# Patient Record
Sex: FEMALE | Race: WHITE | NOT HISPANIC OR LATINO | Employment: UNEMPLOYED | URBAN - METROPOLITAN AREA
[De-identification: names, ages, dates, MRNs, and addresses within clinical notes are randomized per-mention and may not be internally consistent; named-entity substitution may affect disease eponyms.]

---

## 2017-03-12 ENCOUNTER — HOSPITAL ENCOUNTER (EMERGENCY)
Facility: HOSPITAL | Age: 42
Discharge: HOME | End: 2017-03-12
Attending: EMERGENCY MEDICINE | Admitting: EMERGENCY MEDICINE
Payer: COMMERCIAL

## 2017-03-12 VITALS
TEMPERATURE: 98.2 F | DIASTOLIC BLOOD PRESSURE: 82 MMHG | OXYGEN SATURATION: 98 % | HEART RATE: 82 BPM | RESPIRATION RATE: 18 BRPM | WEIGHT: 229.28 LBS | SYSTOLIC BLOOD PRESSURE: 142 MMHG

## 2017-03-12 DIAGNOSIS — R51.9 FACIAL PAIN, ACUTE: Primary | ICD-10-CM

## 2017-03-12 DIAGNOSIS — K08.89 PAIN, DENTAL: ICD-10-CM

## 2017-03-12 PROCEDURE — 99283 EMERGENCY DEPT VISIT LOW MDM: CPT

## 2017-03-12 RX ORDER — HYDROCODONE BITARTRATE AND ACETAMINOPHEN 5; 325 MG/1; MG/1
1 TABLET ORAL EVERY 6 HOURS PRN
Qty: 10 TABLET | Refills: 0 | Status: SHIPPED | OUTPATIENT
Start: 2017-03-12 | End: 2018-06-11

## 2017-03-12 RX ORDER — AMOXICILLIN AND CLAVULANATE POTASSIUM 875; 125 MG/1; MG/1
1 TABLET, FILM COATED ORAL EVERY 12 HOURS
Qty: 20 TABLET | Refills: 0 | Status: SHIPPED | OUTPATIENT
Start: 2017-03-12 | End: 2017-03-22

## 2018-01-18 NOTE — MISCELLANEOUS
Message  Return to work or school:   Jason Montejo is under my professional care   She was seen in my office on 2/29/16   She is able to return to work on  3/2/16            Future Appointments    Signatures   Electronically signed by : Cheli Alba [facility]; Feb 29 2016 11:11AM EST                       (Author)

## 2018-03-29 ENCOUNTER — OFFICE VISIT (OUTPATIENT)
Dept: URGENT CARE | Facility: MEDICAL CENTER | Age: 43
End: 2018-03-29
Payer: COMMERCIAL

## 2018-03-29 VITALS
HEART RATE: 100 BPM | TEMPERATURE: 98.3 F | OXYGEN SATURATION: 100 % | BODY MASS INDEX: 39.91 KG/M2 | HEIGHT: 64 IN | SYSTOLIC BLOOD PRESSURE: 114 MMHG | RESPIRATION RATE: 20 BRPM | WEIGHT: 233.8 LBS | DIASTOLIC BLOOD PRESSURE: 82 MMHG

## 2018-03-29 DIAGNOSIS — B00.9 HERPES SIMPLEX VIRAL INFECTION: Primary | ICD-10-CM

## 2018-03-29 PROCEDURE — 99213 OFFICE O/P EST LOW 20 MIN: CPT | Performed by: NURSE PRACTITIONER

## 2018-03-29 RX ORDER — ACYCLOVIR 400 MG/1
400 TABLET ORAL
Qty: 25 TABLET | Refills: 0 | Status: SHIPPED | OUTPATIENT
Start: 2018-03-29 | End: 2021-01-13 | Stop reason: ALTCHOICE

## 2018-03-29 NOTE — LETTER
March 29, 2018     Patient: Gisela Brown   YOB: 1975   Date of Visit: 3/29/2018       To Whom it May Concern:    Gisela Brown was seen in my clinic on 3/29/2018  She may return to school on 4/2/2018  If you have any questions or concerns, please don't hesitate to call           Sincerely,          SANGEETA Pool        CC: No Recipients

## 2018-03-29 NOTE — PROGRESS NOTES
[facility]    NAME: Jose Daniel Clifton is a 44 y o  female  : 1975    MRN: [id number]  DATE: 2018  TIME: 2:35 PM    Assessment and Plan   Herpes simplex viral infection [B00 9]  1  Herpes simplex viral infection  acyclovir (ZOVIRAX) 400 MG tablet         Patient Instructions   1  Acyclovir 1 tab 5 times daily  For 5 day  2  Sustain from physical contact until lesions are gone  3  Follow up wit PCP/OB GYN as needed   4  Ibuprofen as needed for pain     Chief Complaint     Chief Complaint   Patient presents with    Rash     around lips x 4 days painful         History of Present Illness       45 y/o female presents with rash to lips  She reports that last week they felt dry and burning, so applied a new type of Vaseline until 2 days ago  She states that the rash appeared 4 days ago  She describes it as burning and painful  This morning her lips were swollen which prompted her visit today  She admits that she has been with the same sexual partner for 1 year  She denies history of genital herpes  She admits to oral sex over the weekend  She has not applied anything else to her lips and denies the use of any other new product  Rash   Pertinent negatives include no diarrhea, fatigue, fever or vomiting  Review of Systems   Review of Systems   Constitutional: Negative for chills, fatigue and fever  HENT: Negative for mouth sores  Gastrointestinal: Negative for abdominal pain, diarrhea, nausea and vomiting  Genitourinary: Negative for dysuria, genital sores, pelvic pain and vaginal pain  Skin: Positive for rash           Current Medications       Current Outpatient Prescriptions:     acyclovir (ZOVIRAX) 400 MG tablet, Take 1 tablet (400 mg total) by mouth 5 (five) times a day for 5 days, Disp: 25 tablet, Rfl: 0    HYDROcodone-acetaminophen (NORCO) 5-325 mg per tablet, Take 1 tablet by mouth every 6 (six) hours as needed for pain for up to 10 doses Max Daily Amount: 4 tablets, Disp: 10 tablet, Rfl: 0    Current Allergies     Allergies as of 03/29/2018    (No Known Allergies)            The following portions of the patient's history were reviewed and updated as appropriate: allergies, current medications, past family history, past medical history, past social history, past surgical history and problem list      History reviewed  No pertinent past medical history  History reviewed  No pertinent surgical history  History reviewed  No pertinent family history  Medications have been verified  Objective   /82   Pulse 100   Temp 98 3 °F (36 8 °C) (Temporal)   Resp 20   Ht 5' 4" (1 626 m)   Wt 106 kg (233 lb 12 8 oz)   SpO2 100%   BMI 40 13 kg/m²        Physical Exam     Physical Exam   Constitutional: She is oriented to person, place, and time  She appears well-developed and well-nourished  She is active  HENT:   Head: Normocephalic  Mouth/Throat:       Neck: Normal range of motion  Cardiovascular: Normal rate, regular rhythm, S1 normal, S2 normal and normal heart sounds  Exam reveals no gallop and no friction rub  No murmur heard  Pulmonary/Chest: Effort normal and breath sounds normal  No respiratory distress  Neurological: She is alert and oriented to person, place, and time  GCS eye subscore is 4  GCS verbal subscore is 5  GCS motor subscore is 6  Skin: Skin is warm and dry  Rash noted

## 2018-03-29 NOTE — PATIENT INSTRUCTIONS
1  Acyclovir 1 tab 5 times daily  For 5 day  2  Sustain from physical contact until lesions are gone  3  Follow up wit PCP/OB GYN as needed   4  Ibuprofen as needed for pain   Oral Herpes Simplex Virus Infections   WHAT YOU NEED TO KNOW:   Oral herpes simplex virus (HSV) infections cause sores to form on the mouth, lips, or gums  HSV has 2 types  Oral HSV infections are most often caused by HSV type 1  HSV type 2 normally affects the genital area, but may also occur in the mouth  After you are infected, the virus hides in your nerves and may return  An HSV infection that comes back is also known as a cold sore  DISCHARGE INSTRUCTIONS:   Medicines:   · Antiviral medicine: This decreases symptoms and shortens the amount of time blisters are present  You may also need to take it daily to prevent blisters  The medicine may be given as a liquid, pill, or ointment  Use as directed  · Numbing medicine: This decreases mouth pain  It is usually given as a mouth rinse  Use it before you eat or drink, or as directed  Follow up with your healthcare provider as directed:  Write down your questions so you remember to ask them during your visits  Self-care:   · Eat soft, bland foods:  Avoid salty, acidic, spicy, sharp-edged, and hard foods  Eat healthy foods to help healing  · Drink liquids:  Cool liquids may help soothe your mouth and numb the pain  Avoid citrus or carbonated drinks, such as orange or grapefruit juice, lemonade, or soda  These liquids may cause your mouth to hurt more  A straw may help if you have blisters on the lips or tongue  · Use ice:  Ice helps decrease swelling and pain  Drink cold water or suck on ice to help decrease pain on your tongue or inside your mouth  Use an ice pack, or put crushed ice in a plastic bag on your lip  Cover it with a towel and place it on your lip for 15 to 20 minutes every hour or as directed    Prevent the spread of the herpes simplex virus:   · Do not have close contact with people until the blisters heal  This includes touching, kissing, and oral sex  · Do not get close to babies or to people who are sick while you have cold sores  · Do not share eating utensils, towels, lip balm, or makeup with another person  · Do not touch the blisters or pick at the scabs  Do not touch other body parts, especially your eyes or genitals without washing your hands first  Wash your hands often  Contact your healthcare provider if:   · You have a fever  · Your symptoms become worse or do not improve a week after you start treatment  · You have difficulty eating or drinking because of the pain in your mouth  · You get a headache, are nauseated, or vomit  · Your eyes feel irritated, or you feel like you have something in your eye  · Your skin becomes itchy, swollen, or develops a rash after you take your medicine  · You have questions or concerns about your condition or care  Return to the emergency department if:   · You get a fever, feel achy, or see pus instead of clear fluid in the sores  · You get sores on your eyes  · You have abdominal pain, a severe headache, or confusion  · You get new symptoms, or old symptoms return after you have been treated  © 2017 [employer]  Information is for End User's use only and may not be sold, redistributed or otherwise used for commercial purposes  All illustrations and images included in CareNotes® are the copyrighted property of [employer]  or [address]  The above information is an  only  It is not intended as medical advice for individual conditions or treatments  Talk to your doctor, nurse or pharmacist before following any medical regimen to see if it is safe and effective for you  Gingivostomatitis   WHAT YOU NEED TO KNOW:   Gingivostomatitis (GS) is a condition that causes painful sores on the lips, tongue, gums, and inside the mouth   GS is caused by the herpes simplex virus  The virus spreads easily from person to person through saliva or shared objects  The sores usually heal within 2 weeks with treatment  DISCHARGE INSTRUCTIONS:   Return to the emergency department if:   · You have severe pain  Contact your healthcare provider if:   · Your fever or other symptoms return after treatment  · You are urinating less than usual     · Your mouth sores are draining pus or blood  · You have questions or concerns about your condition or care  Medicines: You may need any of the following:  · Acetaminophen  decreases pain and fever  It is available without a doctor's order  Ask how much to take and how often to take it  Follow directions  Acetaminophen can cause liver damage if not taken correctly  · NSAIDs , such as ibuprofen, help decrease swelling, pain, and fever  This medicine is available with or without a doctor's order  NSAIDs can cause stomach bleeding or kidney problems in certain people  If you take blood thinner medicine, always ask your healthcare provider if NSAIDs are safe for you  Always read the medicine label and follow directions  · Numbing medicine  helps decrease pain from your mouth sores  This medicine is usually a liquid that you swish in your mouth and then spit out  · Antiviral medicine  helps treat a viral infection  · Take your medicine as directed  Contact your healthcare provider if you think your medicine is not helping or if you have side effects  Tell him of her if you are allergic to any medicine  Keep a list of the medicines, vitamins, and herbs you take  Include the amounts, and when and why you take them  Bring the list or the pill bottles to follow-up visits  Carry your medicine list with you in case of an emergency  Manage your symptoms:   · Brush your teeth at least 2 times each day  Floss at least 1 time each day  If you wear dentures, make sure they fit properly       · Drink liquids as directed to prevent dehydration  It is important to drink liquids even though your mouth is sore  Ask how much liquid to drink each day and which liquids are best for you  · Eat a variety of healthy foods  You may need to eat bland foods until your pain gets better  Healthy foods include fruits, vegetables, whole-grain breads, low-fat dairy products, beans, lean meats, and fish  Do not eat spicy, dry, hard, or acidic foods, such as oranges  · Do not smoke  Nicotine and other chemicals in cigarettes and cigars can cause mouth and lung damage  Ask your healthcare provider for information if you currently smoke and need help to quit  E-cigarettes or smokeless tobacco still contain nicotine  Talk to your healthcare provider before you use these products  Follow up with your healthcare provider as directed:  Write down your questions so you remember to ask them during your visits  © 2017 [address] [employer] Information is for End User's use only and may not be sold, redistributed or otherwise used for commercial purposes  All illustrations and images included in CareNotes® are the copyrighted property of [employer]  or [employer]  The above information is an  only  It is not intended as medical advice for individual conditions or treatments  Talk to your doctor, nurse or pharmacist before following any medical regimen to see if it is safe and effective for you

## 2018-06-11 ENCOUNTER — OFFICE VISIT (OUTPATIENT)
Dept: URGENT CARE | Facility: MEDICAL CENTER | Age: 43
End: 2018-06-11
Payer: COMMERCIAL

## 2018-06-11 VITALS
HEART RATE: 115 BPM | WEIGHT: 239.5 LBS | TEMPERATURE: 97.8 F | HEIGHT: 64 IN | OXYGEN SATURATION: 100 % | SYSTOLIC BLOOD PRESSURE: 134 MMHG | RESPIRATION RATE: 20 BRPM | BODY MASS INDEX: 40.89 KG/M2 | DIASTOLIC BLOOD PRESSURE: 80 MMHG

## 2018-06-11 DIAGNOSIS — K08.89 PAIN, DENTAL: Primary | ICD-10-CM

## 2018-06-11 PROCEDURE — 99213 OFFICE O/P EST LOW 20 MIN: CPT | Performed by: FAMILY MEDICINE

## 2018-06-11 RX ORDER — TRAMADOL HYDROCHLORIDE 50 MG/1
50 TABLET ORAL EVERY 6 HOURS PRN
Qty: 8 TABLET | Refills: 0 | Status: SHIPPED | OUTPATIENT
Start: 2018-06-11 | End: 2020-12-08

## 2018-06-11 RX ORDER — AMOXICILLIN 500 MG/1
500 CAPSULE ORAL EVERY 8 HOURS SCHEDULED
Qty: 30 CAPSULE | Refills: 0 | Status: SHIPPED | OUTPATIENT
Start: 2018-06-11 | End: 2018-06-21

## 2018-06-11 NOTE — PROGRESS NOTES
Started yesterday with congestion on right side  Pt stated that it settled in jaw on right side  Having drainage from wisdom tooth on bottom   Having chills off and on

## 2018-06-11 NOTE — PATIENT INSTRUCTIONS
Take amoxicillin 3 times daily for 10 days  Take with food and eat yogurt or a probiotic to decrease GI upset  Tylenol and motrin for pain  Try tramadol as needed for pain  Follow up with your dentist   Ena Arevalo to the ER for any distress

## 2018-06-11 NOTE — PROGRESS NOTES
[facility]        NAME: Mary Caldwell is a 44 y o  female  : 1975    MRN: [id number]  DATE: 2018  TIME: 8:37 AM    Assessment and Plan   Pain, dental [K08 89]  1  Pain, dental  amoxicillin (AMOXIL) 500 mg capsule    traMADol (ULTRAM) 50 mg tablet         Patient Instructions     Take amoxicillin 3 times daily for 10 days  Take with food and eat yogurt or a probiotic to decrease GI upset  Tylenol and motrin for pain  Try tramadol as needed for pain  Follow up with your dentist   Follow up with PCP in 3-5 days  Proceed to  ER if symptoms worsen  Chief Complaint     Chief Complaint   Patient presents with    Dental Pain     x2 days          History of Present Illness       This is a 56-year-old female presenting for dental pain times 3 days  She states that she has infected wisdom tooth on the bottom right, was in a severe amount of pain last night and unable to sleep  She states that this morning the tooth opened up in his began to drain, is feeling a little bit better now but still painful  She has an appointment with a dentist at the end of the week  She denies any fevers but states that she does feel chilled  Review of Systems   Review of Systems   Constitutional: Positive for chills and fatigue  Negative for fever  HENT: Positive for congestion, dental problem and ear pain  Negative for mouth sores and sore throat  Respiratory: Negative for cough  Gastrointestinal: Negative for nausea and vomiting  Musculoskeletal: Negative for neck pain  Skin: Negative for rash  Neurological: Negative for dizziness, light-headedness and headaches           Current Medications       Current Outpatient Prescriptions:     acyclovir (ZOVIRAX) 400 MG tablet, Take 1 tablet (400 mg total) by mouth 5 (five) times a day for 5 days, Disp: 25 tablet, Rfl: 0    amoxicillin (AMOXIL) 500 mg capsule, Take 1 capsule (500 mg total) by mouth every 8 (eight) hours for 10 days, Disp: 30 capsule, Rfl: 0    traMADol (ULTRAM) 50 mg tablet, Take 1 tablet (50 mg total) by mouth every 6 (six) hours as needed for moderate pain, Disp: 8 tablet, Rfl: 0    Current Allergies     Allergies as of 06/11/2018    (No Known Allergies)            The following portions of the patient's history were reviewed and updated as appropriate: allergies, current medications, past family history, past medical history, past social history, past surgical history and problem list      History reviewed  No pertinent past medical history  History reviewed  No pertinent surgical history  No family history on file  Medications have been verified  Objective   /80   Pulse (!) 115   Temp 97 8 °F (36 6 °C) (Temporal)   Resp 20   Ht 5' 4" (1 626 m)   Wt 109 kg (239 lb 8 oz)   SpO2 100%   BMI 41 11 kg/m²        Physical Exam     Physical Exam   Constitutional: She appears well-developed and well-nourished  No distress  HENT:   Head: Normocephalic and atraumatic  Right Ear: Tympanic membrane, external ear and ear canal normal    Left Ear: Tympanic membrane, external ear and ear canal normal    Nose: Nose normal    Mouth/Throat: Uvula is midline, oropharynx is clear and moist and mucous membranes are normal  Abnormal dentition (  There is purulent drainage noted from the far back bottom right tooth  There is some mild edema to the jawline  Tenderness to palpation over the jaw  )  Eyes: Conjunctivae and EOM are normal  Pupils are equal, round, and reactive to light  Cardiovascular: Normal rate, regular rhythm and normal heart sounds  Pulmonary/Chest: Effort normal and breath sounds normal  No respiratory distress  She has no wheezes  She has no rales  Neurological: She is alert  Skin: Skin is warm and dry  She is not diaphoretic  Nursing note and vitals reviewed

## 2018-06-11 NOTE — LETTER
June 11, 2018     Patient: Pema Cooley   YOB: 1975   Date of Visit: 6/11/2018       To Whom it May Concern:    Pema Cooley was seen in my clinic on 6/11/2018  She may return to work on 6/13/2018  If you have any questions or concerns, please don't hesitate to call           Sincerely,          Schuyler Cuba PA-C        CC: No Recipients

## 2019-03-12 ENCOUNTER — OFFICE VISIT (OUTPATIENT)
Dept: URGENT CARE | Facility: CLINIC | Age: 44
End: 2019-03-12
Payer: COMMERCIAL

## 2019-03-12 VITALS
RESPIRATION RATE: 16 BRPM | HEART RATE: 88 BPM | DIASTOLIC BLOOD PRESSURE: 80 MMHG | WEIGHT: 242 LBS | BODY MASS INDEX: 41.32 KG/M2 | SYSTOLIC BLOOD PRESSURE: 142 MMHG | TEMPERATURE: 98.3 F | HEIGHT: 64 IN

## 2019-03-12 DIAGNOSIS — K08.89 PAIN, DENTAL: Primary | ICD-10-CM

## 2019-03-12 PROCEDURE — 99213 OFFICE O/P EST LOW 20 MIN: CPT | Performed by: PHYSICIAN ASSISTANT

## 2019-03-12 RX ORDER — AMOXICILLIN 500 MG/1
500 CAPSULE ORAL EVERY 8 HOURS SCHEDULED
Qty: 30 CAPSULE | Refills: 0 | Status: SHIPPED | OUTPATIENT
Start: 2019-03-12 | End: 2019-03-22

## 2019-03-12 RX ORDER — IBUPROFEN 800 MG/1
800 TABLET ORAL EVERY 8 HOURS PRN
Qty: 30 TABLET | Refills: 0 | Status: SHIPPED | OUTPATIENT
Start: 2019-03-12 | End: 2019-04-16 | Stop reason: ALTCHOICE

## 2019-03-12 NOTE — PROGRESS NOTES
[facility]        NAME: Salo Torres is a 38 y o  female  : 1975    MRN: [id number]  DATE: 2019  TIME: 5:07 PM    Assessment and Plan   Pain, dental [K08 89]  1  Pain, dental  amoxicillin (AMOXIL) 500 mg capsule    ibuprofen (MOTRIN) 800 mg tablet         Patient Instructions     Amoxicillin as directed  Ibuprofen as directed  Salt water swishes  Follow up with Dentist   Proceed to  ER if symptoms worsen  Chief Complaint     Chief Complaint   Patient presents with    Facial Pain     pain L upper jaw, started last night, some swelling inside mouth,          History of Present Illness       Pt is a 38 yr old female presenting for left upper tooth pain since yesterday  She believes it is from her molar but she also has a tooth with a crown next to it  She first noticed sensitivity and discomfort while eating dinner yesterday  She is unable to bite down on the upper left teeth today due to the pain  The pain is radiating up the left side of her sinuses now and she has noticed some swelling to the gums  No f/c  She has taken Motrin with little relief of pain  Review of Systems   Review of Systems   Constitutional: Negative for chills and fever  HENT: Positive for dental problem  Negative for facial swelling and trouble swallowing  Respiratory: Negative for shortness of breath  Gastrointestinal: Negative for vomiting           Current Medications       Current Outpatient Medications:     acyclovir (ZOVIRAX) 400 MG tablet, Take 1 tablet (400 mg total) by mouth 5 (five) times a day for 5 days, Disp: 25 tablet, Rfl: 0    amoxicillin (AMOXIL) 500 mg capsule, Take 1 capsule (500 mg total) by mouth every 8 (eight) hours for 10 days, Disp: 30 capsule, Rfl: 0    ibuprofen (MOTRIN) 800 mg tablet, Take 1 tablet (800 mg total) by mouth every 8 (eight) hours as needed for mild pain, Disp: 30 tablet, Rfl: 0    traMADol (ULTRAM) 50 mg tablet, Take 1 tablet (50 mg total) by mouth every 6 (six) hours as needed for moderate pain (Patient not taking: Reported on 3/12/2019), Disp: 8 tablet, Rfl: 0    Current Allergies     Allergies as of 03/12/2019    (No Known Allergies)            The following portions of the patient's history were reviewed and updated as appropriate: allergies, current medications, past family history, past medical history, past social history, past surgical history and problem list      Past Medical History:   Diagnosis Date    No known health problems        Past Surgical History:   Procedure Laterality Date    NO PAST SURGERIES         No family history on file  Medications have been verified  Objective   /80 (Patient Position: Sitting)   Pulse 88   Temp 98 3 °F (36 8 °C) (Temporal)   Resp 16   Ht 5' 4" (1 626 m)   Wt 110 kg (242 lb)   BMI 41 54 kg/m²        Physical Exam     Physical Exam   Constitutional: She is oriented to person, place, and time  She appears well-developed and well-nourished  No distress  HENT:   Head: Normocephalic and atraumatic  Left upper molars very tender to touch  Molar with crown has dental cavity  Scant swelling noted around gum  No erythema  Pulmonary/Chest: Effort normal    Neurological: She is alert and oriented to person, place, and time  Skin: Skin is warm and dry  No rash noted  She is not diaphoretic  Psychiatric: She has a normal mood and affect   Her behavior is normal  Judgment and thought content normal

## 2019-03-12 NOTE — LETTER
March 12, 2019     Patient: Kishore Starkey   YOB: 1975   Date of Visit: 3/12/2019       To Whom it May Concern:    Kishore Starkey was seen in my clinic on 3/12/2019  If you have any questions or concerns, please don't hesitate to call           Sincerely,          Jessenia Angeles PA-C        CC: Kishore Ramírezchaparro

## 2019-03-12 NOTE — PATIENT INSTRUCTIONS
Amoxicillin as directed  Ibuprofen as directed  Salt water swishes  Follow up with Dentist   Proceed to  ER if symptoms worsen

## 2019-04-03 ENCOUNTER — OFFICE VISIT (OUTPATIENT)
Dept: URGENT CARE | Facility: CLINIC | Age: 44
End: 2019-04-03
Payer: COMMERCIAL

## 2019-04-03 VITALS
DIASTOLIC BLOOD PRESSURE: 76 MMHG | HEART RATE: 88 BPM | SYSTOLIC BLOOD PRESSURE: 159 MMHG | WEIGHT: 245 LBS | RESPIRATION RATE: 18 BRPM | TEMPERATURE: 97.7 F | BODY MASS INDEX: 42.05 KG/M2 | OXYGEN SATURATION: 98 %

## 2019-04-03 DIAGNOSIS — J06.9 ACUTE URI: Primary | ICD-10-CM

## 2019-04-03 PROCEDURE — 99213 OFFICE O/P EST LOW 20 MIN: CPT | Performed by: NURSE PRACTITIONER

## 2019-04-03 RX ORDER — ALBUTEROL SULFATE 90 UG/1
2 AEROSOL, METERED RESPIRATORY (INHALATION) EVERY 4 HOURS PRN
Qty: 18 G | Refills: 0 | Status: SHIPPED | OUTPATIENT
Start: 2019-04-03 | End: 2021-01-13 | Stop reason: ALTCHOICE

## 2019-04-03 RX ORDER — BENZONATATE 200 MG/1
200 CAPSULE ORAL 3 TIMES DAILY PRN
Qty: 20 CAPSULE | Refills: 0 | Status: SHIPPED | OUTPATIENT
Start: 2019-04-03 | End: 2020-12-08

## 2019-04-03 RX ORDER — GUAIFENESIN 600 MG
1200 TABLET, EXTENDED RELEASE 12 HR ORAL EVERY 12 HOURS SCHEDULED
Qty: 20 TABLET | Refills: 0 | Status: SHIPPED | OUTPATIENT
Start: 2019-04-03 | End: 2020-12-08

## 2019-04-04 ENCOUNTER — OFFICE VISIT (OUTPATIENT)
Dept: URGENT CARE | Facility: MEDICAL CENTER | Age: 44
End: 2019-04-04
Payer: COMMERCIAL

## 2019-04-04 VITALS
TEMPERATURE: 97.8 F | DIASTOLIC BLOOD PRESSURE: 88 MMHG | HEART RATE: 98 BPM | SYSTOLIC BLOOD PRESSURE: 135 MMHG | HEIGHT: 64 IN | OXYGEN SATURATION: 98 % | WEIGHT: 240 LBS | BODY MASS INDEX: 40.97 KG/M2 | RESPIRATION RATE: 20 BRPM

## 2019-04-04 DIAGNOSIS — J06.9 UPPER RESPIRATORY TRACT INFECTION, UNSPECIFIED TYPE: Primary | ICD-10-CM

## 2019-04-04 PROCEDURE — 99213 OFFICE O/P EST LOW 20 MIN: CPT | Performed by: PHYSICIAN ASSISTANT

## 2019-04-16 ENCOUNTER — OFFICE VISIT (OUTPATIENT)
Dept: URGENT CARE | Facility: CLINIC | Age: 44
End: 2019-04-16
Payer: COMMERCIAL

## 2019-04-16 VITALS
DIASTOLIC BLOOD PRESSURE: 77 MMHG | BODY MASS INDEX: 42.03 KG/M2 | TEMPERATURE: 99.5 F | HEIGHT: 64 IN | WEIGHT: 246.2 LBS | HEART RATE: 85 BPM | SYSTOLIC BLOOD PRESSURE: 144 MMHG | RESPIRATION RATE: 18 BRPM | OXYGEN SATURATION: 98 %

## 2019-04-16 DIAGNOSIS — K02.9 PAIN DUE TO DENTAL CARIES: Primary | ICD-10-CM

## 2019-04-16 PROCEDURE — 99213 OFFICE O/P EST LOW 20 MIN: CPT | Performed by: NURSE PRACTITIONER

## 2019-04-16 RX ORDER — IBUPROFEN 800 MG/1
800 TABLET ORAL EVERY 8 HOURS PRN
Qty: 20 TABLET | Refills: 0 | Status: SHIPPED | OUTPATIENT
Start: 2019-04-16 | End: 2021-01-13 | Stop reason: ALTCHOICE

## 2019-04-16 RX ORDER — CLINDAMYCIN HYDROCHLORIDE 300 MG/1
300 CAPSULE ORAL 4 TIMES DAILY
Qty: 40 CAPSULE | Refills: 0 | Status: SHIPPED | OUTPATIENT
Start: 2019-04-16 | End: 2019-04-26

## 2019-10-13 ENCOUNTER — OFFICE VISIT (OUTPATIENT)
Dept: URGENT CARE | Facility: MEDICAL CENTER | Age: 44
End: 2019-10-13
Payer: COMMERCIAL

## 2019-10-13 ENCOUNTER — HOSPITAL ENCOUNTER (EMERGENCY)
Facility: HOSPITAL | Age: 44
Discharge: HOME | End: 2019-10-13
Attending: EMERGENCY MEDICINE
Payer: COMMERCIAL

## 2019-10-13 VITALS
TEMPERATURE: 98.5 F | SYSTOLIC BLOOD PRESSURE: 181 MMHG | DIASTOLIC BLOOD PRESSURE: 82 MMHG | OXYGEN SATURATION: 97 % | BODY MASS INDEX: 44.11 KG/M2 | WEIGHT: 257 LBS | HEART RATE: 70 BPM | RESPIRATION RATE: 16 BRPM

## 2019-10-13 VITALS
RESPIRATION RATE: 18 BRPM | HEIGHT: 64 IN | SYSTOLIC BLOOD PRESSURE: 148 MMHG | BODY MASS INDEX: 43.81 KG/M2 | WEIGHT: 256.6 LBS | TEMPERATURE: 98.2 F | DIASTOLIC BLOOD PRESSURE: 94 MMHG | HEART RATE: 96 BPM | OXYGEN SATURATION: 98 %

## 2019-10-13 DIAGNOSIS — K04.7 DENTAL ABSCESS: Primary | ICD-10-CM

## 2019-10-13 DIAGNOSIS — K08.89 PAIN, DENTAL: Primary | ICD-10-CM

## 2019-10-13 DIAGNOSIS — K04.7 DENTAL INFECTION: ICD-10-CM

## 2019-10-13 PROCEDURE — 99213 OFFICE O/P EST LOW 20 MIN: CPT | Performed by: PHYSICIAN ASSISTANT

## 2019-10-13 PROCEDURE — 99284 EMERGENCY DEPT VISIT MOD MDM: CPT | Performed by: PHYSICIAN ASSISTANT

## 2019-10-13 PROCEDURE — 99282 EMERGENCY DEPT VISIT SF MDM: CPT

## 2019-10-13 RX ORDER — IBUPROFEN 600 MG/1
600 TABLET ORAL EVERY 8 HOURS PRN
Qty: 30 TABLET | Refills: 0 | Status: SHIPPED | OUTPATIENT
Start: 2019-10-13 | End: 2019-10-18

## 2019-10-13 RX ORDER — LIDOCAINE HYDROCHLORIDE 20 MG/ML
15 SOLUTION OROPHARYNGEAL ONCE
Status: COMPLETED | OUTPATIENT
Start: 2019-10-13 | End: 2019-10-13

## 2019-10-13 RX ORDER — LIDOCAINE HYDROCHLORIDE 20 MG/ML
15 SOLUTION OROPHARYNGEAL 4 TIMES DAILY PRN
Qty: 100 ML | Refills: 0 | Status: SHIPPED | OUTPATIENT
Start: 2019-10-13 | End: 2021-01-13 | Stop reason: ALTCHOICE

## 2019-10-13 RX ORDER — AMOXICILLIN 500 MG/1
500 TABLET, FILM COATED ORAL 3 TIMES DAILY
Qty: 30 TABLET | Refills: 0 | Status: SHIPPED | OUTPATIENT
Start: 2019-10-13 | End: 2019-10-23

## 2019-10-13 RX ADMIN — LIDOCAINE HYDROCHLORIDE 15 ML: 20 SOLUTION ORAL; TOPICAL at 15:38

## 2019-10-13 NOTE — PATIENT INSTRUCTIONS
1  Take Amox; 2 as 1st dose, then 1 tablet 3x daily x 10 days  2  Use Motrin 600mg  Every 8 hours as needed for pain  3  Dental consult ASAP

## 2019-10-13 NOTE — ED PROVIDER NOTES
History  Chief Complaint   Patient presents with    Dental Pain     Pt presents to the ED with dental pain over the right side  Pt reports radiating pain into ear and jaw  Was seen at urgent care for same  Patient is a 37 y/o female with no significant past medical history who presents with right dental pain for 2 days  Patient states she was seen at urgent care today with the same symptoms and was diagnosed with a dental abscess, though she says a provider said there is no drainable abscess at that time  She was given amoxicillin and ibuprofen and has taken 1 doses of each, the pain is still intense, so she came to the ED to see if there is anything else to do for her pain  She states her last dose of ibuprofen was approximately 2 hours prior to arrival   She states it provided some relief, but the pain is aching to sharp and radiates into her right ear with some mild facial swelling  She denies any drainage from the ear, swelling, stridor, difficulty breathing, throat swelling, drainage from the area, history of abscess  Patient states she has not been the dentist in a while, but plans to schedule an appointment this week  Patient states she is otherwise in her usual state of health and denies any fevers, chills, diaphoresis, headaches, vision changes, neck pain or stiffness, congestion, cough, shortness of breath, chest pain, abdominal pain, nausea, vomiting, diarrhea, urinary changes, or rash          Prior to Admission Medications   Prescriptions Last Dose Informant Patient Reported?  Taking?   acyclovir (ZOVIRAX) 400 MG tablet   No No   Sig: Take 1 tablet (400 mg total) by mouth 5 (five) times a day for 5 days   albuterol (VENTOLIN HFA) 90 mcg/act inhaler Not Taking at Unknown time  No No   Sig: Inhale 2 puffs every 4 (four) hours as needed for wheezing   Patient not taking: Reported on 10/13/2019   amoxicillin (AMOXIL) 500 MG tablet   No Yes   Sig: Take 1 tablet (500 mg total) by mouth 3 (three) times a day for 10 days   benzonatate (TESSALON) 200 MG capsule Not Taking at Unknown time  No No   Sig: Take 1 capsule (200 mg total) by mouth 3 (three) times a day as needed for cough   Patient not taking: Reported on 10/13/2019   guaiFENesin (MUCINEX) 600 mg 12 hr tablet Not Taking at Unknown time  No No   Sig: Take 2 tablets (1,200 mg total) by mouth every 12 (twelve) hours   Patient not taking: Reported on 4/16/2019   ibuprofen (MOTRIN) 600 mg tablet   No Yes   Sig: Take 1 tablet (600 mg total) by mouth every 8 (eight) hours as needed for mild pain or moderate pain for up to 5 days   ibuprofen (MOTRIN) 800 mg tablet Not Taking at Unknown time  No No   Sig: Take 1 tablet (800 mg total) by mouth every 8 (eight) hours as needed for mild pain   Patient not taking: Reported on 10/13/2019   traMADol (ULTRAM) 50 mg tablet Not Taking at Unknown time Self No No   Sig: Take 1 tablet (50 mg total) by mouth every 6 (six) hours as needed for moderate pain   Patient not taking: Reported on 3/12/2019      Facility-Administered Medications: None       Past Medical History:   Diagnosis Date    No known health problems        Past Surgical History:   Procedure Laterality Date    NO PAST SURGERIES         Family History   Problem Relation Age of Onset    No Known Problems Mother     No Known Problems Father      I have reviewed and agree with the history as documented  Social History     Tobacco Use    Smoking status: Current Every Day Smoker     Packs/day: 0 25     Types: Cigarettes    Smokeless tobacco: Never Used   Substance Use Topics    Alcohol use: No    Drug use: No        Review of Systems   Constitutional: Negative for chills, diaphoresis and fever  HENT: Positive for dental problem, ear pain and facial swelling  Negative for congestion, drooling, ear discharge, mouth sores, rhinorrhea and sore throat  Eyes: Negative for visual disturbance     Respiratory: Negative for cough, shortness of breath, wheezing and stridor  Cardiovascular: Negative for chest pain, palpitations and leg swelling  Gastrointestinal: Negative for abdominal pain, diarrhea, nausea and vomiting  Genitourinary: Negative for difficulty urinating  Musculoskeletal: Negative for neck pain and neck stiffness  Skin: Negative for color change, pallor and rash  Neurological: Negative for light-headedness and headaches  All other systems reviewed and are negative  Physical Exam  Physical Exam   Constitutional: She is oriented to person, place, and time  She appears well-developed and well-nourished  She is active and cooperative  Non-toxic appearance  She does not have a sickly appearance  She does not appear ill  No distress  HENT:   Head: Normocephalic and atraumatic  Right Ear: Hearing, tympanic membrane, external ear and ear canal normal    Left Ear: Hearing, tympanic membrane, external ear and ear canal normal    Nose: Nose normal    Mouth/Throat: Uvula is midline, oropharynx is clear and moist and mucous membranes are normal  No oral lesions  Dental caries present  No dental abscesses or uvula swelling  No oropharyngeal exudate, posterior oropharyngeal edema or posterior oropharyngeal erythema  Eyes: Pupils are equal, round, and reactive to light  Conjunctivae and EOM are normal    Neck: Normal range of motion  Neck supple  Cardiovascular: Normal rate, regular rhythm, normal heart sounds and intact distal pulses  Pulmonary/Chest: Effort normal and breath sounds normal  No stridor  No respiratory distress  She has no wheezes  Abdominal: Soft  Bowel sounds are normal  She exhibits no distension  There is no tenderness  Musculoskeletal: Normal range of motion  Lymphadenopathy:     She has no cervical adenopathy  Neurological: She is alert and oriented to person, place, and time  Skin: Skin is warm and dry  Capillary refill takes less than 2 seconds  She is not diaphoretic     Nursing note and vitals reviewed  Vital Signs  ED Triage Vitals [10/13/19 1435]   Temperature Pulse Respirations Blood Pressure SpO2   98 5 °F (36 9 °C) 70 16 (!) 181/82 97 %      Temp Source Heart Rate Source Patient Position - Orthostatic VS BP Location FiO2 (%)   Oral Monitor -- -- --      Pain Score       Worst Possible Pain           Vitals:    10/13/19 1435   BP: (!) 181/82   Pulse: 70         Visual Acuity      ED Medications  Medications   Lidocaine Viscous HCl (XYLOCAINE) 2 % mucosal solution 15 mL (15 mL Swish & Spit Given 10/13/19 1538)       Diagnostic Studies  Results Reviewed     None                 No orders to display              Procedures  Procedures       ED Course                               MDM  Number of Diagnoses or Management Options  Dental infection:   Pain, dental:   Diagnosis management comments: Discussed exam today is consistent with dental infection and patient should continue previously prescribed medications including antibiotics  Discussed most long-term and ultimate treatment of patient's dental pain is follow-up with a dentist and instructed to follow up with a dentist as soon as possible  Patient noted improvement of symptoms after viscous lidocaine in ED  Provided with prescription for management of pain at home  Reviewed red flags symptoms and return to ED instructions  Patient notes understanding and agrees to plan  Disposition  Final diagnoses:   Pain, dental   Dental infection     Time reflects when diagnosis was documented in both MDM as applicable and the Disposition within this note     Time User Action Codes Description Comment    10/13/2019  3:39 PM Samuel Cottrell [K08 [id number]] Pain, dental     10/13/2019  3:39 PM Samuel Cottrell [K04 [id number]] Dental infection       ED Disposition     ED Disposition Condition Date/Time Comment    Discharge Stable Sun Oct 13, 2019  3:39 PM Andres Gearing discharge to home/self care              Follow-up Information     Follow up With Specialties Details Why Contact Info Additional Information    Victor Manuel Yee   As needed [address]  [place]  [address] [facility]  [phone]       [address] Emergency Department Emergency Medicine  If symptoms worsen [address]  [phone] [facility] ED, [address], [place], [place], [address] Adult and [address] [employer] [address]  Schedule an appointment as soon as possible for a visit   [address]  [phone]           Discharge Medication List as of 10/13/2019  3:45 PM      START taking these medications    Details   Lidocaine Viscous HCl (XYLOCAINE) 2 % mucosal solution Swish and spit 15 mL 4 (four) times a day as needed for mild pain, Starting Sun 10/13/2019, Print         CONTINUE these medications which have NOT CHANGED    Details   amoxicillin (AMOXIL) 500 MG tablet Take 1 tablet (500 mg total) by mouth 3 (three) times a day for 10 days, Starting Sun 10/13/2019, Until Wed 10/23/2019, Normal      !! ibuprofen (MOTRIN) 600 mg tablet Take 1 tablet (600 mg total) by mouth every 8 (eight) hours as needed for mild pain or moderate pain for up to 5 days, Starting Sun 10/13/2019, Until Fri 10/18/2019, Normal      acyclovir (ZOVIRAX) 400 MG tablet Take 1 tablet (400 mg total) by mouth 5 (five) times a day for 5 days, Starting Thu 3/29/2018, Until Tue 4/3/2018, Normal      albuterol (VENTOLIN HFA) 90 mcg/act inhaler Inhale 2 puffs every 4 (four) hours as needed for wheezing, Starting Wed 4/3/2019, Normal      benzonatate (TESSALON) 200 MG capsule Take 1 capsule (200 mg total) by mouth 3 (three) times a day as needed for cough, Starting Wed 4/3/2019, Normal      guaiFENesin (MUCINEX) 600 mg 12 hr tablet Take 2 tablets (1,200 mg total) by mouth every 12 (twelve) hours, Starting Wed 4/3/2019, Normal      !! ibuprofen (MOTRIN) 800 mg tablet Take 1 tablet (800 mg total) by mouth every 8 (eight) hours as needed for mild pain, Starting Tue 4/16/2019, Normal      traMADol (ULTRAM) 50 mg tablet Take 1 tablet (50 mg total) by mouth every 6 (six) hours as needed for moderate pain, Starting Mon 6/11/2018, Print       !! - Potential duplicate medications found  Please discuss with provider  No discharge procedures on file      ED Provider  Electronically Signed by           Estee Clay PA-C  10/17/19 3041

## 2019-10-13 NOTE — DISCHARGE INSTRUCTIONS
Continue oxacillin as previously prescribed for dental infection  Continue ibuprofen as previously prescribed for pain  Start using lidocaine as prescribed as needed for pain  Follow-up with dentist as soon as possible for further evaluation  Return to ED if symptoms worsen including difficulty breathing, stridor, increased swelling

## 2020-03-25 ENCOUNTER — NURSE TRIAGE (OUTPATIENT)
Dept: OTHER | Facility: OTHER | Age: 45
End: 2020-03-25

## 2020-03-25 NOTE — TELEPHONE ENCOUNTER
Regarding: Corona - 2 of 2  ----- Message from [facility] ([facility]) sent at 3/25/2020  3:37 PM EDT -----  Fever: No  SOB: No  Cough: Yes  Symptoms since last week, also has chest tightness    PMH: Alopecia    Recent travel outside the country: No  Exposed to anyone positive for coronavirus: No

## 2020-03-25 NOTE — TELEPHONE ENCOUNTER
Reason for Disposition   [1] Caller concerned that exposure to COVID-19 occurred BUT [2] does not meet COVID-19 EXPOSURE criteria from [facility]    Answer Assessment - Initial Assessment Questions  1  CONFIRMED CASE: "Who is the person with the confirmed COVID-19 infection that you were exposed to?"      unsure  2  PLACE of CONTACT: "Where were you when you were exposed to COVID-19  (coronavirus disease 2019)?" (e g , city, state, country)      unsure  3  TYPE of CONTACT: "How much contact was there?" (e g , live in same house, work in same office, same school)      unsure  4  DATE of CONTACT: "When did you have contact with a coronavirus patient?" (e g , days)      unsure  5  DURATION of CONTACT: "How long were you in contact with the COVID-19 (coronavirus disease) patient?" (e g , a few seconds, passed by person, a few minutes, live with the patient)      unsure  6  SYMPTOMS: "Do you have any symptoms?" (e g , fever, cough, breathing difficulty)      Cough and tightness with breathing, aching in lungs, temp today 98 8  7  PREGNANCY OR POSTPARTUM: "Is there any chance you are pregnant?" "When was your last menstrual period?" "Did you deliver in the last 2 weeks?"      no  8  HIGH RISK: "Do you have any heart or lung problems?  Do you have a weakened immune system?" (e g , CHF, COPD, asthma, HIV positive, chemotherapy, renal failure, diabetes mellitus, sickle cell anemia)      alopecia    Protocols used: CORONAVIRUS (COVID-19) EXPOSURE-ADULT-

## 2020-12-08 ENCOUNTER — OFFICE VISIT (OUTPATIENT)
Dept: URGENT CARE | Facility: CLINIC | Age: 45
End: 2020-12-08
Payer: COMMERCIAL

## 2020-12-08 VITALS
RESPIRATION RATE: 16 BRPM | WEIGHT: 240 LBS | HEIGHT: 64 IN | HEART RATE: 110 BPM | BODY MASS INDEX: 40.97 KG/M2 | OXYGEN SATURATION: 97 % | TEMPERATURE: 95.7 F

## 2020-12-08 DIAGNOSIS — J06.9 VIRAL UPPER RESPIRATORY TRACT INFECTION: Primary | ICD-10-CM

## 2020-12-08 DIAGNOSIS — J01.00 ACUTE NON-RECURRENT MAXILLARY SINUSITIS: ICD-10-CM

## 2020-12-08 PROCEDURE — U0003 INFECTIOUS AGENT DETECTION BY NUCLEIC ACID (DNA OR RNA); SEVERE ACUTE RESPIRATORY SYNDROME CORONAVIRUS 2 (SARS-COV-2) (CORONAVIRUS DISEASE [COVID-19]), AMPLIFIED PROBE TECHNIQUE, MAKING USE OF HIGH THROUGHPUT TECHNOLOGIES AS DESCRIBED BY CMS-2020-01-R: HCPCS

## 2020-12-08 PROCEDURE — 99213 OFFICE O/P EST LOW 20 MIN: CPT | Performed by: NURSE PRACTITIONER

## 2020-12-08 RX ORDER — AMOXICILLIN AND CLAVULANATE POTASSIUM 875; 125 MG/1; MG/1
1 TABLET, FILM COATED ORAL EVERY 12 HOURS SCHEDULED
Qty: 14 TABLET | Refills: 0 | Status: SHIPPED | OUTPATIENT
Start: 2020-12-08 | End: 2020-12-15

## 2020-12-09 DIAGNOSIS — J01.91 ACUTE RECURRENT SINUSITIS, UNSPECIFIED LOCATION: Primary | ICD-10-CM

## 2020-12-09 RX ORDER — DOXYCYCLINE 100 MG/1
100 CAPSULE ORAL 2 TIMES DAILY
Qty: 14 CAPSULE | Refills: 0 | Status: SHIPPED | OUTPATIENT
Start: 2020-12-09 | End: 2020-12-16

## 2020-12-10 LAB — SARS-COV-2 RNA SPEC QL NAA+PROBE: DETECTED

## 2021-01-06 ENCOUNTER — APPOINTMENT (EMERGENCY)
Dept: CT IMAGING | Facility: HOSPITAL | Age: 46
End: 2021-01-06
Payer: COMMERCIAL

## 2021-01-06 ENCOUNTER — HOSPITAL ENCOUNTER (EMERGENCY)
Facility: HOSPITAL | Age: 46
Discharge: HOME | End: 2021-01-06
Attending: EMERGENCY MEDICINE
Payer: COMMERCIAL

## 2021-01-06 VITALS
HEART RATE: 75 BPM | RESPIRATION RATE: 20 BRPM | TEMPERATURE: 98.9 F | OXYGEN SATURATION: 96 % | DIASTOLIC BLOOD PRESSURE: 67 MMHG | SYSTOLIC BLOOD PRESSURE: 139 MMHG

## 2021-01-06 DIAGNOSIS — R07.89 ATYPICAL CHEST PAIN: Primary | ICD-10-CM

## 2021-01-06 DIAGNOSIS — Z86.16 HISTORY OF COVID-19: ICD-10-CM

## 2021-01-06 DIAGNOSIS — R09.1 PLEURISY: ICD-10-CM

## 2021-01-06 DIAGNOSIS — K21.9 ACID REFLUX: ICD-10-CM

## 2021-01-06 LAB
ALBUMIN SERPL BCP-MCNC: 3.7 G/DL (ref 3.5–5)
ALP SERPL-CCNC: 71 U/L (ref 46–116)
ALT SERPL W P-5'-P-CCNC: 34 U/L (ref 12–78)
ANION GAP SERPL CALCULATED.3IONS-SCNC: 11 MMOL/L (ref 4–13)
AST SERPL W P-5'-P-CCNC: 19 U/L (ref 5–45)
ATRIAL RATE: 88 BPM
BASOPHILS # BLD AUTO: 0.09 THOUSANDS/ΜL (ref 0–0.1)
BASOPHILS NFR BLD AUTO: 1 % (ref 0–1)
BILIRUB SERPL-MCNC: 0.69 MG/DL (ref 0.2–1)
BUN SERPL-MCNC: 11 MG/DL (ref 5–25)
CALCIUM SERPL-MCNC: 9.3 MG/DL (ref 8.3–10.1)
CHLORIDE SERPL-SCNC: 104 MMOL/L (ref 100–108)
CO2 SERPL-SCNC: 24 MMOL/L (ref 21–32)
CREAT SERPL-MCNC: 1.01 MG/DL (ref 0.6–1.3)
D DIMER PPP FEU-MCNC: 1.22 UG/ML FEU
EOSINOPHIL # BLD AUTO: 0.04 THOUSAND/ΜL (ref 0–0.61)
EOSINOPHIL NFR BLD AUTO: 0 % (ref 0–6)
ERYTHROCYTE [DISTWIDTH] IN BLOOD BY AUTOMATED COUNT: 16.4 % (ref 11.6–15.1)
EXT PREG TEST URINE: NEGATIVE
EXT. CONTROL ED NAV: NORMAL
GFR SERPL CREATININE-BSD FRML MDRD: 68 ML/MIN/1.73SQ M
GLUCOSE SERPL-MCNC: 103 MG/DL (ref 65–140)
HCT VFR BLD AUTO: 41.5 % (ref 34.8–46.1)
HGB BLD-MCNC: 12.7 G/DL (ref 11.5–15.4)
IMM GRANULOCYTES # BLD AUTO: 0.05 THOUSAND/UL (ref 0–0.2)
IMM GRANULOCYTES NFR BLD AUTO: 0 % (ref 0–2)
LYMPHOCYTES # BLD AUTO: 2.82 THOUSANDS/ΜL (ref 0.6–4.47)
LYMPHOCYTES NFR BLD AUTO: 20 % (ref 14–44)
MCH RBC QN AUTO: 21.2 PG (ref 26.8–34.3)
MCHC RBC AUTO-ENTMCNC: 30.6 G/DL (ref 31.4–37.4)
MCV RBC AUTO: 69 FL (ref 82–98)
MONOCYTES # BLD AUTO: 0.66 THOUSAND/ΜL (ref 0.17–1.22)
MONOCYTES NFR BLD AUTO: 5 % (ref 4–12)
NEUTROPHILS # BLD AUTO: 10.5 THOUSANDS/ΜL (ref 1.85–7.62)
NEUTS SEG NFR BLD AUTO: 74 % (ref 43–75)
NRBC BLD AUTO-RTO: 0 /100 WBCS
P AXIS: 64 DEGREES
PLATELET # BLD AUTO: 393 THOUSANDS/UL (ref 149–390)
PMV BLD AUTO: 9 FL (ref 8.9–12.7)
POTASSIUM SERPL-SCNC: 3.8 MMOL/L (ref 3.5–5.3)
PR INTERVAL: 142 MS
PROT SERPL-MCNC: 7.9 G/DL (ref 6.4–8.2)
QRS AXIS: -70 DEGREES
QRSD INTERVAL: 96 MS
QT INTERVAL: 388 MS
QTC INTERVAL: 462 MS
RBC # BLD AUTO: 5.98 MILLION/UL (ref 3.81–5.12)
SODIUM SERPL-SCNC: 139 MMOL/L (ref 136–145)
T WAVE AXIS: 28 DEGREES
TROPONIN I SERPL-MCNC: <0.02 NG/ML
TROPONIN I SERPL-MCNC: <0.02 NG/ML
VENTRICULAR RATE: 85 BPM
WBC # BLD AUTO: 14.16 THOUSAND/UL (ref 4.31–10.16)

## 2021-01-06 PROCEDURE — 99285 EMERGENCY DEPT VISIT HI MDM: CPT

## 2021-01-06 PROCEDURE — G1004 CDSM NDSC: HCPCS

## 2021-01-06 PROCEDURE — 99284 EMERGENCY DEPT VISIT MOD MDM: CPT | Performed by: EMERGENCY MEDICINE

## 2021-01-06 PROCEDURE — 71275 CT ANGIOGRAPHY CHEST: CPT

## 2021-01-06 PROCEDURE — 85025 COMPLETE CBC W/AUTO DIFF WBC: CPT | Performed by: EMERGENCY MEDICINE

## 2021-01-06 PROCEDURE — 80053 COMPREHEN METABOLIC PANEL: CPT | Performed by: EMERGENCY MEDICINE

## 2021-01-06 PROCEDURE — 93010 ELECTROCARDIOGRAM REPORT: CPT | Performed by: INTERNAL MEDICINE

## 2021-01-06 PROCEDURE — 93005 ELECTROCARDIOGRAM TRACING: CPT

## 2021-01-06 PROCEDURE — 84484 ASSAY OF TROPONIN QUANT: CPT | Performed by: EMERGENCY MEDICINE

## 2021-01-06 PROCEDURE — 36415 COLL VENOUS BLD VENIPUNCTURE: CPT | Performed by: EMERGENCY MEDICINE

## 2021-01-06 PROCEDURE — 81025 URINE PREGNANCY TEST: CPT | Performed by: EMERGENCY MEDICINE

## 2021-01-06 PROCEDURE — 96374 THER/PROPH/DIAG INJ IV PUSH: CPT

## 2021-01-06 PROCEDURE — 85379 FIBRIN DEGRADATION QUANT: CPT | Performed by: EMERGENCY MEDICINE

## 2021-01-06 RX ORDER — KETOROLAC TROMETHAMINE 30 MG/ML
15 INJECTION, SOLUTION INTRAMUSCULAR; INTRAVENOUS ONCE
Status: COMPLETED | OUTPATIENT
Start: 2021-01-06 | End: 2021-01-06

## 2021-01-06 RX ORDER — OMEPRAZOLE 20 MG/1
20 CAPSULE, DELAYED RELEASE ORAL DAILY
Qty: 30 CAPSULE | Refills: 0 | Status: SHIPPED | OUTPATIENT
Start: 2021-01-06 | End: 2021-09-29 | Stop reason: ALTCHOICE

## 2021-01-06 RX ORDER — NAPROXEN 500 MG/1
500 TABLET ORAL 2 TIMES DAILY WITH MEALS
Qty: 14 TABLET | Refills: 0 | Status: SHIPPED | OUTPATIENT
Start: 2021-01-06 | End: 2021-01-13 | Stop reason: SDUPTHER

## 2021-01-06 RX ADMIN — KETOROLAC TROMETHAMINE 15 MG: 30 INJECTION, SOLUTION INTRAMUSCULAR at 13:41

## 2021-01-06 RX ADMIN — IOHEXOL 85 ML: 350 INJECTION, SOLUTION INTRAVENOUS at 15:54

## 2021-01-06 NOTE — ED PROVIDER NOTES
History  Chief Complaint   Patient presents with    Chest Pain     reports COVID + in beginning of december, symptoms had resolved until approx 1 week ago, started with chest pressure that radiates towards her back  reports feeling tired and poor appetitie  HPI     43-year-old female with history COVID-19 infection for which she tested positive on December 8th, presenting for evaluation of chest pain, back pain, and shortness of breath  Patient states that she felt like she had almost completely recovered from [facility] when last week she developed soreness in her upper back and in her chest   Reports that the pain in her chest is pleuritic, feels like a sharp pinch with deep breathing  Pain does not radiate straight through to her back, but she occasionally experiences similar pain in her back with deep breathing  Pain in her back feels more like a soreness  Reports feeling fatigued having poor appetite  No personal cardiac history or history of DVT or PE  Denies calf swelling or tenderness  No nausea, vomiting, diarrhea, recent fever or chills  Reports feeling mildly short of breath over the last few days  Prior to Admission Medications   Prescriptions Last Dose Informant Patient Reported? Taking?    Lidocaine Viscous HCl (XYLOCAINE) 2 % mucosal solution   No No   Sig: Swish and spit 15 mL 4 (four) times a day as needed for mild pain   acyclovir (ZOVIRAX) 400 MG tablet   No No   Sig: Take 1 tablet (400 mg total) by mouth 5 (five) times a day for 5 days   albuterol (VENTOLIN HFA) 90 mcg/act inhaler   No No   Sig: Inhale 2 puffs every 4 (four) hours as needed for wheezing   Patient not taking: Reported on 10/13/2019   ibuprofen (MOTRIN) 800 mg tablet   No No   Sig: Take 1 tablet (800 mg total) by mouth every 8 (eight) hours as needed for mild pain   Patient not taking: Reported on 10/13/2019      Facility-Administered Medications: None       Past Medical History:   Diagnosis Date    No known health problems        Past Surgical History:   Procedure Laterality Date    NO PAST SURGERIES         Family History   Problem Relation Age of Onset    No Known Problems Mother     No Known Problems Father      I have reviewed and agree with the history as documented  E-Cigarette/Vaping    E-Cigarette Use Never User      E-Cigarette/Vaping Substances     Social History     Tobacco Use    Smoking status: Former Smoker     Packs/day: 0 25     Types: Cigarettes    Smokeless tobacco: Never Used   Substance Use Topics    Alcohol use: No    Drug use: No       Review of Systems   Constitutional: Positive for appetite change and fatigue  Negative for chills and fever  HENT: Negative for congestion  Eyes: Negative for visual disturbance  Respiratory: Positive for shortness of breath  Negative for cough  Cardiovascular: Positive for chest pain  Negative for leg swelling  Gastrointestinal: Negative for abdominal pain, diarrhea, nausea and vomiting  Genitourinary: Negative for dysuria and frequency  Musculoskeletal: Positive for back pain  Negative for arthralgias, neck pain and neck stiffness  Skin: Negative for rash  Neurological: Negative for weakness, numbness and headaches  Psychiatric/Behavioral: Negative for agitation, behavioral problems and confusion  Physical Exam  Physical Exam  Constitutional:       General: She is not in acute distress  Appearance: She is well-developed  She is not diaphoretic  HENT:      Head: Normocephalic and atraumatic  Right Ear: External ear normal       Left Ear: External ear normal       Nose: Nose normal    Eyes:      Conjunctiva/sclera: Conjunctivae normal    Neck:      Musculoskeletal: Normal range of motion and neck supple  Cardiovascular:      Rate and Rhythm: Normal rate and regular rhythm  Heart sounds: Normal heart sounds  No murmur  No friction rub  No gallop      Pulmonary:      Effort: Pulmonary effort is normal  No respiratory distress  Breath sounds: Normal breath sounds  No wheezing or rales  Chest:      Chest wall: Tenderness (Chest pain partially reproduced with palpation over the sternum) present  Abdominal:      General: Bowel sounds are normal  There is no distension  Palpations: Abdomen is soft  Tenderness: There is no abdominal tenderness  There is no guarding  Musculoskeletal: Normal range of motion  General: No deformity  Comments: "Soreness" with palpation of the bilateral paraspinous muscles of the thoracic spine  No calf swelling or tenderness  Skin:     General: Skin is warm and dry  Neurological:      Mental Status: She is alert and oriented to person, place, and time  Motor: No abnormal muscle tone           Vital Signs  ED Triage Vitals   Temperature Pulse Respirations Blood Pressure SpO2   01/06/21 1253 01/06/21 1253 01/06/21 1253 01/06/21 1253 01/06/21 1253   98 9 °F (37 2 °C) 96 20 (!) 205/95 99 %      Temp src Heart Rate Source Patient Position - Orthostatic VS BP Location FiO2 (%)   -- 01/06/21 1325 01/06/21 1325 01/06/21 1325 --    Monitor Sitting Right arm       Pain Score       01/06/21 1253       No Pain           Vitals:    01/06/21 1253 01/06/21 1325   BP: (!) 205/95 139/67   Pulse: 96 75   Patient Position - Orthostatic VS:  Sitting         Visual Acuity      ED Medications  Medications   ketorolac (TORADOL) injection 15 mg (15 mg Intravenous Given 1/6/21 1341)   iohexol (OMNIPAQUE) 350 MG/ML injection (SINGLE-DOSE) 100 mL (85 mL Intravenous Given 1/6/21 1554)       Diagnostic Studies  Results Reviewed     Procedure Component Value Units Date/Time    Troponin I [[id number]]  (Normal) Collected: 01/06/21 1646    Lab Status: Final result Specimen: Blood from Arm, Left Updated: 01/06/21 1715     Troponin I <0 02 ng/mL     POCT pregnancy, urine [[id number]]  (Normal) Resulted: 01/06/21 1545    Lab Status: Final result Updated: 01/06/21 1545     EXT PREG TEST UR (Ref: Negative) NEGATIVE     Control VALID    Troponin I [[id number]]  (Normal) Collected: 01/06/21 1337    Lab Status: Final result Specimen: Blood from Arm, Right Updated: 01/06/21 1403     Troponin I <0 02 ng/mL     Comprehensive metabolic panel [[id number]] Collected: 01/06/21 1337    Lab Status: Final result Specimen: Blood from Arm, Right Updated: 01/06/21 1400     Sodium 139 mmol/L      Potassium 3 8 mmol/L      Chloride 104 mmol/L      CO2 24 mmol/L      ANION GAP 11 mmol/L      BUN 11 mg/dL      Creatinine 1 01 mg/dL      Glucose 103 mg/dL      Calcium 9 3 mg/dL      AST 19 U/L      ALT 34 U/L      Alkaline Phosphatase 71 U/L      Total Protein 7 9 g/dL      Albumin 3 7 g/dL      Total Bilirubin 0 69 mg/dL      eGFR 68 ml/min/1 73sq m     Narrative:      [facility] guidelines for Chronic Kidney Disease (CKD):     Stage 1 with normal or high GFR (GFR > 90 mL/min/1 73 square meters)    Stage 2 Mild CKD (GFR = 60-89 mL/min/1 73 square meters)    Stage 3A Moderate CKD (GFR = 45-59 mL/min/1 73 square meters)    Stage 3B Moderate CKD (GFR = 30-44 mL/min/1 73 square meters)    Stage 4 Severe CKD (GFR = 15-29 mL/min/1 73 square meters)    Stage 5 End Stage CKD (GFR <15 mL/min/1 73 square meters)  Note: GFR calculation is accurate only with a steady state creatinine    D-dimer, quantitative [[id number]]  (Abnormal) Collected: 01/06/21 1337    Lab Status: Final result Specimen: Blood from Arm, Right Updated: 01/06/21 1359     D-Dimer, Quant 1 22 ug/ml FEU     CBC and differential [[id number]]  (Abnormal) Collected: 01/06/21 1337    Lab Status: Final result Specimen: Blood from Arm, Right Updated: 01/06/21 1346     WBC 14 16 Thousand/uL      RBC 5 98 Million/uL      Hemoglobin 12 7 g/dL      Hematocrit 41 5 %      MCV 69 fL      MCH 21 2 pg      MCHC 30 6 g/dL      RDW 16 4 %      MPV 9 0 fL      Platelets 496 Thousands/uL      nRBC 0 /100 WBCs      Neutrophils Relative 74 %      Immat GRANS % 0 % Lymphocytes Relative 20 %      Monocytes Relative 5 %      Eosinophils Relative 0 %      Basophils Relative 1 %      Neutrophils Absolute 10 50 Thousands/µL      Immature Grans Absolute 0 05 Thousand/uL      Lymphocytes Absolute 2 82 Thousands/µL      Monocytes Absolute 0 66 Thousand/µL      Eosinophils Absolute 0 04 Thousand/µL      Basophils Absolute 0 09 Thousands/µL                  CTA ED chest PE Study   Final Result by Emma Hernández MD (01/06 1647)      No pulmonary embolism  Thyroid enlargement without discrete nodule  Fatty liver  Workstation performed: [id number]                    Procedures  Procedures         ED Course             HEART Risk Score      Most Recent Value   Heart Score Risk Calculator   History  0 Filed at: 01/06/2021 2244   ECG  1 Filed at: 01/06/2021 2244   Age  0 Filed at: 01/06/2021 2244   Risk Factors  0 Filed at: 01/06/2021 2244   Troponin  0 Filed at: 01/06/2021 2244   HEART Score  1 Filed at: 01/06/2021 2244                          Amanda Diallo' Criteria for PE      Most Recent Value   Wells' Criteria for PE   Clinical signs and symptoms of DVT  --   PE is primary diagnosis or equally likely  0 Filed at: 01/06/2021 2244   HR >100  0 Filed at: 01/06/2021 2244   Immobilization at least 3 days or Surgery in the previous 4 weeks  0 Filed at: 01/06/2021 2244   Previous, objectively diagnosed PE or DVT  0 Filed at: 01/06/2021 2244   Hemoptysis  0 Filed at: 01/06/2021 2244   Malignancy with treatment within 6 months or palliative  0 Filed at: 01/06/2021 2244   Wells' Criteria Total  0 Filed at: 01/06/2021 2244                MDM  Number of Diagnoses or Management Options  Acid reflux: new and does not require workup  Atypical chest pain: new and requires workup  Pleurisy: new and requires workup  Diagnosis management comments: Nontoxic  Afebrile and hemodynamically stable  No increased work of breathing and lungs are clear to auscultation bilaterally      I personally interpreted the patient's EKG, which reveals normal rate, normal sinus rhythm, left axis deviation incomplete right bundle branch, T-wave inversion isolated to lead 3, no ST segment deviation  Patient is low risk by heart score  Delta troponins undetectable, doubt ischemia  D-dimer mildly elevated  CTA performed of the chest without evidence of PE or pneumonia  Aorta appears unremarkable and description of symptoms is not consistent with aortic dissection  Strongly suspect pleurisy as the cause of the patient's symptoms  Will prescribe short course of naproxen  Additionally, patient does report experiencing regurgitation of stomach contents and a burning feeling in her stomach after eating tomato sauce and iced tea  Suspect component of GERD  Dietary modifications discussed and will start trial of Prilosec  Amount and/or Complexity of Data Reviewed  Clinical lab tests: ordered and reviewed  Tests in the radiology section of CPT®: ordered and reviewed  Independent visualization of images, tracings, or specimens: yes    Patient Progress  Patient progress: stable           Disposition  Final diagnoses:   Atypical chest pain   Pleurisy   Acid reflux   History of COVID-19     Time reflects when diagnosis was documented in both MDM as applicable and the Disposition within this note     Time User Action Codes Description Comment    1/6/2021  5:23 PM North Damian [R07 89] Atypical chest pain     1/6/2021  5:23 PM [place] Damian [R09 1] Pleurisy     1/6/2021  5:24 PM Silvia Sullivan Add [K21 9] Acid reflux     1/8/2021  9:08 AM [place]ian [N45 28] History of COVID-19       ED Disposition     ED Disposition Condition Date/Time Comment    Discharge Stable Wed Jan 6, 2021  5:23 PM Sivan Rodriguez discharge to home/self care              Follow-up Information     Follow up With Specialties Details Why Contact Info Carla Metcalf   Please follow-up with your doctor in 1 week for re-evaluation, and for further management of your acid reflux  [address]  [address]  [address] [facility] [address], [facility] Emergency Department Emergency Medicine  As we discussed, return to the Emergency Department for change in nature of your pain, trouble breathing, or new or concerning symptoms  [address]  [phone] [facility],  [address], [place], [place], [address]          Discharge Medication List as of 1/6/2021  5:25 PM      START taking these medications    Details   naproxen (NAPROSYN) 500 mg tablet Take 1 tablet (500 mg total) by mouth 2 (two) times a day with meals for 7 days, Starting Wed 1/6/2021, Until Wed 1/13/2021, Normal      omeprazole (PriLOSEC) 20 mg delayed release capsule Take 1 capsule (20 mg total) by mouth daily, Starting Wed 1/6/2021, Until Fri 2/5/2021, Normal         CONTINUE these medications which have NOT CHANGED    Details   acyclovir (ZOVIRAX) 400 MG tablet Take 1 tablet (400 mg total) by mouth 5 (five) times a day for 5 days, Starting Thu 3/29/2018, Until Tue 4/3/2018, Normal      albuterol (VENTOLIN HFA) 90 mcg/act inhaler Inhale 2 puffs every 4 (four) hours as needed for wheezing, Starting Wed 4/3/2019, Normal      ibuprofen (MOTRIN) 800 mg tablet Take 1 tablet (800 mg total) by mouth every 8 (eight) hours as needed for mild pain, Starting Tue 4/16/2019, Normal      Lidocaine Viscous HCl (XYLOCAINE) 2 % mucosal solution Swish and spit 15 mL 4 (four) times a day as needed for mild pain, Starting Sun 10/13/2019, Print           No discharge procedures on file      PDMP Review     None          ED Provider  Electronically Signed by           Maria Guadalupe Greene MD  01/06/21 201 [facility] Dc Hawley MD  01/08/21 8214

## 2021-01-13 ENCOUNTER — OFFICE VISIT (OUTPATIENT)
Dept: FAMILY MEDICINE CLINIC | Facility: CLINIC | Age: 46
End: 2021-01-13
Payer: COMMERCIAL

## 2021-01-13 VITALS
BODY MASS INDEX: 45.24 KG/M2 | DIASTOLIC BLOOD PRESSURE: 98 MMHG | WEIGHT: 265 LBS | HEIGHT: 64 IN | HEART RATE: 82 BPM | TEMPERATURE: 98.1 F | OXYGEN SATURATION: 99 % | SYSTOLIC BLOOD PRESSURE: 130 MMHG

## 2021-01-13 DIAGNOSIS — Z00.00 ANNUAL PHYSICAL EXAM: Primary | ICD-10-CM

## 2021-01-13 DIAGNOSIS — F41.9 ANXIETY: ICD-10-CM

## 2021-01-13 DIAGNOSIS — R09.1 PLEURISY: ICD-10-CM

## 2021-01-13 PROCEDURE — 99396 PREV VISIT EST AGE 40-64: CPT | Performed by: NURSE PRACTITIONER

## 2021-01-13 RX ORDER — BUSPIRONE HYDROCHLORIDE 5 MG/1
5 TABLET ORAL DAILY
Qty: 30 TABLET | Refills: 1 | Status: SHIPPED | OUTPATIENT
Start: 2021-01-13 | End: 2021-01-25 | Stop reason: ALTCHOICE

## 2021-01-13 RX ORDER — NAPROXEN 500 MG/1
500 TABLET ORAL 2 TIMES DAILY WITH MEALS
Qty: 28 TABLET | Refills: 0 | Status: SHIPPED | OUTPATIENT
Start: 2021-01-13 | End: 2021-06-04 | Stop reason: ALTCHOICE

## 2021-01-13 NOTE — PROGRESS NOTES
[facility]    NAME: Venkatesh Morrison  AGE: 41 y o  SEX: female  : 1975     DATE: 2021     Assessment and Plan:     Problem List Items Addressed This Visit     None      Visit Diagnoses     Annual physical exam    -  Primary    Anxiety        Relevant Medications    busPIRone (BUSPAR) 5 mg tablet    Pleurisy        Relevant Medications    naproxen (NAPROSYN) 500 mg tablet        Immunizations and preventive care screenings were discussed with patient today  Appropriate education was printed on patient's after visit summary  Counseling:  Alcohol/drug use: discussed moderation in alcohol intake, the recommendations for healthy alcohol use, and avoidance of illicit drug use  Dental Health: discussed importance of regular tooth brushing, flossing, and dental visits  Injury prevention: discussed safety/seat belts, safety helmets, smoke detectors, carbon dioxide detectors, and smoking near bedding or upholstery  Sexual health: discussed sexually transmitted diseases, partner selection, use of condoms, avoidance of unintended pregnancy, and contraceptive alternatives  · Exercise: the importance of regular exercise/physical activity was discussed  Recommend exercise 3-5 times per week for at least 30 minutes  BMI Counseling: Body mass index is 45 49 kg/m²  The BMI is above normal  Nutrition recommendations include decreasing portion sizes, encouraging healthy choices of fruits and vegetables, consuming healthier snacks, moderation in carbohydrate intake and increasing intake of lean protein  Exercise recommendations include exercising 3-5 times per week and strength training exercises  Return in about 4 weeks (around 2/10/2021)       Chief Complaint:     Chief Complaint   Patient presents with    Establish Care      History of Present Illness:     Adult Annual Physical   Patient here to establish care and for a comprehensive physical exam  The patient reports that she [facility] for atypical chest pain  She hd tested positive for COVID-19 on 2020 and continues with chest pain, back pain and shortness of breath  She felt that she was starting to have symptom improvement  But than developed this pain in her upper back  She reports that the pain in her chest is pleuritic, feels like a sharp pinch with deep breathing  She had a pulmonary emboli ruled out with CTA of chest and discharged to home with naproxen  She states the naproxen is helpful with the pleuritic pain but still present  She also reports having increased anxiety about the pain and the pandemic  She was a previously a patient of Dr Angel Santacruz but wants to change over to [facility] for her continued health care needs  Diet and Physical Activity  · Diet/Nutrition: well balanced diet, limited junk food and consuming 3-5 servings of fruits/vegetables daily  · Exercise: no formal exercise  Depression Screening  PHQ-9 Depression Screening    PHQ-9:   Frequency of the following problems over the past two weeks:      Little interest or pleasure in doing things: 2 - more than half the days  Feeling down, depressed, or hopeless: 0 - not at all  Trouble falling or staying asleep, or sleeping too much: 2 - more than half the days  Feeling tired or having little energy: 2 - more than half the days  Poor appetite or overeatin - more than half the days  Feeling bad about yourself - or that you are a failure or have let yourself or your family down: 0 - not at all  Trouble concentrating on things, such as reading the newspaper or watching television: 0 - not at all  Moving or speaking so slowly that other people could have noticed   Or the opposite - being so fidgety or restless that you have been moving around a lot more than usual: 1 - several days  Thoughts that you would be better off dead, or of hurting yourself in some way: 0 - not at all  PHQ-2 Score: 2  PHQ-9 Score: 9       General Health  · Sleep: sleeps well and gets 7-8 hours of sleep on average  · Hearing: normal - bilateral   · Vision: no vision problems and wears glasses  · Dental: regular dental visits, brushes teeth twice daily and daily floss  /GYN Health  · Patient is: premenopausal  · Last menstrual period: 01/03/2021  · Contraceptive method: none  Review of Systems:     Review of Systems   Constitutional: Positive for fatigue  Negative for activity change, appetite change, chills and fever  HENT: Negative  Negative for dental problem, ear pain, nosebleeds, postnasal drip, sinus pressure, sore throat, tinnitus and trouble swallowing  Eyes: Negative for pain and visual disturbance  Respiratory: Negative for cough, chest tightness, shortness of breath and wheezing  Cardiovascular: Positive for chest pain and palpitations  Negative for leg swelling  Gastrointestinal: Negative for abdominal pain, constipation, diarrhea and nausea  Endocrine: Negative for polydipsia, polyphagia and polyuria  Genitourinary: Negative  Musculoskeletal: Positive for back pain and myalgias  Skin: Negative for color change and rash  Allergic/Immunologic: Negative  Neurological: Negative for dizziness, weakness, light-headedness, numbness and headaches  Hematological: Negative  Psychiatric/Behavioral: The patient is nervous/anxious         Past Medical History:     Past Medical History:   Diagnosis Date    No known health problems       Past Surgical History:     Past Surgical History:   Procedure Laterality Date    NO PAST SURGERIES        Social History:        Social History     Socioeconomic History    Marital status: Single     Spouse name: None    Number of children: None    Years of education: None    Highest education level: None   Occupational History    None   Social Needs    Financial resource strain: None  Food insecurity     Worry: None     Inability: None    Transportation needs     Medical: None     Non-medical: None   Tobacco Use    Smoking status: Former Smoker     Packs/day: 0 25     Types: Cigarettes    Smokeless tobacco: Never Used   Substance and Sexual Activity    Alcohol use: No    Drug use: Never    Sexual activity: Yes     Partners: Male   Lifestyle    Physical activity     Days per week: None     Minutes per session: None    Stress: None   Relationships    Social connections     Talks on phone: None     Gets together: None     Attends Jainism service: None     Active member of club or organization: None     Attends meetings of clubs or organizations: None     Relationship status: None    Intimate partner violence     Fear of current or ex partner: None     Emotionally abused: None     Physically abused: None     Forced sexual activity: None   Other Topics Concern    None   Social History Narrative    None      Family History:     Family History   Problem Relation Age of Onset    Cardiomyopathy Mother     Anxiety disorder Mother     No Known Problems Father     Anxiety disorder Brother     Heart attack Maternal Grandfather       Current Medications:     Current Outpatient Medications   Medication Sig Dispense Refill    naproxen (NAPROSYN) 500 mg tablet Take 1 tablet (500 mg total) by mouth 2 (two) times a day with meals for 14 days 28 tablet 0    omeprazole (PriLOSEC) 20 mg delayed release capsule Take 1 capsule (20 mg total) by mouth daily 30 capsule 0    busPIRone (BUSPAR) 5 mg tablet Take 1 tablet (5 mg total) by mouth daily 30 tablet 1     No current facility-administered medications for this visit  Allergies:     No Known Allergies   Physical Exam:     /98   Pulse 82   Temp 98 1 °F (36 7 °C)   Ht 5' 4" (1 626 m)   Wt 120 kg (265 lb)   LMP 01/01/2021 (Exact Date)   SpO2 99%   BMI 45 49 kg/m²     Physical Exam  Vitals signs reviewed     Constitutional: General: She is not in acute distress  Appearance: Normal appearance  She is well-developed and well-groomed  She is not ill-appearing  HENT:      Head: Normocephalic and atraumatic  Right Ear: Tympanic membrane, ear canal and external ear normal       Left Ear: Tympanic membrane, ear canal and external ear normal       Nose: Nose normal       Mouth/Throat:      Lips: Pink  Mouth: Mucous membranes are moist       Pharynx: Oropharynx is clear  Eyes:      General: Lids are normal       Extraocular Movements: Extraocular movements intact  Conjunctiva/sclera: Conjunctivae normal       Pupils: Pupils are equal, round, and reactive to light  Neck:      Musculoskeletal: Neck supple  Thyroid: No thyromegaly  Trachea: Trachea normal    Cardiovascular:      Rate and Rhythm: Normal rate and regular rhythm  Pulses: Normal pulses  Radial pulses are 2+ on the right side and 2+ on the left side  Dorsalis pedis pulses are 2+ on the right side and 2+ on the left side  Posterior tibial pulses are 2+ on the right side and 2+ on the left side  Heart sounds: Normal heart sounds  No murmur  No friction rub  No gallop  Pulmonary:      Effort: Pulmonary effort is normal       Breath sounds: Normal breath sounds  Abdominal:      General: Bowel sounds are normal  There is no distension  Palpations: Abdomen is soft  Tenderness: There is no abdominal tenderness  Musculoskeletal:      Right lower leg: No edema  Left lower leg: No edema  Lymphadenopathy:      Cervical: No cervical adenopathy  Skin:     General: Skin is warm and dry  Capillary Refill: Capillary refill takes less than 2 seconds  Nails: There is no clubbing  Neurological:      General: No focal deficit present  Mental Status: She is alert and oriented to person, place, and time  Cranial Nerves: Cranial nerves are intact  Motor: Motor function is intact  Psychiatric:         Mood and Affect: Mood is anxious  Behavior: Behavior normal  Behavior is cooperative  Thought Content:  Thought content normal           Madelaine Wakefield, [address]

## 2021-01-13 NOTE — PROGRESS NOTES
[facility]    NAME: Susanne Pedersen  AGE: 41 y o  SEX: female  : 1975     DATE: 2021     Assessment and Plan:     Problem List Items Addressed This Visit     None      Visit Diagnoses     Annual physical exam    -  Primary          Immunizations and preventive care screenings were discussed with patient today  Appropriate education was printed on patient's after visit summary  Counseling:  · {Annual Physical; Counselin}         No follow-ups on file  Chief Complaint:     Chief Complaint   Patient presents with    [facility]      History of Present Illness:     Adult Annual Physical   Patient here for a comprehensive physical exam  The patient reports {problems:86737}  Diet and Physical Activity  · Diet/Nutrition: {annual physical; diet:43177469}  · Exercise: {annual physical; exercise:2102}  Depression Screening  PHQ-9 Depression Screening    PHQ-9:   Frequency of the following problems over the past two weeks:      Little interest or pleasure in doing things: 1 - several days  Feeling down, depressed, or hopeless: 0 - not at all  PHQ-2 Score: 1       General Health  · Sleep: {annual physical; sleep:2102}  · Hearing: {annual physical; hearin}  · Vision: {annual physical; vision:}  · Dental: {annual physical; dental:}  /GYN Health  · Patient is: {Menopause:39153}  · Last menstrual period: ***  · Contraceptive method: {contraceptive options:}       Review of Systems:     Review of Systems   Past Medical History:     Past Medical History:   Diagnosis Date    No known health problems       Past Surgical History:     Past Surgical History:   Procedure Laterality Date    NO PAST SURGERIES        Social History:        Social History     Socioeconomic History    Marital status: Single     Spouse name: None    Number of children: None    Years of education: None    Highest education level: None   Occupational History    None   Social Needs    Financial resource strain: None    Food insecurity     Worry: None     Inability: None    Transportation needs     Medical: None     Non-medical: None   Tobacco Use    Smoking status: Former Smoker     Packs/day: 0 25     Types: Cigarettes    Smokeless tobacco: Never Used   Substance and Sexual Activity    Alcohol use: No    Drug use: Never    Sexual activity: Yes     Partners: Male   Lifestyle    Physical activity     Days per week: None     Minutes per session: None    Stress: None   Relationships    Social connections     Talks on phone: None     Gets together: None     Attends Mor[employer] service: None     Active member of club or organization: None     Attends meetings of clubs or organizations: None     Relationship status: None    Intimate partner violence     Fear of current or ex partner: None     Emotionally abused: None     Physically abused: None     Forced sexual activity: None   Other Topics Concern    None   Social History Narrative    None      Family History:     Family History   Problem Relation Age of Onset    No Known Problems Mother     No Known Problems Father       Current Medications:     Current Outpatient Medications   Medication Sig Dispense Refill    naproxen (NAPROSYN) 500 mg tablet Take 1 tablet (500 mg total) by mouth 2 (two) times a day with meals for 7 days 14 tablet 0    omeprazole (PriLOSEC) 20 mg delayed release capsule Take 1 capsule (20 mg total) by mouth daily 30 capsule 0     No current facility-administered medications for this visit  Allergies:     No Known Allergies   Physical Exam:     /98   Pulse 82   Temp 98 1 °F (36 7 °C)   Ht 5' 4" (1 626 m)   Wt 120 kg (265 lb)   LMP 2021 (Exact Date)   SpO2 99%   BMI 45 49 kg/m²     Physical Exam     BMI Counseling: Body mass index is 45 49 kg/m²   The BMI {VB BMI Counselin} Depression Screening Follow-up Plan: Patient's depression screening was positive with a PHQ-2 score of 1   Their PHQ-9 score was 8  {Depression screen follow-up plan:5936566468}    FAUSTO Fong

## 2021-01-13 NOTE — PATIENT INSTRUCTIONS

## 2021-01-15 ENCOUNTER — OFFICE VISIT (OUTPATIENT)
Dept: FAMILY MEDICINE CLINIC | Facility: CLINIC | Age: 46
End: 2021-01-15
Payer: COMMERCIAL

## 2021-01-15 VITALS
SYSTOLIC BLOOD PRESSURE: 132 MMHG | HEART RATE: 71 BPM | TEMPERATURE: 97.5 F | HEIGHT: 64 IN | DIASTOLIC BLOOD PRESSURE: 92 MMHG | OXYGEN SATURATION: 98 % | BODY MASS INDEX: 45.24 KG/M2 | WEIGHT: 265 LBS

## 2021-01-15 DIAGNOSIS — R00.2 PALPITATIONS: Primary | ICD-10-CM

## 2021-01-15 PROCEDURE — 99214 OFFICE O/P EST MOD 30 MIN: CPT | Performed by: NURSE PRACTITIONER

## 2021-01-15 NOTE — PROGRESS NOTES
Assessment/Plan:     Diagnoses and all orders for this visit:    Palpitations  -     Holter monitor - 48 hour; Future        Discussed with patient plan to obtain a 48 hour Holter monitor to check for possible arrhythmia  Discussed with patient plan to have her use the Buspar more often to manage anxiety  Patient instructed to call if no improvement in 72 hours or symptoms worsen    Subjective:      Patient ID: Pancho Jiménez is a 41 y o  female  41 y  o female presenting with increasing frequency of palpitations for the past twenty-four hours  She was originally seen for a emergency room follow up for palpitations on 01/13/2021  She reports that she is unsure if anxiety is causing the pains but when she talks to someone the palpitations improve  She does not identify any form of trigger for the palpitations  Patient did not want to return to the emergency room for further evaluation because she felt that they would not be able to evaluate her appropriately          Family History   Problem Relation Age of Onset    Cardiomyopathy Mother     Anxiety disorder Mother     No Known Problems Father     Anxiety disorder Brother     Heart attack Maternal Grandfather      Social History     Socioeconomic History    Marital status: Single     Spouse name: Not on file    Number of children: Not on file    Years of education: Not on file    Highest education level: Not on file   Occupational History    Not on file   Social Needs    Financial resource strain: Not on file    Food insecurity     Worry: Not on file     Inability: Not on file   [employer] needs     Medical: Not on file     Non-medical: Not on file   Tobacco Use    Smoking status: Former Smoker     Packs/day: 0 25     Types: Cigarettes    Smokeless tobacco: Never Used   Substance and Sexual Activity    Alcohol use: No    Drug use: Never    Sexual activity: Yes     Partners: Male   Lifestyle    Physical activity     Days per week: Not on file     Minutes per session: Not on file    Stress: Not on file   Relationships    Social connections     Talks on phone: Not on file     Gets together: Not on file     Attends Pentecostal service: Not on file     Active member of club or organization: Not on file     Attends meetings of clubs or organizations: Not on file     Relationship status: Not on file    Intimate partner violence     Fear of current or ex partner: Not on file     Emotionally abused: Not on file     Physically abused: Not on file     Forced sexual activity: Not on file   Other Topics Concern    Not on file   Social History Narrative    Not on file     E-Cigarette/Vaping    E-Cigarette Use Never User      E-Cigarette/Vaping Substances     Past Medical History:   Diagnosis Date    No known health problems      Past Surgical History:   Procedure Laterality Date    NO PAST SURGERIES       No Known Allergies    Current Outpatient Medications:     busPIRone (BUSPAR) 5 mg tablet, Take 1 tablet (5 mg total) by mouth daily, Disp: 30 tablet, Rfl: 1    naproxen (NAPROSYN) 500 mg tablet, Take 1 tablet (500 mg total) by mouth 2 (two) times a day with meals for 14 days, Disp: 28 tablet, Rfl: 0    omeprazole (PriLOSEC) 20 mg delayed release capsule, Take 1 capsule (20 mg total) by mouth daily, Disp: 30 capsule, Rfl: 0    Review of Systems   Constitutional: Negative for chills, fatigue and fever  HENT: Negative  Eyes: Negative  Respiratory: Negative for cough, chest tightness and shortness of breath  Cardiovascular: Positive for palpitations  Negative for chest pain and leg swelling  Endocrine: Negative  Genitourinary: Negative  Musculoskeletal: Negative  Skin: Negative for color change and rash  Allergic/Immunologic: Negative  Neurological: Negative for dizziness, weakness, light-headedness and headaches  Hematological: Negative  Psychiatric/Behavioral: Negative          Objective:    /92   Pulse 71   Temp 97 5 °F (36 4 °C)   Ht 5' 4" (1 626 m)   Wt 120 kg (265 lb)   LMP 01/07/2021 (Approximate)   SpO2 98%   BMI 45 49 kg/m² (Reviewed)     Physical Exam  Vitals signs reviewed  Constitutional:       General: She is not in acute distress  Appearance: Normal appearance  She is well-developed and well-groomed  She is not ill-appearing  HENT:      Head: Normocephalic and atraumatic  Right Ear: External ear normal       Left Ear: External ear normal       Nose:      Comments: Patient had a facial covering in place as per office protocol  Eyes:      General: Lids are normal       Extraocular Movements: Extraocular movements intact  Conjunctiva/sclera: Conjunctivae normal       Pupils: Pupils are equal, round, and reactive to light  Neck:      Musculoskeletal: Neck supple  Thyroid: No thyromegaly  Trachea: Trachea normal    Cardiovascular:      Rate and Rhythm: Normal rate and regular rhythm  Pulses: Normal pulses  Radial pulses are 2+ on the right side and 2+ on the left side  Posterior tibial pulses are 2+ on the right side and 2+ on the left side  Heart sounds: Normal heart sounds  No murmur  No friction rub  No gallop  Pulmonary:      Effort: Pulmonary effort is normal       Breath sounds: Normal breath sounds  Musculoskeletal:      Right lower leg: No edema  Left lower leg: No edema  Skin:     General: Skin is warm and dry  Capillary Refill: Capillary refill takes less than 2 seconds  Neurological:      General: No focal deficit present  Mental Status: She is alert and oriented to person, place, and time  Cranial Nerves: Cranial nerves are intact  Psychiatric:         Mood and Affect: Mood is anxious  Behavior: Behavior normal  Behavior is cooperative  Thought Content:  Thought content normal

## 2021-01-17 ENCOUNTER — HOSPITAL ENCOUNTER (EMERGENCY)
Facility: HOSPITAL | Age: 46
Discharge: HOME | End: 2021-01-17
Attending: EMERGENCY MEDICINE | Admitting: EMERGENCY MEDICINE
Payer: COMMERCIAL

## 2021-01-17 ENCOUNTER — APPOINTMENT (EMERGENCY)
Dept: RADIOLOGY | Facility: HOSPITAL | Age: 46
End: 2021-01-17
Payer: COMMERCIAL

## 2021-01-17 VITALS
RESPIRATION RATE: 16 BRPM | HEART RATE: 64 BPM | OXYGEN SATURATION: 98 % | TEMPERATURE: 98.1 F | BODY MASS INDEX: 45.98 KG/M2 | SYSTOLIC BLOOD PRESSURE: 125 MMHG | DIASTOLIC BLOOD PRESSURE: 61 MMHG | WEIGHT: 267.86 LBS

## 2021-01-17 DIAGNOSIS — R00.2 PALPITATIONS: Primary | ICD-10-CM

## 2021-01-17 DIAGNOSIS — I49.3 FREQUENT PVCS: ICD-10-CM

## 2021-01-17 LAB
ANION GAP SERPL CALCULATED.3IONS-SCNC: 8 MMOL/L (ref 4–13)
BASOPHILS # BLD AUTO: 0.08 THOUSANDS/ΜL (ref 0–0.1)
BASOPHILS NFR BLD AUTO: 1 % (ref 0–1)
BUN SERPL-MCNC: 12 MG/DL (ref 5–25)
CALCIUM SERPL-MCNC: 9.4 MG/DL (ref 8.3–10.1)
CHLORIDE SERPL-SCNC: 105 MMOL/L (ref 100–108)
CO2 SERPL-SCNC: 25 MMOL/L (ref 21–32)
CREAT SERPL-MCNC: 0.94 MG/DL (ref 0.6–1.3)
EOSINOPHIL # BLD AUTO: 0.12 THOUSAND/ΜL (ref 0–0.61)
EOSINOPHIL NFR BLD AUTO: 1 % (ref 0–6)
ERYTHROCYTE [DISTWIDTH] IN BLOOD BY AUTOMATED COUNT: 17.3 % (ref 11.6–15.1)
GFR SERPL CREATININE-BSD FRML MDRD: 74 ML/MIN/1.73SQ M
GLUCOSE SERPL-MCNC: 87 MG/DL (ref 65–140)
HCT VFR BLD AUTO: 43.7 % (ref 34.8–46.1)
HGB BLD-MCNC: 13.3 G/DL (ref 11.5–15.4)
IMM GRANULOCYTES # BLD AUTO: 0.04 THOUSAND/UL (ref 0–0.2)
IMM GRANULOCYTES NFR BLD AUTO: 0 % (ref 0–2)
LYMPHOCYTES # BLD AUTO: 3.38 THOUSANDS/ΜL (ref 0.6–4.47)
LYMPHOCYTES NFR BLD AUTO: 26 % (ref 14–44)
MAGNESIUM SERPL-MCNC: 2 MG/DL (ref 1.6–2.6)
MCH RBC QN AUTO: 21.2 PG (ref 26.8–34.3)
MCHC RBC AUTO-ENTMCNC: 30.4 G/DL (ref 31.4–37.4)
MCV RBC AUTO: 70 FL (ref 82–98)
MONOCYTES # BLD AUTO: 0.79 THOUSAND/ΜL (ref 0.17–1.22)
MONOCYTES NFR BLD AUTO: 6 % (ref 4–12)
NEUTROPHILS # BLD AUTO: 8.84 THOUSANDS/ΜL (ref 1.85–7.62)
NEUTS SEG NFR BLD AUTO: 66 % (ref 43–75)
NRBC BLD AUTO-RTO: 0 /100 WBCS
PHOSPHATE SERPL-MCNC: 2.8 MG/DL (ref 2.7–4.5)
PLATELET # BLD AUTO: 369 THOUSANDS/UL (ref 149–390)
PMV BLD AUTO: 9.6 FL (ref 8.9–12.7)
POTASSIUM SERPL-SCNC: 3.8 MMOL/L (ref 3.5–5.3)
RBC # BLD AUTO: 6.27 MILLION/UL (ref 3.81–5.12)
SODIUM SERPL-SCNC: 138 MMOL/L (ref 136–145)
TSH SERPL DL<=0.05 MIU/L-ACNC: 2.36 UIU/ML (ref 0.36–3.74)
WBC # BLD AUTO: 13.25 THOUSAND/UL (ref 4.31–10.16)

## 2021-01-17 PROCEDURE — 99284 EMERGENCY DEPT VISIT MOD MDM: CPT | Performed by: EMERGENCY MEDICINE

## 2021-01-17 PROCEDURE — 71045 X-RAY EXAM CHEST 1 VIEW: CPT

## 2021-01-17 PROCEDURE — 99285 EMERGENCY DEPT VISIT HI MDM: CPT

## 2021-01-17 PROCEDURE — 85025 COMPLETE CBC W/AUTO DIFF WBC: CPT | Performed by: EMERGENCY MEDICINE

## 2021-01-17 PROCEDURE — 36415 COLL VENOUS BLD VENIPUNCTURE: CPT | Performed by: EMERGENCY MEDICINE

## 2021-01-17 PROCEDURE — 93005 ELECTROCARDIOGRAM TRACING: CPT

## 2021-01-17 PROCEDURE — 80048 BASIC METABOLIC PNL TOTAL CA: CPT | Performed by: EMERGENCY MEDICINE

## 2021-01-17 PROCEDURE — 83735 ASSAY OF MAGNESIUM: CPT | Performed by: EMERGENCY MEDICINE

## 2021-01-17 PROCEDURE — 84100 ASSAY OF PHOSPHORUS: CPT | Performed by: EMERGENCY MEDICINE

## 2021-01-17 PROCEDURE — 84443 ASSAY THYROID STIM HORMONE: CPT | Performed by: EMERGENCY MEDICINE

## 2021-01-17 RX ORDER — METOPROLOL TARTRATE 50 MG/1
50 TABLET, FILM COATED ORAL 2 TIMES DAILY
Qty: 20 TABLET | Refills: 0 | Status: SHIPPED | OUTPATIENT
Start: 2021-01-17 | End: 2021-01-25 | Stop reason: SDUPTHER

## 2021-01-17 RX ADMIN — METOPROLOL TARTRATE 25 MG: 25 TABLET, FILM COATED ORAL at 16:33

## 2021-01-17 NOTE — ED NOTES
Per Dr Caryl Paez, pt to wait 30 mins after PO med for possible reaction, and then can leave        Isi Augustine RN  01/17/21 8630

## 2021-01-17 NOTE — DISCHARGE INSTRUCTIONS
After discussion with her primary care doctor, it was decided that you can be trialed on a medication called a beta-blocker  A beta-blocker is a medication commonly used to control palpitations  It also decreases heart rate and blood pressure  Some side effects may include dizziness, sleepiness, sexual dysfunction  Please touch base with your primary care doctor tomorrow to make sure that her blood pressure and heart rate are good under this medication  If you develop any worsening symptoms including chest pain, shortness of breath, or any other concerning symptoms, please return to the emergency department as these could be signs of worsening illness

## 2021-01-17 NOTE — ED PROVIDER NOTES
History  Chief Complaint   Patient presents with    Palpitations     Reports heart palpitations off and on since January 6th  Patient's being seen her Janur 6th for same  To have holter monitor put on this week  Reports palpitations increased when lying down  Placed on buspar last Wednesday by FMD     HPI  69-year-old female presenting to the emergency department with palpitations for the past week and a half  Patient was evaluated on 01/6 in this emergency department for the same symptoms, had a CTA of the chest done at that time, which revealed no abnormalities  Patient has no other past significant history other than having COVID on 12/22  Patient states that since she has had persistent palpitations which seem to increase when she is lying down  She has followed up with her primary care to your who has scheduled her for Holter monitoring this week  Patient was also recently started on BuSpar, which patient attributes to making her symptoms worse, however she was reassured that this was likely not the case  Patient presents today because her palpitations seem to be more frequent  Denies chest pain or shortness of breath  States the palpitations are making her anxious  Otherwise, patient denies fever, chills, cough, sore throat, nausea, vomiting, abdominal pain, urinary symptoms, changes in stool, leg pain or leg swelling  Prior to Admission Medications   Prescriptions Last Dose Informant Patient Reported?  Taking?   busPIRone (BUSPAR) 5 mg tablet 1/17/2021 at Unknown time Self No Yes   Sig: Take 1 tablet (5 mg total) by mouth daily   naproxen (NAPROSYN) 500 mg tablet 1/17/2021 at Unknown time Self No Yes   Sig: Take 1 tablet (500 mg total) by mouth 2 (two) times a day with meals for 14 days   omeprazole (PriLOSEC) 20 mg delayed release capsule 1/17/2021 at Unknown time Self No Yes   Sig: Take 1 capsule (20 mg total) by mouth daily      Facility-Administered Medications: None       Past Medical History:   Diagnosis Date    No known health problems        Past Surgical History:   Procedure Laterality Date    NO PAST SURGERIES         Family History   Problem Relation Age of Onset    Cardiomyopathy Mother     Anxiety disorder Mother     No Known Problems Father     Anxiety disorder Brother     Heart attack Maternal Grandfather      I have reviewed and agree with the history as documented  E-Cigarette/Vaping    E-Cigarette Use Never User      E-Cigarette/Vaping Substances     Social History     Tobacco Use    Smoking status: Former Smoker     Packs/day: 0 25     Types: Cigarettes    Smokeless tobacco: Never Used   Substance Use Topics    Alcohol use: No    Drug use: Never       Review of Systems   Constitutional: Positive for fatigue  Negative for chills and fever  Respiratory: Negative for apnea, cough, choking, chest tightness, shortness of breath, wheezing and stridor  Cardiovascular: Positive for palpitations  Negative for chest pain and leg swelling  Gastrointestinal: Negative for abdominal distention, abdominal pain, constipation, diarrhea, nausea, rectal pain and vomiting  Genitourinary: Negative for dysuria and hematuria  Musculoskeletal: Negative for back pain, gait problem and neck pain  Skin: Negative for color change, pallor, rash and wound  Neurological: Negative for dizziness, tremors, seizures, syncope, facial asymmetry, speech difficulty, weakness, light-headedness, numbness and headaches  Physical Exam  Physical Exam  Vitals signs and nursing note reviewed  Constitutional:       General: She is not in acute distress  Appearance: She is well-developed  She is not diaphoretic  HENT:      Head: Normocephalic and atraumatic  Right Ear: External ear normal       Left Ear: External ear normal       Nose: Nose normal    Eyes:      General:         Right eye: No discharge  Left eye: No discharge        Extraocular Movements: Extraocular movements intact  Conjunctiva/sclera: Conjunctivae normal       Pupils: Pupils are equal, round, and reactive to light  Neck:      Musculoskeletal: Normal range of motion and neck supple  Cardiovascular:      Rate and Rhythm: Normal rate and regular rhythm  Heart sounds: Normal heart sounds  No murmur  No friction rub  No gallop  Pulmonary:      Effort: Pulmonary effort is normal  No respiratory distress  Breath sounds: Normal breath sounds  No stridor  No wheezing, rhonchi or rales  Chest:      Chest wall: No tenderness  Abdominal:      General: Bowel sounds are normal  There is no distension  Palpations: Abdomen is soft  There is no mass  Tenderness: There is no abdominal tenderness  There is no guarding or rebound  Hernia: No hernia is present  Musculoskeletal: Normal range of motion  General: No tenderness or deformity  Skin:     General: Skin is warm and dry  Capillary Refill: Capillary refill takes less than 2 seconds  Neurological:      Mental Status: She is alert and oriented to person, place, and time           Vital Signs  ED Triage Vitals   Temperature Pulse Respirations Blood Pressure SpO2   01/17/21 1445 01/17/21 1445 01/17/21 1445 01/17/21 1445 01/17/21 1445   98 1 °F (36 7 °C) 79 20 (!) 174/76 98 %      Temp Source Heart Rate Source Patient Position - Orthostatic VS BP Location FiO2 (%)   01/17/21 1445 01/17/21 1445 01/17/21 1545 01/17/21 1445 --   Oral Monitor Sitting Right arm       Pain Score       01/17/21 1445       2           Vitals:    01/17/21 1445 01/17/21 1545   BP: (!) 174/76 127/79   Pulse: 79 64   Patient Position - Orthostatic VS:  Sitting         Visual Acuity      ED Medications  Medications   metoprolol tartrate (LOPRESSOR) tablet 25 mg (has no administration in time range)       Diagnostic Studies  Results Reviewed     Procedure Component Value Units Date/Time    Basic metabolic panel [[id number]] Collected: 01/17/21 1540 Lab Status: Final result Specimen: Blood from Arm, Left Updated: 01/17/21 1614     Sodium 138 mmol/L      Potassium 3 8 mmol/L      Chloride 105 mmol/L      CO2 25 mmol/L      ANION GAP 8 mmol/L      BUN 12 mg/dL      Creatinine 0 94 mg/dL      Glucose 87 mg/dL      Calcium 9 4 mg/dL      eGFR 74 ml/min/1 73sq m     Narrative:      [place] guidelines for Chronic Kidney Disease (CKD):     Stage 1 with normal or high GFR (GFR > 90 mL/min/1 73 square meters)    Stage 2 Mild CKD (GFR = 60-89 mL/min/1 73 square meters)    Stage 3A Moderate CKD (GFR = 45-59 mL/min/1 73 square meters)    Stage 3B Moderate CKD (GFR = 30-44 mL/min/1 73 square meters)    Stage 4 Severe CKD (GFR = 15-29 mL/min/1 73 square meters)    Stage 5 End Stage CKD (GFR <15 mL/min/1 73 square meters)  Note: GFR calculation is accurate only with a steady state creatinine    TSH [[id number]]  (Normal) Collected: 01/17/21 1540    Lab Status: Final result Specimen: Blood from Arm, Left Updated: 01/17/21 1614     TSH 3RD GENERATON 2 363 uIU/mL     Narrative:      Patients undergoing fluorescein dye angiography may retain small amounts of fluorescein in the body for 48-72 hours post procedure  Samples containing fluorescein can produce falsely depressed TSH values  If the patient had this procedure,a specimen should be resubmitted post fluorescein clearance        Magnesium [[id number]]  (Normal) Collected: 01/17/21 1540    Lab Status: Final result Specimen: Blood from Arm, Left Updated: 01/17/21 1614     Magnesium 2 0 mg/dL     Phosphorus [[id number]]  (Normal) Collected: 01/17/21 1540    Lab Status: Final result Specimen: Blood from Arm, Left Updated: 01/17/21 1614     Phosphorus 2 8 mg/dL     CBC and differential [[id number]]  (Abnormal) Collected: 01/17/21 1541    Lab Status: Final result Specimen: Blood from Arm, Left Updated: 01/17/21 1550     WBC 13 25 Thousand/uL      RBC 6 27 Million/uL      Hemoglobin 13 3 g/dL Hematocrit 43 7 %      MCV 70 fL      MCH 21 2 pg      MCHC 30 4 g/dL      RDW 17 3 %      MPV 9 6 fL      Platelets 903 Thousands/uL      nRBC 0 /100 WBCs      Neutrophils Relative 66 %      Immat GRANS % 0 %      Lymphocytes Relative 26 %      Monocytes Relative 6 %      Eosinophils Relative 1 %      Basophils Relative 1 %      Neutrophils Absolute 8 84 Thousands/µL      Immature Grans Absolute 0 04 Thousand/uL      Lymphocytes Absolute 3 38 Thousands/µL      Monocytes Absolute 0 79 Thousand/µL      Eosinophils Absolute 0 12 Thousand/µL      Basophils Absolute 0 08 Thousands/µL                  XR chest 1 view portable    (Results Pending)              Procedures  Procedures         ED Course  ED Course as of Jan 17 1631   Rocío Carrera Jan 17, 2021   130 67-year-old female presenting to the emergency department for palpitations  Vital signs upon arrival notable for hypertension  Cardiac monitoring at bedside shows occasional PVC  1521 Physical exam is completely unremarkable  Differential diagnosis includes intrinsic PVCs, electrolyte imbalance, anemia, thyroid problem, dehydration, sepsis, PE  Doubt PE given last CT PE study  1521 Lab work and imaging ordered  1522 No acute findings  XR chest 1 view portable   1522 EKG normal sinus rhythm 77, QRS 92, , occasional PVC  No ST elevation or depression  ECG 12 lead   1552 Patient has no history of fever and has no evidence of systemic illness  WBC(!): 13 25   1624 Lab work including CBC, BMP, TSH, magnesium, phosphorus all grossly unremarkable with the exception of slight leukocytosis  1625 Temperature: 98 1 °F (36 7 °C)   1628 Patient continues to have symptomatic palpitations which correspond to PVCs on monitor  After close discussion with patient's primary care doctor, it was decided to start patient on beta-blocker with follow-up tomorrow to monitor heart rate and blood pressure    Side effects of beta-blockers were reviewed with the patient, and patient is amenable to this plan  Patient remained hemodynamically and clinically stable in the emergency department for 1 hour and 51 minutes without evidence of impending cardiopulmonary instability  Recommended the patient follow-up tomorrow with primary care doctor  Patient verbalized understanding and will follow up in the morning  Strict return precautions given  Upon discharge, patient was fully alert oriented, GCS 15, ambulatory, without any per week  MDM    Disposition  Final diagnoses:   Palpitations   Frequent PVCs     Time reflects when diagnosis was documented in both MDM as applicable and the Disposition within this note     Time User Action Codes Description Comment    1/17/2021  4:25 PM Valla Levo Add [R00 2] Palpitations     1/17/2021  4:25 PM Valla Levo Add [I49 3] Frequent PVCs       ED Disposition     ED Disposition Condition Date/Time Comment    Discharge Stable Sun Jan 17, 2021  4:25 PM Carlos Adams discharge to home/self care  Follow-up Information     Follow up With Specialties Details Ozzie Sibley [address], [address] Zaman, Nurse Practitioner In 1 day For close monitoring of your blood pressure and heart rate  [address]  [address]            Patient's Medications   Discharge Prescriptions    METOPROLOL TARTRATE (LOPRESSOR) 50 MG TABLET    Take 1 tablet (50 mg total) by mouth 2 (two) times a day       Start Date: 1/17/2021 End Date: --       Order Dose: 50 mg       Quantity: 20 tablet    Refills: 0     No discharge procedures on file      PDMP Review     None          ED Provider  Electronically Signed by           Baljinder Carpio MD  01/17/21 6282

## 2021-01-18 ENCOUNTER — TELEPHONE (OUTPATIENT)
Dept: FAMILY MEDICINE CLINIC | Facility: CLINIC | Age: 46
End: 2021-01-18

## 2021-01-18 ENCOUNTER — OFFICE VISIT (OUTPATIENT)
Dept: FAMILY MEDICINE CLINIC | Facility: CLINIC | Age: 46
End: 2021-01-18
Payer: COMMERCIAL

## 2021-01-18 VITALS
HEART RATE: 67 BPM | BODY MASS INDEX: 45.07 KG/M2 | HEIGHT: 64 IN | TEMPERATURE: 98.3 F | DIASTOLIC BLOOD PRESSURE: 88 MMHG | WEIGHT: 264 LBS | OXYGEN SATURATION: 99 % | SYSTOLIC BLOOD PRESSURE: 128 MMHG

## 2021-01-18 DIAGNOSIS — R00.2 PALPITATION: Primary | ICD-10-CM

## 2021-01-18 PROCEDURE — 3725F SCREEN DEPRESSION PERFORMED: CPT | Performed by: NURSE PRACTITIONER

## 2021-01-18 PROCEDURE — 99214 OFFICE O/P EST MOD 30 MIN: CPT | Performed by: NURSE PRACTITIONER

## 2021-01-18 NOTE — PROGRESS NOTES
Assessment/Plan:     Diagnoses and all orders for this visit:    Palpitation  -     Echo complete with contrast if indicated; Future        Discussed with patient plan to continue on Metoprolol 50 mg twice a day  Discussed with patient need to obtain a home blood pressure monitor to monitor to make sure her blood pressure systolic does not drop low 100 or that her her rate drops below 55 with increasing symptoms of dizziness  Discussed with patient plan to have her continue to obtain 48 hour Holter monitor  Discussed with patient plan to obtain echocardiogram to assess for cardiomyopathy related to past viral illness  Patient instructed to call if no improvement in 72 hours or symptoms worsen  Patient instructed to return around February 1, 2021 for next follow-up visit and to discuss results of ordered tests if possible    Subjective:      Patient ID: Jessica Thornton is a 41 y o  female  41 y  o female presenting for emergency room follow-up from January he 17th 2020 for ongoing palpitations and chest discomfort  Patient was seen in the office on January 15, 2021 for palpitations and was ordered a 48 hour Holter monitor and also discussed about anxiousness and management of that process  Patient was positive for COVID in December 2020 and developed pleurisy at the beginning of January 2021 and has been treated with naproxen and omeprazole  Patient states that the palpitations are worse when she lays down at night and were getting worse which was the reason for her to go to the emergency room  Emergency room physician lilian amato PCP enquiring about starting patient on beta-blocker  Patient was started on metoprolol tartrate 50 mg b i d  with need for follow-up in office today with PCP  Patient states that the metoprolol has ease the palpitations but she still continues with some along with some back pain from the previously diagnosed pleurisy    Patient's white count remains elevated at 13 25 so concerns regarding possible viral cardiomyopathy considered so patient will be ordered an echocardiogram to further assess        Family History   Problem Relation Age of Onset    Cardiomyopathy Mother     Anxiety disorder Mother     No Known Problems Father     Anxiety disorder Brother     Heart attack Maternal Grandfather      Social History     Socioeconomic History    Marital status: Single     Spouse name: Not on file    Number of children: Not on file    Years of education: Not on file    Highest education level: Not on file   Occupational History    Not on file   Social Needs    Financial resource strain: Not on file    Food insecurity     Worry: Not on file     Inability: Not on file    Transportation needs     Medical: Not on file     Non-medical: Not on file   Tobacco Use    Smoking status: Former Smoker     Packs/day: 0 25     Types: Cigarettes    Smokeless tobacco: Never Used   Substance and Sexual Activity    Alcohol use: No    Drug use: Never    Sexual activity: Yes     Partners: Male   Lifestyle    Physical activity     Days per week: Not on file     Minutes per session: Not on file    Stress: Not on file   Relationships    Social connections     Talks on phone: Not on file     Gets together: Not on file     Attends Scientology service: Not on file     Active member of club or organization: Not on file     Attends meetings of clubs or organizations: Not on file     Relationship status: Not on file    Intimate partner violence     Fear of current or ex partner: Not on file     Emotionally abused: Not on file     Physically abused: Not on file     Forced sexual activity: Not on file   Other Topics Concern    Not on file   Social History Narrative    Not on file     E-Cigarette/Vaping    E-Cigarette Use Never User      E-Cigarette/Vaping Substances     Past Medical History:   Diagnosis Date    No known health problems      Past Surgical History:   Procedure Laterality Date    NO PAST SURGERIES       No Known Allergies    Current Outpatient Medications:     busPIRone (BUSPAR) 5 mg tablet, Take 1 tablet (5 mg total) by mouth daily, Disp: 30 tablet, Rfl: 1    metoprolol tartrate (LOPRESSOR) 50 mg tablet, Take 1 tablet (50 mg total) by mouth 2 (two) times a day, Disp: 20 tablet, Rfl: 0    naproxen (NAPROSYN) 500 mg tablet, Take 1 tablet (500 mg total) by mouth 2 (two) times a day with meals for 14 days, Disp: 28 tablet, Rfl: 0    omeprazole (PriLOSEC) 20 mg delayed release capsule, Take 1 capsule (20 mg total) by mouth daily, Disp: 30 capsule, Rfl: 0  No current facility-administered medications for this visit  Review of Systems   Constitutional: Positive for fatigue  Negative for activity change, appetite change, chills and fever  HENT: Negative  Eyes: Negative  Respiratory: Positive for chest tightness  Negative for cough, shortness of breath and wheezing  Cardiovascular: Positive for chest pain and palpitations  Negative for leg swelling  Gastrointestinal: Negative for abdominal pain, diarrhea and nausea  Endocrine: Negative  Genitourinary: Negative  Musculoskeletal: Negative  Skin: Negative for color change and rash  Allergic/Immunologic: Negative  Neurological: Negative for dizziness, weakness, light-headedness and headaches  Hematological: Negative  Psychiatric/Behavioral: Negative  Objective:    /88   Pulse 67   Temp 98 3 °F (36 8 °C)   Ht 5' 4" (1 626 m)   Wt 120 kg (264 lb)   LMP 01/03/2021 (Approximate)   SpO2 99%   BMI 45 32 kg/m² (Reviewed)     Physical Exam  Vitals signs reviewed  Constitutional:       General: She is not in acute distress  Appearance: Normal appearance  She is well-developed and well-groomed  She is not ill-appearing  HENT:      Head: Normocephalic and atraumatic        Right Ear: External ear normal       Left Ear: External ear normal       Nose:      Comments: Patient had a facial covering in place as per office protocol  Eyes:      General: Lids are normal       Extraocular Movements: Extraocular movements intact  Conjunctiva/sclera: Conjunctivae normal       Pupils: Pupils are equal, round, and reactive to light  Neck:      Musculoskeletal: Neck supple  Thyroid: No thyromegaly  Trachea: Trachea normal    Cardiovascular:      Rate and Rhythm: Normal rate and regular rhythm  Chest Wall: PMI is not displaced  Pulses: Normal pulses  Radial pulses are 2+ on the right side and 2+ on the left side  Posterior tibial pulses are 2+ on the right side and 2+ on the left side  Heart sounds: Normal heart sounds  No murmur  No friction rub  No gallop  Pulmonary:      Effort: Pulmonary effort is normal       Breath sounds: Normal breath sounds  Lymphadenopathy:      Cervical: No cervical adenopathy  Skin:     General: Skin is warm and dry  Capillary Refill: Capillary refill takes less than 2 seconds  Coloration: Skin is not cyanotic or pale  Neurological:      General: No focal deficit present  Mental Status: She is alert and oriented to person, place, and time  Motor: Motor function is intact  Psychiatric:         Mood and Affect: Mood normal          Behavior: Behavior normal  Behavior is cooperative  Thought Content:  Thought content normal

## 2021-01-19 LAB
ATRIAL RATE: 77 BPM
P AXIS: 72 DEGREES
PR INTERVAL: 144 MS
QRS AXIS: -24 DEGREES
QRSD INTERVAL: 92 MS
QT INTERVAL: 378 MS
QTC INTERVAL: 427 MS
T WAVE AXIS: 54 DEGREES
VENTRICULAR RATE: 77 BPM

## 2021-01-19 PROCEDURE — 93010 ELECTROCARDIOGRAM REPORT: CPT | Performed by: INTERNAL MEDICINE

## 2021-01-20 ENCOUNTER — HOSPITAL ENCOUNTER (OUTPATIENT)
Dept: NON INVASIVE DIAGNOSTICS | Facility: HOSPITAL | Age: 46
Discharge: HOME | End: 2021-01-20
Payer: COMMERCIAL

## 2021-01-20 DIAGNOSIS — R00.2 PALPITATIONS: ICD-10-CM

## 2021-01-20 PROCEDURE — 93226 XTRNL ECG REC<48 HR SCAN A/R: CPT

## 2021-01-20 PROCEDURE — 93225 XTRNL ECG REC<48 HRS REC: CPT

## 2021-01-25 ENCOUNTER — OFFICE VISIT (OUTPATIENT)
Dept: FAMILY MEDICINE CLINIC | Facility: CLINIC | Age: 46
End: 2021-01-25
Payer: COMMERCIAL

## 2021-01-25 VITALS
TEMPERATURE: 98 F | OXYGEN SATURATION: 99 % | HEIGHT: 64 IN | WEIGHT: 269 LBS | SYSTOLIC BLOOD PRESSURE: 102 MMHG | HEART RATE: 74 BPM | BODY MASS INDEX: 45.93 KG/M2 | DIASTOLIC BLOOD PRESSURE: 78 MMHG

## 2021-01-25 DIAGNOSIS — I49.3 FREQUENT PVCS: ICD-10-CM

## 2021-01-25 DIAGNOSIS — R00.2 PALPITATIONS: ICD-10-CM

## 2021-01-25 DIAGNOSIS — R00.2 PALPITATIONS: Primary | ICD-10-CM

## 2021-01-25 DIAGNOSIS — E61.1 IRON DEFICIENCY: ICD-10-CM

## 2021-01-25 PROCEDURE — 1036F TOBACCO NON-USER: CPT | Performed by: NURSE PRACTITIONER

## 2021-01-25 PROCEDURE — 3008F BODY MASS INDEX DOCD: CPT | Performed by: NURSE PRACTITIONER

## 2021-01-25 PROCEDURE — 99214 OFFICE O/P EST MOD 30 MIN: CPT | Performed by: NURSE PRACTITIONER

## 2021-01-25 RX ORDER — METOPROLOL TARTRATE 50 MG/1
50 TABLET, FILM COATED ORAL 2 TIMES DAILY
Qty: 60 TABLET | Refills: 2 | Status: SHIPPED | OUTPATIENT
Start: 2021-01-25 | End: 2021-04-22 | Stop reason: SDUPTHER

## 2021-01-25 NOTE — PROGRESS NOTES
Assessment/Plan:     Diagnoses and all orders for this visit:    Palpitations  -     Comprehensive metabolic panel; Future  -     Sedimentation rate, automated; Future    Iron deficiency  -     Iron Panel (Includes Ferritin, Iron Sat%, Iron, and TIBC); Future  -     CBC and differential; Future    #1 Palpitations  Discussed with patient plan to have her get echocardiogram and review Holter monitor results when available  Discussed with patient plan to check a metabolic panel in a separate for continue inflammation related to COVID and pleurisy  #2 Iron deficiency anemia  Discussed with patient plan to check for worsening iron deficiency with lab work  Patient is scheduled for a return office visit on February 1, 2021  Patient instructed to call for problems or concerns in the interim    Subjective:      Patient ID: Rocael Chappell is a 41 y o  female  41 y  o female presenting with improving quantity of palpitations but continues to have more episodes at nighttime  She as been monitoring her blood pressure and heart rate since being started on Metoprolol  Her heart rate is reported to be consistently in to 70's and lowest systolic blood pressure was 98 mm Hg  She denies having any symptom development of dizziness or fatigue since starting on medication  She reports experiencing randomized joint pains that is making sleep difficulty  She had her 48 hour Holter monitor completed so waiting for the read report  She is scheduled for the echocardiogram on 02/02/2021  Patient's anxiety appears to be etiology of palpitations and joint pains at bedtime  Patient stopped taking the previously prescribed Buspar and not interested starting on another medication        Family History   Problem Relation Age of Onset    Cardiomyopathy Mother     Anxiety disorder Mother     No Known Problems Father     Anxiety disorder Brother     Heart attack Maternal Grandfather      Social History     Socioeconomic History    Marital status: Single     Spouse name: Not on file    Number of children: Not on file    Years of education: Not on file    Highest education level: Not on file   Occupational History    Not on file   Social Needs    Financial resource strain: Not on file    Food insecurity     Worry: Not on file     Inability: Not on file    Transportation needs     Medical: Not on file     Non-medical: Not on file   Tobacco Use    Smoking status: Former Smoker     Packs/day: 0 25     Types: Cigarettes    Smokeless tobacco: Never Used   Substance and Sexual Activity    Alcohol use: No    Drug use: Never    Sexual activity: Yes     Partners: Male   Lifestyle    Physical activity     Days per week: Not on file     Minutes per session: Not on file    Stress: Not on file   Relationships    Social connections     Talks on phone: Not on file     Gets together: Not on file     Attends Orthodoxy service: Not on file     Active member of club or organization: Not on file     Attends meetings of clubs or organizations: Not on file     Relationship status: Not on file    Intimate partner violence     Fear of current or ex partner: Not on file     Emotionally abused: Not on file     Physically abused: Not on file     Forced sexual activity: Not on file   Other Topics Concern    Not on file   Social History Narrative    Not on file     E-Cigarette/Vaping    E-Cigarette Use Never User      E-Cigarette/Vaping Substances     Past Medical History:   Diagnosis Date    No known health problems      Past Surgical History:   Procedure Laterality Date    NO PAST SURGERIES       No Known Allergies    Current Outpatient Medications:     naproxen (NAPROSYN) 500 mg tablet, Take 1 tablet (500 mg total) by mouth 2 (two) times a day with meals for 14 days, Disp: 28 tablet, Rfl: 0    omeprazole (PriLOSEC) 20 mg delayed release capsule, Take 1 capsule (20 mg total) by mouth daily, Disp: 30 capsule, Rfl: 0    metoprolol tartrate (LOPRESSOR) 50 mg tablet, Take 1 tablet (50 mg total) by mouth 2 (two) times a day, Disp: 60 tablet, Rfl: 2    Review of Systems   Constitutional: Negative  HENT: Negative  Eyes: Negative  Respiratory: Negative for cough, chest tightness and shortness of breath  Cardiovascular: Positive for palpitations  Negative for chest pain  Gastrointestinal: Negative for abdominal pain, diarrhea and nausea  Endocrine: Negative  Genitourinary: Negative  Musculoskeletal: Positive for myalgias  Skin: Negative for color change and rash  Allergic/Immunologic: Negative  Neurological: Negative for dizziness, weakness, light-headedness, numbness and headaches  Hematological: Negative  Psychiatric/Behavioral: Negative  Objective:    /78   Pulse 74   Temp 98 °F (36 7 °C)   Ht 5' 4" (1 626 m)   Wt 122 kg (269 lb)   LMP 01/07/2021 (Approximate)   SpO2 99%   BMI 46 17 kg/m²  (Reviewed)       Physical Exam  Vitals signs reviewed  Constitutional:       General: She is not in acute distress  Appearance: Normal appearance  She is well-developed and well-groomed  She is not ill-appearing  HENT:      Head: Normocephalic and atraumatic  Right Ear: External ear normal       Left Ear: External ear normal       Nose:      Comments: Patient had a facial covering in place as per office protocol  Eyes:      General: Lids are normal       Extraocular Movements: Extraocular movements intact  Conjunctiva/sclera: Conjunctivae normal       Pupils: Pupils are equal, round, and reactive to light  Neck:      Musculoskeletal: Neck supple  Thyroid: No thyromegaly  Trachea: Trachea normal    Cardiovascular:      Rate and Rhythm: Normal rate and regular rhythm  Pulses: Normal pulses  Radial pulses are 2+ on the right side and 2+ on the left side  Dorsalis pedis pulses are 2+ on the right side and 2+ on the left side          Posterior tibial pulses are 2+ on the right side and 2+ on the left side  Heart sounds: Normal heart sounds  No murmur  No friction rub  No gallop  Pulmonary:      Effort: Pulmonary effort is normal       Breath sounds: Normal breath sounds  Musculoskeletal:      Right lower leg: No edema  Left lower leg: No edema  Lymphadenopathy:      Cervical: No cervical adenopathy  Skin:     General: Skin is warm and dry  Capillary Refill: Capillary refill takes less than 2 seconds  Neurological:      General: No focal deficit present  Mental Status: She is alert and oriented to person, place, and time  Psychiatric:         Mood and Affect: Mood normal          Behavior: Behavior normal  Behavior is cooperative  Thought Content:  Thought content normal

## 2021-01-26 ENCOUNTER — TELEPHONE (OUTPATIENT)
Dept: FAMILY MEDICINE CLINIC | Facility: CLINIC | Age: 46
End: 2021-01-26

## 2021-01-26 ENCOUNTER — HOSPITAL ENCOUNTER (OUTPATIENT)
Dept: NON INVASIVE DIAGNOSTICS | Facility: CLINIC | Age: 46
Discharge: HOME | End: 2021-01-26
Payer: COMMERCIAL

## 2021-01-26 DIAGNOSIS — R00.2 PALPITATION: ICD-10-CM

## 2021-01-26 PROCEDURE — 93306 TTE W/DOPPLER COMPLETE: CPT

## 2021-01-26 PROCEDURE — 93306 TTE W/DOPPLER COMPLETE: CPT | Performed by: INTERNAL MEDICINE

## 2021-01-26 NOTE — TELEPHONE ENCOUNTER
Patient called  She wanted to know if you could call her to discuss her echo results   She will be avail between 9-12 and then after 3 pm

## 2021-01-27 ENCOUNTER — LAB (OUTPATIENT)
Dept: LAB | Facility: CLINIC | Age: 46
End: 2021-01-27
Payer: COMMERCIAL

## 2021-01-27 DIAGNOSIS — R00.2 PALPITATIONS: ICD-10-CM

## 2021-01-27 DIAGNOSIS — E61.1 IRON DEFICIENCY: ICD-10-CM

## 2021-01-27 LAB
ALBUMIN SERPL BCP-MCNC: 3.6 G/DL (ref 3.5–5)
ALP SERPL-CCNC: 65 U/L (ref 46–116)
ALT SERPL W P-5'-P-CCNC: 31 U/L (ref 12–78)
ANION GAP SERPL CALCULATED.3IONS-SCNC: 6 MMOL/L (ref 4–13)
AST SERPL W P-5'-P-CCNC: 13 U/L (ref 5–45)
BASOPHILS # BLD AUTO: 0.11 THOUSANDS/ΜL (ref 0–0.1)
BASOPHILS NFR BLD AUTO: 1 % (ref 0–1)
BILIRUB SERPL-MCNC: 0.38 MG/DL (ref 0.2–1)
BUN SERPL-MCNC: 15 MG/DL (ref 5–25)
CALCIUM SERPL-MCNC: 9.1 MG/DL (ref 8.3–10.1)
CHLORIDE SERPL-SCNC: 109 MMOL/L (ref 100–108)
CO2 SERPL-SCNC: 25 MMOL/L (ref 21–32)
CREAT SERPL-MCNC: 0.9 MG/DL (ref 0.6–1.3)
EOSINOPHIL # BLD AUTO: 0.18 THOUSAND/ΜL (ref 0–0.61)
EOSINOPHIL NFR BLD AUTO: 2 % (ref 0–6)
ERYTHROCYTE [DISTWIDTH] IN BLOOD BY AUTOMATED COUNT: 17.1 % (ref 11.6–15.1)
ERYTHROCYTE [SEDIMENTATION RATE] IN BLOOD: 35 MM/HOUR (ref 0–19)
FERRITIN SERPL-MCNC: 52 NG/ML (ref 8–388)
GFR SERPL CREATININE-BSD FRML MDRD: 78 ML/MIN/1.73SQ M
GLUCOSE P FAST SERPL-MCNC: 94 MG/DL (ref 65–99)
HCT VFR BLD AUTO: 42.8 % (ref 34.8–46.1)
HGB BLD-MCNC: 12.5 G/DL (ref 11.5–15.4)
IMM GRANULOCYTES # BLD AUTO: 0.02 THOUSAND/UL (ref 0–0.2)
IMM GRANULOCYTES NFR BLD AUTO: 0 % (ref 0–2)
IRON SATN MFR SERPL: 8 %
IRON SERPL-MCNC: 28 UG/DL (ref 50–170)
LYMPHOCYTES # BLD AUTO: 3.92 THOUSANDS/ΜL (ref 0.6–4.47)
LYMPHOCYTES NFR BLD AUTO: 39 % (ref 14–44)
MCH RBC QN AUTO: 20.8 PG (ref 26.8–34.3)
MCHC RBC AUTO-ENTMCNC: 29.2 G/DL (ref 31.4–37.4)
MCV RBC AUTO: 71 FL (ref 82–98)
MONOCYTES # BLD AUTO: 0.67 THOUSAND/ΜL (ref 0.17–1.22)
MONOCYTES NFR BLD AUTO: 7 % (ref 4–12)
NEUTROPHILS # BLD AUTO: 5.25 THOUSANDS/ΜL (ref 1.85–7.62)
NEUTS SEG NFR BLD AUTO: 51 % (ref 43–75)
NRBC BLD AUTO-RTO: 0 /100 WBCS
PLATELET # BLD AUTO: 407 THOUSANDS/UL (ref 149–390)
PMV BLD AUTO: 10.5 FL (ref 8.9–12.7)
POTASSIUM SERPL-SCNC: 4.3 MMOL/L (ref 3.5–5.3)
PROT SERPL-MCNC: 7.4 G/DL (ref 6.4–8.2)
RBC # BLD AUTO: 6.01 MILLION/UL (ref 3.81–5.12)
SODIUM SERPL-SCNC: 140 MMOL/L (ref 136–145)
TIBC SERPL-MCNC: 343 UG/DL (ref 250–450)
WBC # BLD AUTO: 10.15 THOUSAND/UL (ref 4.31–10.16)

## 2021-01-27 PROCEDURE — 36415 COLL VENOUS BLD VENIPUNCTURE: CPT

## 2021-01-27 PROCEDURE — 80053 COMPREHEN METABOLIC PANEL: CPT

## 2021-01-27 PROCEDURE — 83550 IRON BINDING TEST: CPT

## 2021-01-27 PROCEDURE — 93227 XTRNL ECG REC<48 HR R&I: CPT | Performed by: INTERNAL MEDICINE

## 2021-01-27 PROCEDURE — 85025 COMPLETE CBC W/AUTO DIFF WBC: CPT

## 2021-01-27 PROCEDURE — 82728 ASSAY OF FERRITIN: CPT

## 2021-01-27 PROCEDURE — 85652 RBC SED RATE AUTOMATED: CPT

## 2021-01-27 PROCEDURE — 83540 ASSAY OF IRON: CPT

## 2021-02-04 ENCOUNTER — OFFICE VISIT (OUTPATIENT)
Dept: FAMILY MEDICINE CLINIC | Facility: CLINIC | Age: 46
End: 2021-02-04
Payer: COMMERCIAL

## 2021-02-04 VITALS
TEMPERATURE: 98.3 F | HEIGHT: 64 IN | SYSTOLIC BLOOD PRESSURE: 126 MMHG | HEART RATE: 72 BPM | BODY MASS INDEX: 45.58 KG/M2 | WEIGHT: 267 LBS | DIASTOLIC BLOOD PRESSURE: 82 MMHG

## 2021-02-04 DIAGNOSIS — E61.1 IRON DEFICIENCY: ICD-10-CM

## 2021-02-04 DIAGNOSIS — R00.2 PALPITATIONS: Primary | ICD-10-CM

## 2021-02-04 PROCEDURE — 3725F SCREEN DEPRESSION PERFORMED: CPT | Performed by: NURSE PRACTITIONER

## 2021-02-04 PROCEDURE — 99214 OFFICE O/P EST MOD 30 MIN: CPT | Performed by: NURSE PRACTITIONER

## 2021-02-04 NOTE — PROGRESS NOTES
Assessment/Plan:     Diagnoses and all orders for this visit:    Palpitations    Iron deficiency  -     Iron Panel (Includes Ferritin, Iron Sat%, Iron, and TIBC); Future  -     CBC and differential; Future    #1 Palpitations  Discussed with patient plan to have her continue on Metoprolol 50 mg twice a day  Discussed with patient some need to treat underlying anxiety disorder  #2 Iron deficidnecy  Discussed with patient plan to treat with over the counter iron supplement and recheck levels in 6 months  Patient instructed to call if no improvement in 72 hours or symptoms worsen    Subjective:      Patient ID: Damian An is a 41 y o  female  41 y  o female presenting for a two week follow-up for palpitations  Patient completed previously ordered echocardiogram and 48 hour Holter monitor  Reviewed with patient results both tests  Patient tolerating metoprolol 50 mg twice a day with blood pressure and heart rate being monitored and found to be stable  Patient reports that she still continues with some palpitations but not as bad as previously reported  Patient mostly feels the palpitations around bedtime and does note some anxiety increase at that time  Patient's iron panels reviewed with her and is found that she does have an iron deficiency anemia so patient has been advised to use an over-the-counter iron supplement with continued monitoring of levels     Patient continues to have some chest wall tenderness as she recovers from a bout of pleurisy status post COVID-19 virus          Family History   Problem Relation Age of Onset    Cardiomyopathy Mother     Anxiety disorder Mother     No Known Problems Father     Anxiety disorder Brother     Heart attack Maternal Grandfather     Thalassemia Maternal Aunt     Lupus Family      Social History     Socioeconomic History    Marital status: Single     Spouse name: Not on file    Number of children: Not on file    Years of education: Not on file   Ayad Fenton Highest education level: Not on file   Occupational History    Not on file   Social Needs    Financial resource strain: Not on file    Food insecurity     Worry: Not on file     Inability: Not on file    Transportation needs     Medical: Not on file     Non-medical: Not on file   Tobacco Use    Smoking status: Former Smoker     Packs/day: 0 25     Types: Cigarettes    Smokeless tobacco: Never Used   Substance and Sexual Activity    Alcohol use: No    Drug use: Never    Sexual activity: Yes     Partners: Male   Lifestyle    Physical activity     Days per week: Not on file     Minutes per session: Not on file    Stress: Not on file   Relationships    Social connections     Talks on phone: Not on file     Gets together: Not on file     Attends Taoist service: Not on file     Active member of club or organization: Not on file     Attends meetings of clubs or organizations: Not on file     Relationship status: Not on file    Intimate partner violence     Fear of current or ex partner: Not on file     Emotionally abused: Not on file     Physically abused: Not on file     Forced sexual activity: Not on file   Other Topics Concern    Not on file   Social History Narrative    Not on file     E-Cigarette/Vaping    E-Cigarette Use Never User      E-Cigarette/Vaping Substances     Past Medical History:   Diagnosis Date    No known health problems      Past Surgical History:   Procedure Laterality Date    NO PAST SURGERIES       No Known Allergies    Current Outpatient Medications:     metoprolol tartrate (LOPRESSOR) 50 mg tablet, Take 1 tablet (50 mg total) by mouth 2 (two) times a day, Disp: 60 tablet, Rfl: 2    naproxen (NAPROSYN) 500 mg tablet, Take 1 tablet (500 mg total) by mouth 2 (two) times a day with meals for 14 days, Disp: 28 tablet, Rfl: 0    omeprazole (PriLOSEC) 20 mg delayed release capsule, Take 1 capsule (20 mg total) by mouth daily, Disp: 30 capsule, Rfl: 0    Review of Systems Constitutional: Negative for chills, fatigue and fever  HENT: Negative for congestion, ear pain, hearing loss, nosebleeds, postnasal drip, sinus pressure, sore throat and tinnitus  Eyes: Negative  Respiratory: Negative for cough, shortness of breath and wheezing  Cardiovascular: Positive for palpitations  Negative for chest pain and leg swelling  Gastrointestinal: Negative for abdominal pain, diarrhea and nausea  Endocrine: Negative  Genitourinary: Negative  Musculoskeletal: Negative for myalgias  Skin: Negative for color change and rash  Allergic/Immunologic: Negative  Neurological: Negative for dizziness, weakness, light-headedness and headaches  Hematological: Negative  Psychiatric/Behavioral: The patient is nervous/anxious  Objective:    /82   Pulse 72   Temp 98 3 °F (36 8 °C)   Ht 5' 4" (1 626 m)   Wt 121 kg (267 lb)   LMP 01/07/2021 (Approximate)   BMI 45 83 kg/m²        Physical Exam  Vitals signs reviewed  Constitutional:       General: She is not in acute distress  Appearance: Normal appearance  She is well-developed and well-groomed  She is not ill-appearing  HENT:      Head: Normocephalic and atraumatic  Comments: Patient had a facial covering in place as per office protocol     Right Ear: External ear normal       Left Ear: External ear normal    Eyes:      General: Lids are normal       Extraocular Movements: Extraocular movements intact  Conjunctiva/sclera: Conjunctivae normal       Pupils: Pupils are equal, round, and reactive to light  Neck:      Musculoskeletal: Neck supple  Vascular: No carotid bruit  Trachea: Trachea normal    Cardiovascular:      Rate and Rhythm: Normal rate and regular rhythm  Pulses:           Radial pulses are 2+ on the right side and 2+ on the left side  Dorsalis pedis pulses are 2+ on the right side and 2+ on the left side          Posterior tibial pulses are 2+ on the right side and 2+ on the left side  Heart sounds: Normal heart sounds, S1 normal and S2 normal    Pulmonary:      Effort: Pulmonary effort is normal       Breath sounds: Normal breath sounds  Musculoskeletal:      Right lower leg: No edema  Left lower leg: No edema  Skin:     General: Skin is warm and dry  Capillary Refill: Capillary refill takes less than 2 seconds  Neurological:      General: No focal deficit present  Mental Status: She is alert and oriented to person, place, and time  Cranial Nerves: Cranial nerves are intact  Motor: Motor function is intact  Psychiatric:         Mood and Affect: Mood normal          Behavior: Behavior normal  Behavior is cooperative  Thought Content: Thought content normal          BMI Counseling: Body mass index is 45 83 kg/m²  The BMI is above normal  Nutrition recommendations include decreasing portion sizes, encouraging healthy choices of fruits and vegetables, consuming healthier snacks, moderation in carbohydrate intake and increasing intake of lean protein  Exercise recommendations include exercising 3-5 times per week and strength training exercises

## 2021-03-10 DIAGNOSIS — Z23 ENCOUNTER FOR IMMUNIZATION: ICD-10-CM

## 2021-03-25 ENCOUNTER — OFFICE VISIT (OUTPATIENT)
Dept: FAMILY MEDICINE CLINIC | Facility: CLINIC | Age: 46
End: 2021-03-25
Payer: COMMERCIAL

## 2021-03-25 VITALS
DIASTOLIC BLOOD PRESSURE: 88 MMHG | SYSTOLIC BLOOD PRESSURE: 124 MMHG | TEMPERATURE: 97.5 F | OXYGEN SATURATION: 99 % | BODY MASS INDEX: 42.27 KG/M2 | HEIGHT: 64 IN | WEIGHT: 247.6 LBS | HEART RATE: 75 BPM

## 2021-03-25 DIAGNOSIS — R00.2 PALPITATIONS: Primary | ICD-10-CM

## 2021-03-25 PROCEDURE — 3008F BODY MASS INDEX DOCD: CPT | Performed by: NURSE PRACTITIONER

## 2021-03-25 PROCEDURE — 1036F TOBACCO NON-USER: CPT | Performed by: NURSE PRACTITIONER

## 2021-03-25 PROCEDURE — 99214 OFFICE O/P EST MOD 30 MIN: CPT | Performed by: NURSE PRACTITIONER

## 2021-03-25 NOTE — PROGRESS NOTES
Assessment/Plan:     Diagnoses and all orders for this visit:    Palpitations        Discussed with patient plan to have her continue with Metoprolol and monitor  Patient instructed to call if no improvement in 72 hours or symptoms worsen    Subjective:      Patient ID: Rina Galvez is a 40 y o  female  40 y  o female presenting with increase in palpations since receiving her COVID-19 vaccines  She reports that the night of the first vaccine she noticed her heart racing and it lasted for at least an hour  She reports having some myalgia and redness at injection site  She reports that she feels that her body is rebelling against the vaccine  She does report feeling better   today        Family History   Problem Relation Age of Onset    Cardiomyopathy Mother     Anxiety disorder Mother     No Known Problems Father     Anxiety disorder Brother     Heart attack Maternal Grandfather     Thalassemia Maternal Aunt     Lupus Family      Social History     Socioeconomic History    Marital status: Single     Spouse name: Not on file    Number of children: Not on file    Years of education: Not on file    Highest education level: Not on file   Occupational History    Not on file   Social Needs    Financial resource strain: Not on file    Food insecurity     Worry: Not on file     Inability: Not on file    Transportation needs     Medical: Not on file     Non-medical: Not on file   Tobacco Use    Smoking status: Former Smoker     Packs/day: 0 25     Types: Cigarettes    Smokeless tobacco: Never Used   Substance and Sexual Activity    Alcohol use: No    Drug use: Never    Sexual activity: Yes     Partners: Male   Lifestyle    Physical activity     Days per week: Not on file     Minutes per session: Not on file    Stress: Not on file   Relationships    Social connections     Talks on phone: Not on file     Gets together: Not on file     Attends [employer] service: Not on file     Active member of club or organization: Not on file     Attends meetings of clubs or organizations: Not on file     Relationship status: Not on file    Intimate partner violence     Fear of current or ex partner: Not on file     Emotionally abused: Not on file     Physically abused: Not on file     Forced sexual activity: Not on file   Other Topics Concern    Not on file   Social History Narrative    Not on file     E-Cigarette/Vaping    E-Cigarette Use Never User      E-Cigarette/Vaping Substances     Past Medical History:   Diagnosis Date    No known health problems      Past Surgical History:   Procedure Laterality Date    NO PAST SURGERIES       No Known Allergies    Current Outpatient Medications:     metoprolol tartrate (LOPRESSOR) 50 mg tablet, Take 1 tablet (50 mg total) by mouth 2 (two) times a day, Disp: 60 tablet, Rfl: 2    naproxen (NAPROSYN) 500 mg tablet, Take 1 tablet (500 mg total) by mouth 2 (two) times a day with meals for 14 days, Disp: 28 tablet, Rfl: 0    omeprazole (PriLOSEC) 20 mg delayed release capsule, Take 1 capsule (20 mg total) by mouth daily, Disp: 30 capsule, Rfl: 0    Review of Systems   Constitutional: Negative for chills, fatigue and fever  HENT: Negative  Eyes: Negative  Respiratory: Negative for cough, chest tightness, shortness of breath and wheezing  Cardiovascular: Positive for palpitations and leg swelling  Negative for chest pain  Endocrine: Negative  Genitourinary: Negative  Musculoskeletal: Positive for myalgias  Skin: Negative for color change and rash  Allergic/Immunologic: Negative  Neurological: Positive for headaches  Negative for dizziness, weakness, light-headedness and numbness  Hematological: Negative  Psychiatric/Behavioral: Negative          Objective:    /88   Pulse 75   Temp 97 5 °F (36 4 °C)   Ht 5' 4" (1 626 m)   Wt 112 kg (247 lb 9 6 oz)   LMP 03/25/2021   SpO2 99%   BMI 42 50 kg/m² (Reviewed)     Physical Exam  Vitals signs reviewed  Constitutional:       General: She is not in acute distress  Appearance: Normal appearance  She is not ill-appearing  HENT:      Head: Normocephalic and atraumatic  Right Ear: External ear normal       Left Ear: External ear normal       Nose:      Comments: Patient had a facial covering in place as per office protocol  Eyes:      Extraocular Movements: Extraocular movements intact  Conjunctiva/sclera: Conjunctivae normal       Pupils: Pupils are equal, round, and reactive to light  Neck:      Musculoskeletal: Neck supple  Cardiovascular:      Rate and Rhythm: Normal rate and regular rhythm  Heart sounds: Normal heart sounds  No murmur  No friction rub  No gallop  Pulmonary:      Effort: Pulmonary effort is normal       Breath sounds: Normal breath sounds  Skin:     General: Skin is warm and dry  Capillary Refill: Capillary refill takes less than 2 seconds  Neurological:      General: No focal deficit present  Mental Status: She is alert and oriented to person, place, and time  Psychiatric:         Mood and Affect: Mood normal          Behavior: Behavior normal          Thought Content:  Thought content normal            03/21/2021

## 2021-04-22 DIAGNOSIS — R00.2 PALPITATIONS: ICD-10-CM

## 2021-04-22 DIAGNOSIS — I49.3 FREQUENT PVCS: ICD-10-CM

## 2021-04-22 RX ORDER — METOPROLOL TARTRATE 50 MG/1
50 TABLET, FILM COATED ORAL 2 TIMES DAILY
Qty: 180 TABLET | Refills: 1 | Status: SHIPPED | OUTPATIENT
Start: 2021-04-22 | End: 2021-10-15 | Stop reason: SDUPTHER

## 2021-05-03 ENCOUNTER — APPOINTMENT (OUTPATIENT)
Dept: LAB | Facility: CLINIC | Age: 46
End: 2021-05-03
Payer: COMMERCIAL

## 2021-05-03 DIAGNOSIS — E61.1 IRON DEFICIENCY: ICD-10-CM

## 2021-05-03 LAB
BASOPHILS # BLD AUTO: 0.09 THOUSANDS/ΜL (ref 0–0.1)
BASOPHILS NFR BLD AUTO: 1 % (ref 0–1)
EOSINOPHIL # BLD AUTO: 0.2 THOUSAND/ΜL (ref 0–0.61)
EOSINOPHIL NFR BLD AUTO: 2 % (ref 0–6)
ERYTHROCYTE [DISTWIDTH] IN BLOOD BY AUTOMATED COUNT: 18 % (ref 11.6–15.1)
FERRITIN SERPL-MCNC: 92 NG/ML (ref 8–388)
HCT VFR BLD AUTO: 42.7 % (ref 34.8–46.1)
HGB BLD-MCNC: 12.8 G/DL (ref 11.5–15.4)
IMM GRANULOCYTES # BLD AUTO: 0.02 THOUSAND/UL (ref 0–0.2)
IMM GRANULOCYTES NFR BLD AUTO: 0 % (ref 0–2)
IRON SATN MFR SERPL: 26 %
IRON SERPL-MCNC: 84 UG/DL (ref 50–170)
LYMPHOCYTES # BLD AUTO: 4.14 THOUSANDS/ΜL (ref 0.6–4.47)
LYMPHOCYTES NFR BLD AUTO: 42 % (ref 14–44)
MCH RBC QN AUTO: 21.6 PG (ref 26.8–34.3)
MCHC RBC AUTO-ENTMCNC: 30 G/DL (ref 31.4–37.4)
MCV RBC AUTO: 72 FL (ref 82–98)
MONOCYTES # BLD AUTO: 0.63 THOUSAND/ΜL (ref 0.17–1.22)
MONOCYTES NFR BLD AUTO: 6 % (ref 4–12)
NEUTROPHILS # BLD AUTO: 4.72 THOUSANDS/ΜL (ref 1.85–7.62)
NEUTS SEG NFR BLD AUTO: 49 % (ref 43–75)
NRBC BLD AUTO-RTO: 0 /100 WBCS
PLATELET # BLD AUTO: 366 THOUSANDS/UL (ref 149–390)
PMV BLD AUTO: 9.8 FL (ref 8.9–12.7)
RBC # BLD AUTO: 5.93 MILLION/UL (ref 3.81–5.12)
TIBC SERPL-MCNC: 323 UG/DL (ref 250–450)
WBC # BLD AUTO: 9.8 THOUSAND/UL (ref 4.31–10.16)

## 2021-05-03 PROCEDURE — 83550 IRON BINDING TEST: CPT

## 2021-05-03 PROCEDURE — 85025 COMPLETE CBC W/AUTO DIFF WBC: CPT

## 2021-05-03 PROCEDURE — 36415 COLL VENOUS BLD VENIPUNCTURE: CPT

## 2021-05-03 PROCEDURE — 82728 ASSAY OF FERRITIN: CPT

## 2021-05-03 PROCEDURE — 83540 ASSAY OF IRON: CPT

## 2021-06-02 ENCOUNTER — TELEPHONE (OUTPATIENT)
Dept: FAMILY MEDICINE CLINIC | Facility: CLINIC | Age: 46
End: 2021-06-02

## 2021-06-02 DIAGNOSIS — K04.7 INFECTION OF TOOTH: Primary | ICD-10-CM

## 2021-06-02 RX ORDER — AMOXICILLIN 875 MG/1
875 TABLET, COATED ORAL 2 TIMES DAILY
Qty: 14 TABLET | Refills: 0 | Status: SHIPPED | OUTPATIENT
Start: 2021-06-02 | End: 2021-06-04 | Stop reason: ALTCHOICE

## 2021-06-02 NOTE — TELEPHONE ENCOUNTER
Can she get an antibiotic, she has tooth pain, its throbbing, red and swollen, jaw pn,  No abcess, she can't get into the dentist until the end of next wk  She does have jessica with you friday  karlene knight  She will ck pharm if no cb

## 2021-06-04 ENCOUNTER — OFFICE VISIT (OUTPATIENT)
Dept: FAMILY MEDICINE CLINIC | Facility: CLINIC | Age: 46
End: 2021-06-04
Payer: COMMERCIAL

## 2021-06-04 VITALS
BODY MASS INDEX: 46.95 KG/M2 | TEMPERATURE: 98.2 F | SYSTOLIC BLOOD PRESSURE: 122 MMHG | DIASTOLIC BLOOD PRESSURE: 82 MMHG | HEIGHT: 64 IN | HEART RATE: 76 BPM | WEIGHT: 275 LBS

## 2021-06-04 DIAGNOSIS — L30.9 ECZEMA, UNSPECIFIED TYPE: ICD-10-CM

## 2021-06-04 DIAGNOSIS — R53.81 PHYSICAL DECONDITIONING: ICD-10-CM

## 2021-06-04 DIAGNOSIS — R06.00 DYSPNEA ON EXERTION: Primary | ICD-10-CM

## 2021-06-04 PROCEDURE — 99214 OFFICE O/P EST MOD 30 MIN: CPT | Performed by: NURSE PRACTITIONER

## 2021-06-04 PROCEDURE — 1036F TOBACCO NON-USER: CPT | Performed by: NURSE PRACTITIONER

## 2021-06-04 PROCEDURE — 3008F BODY MASS INDEX DOCD: CPT | Performed by: NURSE PRACTITIONER

## 2021-06-04 NOTE — PROGRESS NOTES
Assessment/Plan:     Diagnoses and all orders for this visit:    Dyspnea on exertion  -     Complete PFT with post bronchodilators; Future    Physical deconditioning  -     Ambulatory referral to Physical Therapy; Future    Eczema, unspecified type    #1 Dyspnea on exertion  Patient will be sent for a pulmonary function test because unable to perform in office today to evaluate for potential asthma  #2 Physical deconditioning  Discussed with patient referral to physical therapy to help with some physical deconditioning issues she may be having  #3 Eczema  Discussed with patient plan to treat with frequent use of mositurizing creams/ointments  Patient instructed to return in 1 month for follow-up or sooner if needed  Patient instructed to call for problems or concerns in the interim    Subjective:      Patient ID: Suraj Larose is a 40 y o  female  40 y  o female presenting for a four-month follow-up on shortness of breath and palpitations  Patient reports that the palpitations have improved drastically being on the metoprolol but is still occasionally does get a warm feeling through her chest   Patient reports her most disturbing symptom is her continued dyspnea on exertion  Patient reports that since she had COVID in December of 2020 followed by a COVID related pleurisy she has had increasing dyspnea on exertion  Patient reports that typically she was able to go for long walks with her family but since [facility] she has not been able to  Once she starts a if passed ambulatory pace her heart rate goes up and she feels chest tightness and short of breath  Once she stops and rests for well she does see improvement in her breathing  Discussed with patient that her symptoms may be related to a post COVID syndrome  Patient also has noted a red dry patches of skin on her left antecubital area so request it to be assessed also         Family History   Problem Relation Age of Onset    Cardiomyopathy Mother    Peter Alonso Anxiety disorder Mother     No Known Problems Father     Anxiety disorder Brother     Heart attack Maternal Grandfather     Thalassemia Maternal Aunt     Lupus Family      Social History     Socioeconomic History    Marital status: Single     Spouse name: Not on file    Number of children: Not on file    Years of education: Not on file    Highest education level: Not on file   Occupational History    Not on file   Social Needs    Financial resource strain: Not on file    Food insecurity     Worry: Not on file     Inability: Not on file    Transportation needs     Medical: Not on file     Non-medical: Not on file   Tobacco Use    Smoking status: Former Smoker     Packs/day: 0 25     Types: Cigarettes    Smokeless tobacco: Never Used   Substance and Sexual Activity    Alcohol use: No    Drug use: Never    Sexual activity: Yes     Partners: Male   Lifestyle    Physical activity     Days per week: Not on file     Minutes per session: Not on file    Stress: Not on file   Relationships    Social connections     Talks on phone: Not on file     Gets together: Not on file     Attends Protestant service: Not on file     Active member of club or organization: Not on file     Attends meetings of clubs or organizations: Not on file     Relationship status: Not on file    Intimate partner violence     Fear of current or ex partner: Not on file     Emotionally abused: Not on file     Physically abused: Not on file     Forced sexual activity: Not on file   Other Topics Concern    Not on file   Social History Narrative    Not on file     E-Cigarette/Vaping    E-Cigarette Use Never User      E-Cigarette/Vaping Substances     Past Medical History:   Diagnosis Date    No known health problems      Past Surgical History:   Procedure Laterality Date    NO PAST SURGERIES       No Known Allergies    Current Outpatient Medications:     metoprolol tartrate (LOPRESSOR) 50 mg tablet, Take 1 tablet (50 mg total) by mouth 2 (two) times a day, Disp: 180 tablet, Rfl: 1    omeprazole (PriLOSEC) 20 mg delayed release capsule, Take 1 capsule (20 mg total) by mouth daily, Disp: 30 capsule, Rfl: 0    Review of Systems   Constitutional: Positive for activity change and fatigue  Negative for appetite change, chills, fever and unexpected weight change  HENT: Negative  Eyes: Negative  Respiratory: Positive for chest tightness, shortness of breath and wheezing  Negative for cough  Cardiovascular: Positive for palpitations  Negative for chest pain  Gastrointestinal: Negative for abdominal distention, abdominal pain, diarrhea, nausea and vomiting  Endocrine: Negative for polydipsia, polyphagia and polyuria  Musculoskeletal: Negative for myalgias  Skin: Positive for rash  Neurological: Negative for dizziness, weakness, light-headedness, numbness and headaches  Hematological: Negative  Psychiatric/Behavioral: Negative  Objective:    /82   Pulse 76   Temp 98 2 °F (36 8 °C)   Ht 5' 4" (1 626 m)   Wt 125 kg (275 lb)   BMI 47 20 kg/m² (Reviewed)     Physical Exam  Vitals signs reviewed  Constitutional:       General: She is not in acute distress  Appearance: She is well-developed and well-groomed  She is morbidly obese  She is not ill-appearing  HENT:      Head: Normocephalic and atraumatic  Right Ear: External ear normal       Left Ear: External ear normal    Eyes:      General: Lids are normal       Extraocular Movements: Extraocular movements intact  Conjunctiva/sclera: Conjunctivae normal       Pupils: Pupils are equal, round, and reactive to light  Neck:      Musculoskeletal: Neck supple  Trachea: Trachea normal    Cardiovascular:      Rate and Rhythm: Regular rhythm  Tachycardia present  Pulses:           Radial pulses are 2+ on the right side and 2+ on the left side  Heart sounds: Normal heart sounds     Pulmonary:      Effort: Pulmonary effort is normal  Breath sounds: Normal breath sounds  Comments: 6 minute walk oxygen saturations 97-96% with heart rate increased from 99 to 135  Musculoskeletal:      Right lower leg: No edema  Left lower leg: No edema  Lymphadenopathy:      Cervical: No cervical adenopathy  Skin:     General: Skin is warm and dry  Capillary Refill: Capillary refill takes less than 2 seconds  Findings: Rash present  Neurological:      General: No focal deficit present  Mental Status: She is alert and oriented to person, place, and time  Cranial Nerves: Cranial nerves are intact  Motor: Motor function is intact  Psychiatric:         Mood and Affect: Mood normal          Behavior: Behavior normal  Behavior is cooperative  Thought Content:  Thought content normal

## 2021-06-24 ENCOUNTER — HOSPITAL ENCOUNTER (OUTPATIENT)
Dept: PULMONOLOGY | Facility: HOSPITAL | Age: 46
Discharge: HOME | End: 2021-06-24
Payer: COMMERCIAL

## 2021-06-24 DIAGNOSIS — R06.00 DYSPNEA ON EXERTION: ICD-10-CM

## 2021-06-24 PROCEDURE — 94729 DIFFUSING CAPACITY: CPT

## 2021-06-24 PROCEDURE — 94726 PLETHYSMOGRAPHY LUNG VOLUMES: CPT | Performed by: INTERNAL MEDICINE

## 2021-06-24 PROCEDURE — 94726 PLETHYSMOGRAPHY LUNG VOLUMES: CPT

## 2021-06-24 PROCEDURE — 94060 EVALUATION OF WHEEZING: CPT

## 2021-06-24 PROCEDURE — 94060 EVALUATION OF WHEEZING: CPT | Performed by: INTERNAL MEDICINE

## 2021-06-24 PROCEDURE — 94760 N-INVAS EAR/PLS OXIMETRY 1: CPT

## 2021-06-24 PROCEDURE — 94729 DIFFUSING CAPACITY: CPT | Performed by: INTERNAL MEDICINE

## 2021-06-24 RX ORDER — ALBUTEROL SULFATE 2.5 MG/3ML
2.5 SOLUTION RESPIRATORY (INHALATION) ONCE
Status: COMPLETED | OUTPATIENT
Start: 2021-06-24 | End: 2021-06-24

## 2021-06-24 RX ADMIN — ALBUTEROL SULFATE 2.5 MG: 2.5 SOLUTION RESPIRATORY (INHALATION) at 13:54

## 2021-07-06 ENCOUNTER — OFFICE VISIT (OUTPATIENT)
Dept: FAMILY MEDICINE CLINIC | Facility: CLINIC | Age: 46
End: 2021-07-06
Payer: COMMERCIAL

## 2021-07-06 VITALS
DIASTOLIC BLOOD PRESSURE: 82 MMHG | TEMPERATURE: 97.9 F | WEIGHT: 279 LBS | SYSTOLIC BLOOD PRESSURE: 122 MMHG | HEIGHT: 64 IN | HEART RATE: 78 BPM | BODY MASS INDEX: 47.63 KG/M2

## 2021-07-06 DIAGNOSIS — R06.02 SHORTNESS OF BREATH: Primary | ICD-10-CM

## 2021-07-06 DIAGNOSIS — L65.9 HAIR LOSS: ICD-10-CM

## 2021-07-06 DIAGNOSIS — R00.2 PALPITATIONS: ICD-10-CM

## 2021-07-06 PROCEDURE — 99214 OFFICE O/P EST MOD 30 MIN: CPT | Performed by: NURSE PRACTITIONER

## 2021-07-06 PROCEDURE — 3008F BODY MASS INDEX DOCD: CPT | Performed by: NURSE PRACTITIONER

## 2021-07-06 NOTE — PROGRESS NOTES
Assessment/Plan:     Diagnoses and all orders for this visit:    Shortness of breath    Hair loss    Palpitations    #1 Shortness of breath  Patient's breath more stable since starting on Wixela inhaler twice a day  #2 Hair loss  Discussed with patient plan to treat with over the counter Biotin and histamine blockade  #3 Palpitations  Well managed on metoprolol 50 mg twice a day  Patient instructed to call if no improvement in 72 hours or symptoms worsen    Subjective:      Patient ID: Houston Conroy is a 40 y o  female  40 y  o female presenting for a one month follow-up to discuss results have pulmonary functioning testing due to increased shortness of breath post COVID-19 infection  She was started on Advair S/P PFT and does report improvement  She reports that her palpitations  continue to occur but better managed with the Metoprolol  Her chief concern today is the increasing amount of hair loss she is experiencing  She has been under a lot of stress related to the palpitations, shortness of breath and COVID which may be paying a part int he current hair loss situation          Family History   Problem Relation Age of Onset    Cardiomyopathy Mother     Anxiety disorder Mother     No Known Problems Father     Anxiety disorder Brother     Heart attack Maternal Grandfather     Thalassemia Maternal Aunt     Lupus Family      Social History     Socioeconomic History    Marital status: Single     Spouse name: Not on file    Number of children: Not on file    Years of education: Not on file    Highest education level: Not on file   Occupational History    Not on file   Tobacco Use    Smoking status: Former Smoker     Packs/day: 0 25     Types: Cigarettes    Smokeless tobacco: Never Used   Vaping Use    Vaping Use: Never used   Substance and Sexual Activity    Alcohol use: No    Drug use: Never    Sexual activity: Yes     Partners: Male   Other Topics Concern    Not on file   Social History Narrative  Not on file     Social Determinants of Health     Financial Resource Strain:     Difficulty of Paying Living Expenses:    Food Insecurity:     Worried About Running Out of Food in the Last Year:     [address] in the Last Year:    Transportation Needs:     Lack of Transportation (Medical):  Lack of Transportation (Non-Medical):    Physical Activity:     Days of Exercise per Week:     Minutes of Exercise per Session:    Stress:     Feeling of Stress :    Social Connections:     Frequency of Communication with Friends and Family:     Frequency of Social Gatherings with Friends and Family:     Attends [employer] Services:     Active Member of Clubs or Organizations:     Attends Club or Organization Meetings:     Marital Status:    Intimate Partner Violence:     Fear of Current or Ex-Partner:     Emotionally Abused:     Physically Abused:     Sexually Abused:      E-Cigarette/Vaping    E-Cigarette Use Never User      E-Cigarette/Vaping Substances     Past Medical History:   Diagnosis Date    No known health problems      Past Surgical History:   Procedure Laterality Date    NO PAST SURGERIES       No Known Allergies    Current Outpatient Medications:     fluticasone-salmeterol (Wixela Inhub) 100-50 mcg/dose inhaler, Inhale 1 puff 2 (two) times a day Rinse mouth after use , Disp: 60 blister, Rfl: 5    metoprolol tartrate (LOPRESSOR) 50 mg tablet, Take 1 tablet (50 mg total) by mouth 2 (two) times a day, Disp: 180 tablet, Rfl: 1    betamethasone dipropionate (DIPROSONE) 0 05 % cream, Apply topically 2 (two) times a day, Disp: 30 g, Rfl: 0    omeprazole (PriLOSEC) 20 mg delayed release capsule, Take 1 capsule (20 mg total) by mouth daily, Disp: 30 capsule, Rfl: 0    Review of Systems   Constitutional: Negative for activity change, appetite change and unexpected weight change  HENT: Negative  Eyes: Negative for visual disturbance     Respiratory: Negative for cough, chest tightness, shortness of breath and wheezing  Cardiovascular: Negative for chest pain, palpitations and leg swelling  Gastrointestinal: Negative  Endocrine: Negative for polydipsia, polyphagia and polyuria  Musculoskeletal: Negative  Skin: Negative for color change and rash  Allergic/Immunologic: Negative for environmental allergies  Neurological: Negative for dizziness, weakness, light-headedness and headaches  Hematological: Negative  Psychiatric/Behavioral: Negative  Objective:    /82   Pulse 78   Temp 97 9 °F (36 6 °C)   Ht 5' 4" (1 626 m)   Wt 127 kg (279 lb)   BMI 47 89 kg/m² (Reviewed)     Physical Exam  Vitals reviewed  Constitutional:       General: She is not in acute distress  Appearance: She is well-developed and well-groomed  She is obese  She is not ill-appearing  HENT:      Head: Normocephalic and atraumatic  Right Ear: External ear normal       Left Ear: External ear normal       Nose:      Comments: Patient had a facial covering in place as per office protocol  Eyes:      General: Lids are normal       Extraocular Movements: Extraocular movements intact  Conjunctiva/sclera: Conjunctivae normal       Pupils: Pupils are equal, round, and reactive to light  Neck:      Trachea: Trachea normal    Cardiovascular:      Rate and Rhythm: Normal rate and regular rhythm  Heart sounds: Normal heart sounds  No murmur heard  No friction rub  No gallop  Pulmonary:      Effort: Pulmonary effort is normal  No tachypnea, bradypnea or respiratory distress  Breath sounds: Normal breath sounds  No wheezing or rhonchi  Musculoskeletal:      Cervical back: Neck supple  Right lower leg: No edema  Left lower leg: No edema  Skin:     General: Skin is warm and dry  Capillary Refill: Capillary refill takes less than 2 seconds  Neurological:      General: No focal deficit present        Mental Status: She is alert and oriented to person, place, and time  Motor: Motor function is intact  Psychiatric:         Mood and Affect: Mood normal          Behavior: Behavior normal  Behavior is cooperative  Thought Content:  Thought content normal

## 2021-07-07 PROBLEM — J45.909 MODERATE ASTHMA WITHOUT COMPLICATION: Status: ACTIVE | Noted: 2021-07-07

## 2021-07-19 ENCOUNTER — HOSPITAL ENCOUNTER (EMERGENCY)
Facility: HOSPITAL | Age: 46
Discharge: HOME | End: 2021-07-19
Attending: EMERGENCY MEDICINE | Admitting: EMERGENCY MEDICINE
Payer: COMMERCIAL

## 2021-07-19 ENCOUNTER — TELEPHONE (OUTPATIENT)
Dept: FAMILY MEDICINE CLINIC | Facility: CLINIC | Age: 46
End: 2021-07-19

## 2021-07-19 VITALS
HEART RATE: 74 BPM | RESPIRATION RATE: 16 BRPM | TEMPERATURE: 98.4 F | OXYGEN SATURATION: 97 % | SYSTOLIC BLOOD PRESSURE: 125 MMHG | DIASTOLIC BLOOD PRESSURE: 79 MMHG

## 2021-07-19 DIAGNOSIS — R00.2 PALPITATIONS: ICD-10-CM

## 2021-07-19 DIAGNOSIS — R00.2 HEART PALPITATIONS: Primary | ICD-10-CM

## 2021-07-19 LAB
ANION GAP SERPL CALCULATED.3IONS-SCNC: 10 MMOL/L (ref 4–13)
ATRIAL RATE: 60 BPM
ATRIAL RATE: 64 BPM
BASOPHILS # BLD AUTO: 0.11 THOUSANDS/ΜL (ref 0–0.1)
BASOPHILS NFR BLD AUTO: 1 % (ref 0–1)
BUN SERPL-MCNC: 14 MG/DL (ref 5–25)
CALCIUM SERPL-MCNC: 9.5 MG/DL (ref 8.3–10.1)
CHLORIDE SERPL-SCNC: 104 MMOL/L (ref 100–108)
CO2 SERPL-SCNC: 26 MMOL/L (ref 21–32)
CREAT SERPL-MCNC: 0.95 MG/DL (ref 0.6–1.3)
EOSINOPHIL # BLD AUTO: 0.13 THOUSAND/ΜL (ref 0–0.61)
EOSINOPHIL NFR BLD AUTO: 1 % (ref 0–6)
ERYTHROCYTE [DISTWIDTH] IN BLOOD BY AUTOMATED COUNT: 18 % (ref 11.6–15.1)
GFR SERPL CREATININE-BSD FRML MDRD: 73 ML/MIN/1.73SQ M
GLUCOSE SERPL-MCNC: 93 MG/DL (ref 65–140)
HCT VFR BLD AUTO: 43.8 % (ref 34.8–46.1)
HGB BLD-MCNC: 13.4 G/DL (ref 11.5–15.4)
IMM GRANULOCYTES # BLD AUTO: 0.04 THOUSAND/UL (ref 0–0.2)
IMM GRANULOCYTES NFR BLD AUTO: 0 % (ref 0–2)
LYMPHOCYTES # BLD AUTO: 2.97 THOUSANDS/ΜL (ref 0.6–4.47)
LYMPHOCYTES NFR BLD AUTO: 31 % (ref 14–44)
MCH RBC QN AUTO: 21.8 PG (ref 26.8–34.3)
MCHC RBC AUTO-ENTMCNC: 30.6 G/DL (ref 31.4–37.4)
MCV RBC AUTO: 71 FL (ref 82–98)
MONOCYTES # BLD AUTO: 0.59 THOUSAND/ΜL (ref 0.17–1.22)
MONOCYTES NFR BLD AUTO: 6 % (ref 4–12)
NEUTROPHILS # BLD AUTO: 5.7 THOUSANDS/ΜL (ref 1.85–7.62)
NEUTS SEG NFR BLD AUTO: 61 % (ref 43–75)
NRBC BLD AUTO-RTO: 0 /100 WBCS
P AXIS: 67 DEGREES
P AXIS: 70 DEGREES
PLATELET # BLD AUTO: 400 THOUSANDS/UL (ref 149–390)
PMV BLD AUTO: 9.9 FL (ref 8.9–12.7)
POTASSIUM SERPL-SCNC: 4.3 MMOL/L (ref 3.5–5.3)
PR INTERVAL: 156 MS
QRS AXIS: -31 DEGREES
QRS AXIS: -32 DEGREES
QRSD INTERVAL: 88 MS
QRSD INTERVAL: 96 MS
QT INTERVAL: 398 MS
QT INTERVAL: 406 MS
QTC INTERVAL: 399 MS
QTC INTERVAL: 405 MS
RBC # BLD AUTO: 6.16 MILLION/UL (ref 3.81–5.12)
SODIUM SERPL-SCNC: 140 MMOL/L (ref 136–145)
T WAVE AXIS: 37 DEGREES
T WAVE AXIS: 39 DEGREES
TROPONIN I SERPL-MCNC: <0.02 NG/ML
VENTRICULAR RATE: 58 BPM
VENTRICULAR RATE: 62 BPM
WBC # BLD AUTO: 9.54 THOUSAND/UL (ref 4.31–10.16)

## 2021-07-19 PROCEDURE — 99284 EMERGENCY DEPT VISIT MOD MDM: CPT | Performed by: EMERGENCY MEDICINE

## 2021-07-19 PROCEDURE — 99285 EMERGENCY DEPT VISIT HI MDM: CPT

## 2021-07-19 PROCEDURE — 85025 COMPLETE CBC W/AUTO DIFF WBC: CPT

## 2021-07-19 PROCEDURE — 84484 ASSAY OF TROPONIN QUANT: CPT

## 2021-07-19 PROCEDURE — 93010 ELECTROCARDIOGRAM REPORT: CPT | Performed by: INTERNAL MEDICINE

## 2021-07-19 PROCEDURE — 93005 ELECTROCARDIOGRAM TRACING: CPT

## 2021-07-19 PROCEDURE — 36415 COLL VENOUS BLD VENIPUNCTURE: CPT

## 2021-07-19 PROCEDURE — 80048 BASIC METABOLIC PNL TOTAL CA: CPT

## 2021-07-19 NOTE — ED ATTENDING ATTESTATION
7/19/2021  I, Darin Dennie Kluver, DO, saw and evaluated the patient  I have discussed the patient with the resident/non-physician practitioner and agree with the resident's/non-physician practitioner's findings, Plan of Care, and MDM as documented in the resident's/non-physician practitioner's note, except where noted  All available labs and Radiology studies were reviewed  I was present for key portions of any procedure(s) performed by the resident/non-physician practitioner and I was immediately available to provide assistance  At this point I agree with the current assessment done in the Emergency Department  I have conducted an independent evaluation of this patient a history and physical is as follows:        80-year-old female presents with chief complaint of palpitations  , occurred 3 days ago in the morning, lasted about 3-5 minutes and then fully resolved  , intermittently has palpitations since  , palpitations is not uncommon for her , but the episode that she had a lasted 3-5 minutes was at abnormally long , no associated chest pain or burning  No radiation, no associated shortness of breath  , current pain level is a 0/10 she is not currently having palpitations at this time she presented today to the emergency department as she called her family doctor to be evaluated in the office and they advised he come to the ER for further evaluation  , no falls no injury no trauma  No other modifying or alleviating factors  Review of Systems   Constitutional: Negative for activity change, chills, diaphoresis and fever  HENT: Negative for congestion, sinus pressure and sore throat  Eyes: Negative for pain and visual disturbance  Respiratory: Negative for cough, chest tightness, shortness of breath, wheezing and stridor  Cardiovascular: Negative for chest pain positive for palpitations     Gastrointestinal: Negative for abdominal distention, abdominal pain, constipation, diarrhea, nausea and vomiting  Genitourinary: Negative for dysuria and frequency  Musculoskeletal: Negative for neck pain and neck stiffness  Skin: Negative for rash  Neurological: Negative for dizziness, speech difficulty, light-headedness, numbness and headaches  Physical Exam  Vitals reviewed  Constitutional:       General: She is not in acute distress  Appearance: She is well-developed  She is not diaphoretic  HENT:      Head: Normocephalic and atraumatic  Right Ear: External ear normal       Left Ear: External ear normal       Nose: Nose normal    Eyes:      General:         Right eye: No discharge  Left eye: No discharge  Pupils: Pupils are equal, round, and reactive to light  Neck:      Trachea: No tracheal deviation  Cardiovascular:      Rate and Rhythm: Normal rate and regular rhythm  Heart sounds: Normal heart sounds  No murmur heard  Pulmonary:      Effort: Pulmonary effort is normal  No respiratory distress  Breath sounds: Normal breath sounds  No stridor  Abdominal:      General: There is no distension  Palpations: Abdomen is soft  Tenderness: There is no abdominal tenderness  There is no guarding or rebound  Musculoskeletal:         General: Normal range of motion  Cervical back: Normal range of motion and neck supple  Skin:     General: Skin is warm and dry  Coloration: Skin is not pale  Findings: No erythema  Neurological:      General: No focal deficit present  Mental Status: She is alert and oriented to person, place, and time                      ED Course         Critical Care Time  ECG 12 Lead Documentation Only    Date/Time: 7/19/2021 12:50 PM  Performed by: Jus Martinez DO  Authorized by: Jus Martinez DO     ECG reviewed by me, the ED Provider: yes    Patient location:  ED  Previous ECG:     Previous ECG:  Compared to current    Comparison ECG info:  1 17 2021    Similarity:  No change  Interpretation: Interpretation: non-specific    Rate:     ECG rate:  62    ECG rate assessment: normal    Rhythm:     Rhythm: sinus rhythm    Ectopy:     Ectopy: none    QRS:     QRS axis:  Left    QRS intervals:  Normal  Conduction:     Conduction: normal    ST segments:     ST segments:  Normal  T waves:     T waves: normal      ECG 12 Lead Documentation Only    Date/Time: 7/19/2021 2:02 PM  Performed by: Izzy Sánchez DO  Authorized by: Izzy Sánchez DO     ECG reviewed by me, the ED Provider: yes    Patient location:  ED  Interpretation:     Interpretation: normal    Rate:     ECG rate:  58    ECG rate assessment: bradycardic    Rhythm:     Rhythm: sinus rhythm    Ectopy:     Ectopy: none    QRS:     QRS axis:  Normal    QRS intervals:  Normal  Conduction:     Conduction: normal    ST segments:     ST segments:  Normal  T waves:     T waves: normal            MDM  Number of Diagnoses or Management Options  Heart palpitations: new, needed workup  Palpitations: new, needed workup  Diagnosis management comments:       Initial ED assessment:  52-year-old female presents with palpitations  Initial DDx includes but is not limited to:   Symptomatic PVCs, transient arrhythmia, doubt ischemic pathology    Initial ED plan:   Blood work, chest x-ray, disposition pending ED workup        Final ED summary/disposition:   After evaluation and workup in the emergency department, workup unremarkable    Patient discharged        Amount and/or Complexity of Data Reviewed  Clinical lab tests: ordered and reviewed  Review and summarize past medical records: yes  Independent visualization of images, tracings, or specimens: yes        Time reflects when diagnosis was documented in both MDM as applicable and the Disposition within this note     Time User Action Codes Description Comment    7/19/2021  2:02 PM Uri Walsh [R00 2] Palpitations     7/19/2021  2:12 PM Jacklyn Olivares [R00 2] Heart palpitations     7/19/2021  2:12 PM Deneice Legacy Modify [R00 2] Palpitations     7/19/2021  2:12 PM Deneice Legacy Modify [R00 2] Heart palpitations       ED Disposition     ED Disposition Condition Date/Time Comment    Discharge Stable Mon Jul 19, 2021  2:02 PM Mary Caldwell discharge to home/self care              Follow-up Information     Follow up With Specialties Details [address], [address] Curtis Zaman, Nurse Practitioner   [address] [address]  [phone]

## 2021-07-19 NOTE — TELEPHONE ENCOUNTER
Per Dr Lydia Kramer pt needs to go to ER, patient refused ER, she thinks meds need to be adjusted, can someone see her tomorrow or wait for Mathew Jessica,    Saturday and yesterday she had fast/hard heartbeat and sweating, she has hx , has PVC  Symptoms are gone today      Lilliam with anyone or she will wait for Mathew Jessica

## 2021-07-19 NOTE — ED PROVIDER NOTES
History  Chief Complaint   Patient presents with    Palpitations     pt c/o palpitations, multiple episodes since saturday  Patient is a 16-year-old female here today for palpitations  Patient woke up Saturday morning with palpitations and a feeling like her heart was beating through her chest   The episode lasted several minutes before subsiding on its own  Patient denied any chest pain, shortness of breath, vision changes, nausea, vomiting the time of this event  The rest of her Saturday she felt tired but denied any additional events  Patient called her PCP today to make a follow-up appointment for this event, however her PCP recommended that she go to the emergency department  Patient is asymptomatic in the emergency department today  Patient endorses a history of PVCs and associated palpitations  History provided by:  Patient   used: No    Palpitations  Palpitations quality:  Fast  Onset quality:  Sudden  Progression:  Resolved  Associated symptoms: no back pain, no chest pain, no cough, no shortness of breath and no vomiting        Prior to Admission Medications   Prescriptions Last Dose Informant Patient Reported? Taking?   fluticasone-salmeterol (Wixela Inhub) 100-50 mcg/dose inhaler   No No   Sig: Inhale 1 puff 2 (two) times a day Rinse mouth after use     metoprolol tartrate (LOPRESSOR) 50 mg tablet  Self No No   Sig: Take 1 tablet (50 mg total) by mouth 2 (two) times a day   omeprazole (PriLOSEC) 20 mg delayed release capsule  Self No No   Sig: Take 1 capsule (20 mg total) by mouth daily      Facility-Administered Medications: None       Past Medical History:   Diagnosis Date    No known health problems        Past Surgical History:   Procedure Laterality Date    NO PAST SURGERIES         Family History   Problem Relation Age of Onset    Cardiomyopathy Mother     Anxiety disorder Mother     No Known Problems Father     Anxiety disorder Brother     Heart attack Maternal Grandfather     Thalassemia Maternal Aunt     Lupus Family      I have reviewed and agree with the history as documented  E-Cigarette/Vaping    E-Cigarette Use Never User      E-Cigarette/Vaping Substances     Social History     Tobacco Use    Smoking status: Former Smoker     Packs/day: 0 25     Types: Cigarettes    Smokeless tobacco: Never Used   Vaping Use    Vaping Use: Never used   Substance Use Topics    Alcohol use: No    Drug use: Never        Review of Systems   Constitutional: Negative for chills and fever  HENT: Negative for ear pain and sore throat  Eyes: Negative for pain and visual disturbance  Respiratory: Negative for cough and shortness of breath  Cardiovascular: Positive for palpitations  Negative for chest pain  Gastrointestinal: Negative for abdominal pain and vomiting  Genitourinary: Negative for dysuria and hematuria  Musculoskeletal: Negative for arthralgias and back pain  Skin: Negative for color change and rash  Neurological: Negative for seizures and syncope  Psychiatric/Behavioral: Negative for agitation and confusion  All other systems reviewed and are negative  Physical Exam  ED Triage Vitals [07/19/21 1101]   Temperature Pulse Respirations Blood Pressure SpO2   98 4 °F (36 9 °C) 66 18 135/91 99 %      Temp src Heart Rate Source Patient Position - Orthostatic VS BP Location FiO2 (%)   -- Monitor Sitting Left arm --      Pain Score       --             Orthostatic Vital Signs  Vitals:    07/19/21 1101 07/19/21 1328   BP: 135/91 125/79   Pulse: 66 74   Patient Position - Orthostatic VS: Sitting Sitting       Physical Exam  Vitals and nursing note reviewed  Constitutional:       Appearance: Normal appearance  HENT:      Head: Normocephalic and atraumatic  Right Ear: External ear normal       Left Ear: External ear normal       Mouth/Throat:      Mouth: Mucous membranes are moist       Pharynx: Oropharynx is clear     Eyes: General: No scleral icterus  Conjunctiva/sclera: Conjunctivae normal    Cardiovascular:      Rate and Rhythm: Normal rate and regular rhythm  Heart sounds: Normal heart sounds  Pulmonary:      Effort: Pulmonary effort is normal  No respiratory distress  Breath sounds: Normal breath sounds  Abdominal:      General: Abdomen is flat  There is no distension  Tenderness: There is no abdominal tenderness  Musculoskeletal:         General: No tenderness or signs of injury  Cervical back: Neck supple  No rigidity  Skin:     General: Skin is warm  Coloration: Skin is not jaundiced  Findings: No erythema or rash  Neurological:      General: No focal deficit present  Mental Status: She is alert  Mental status is at baseline     Psychiatric:         Mood and Affect: Mood normal          Behavior: Behavior normal          ED Medications  Medications - No data to display    Diagnostic Studies  Results Reviewed     Procedure Component Value Units Date/Time    Troponin I [[id number]]  (Normal) Collected: 07/19/21 1318    Lab Status: Final result Specimen: Blood from Arm, Right Updated: 07/19/21 1350     Troponin I <0 02 ng/mL     Basic metabolic panel [[id number]] Collected: 07/19/21 1318    Lab Status: Final result Specimen: Blood from Arm, Right Updated: 07/19/21 1340     Sodium 140 mmol/L      Potassium 4 3 mmol/L      Chloride 104 mmol/L      CO2 26 mmol/L      ANION GAP 10 mmol/L      BUN 14 mg/dL      Creatinine 0 95 mg/dL      Glucose 93 mg/dL      Calcium 9 5 mg/dL      eGFR 73 ml/min/1 73sq m     Narrative:      Randy guidelines for Chronic Kidney Disease (CKD):     Stage 1 with normal or high GFR (GFR > 90 mL/min/1 73 square meters)    Stage 2 Mild CKD (GFR = 60-89 mL/min/1 73 square meters)    Stage 3A Moderate CKD (GFR = 45-59 mL/min/1 73 square meters)    Stage 3B Moderate CKD (GFR = 30-44 mL/min/1 73 square meters)    Stage 4 Severe CKD (GFR = 15-29 mL/min/1 73 square meters)    Stage 5 End Stage CKD (GFR <15 mL/min/1 73 square meters)  Note: GFR calculation is accurate only with a steady state creatinine    CBC and differential [[id number]]  (Abnormal) Collected: 07/19/21 1318    Lab Status: Final result Specimen: Blood from Arm, Right Updated: 07/19/21 1332     WBC 9 54 Thousand/uL      RBC 6 16 Million/uL      Hemoglobin 13 4 g/dL      Hematocrit 43 8 %      MCV 71 fL      MCH 21 8 pg      MCHC 30 6 g/dL      RDW 18 0 %      MPV 9 9 fL      Platelets 123 Thousands/uL      nRBC 0 /100 WBCs      Neutrophils Relative 61 %      Immat GRANS % 0 %      Lymphocytes Relative 31 %      Monocytes Relative 6 %      Eosinophils Relative 1 %      Basophils Relative 1 %      Neutrophils Absolute 5 70 Thousands/µL      Immature Grans Absolute 0 04 Thousand/uL      Lymphocytes Absolute 2 97 Thousands/µL      Monocytes Absolute 0 59 Thousand/µL      Eosinophils Absolute 0 13 Thousand/µL      Basophils Absolute 0 11 Thousands/µL                  No orders to display         Procedures  ECG 12 Lead Documentation Only    Date/Time: 7/19/2021 1:56 PM  Performed by: Aliza Crockett DO  Authorized by: Aliza Crockett DO     Indications / Diagnosis:  Palpitations  ECG reviewed by me, the ED Provider: yes    Patient location:  ED  Previous ECG:     Previous ECG:  Compared to current    Similarity:  No change  Interpretation:     Interpretation: normal    Rate:     ECG rate:  58    ECG rate assessment: bradycardic    Rhythm:     Rhythm: sinus rhythm    Ectopy:     Ectopy: none    QRS:     QRS axis:  Left  ST segments:     ST segments:  Normal  T waves:     T waves: normal            ED Course                                       MDM  Number of Diagnoses or Management Options  Palpitations: new and requires workup     Amount and/or Complexity of Data Reviewed  Clinical lab tests: ordered and reviewed  Review and summarize past medical records: yes  Independent visualization of images, tracings, or specimens: yes    Risk of Complications, Morbidity, and/or Mortality  General comments: Patient is a 43-year-old female here today for palpitations  Patient had an episode of severe palpitation Saturday morning she awoke, he felt like her heart was beating on her chest   The episode lasted only a few minutes after which time it resolved on its own  Patient did not have any more episodes after this  She denied any chest pain, shortness of breath, nausea, vomiting, headache, vision changes  Patient called her PCP today to discuss the episode and make an appointment, the PCP recommended that she come to the emergency department further assessment  ECG showed sinus bradycardia at 58 beats per minutes  There were no acute abnormalities on the EKG, no PVCs seen  Troponins were negative  No evidence of any acute processes  Patient was not having any symptoms  Physical exam is unremarkable  Suspect that the patient was symptomatic due to PVCs  Patient has history of PVCs and is being followed by her PCP for this issue  Patient to follow-up with her PCP tomorrow morning  Patient would like to potentially follow up with cardiologist   Patient is agreeable to plan      Patient Progress  Patient progress: stable      Disposition  Final diagnoses:   Palpitations   Heart palpitations     Time reflects when diagnosis was documented in both MDM as applicable and the Disposition within this note     Time User Action Codes Description Comment    7/19/2021  2:02 PM Uri Walsh Add [R00 2] Palpitations     7/19/2021  2:12 PM Burke Zavala, [facility] [R00 2] Heart palpitations     7/19/2021  2:12 PM Rosalie Okaloosa Modify [R00 2] Palpitations     7/19/2021  2:12 PM Rosalie [place]aloosa Modify [R00 2] Heart palpitations       ED Disposition     ED Disposition Condition Date/Time Comment    Discharge Stable Mon Jul 19, 2021  2:02 PM Morgan Gifford discharge to home/self care             Follow-up Information     Follow up With Specialties Details [address], [address] Curtis Zaman, Nurse Practitioner   [address]  [phone]            Patient's Medications   Discharge Prescriptions    No medications on file     No discharge procedures on file  PDMP Review     None           ED Provider  Attending physically available and evaluated [facility]  I managed the patient along with the ED Attending      Electronically Signed by         Any Carrion DO  07/19/21 0206

## 2021-07-20 ENCOUNTER — OFFICE VISIT (OUTPATIENT)
Dept: FAMILY MEDICINE CLINIC | Facility: CLINIC | Age: 46
End: 2021-07-20
Payer: COMMERCIAL

## 2021-07-20 ENCOUNTER — TELEPHONE (OUTPATIENT)
Dept: FAMILY MEDICINE CLINIC | Facility: CLINIC | Age: 46
End: 2021-07-20

## 2021-07-20 VITALS
HEART RATE: 65 BPM | OXYGEN SATURATION: 98 % | WEIGHT: 272 LBS | DIASTOLIC BLOOD PRESSURE: 80 MMHG | HEIGHT: 64 IN | BODY MASS INDEX: 46.44 KG/M2 | SYSTOLIC BLOOD PRESSURE: 126 MMHG | TEMPERATURE: 97.9 F

## 2021-07-20 DIAGNOSIS — R00.2 PALPITATION: ICD-10-CM

## 2021-07-20 DIAGNOSIS — Z00.00 ANNUAL PHYSICAL EXAM: ICD-10-CM

## 2021-07-20 DIAGNOSIS — L63.9 ALOPECIA AREATA: Primary | ICD-10-CM

## 2021-07-20 DIAGNOSIS — I49.3 FREQUENT PVCS: ICD-10-CM

## 2021-07-20 DIAGNOSIS — R00.2 PALPITATIONS: Primary | ICD-10-CM

## 2021-07-20 DIAGNOSIS — L65.9 HAIR LOSS: Primary | ICD-10-CM

## 2021-07-20 PROCEDURE — 1036F TOBACCO NON-USER: CPT | Performed by: FAMILY MEDICINE

## 2021-07-20 PROCEDURE — 99213 OFFICE O/P EST LOW 20 MIN: CPT | Performed by: FAMILY MEDICINE

## 2021-07-20 PROCEDURE — 3008F BODY MASS INDEX DOCD: CPT | Performed by: FAMILY MEDICINE

## 2021-07-20 RX ORDER — BETAMETHASONE DIPROPIONATE 0.5 MG/G
CREAM TOPICAL 2 TIMES DAILY
Qty: 30 G | Refills: 0 | Status: SHIPPED | OUTPATIENT
Start: 2021-07-20 | End: 2021-12-08 | Stop reason: ALTCHOICE

## 2021-07-20 NOTE — TELEPHONE ENCOUNTER
Patient just left and she was looking for lab orders   I did not see any in her chart   Did you want to order more labs?    Please advise

## 2021-07-21 NOTE — PROGRESS NOTES
Reina Ny 1975 female MRN: [id number]    Acute Visit    Assessment/Plan   Ward Reynoso was seen today for follow-up  Diagnoses and all orders for this visit:    Palpitations  -     Ambulatory referral to Cardiology; Future    continue current dose of metoprolol  Call with changing symptoms  Referred to cardiology, consider loop recorder if ongoing distressing symptoms  labs ordered for CRP, ESR, ODILIA, TSH, thyroid antibodies    Meena London MD  [address]  7/20/2021      Please be aware that this note contains text that was dictated and there may be errors pertaining to "sound-alike "words during the dictation process  SUBJECTIVE    CC: Follow-up (ER)      HPI:  Reina Ny is a 40 y o  female who presented for an acute visit complaining of ongoing palpitations  Since she initially was diagnosed by her PCP last year with PVCs and started on metoprolol she had overall been doing well, however, she had a "flare" of her symptoms and constant palpitations that precipitated her ER visit  Her workup there was reasurring (normal troponin, sinus fahad on EKG)  She felt a strong sense of palpitations on Saturday when she woke up  Felt a warm sensation in her chest and was sweating  She notes a change in lifestyle over the last few weeks, change in diet and walking several miles daily  She felt exhausted with this episode of palpitations  Review of Systems   Constitutional: Negative for chills, diaphoresis, fatigue and fever  HENT: Negative for sore throat  Respiratory: Negative for cough and shortness of breath  Cardiovascular: Positive for palpitations  Gastrointestinal: Negative for abdominal pain, constipation, diarrhea, nausea and vomiting  Genitourinary: Negative for dysuria, flank pain, frequency, pelvic pain, urgency and vaginal bleeding  Musculoskeletal: Negative for myalgias  Skin: Negative for rash  Neurological: Negative for light-headedness     All other systems reviewed and are negative  Medications:   Meds/Allergies   Current Outpatient Medications   Medication Sig Dispense Refill    fluticasone-salmeterol (Wixela Inhub) 100-50 mcg/dose inhaler Inhale 1 puff 2 (two) times a day Rinse mouth after use  60 blister 5    metoprolol tartrate (LOPRESSOR) 50 mg tablet Take 1 tablet (50 mg total) by mouth 2 (two) times a day 180 tablet 1    omeprazole (PriLOSEC) 20 mg delayed release capsule Take 1 capsule (20 mg total) by mouth daily 30 capsule 0    betamethasone dipropionate (DIPROSONE) 0 05 % cream Apply topically 2 (two) times a day 30 g 0     No current facility-administered medications for this visit  No Known Allergies    OBJECTIVE    Vitals:   Vitals:    07/20/21 1030   BP: 126/80   Pulse: 65   Temp: 97 9 °F (36 6 °C)   SpO2: 98%   Weight: 123 kg (272 lb)   Height: 5' 4" (1 626 m)       Physical Exam  Vitals and nursing note reviewed  Constitutional:       Appearance: She is well-developed  She is obese  HENT:      Head: Normocephalic and atraumatic  Eyes:      Conjunctiva/sclera: Conjunctivae normal    Cardiovascular:      Rate and Rhythm: Normal rate and regular rhythm  Pulses: Normal pulses  Heart sounds: Normal heart sounds  No murmur heard  No gallop  Pulmonary:      Effort: Pulmonary effort is normal  No respiratory distress  Breath sounds: Normal breath sounds  No wheezing  Abdominal:      General: Bowel sounds are normal  There is no distension  Palpations: Abdomen is soft  Tenderness: There is no abdominal tenderness  Skin:     General: Skin is warm and dry  Neurological:      Mental Status: She is alert

## 2021-08-05 ENCOUNTER — TELEPHONE (OUTPATIENT)
Dept: FAMILY MEDICINE CLINIC | Facility: CLINIC | Age: 46
End: 2021-08-05

## 2021-08-05 NOTE — TELEPHONE ENCOUNTER
Spoke with patient and made aware, states she has yellow/green discharge now which has changed from this morning, unsure if she needs an antibiotic       [facility] [place]

## 2021-08-05 NOTE — TELEPHONE ENCOUNTER
She has a stuffy nose, when she tries to blow it nothing comes out, one time it was clear  No other symptoms  She tried musinex, what else can she try? Does she need antibiotic  Pl adv

## 2021-08-10 ENCOUNTER — APPOINTMENT (OUTPATIENT)
Dept: LAB | Facility: CLINIC | Age: 46
End: 2021-08-10
Payer: COMMERCIAL

## 2021-08-10 DIAGNOSIS — L65.9 HAIR LOSS: ICD-10-CM

## 2021-08-10 DIAGNOSIS — R00.2 PALPITATION: ICD-10-CM

## 2021-08-10 DIAGNOSIS — I49.3 FREQUENT PVCS: ICD-10-CM

## 2021-08-10 LAB
CRP SERPL QL: 30.4 MG/L
ERYTHROCYTE [SEDIMENTATION RATE] IN BLOOD: 49 MM/HOUR (ref 0–19)
T4 FREE SERPL-MCNC: 0.78 NG/DL (ref 0.76–1.46)
TSH SERPL DL<=0.05 MIU/L-ACNC: 5.67 UIU/ML (ref 0.36–3.74)

## 2021-08-10 PROCEDURE — 84439 ASSAY OF FREE THYROXINE: CPT

## 2021-08-10 PROCEDURE — 36415 COLL VENOUS BLD VENIPUNCTURE: CPT

## 2021-08-10 PROCEDURE — 86140 C-REACTIVE PROTEIN: CPT

## 2021-08-10 PROCEDURE — 86038 ANTINUCLEAR ANTIBODIES: CPT

## 2021-08-10 PROCEDURE — 86800 THYROGLOBULIN ANTIBODY: CPT

## 2021-08-10 PROCEDURE — 86200 CCP ANTIBODY: CPT

## 2021-08-10 PROCEDURE — 84443 ASSAY THYROID STIM HORMONE: CPT

## 2021-08-10 PROCEDURE — 85652 RBC SED RATE AUTOMATED: CPT

## 2021-08-10 PROCEDURE — 86376 MICROSOMAL ANTIBODY EACH: CPT

## 2021-08-11 LAB
RYE IGE QN: NEGATIVE
THYROGLOB AB SERPL-ACNC: 1.1 IU/ML (ref 0–0.9)
THYROPEROXIDASE AB SERPL-ACNC: >600 IU/ML (ref 0–34)

## 2021-08-12 NOTE — TELEPHONE ENCOUNTER
A Healthy Lifestyle: Care Instructions  Your Care Instructions     A healthy lifestyle can help you feel good, stay at a healthy weight, and have plenty of energy for both work and play. A healthy lifestyle is something you can share with your whole family. A healthy lifestyle also can lower your risk for serious health problems, such as high blood pressure, heart disease, and diabetes. You can follow a few steps listed below to improve your health and the health of your family. Follow-up care is a key part of your treatment and safety. Be sure to make and go to all appointments, and call your doctor if you are having problems. It's also a good idea to know your test results and keep a list of the medicines you take. How can you care for yourself at home? · Do not eat too much sugar, fat, or fast foods. You can still have dessert and treats now and then. The goal is moderation. · Start small to improve your eating habits. Pay attention to portion sizes, drink less juice and soda pop, and eat more fruits and vegetables. ? Eat a healthy amount of food. A 3-ounce serving of meat, for example, is about the size of a deck of cards. Fill the rest of your plate with vegetables and whole grains. ? Limit the amount of soda and sports drinks you have every day. Drink more water when you are thirsty. ? Eat plenty of fruits and vegetables every day. Have an apple or some carrot sticks as an afternoon snack instead of a candy bar. Try to have fruits and/or vegetables at every meal.  · Make exercise part of your daily routine. You may want to start with simple activities, such as walking, bicycling, or slow swimming. Try to be active 30 to 60 minutes every day. You do not need to do all 30 to 60 minutes all at once. For example, you can exercise 3 times a day for 10 or 20 minutes.  Moderate exercise is safe for most people, but it is always a good idea to talk to your doctor before starting an exercise Patient called stating she was suppose to follow up with you today for bp and heart rate? Patient was seen in ED last night  program.  · Keep moving. [employer] the lawn, work in the garden, or [employer]. Take the stairs instead of the elevator at work. · If you smoke, quit. People who smoke have an increased risk for heart attack, stroke, cancer, and other lung illnesses. Quitting is hard, but there are ways to boost your chance of quitting tobacco for good. ? Use nicotine gum, patches, or lozenges. ? Ask your doctor about stop-smoking programs and medicines. ? Keep trying. In addition to reducing your risk of diseases in the future, you will notice some benefits soon after you stop using tobacco. If you have shortness of breath or asthma symptoms, they will likely get better within a few weeks after you quit. · Limit how much alcohol you drink. Moderate amounts of alcohol (up to 2 drinks a day for men, 1 drink a day for women) are okay. But drinking too much can lead to liver problems, high blood pressure, and other health problems. Family health  If you have a family, there are many things you can do together to improve your health. · Eat meals together as a family as often as possible. · Eat healthy foods. This includes fruits, vegetables, lean meats and dairy, and whole grains. · Include your family in your fitness plan. Most people think of activities such as jogging or tennis as the way to fitness, but there are many ways you and your family can be more active. Anything that makes you breathe hard and gets your heart pumping is exercise. Here are some tips:  ? Walk to do errands or to take your child to school or the bus.  ? Go for a family bike ride after dinner instead of watching TV. Where can you learn more? Go to http://www.gray.com/  Enter T491 in the search box to learn more about \"A Healthy Lifestyle: Care Instructions. \"  Current as of: September 23, 2020               Content Version: 12.8  © 8990-6431 [employer].    Care instructions adapted under license by [employer] (which disclaims liability or warranty for this information). If you have questions about a medical condition or this instruction, always ask your healthcare professional. [address] any warranty or liability for your use of this information.

## 2021-08-13 LAB — CCP AB SER IA-ACNC: 1

## 2021-08-31 ENCOUNTER — CONSULT (OUTPATIENT)
Dept: DERMATOLOGY | Facility: CLINIC | Age: 46
End: 2021-08-31
Payer: COMMERCIAL

## 2021-08-31 VITALS — WEIGHT: 250 LBS | BODY MASS INDEX: 42.68 KG/M2 | HEIGHT: 64 IN | TEMPERATURE: 97.8 F

## 2021-08-31 DIAGNOSIS — L65.0 TELOGEN EFFLUVIUM: ICD-10-CM

## 2021-08-31 DIAGNOSIS — L21.9 SEBORRHEIC DERMATITIS: ICD-10-CM

## 2021-08-31 DIAGNOSIS — L64.9 ANDROGENIC ALOPECIA: Primary | ICD-10-CM

## 2021-08-31 PROCEDURE — 99243 OFF/OP CNSLTJ NEW/EST LOW 30: CPT | Performed by: DERMATOLOGY

## 2021-08-31 NOTE — PATIENT INSTRUCTIONS
Assessment and Plan:  Based on a thorough discussion of this condition and the management approach to it (including a comprehensive discussion of the known risks, side effects and potential benefits of treatment), the patient (family) agrees to implement the following specific plan:  over the counter rogaine (minoxidil) 5% foam  Use one cap full a day on dry hair, part hair and apply directly to scalp  Do not do too close to bedtime  Need to do this daily  If you stop abruptly, you will lose hair  Can taper off if you don't like it  Could do laser therapy like laser caps or keating (examples: capillus, laser hair max)  Buy over the counter, online  Cosmetic treatments like platelet rich plasma (PRP) do it monthly for 3 months  Hair transplant surgery   Hair fibers, put on topically so it looks more full (example topix)      What is telogen effluvium? Telogen effluvium is the name for temporary hair loss due to the shedding of resting or telogen hair after some shock to the system  New hair continues to grow  Telogen hair has a bulb or club-shaped tip  It should be distinguished from anagen effluvium, in which the hair shedding is due to interruption of active or anagen hair growth by drugs, toxins or inflammation (eg, alopecia areata)  Anagen hair has a pointed or tapered tip  What is the cause of telogen effluvium? In a normal healthy person's scalp about 88% of the hair follicles are actively growing hair (anagen hair) and 15% are resting hair (telogen hair)  A hair follicle usually grows anagen hair for 4 years or so, then rests for about 4 months  The resting or telogen hair has a club or bulb at the tip  A new anagen hair begins to grow under the resting telogen hair and pushes it out  Thus, it is normal to lose up to about 100 hairs a day on one's comb, brush, in the basin or on the pillow, as a result of the normal scalp hair cycle      If there is some shock to the system, as many as 70% of the anagen hairs can be precipitated into telogen, thus reversing the usual ratio  Typical "shock" triggers include:   Illness, especially if there is fever    Surgical operation    Accident    Childbirth    Nervous shock    Weight loss or unusual diet    Certain medications    Discontinuing the contraceptive pill    Overseas travel resulting in jetlag    Excessive sun exposure    At first, the resting scalp hairs, now in the form of club hairs, remain firmly attached to the hair follicles  It is only about 2-4 months after the shock that the new hairs coming up through the scalp push out the 'dead' club hairs and increased hair fall are noticed  Thus, paradoxically, with this type of hair loss, hair fall is a sign of hair regrowth  As the new hair first comes up through the scalp and pushes out the dead hair a fine fringe of new hair is often evident along the forehead hairline  At first, the fall of club hairs is profuse and a general thinning of the scalp hair may become evident but after several months a peak is reached and hair fall begins to lessen, gradually tapering back to normal over 6-9 months  As the hair fall tapers off the scalp thickens back up to normal, but recovery may be incomplete in some cases  Because nail and hair growth are under the same influences, an arrest in hair growth is often mirrored in the nails by a groove across them coinciding with the time of the shock to the system  This is called Beau's line  The time of the shock can be estimated from the fact that a fingernail takes 5 months to grow from the posterior nail fold to the free edge  So if the groove in the nail is half way down the nail then the shock must have been 2 1/2 months ago  Chronic telogen effluvium  In some patients, hair shedding continues to be intermittently or continuously greater than normal for long periods of time, sometimes for years   The hair cycle appears to be reset so that the anagen period is shortened  Chronic telogen effluvium often presents in women that actually continue to have quite thick and moderately long hair - this is because they notice the shed hair more than those with finer or shorter hair  Telogen effluvium does not cause complete baldness, although it may unmask a genetic tendency to genetic balding i e  female pattern hair loss, or in men, male pattern hair loss  What is the treatment for telogen effluvium? Telogen effluvium is self-correcting  It is really not influenced by any treatment that can be given  However, gentle handling of the hair, avoiding over-vigorous combing, brushing and any type of scalp massage are important  You should also ensure a nutritious diet, with plenty of protein, fruit and vegetables  The doctor may check your thyroid function, and levels of iron, vitamin W65 and folic acid, as any deficiency in these can slow hair growth  The psychological effects of hair loss should not be ignored

## 2021-08-31 NOTE — PROGRESS NOTES
[facility] Note     Patient Name: Abundio Bosworth  Encounter Date: 08/31/2021     Have you been cared for by a [facility] in the last 3 years and, if so, which one? No    · Have you traveled outside of the [address] [place] [address] in the past 3 months or outside of the [facility] area in the last 2 weeks? No     May we call your Preferred Phone number to discuss your specific medical information? Yes     May we leave a detailed message that includes your specific medical information? Yes      Today's Chief Concerns:   Concern #1:  Hair loss     Past Medical History:  Have you personally ever had or currently have any of the following? · Skin cancer (such as Melanoma, Basal Cell Carcinoma, Squamous Cell Carcinoma? (If Yes, please provide more detail)- No  · Eczema: No  · Psoriasis: No  · HIV/AIDS: No  · Hepatitis B or C: No  · Tuberculosis: No  · Systemic Immunosuppression such as Diabetes, Biologic or Immunotherapy, Chemotherapy, Organ Transplantation, Bone Marrow Transplantation (If YES, please provide more detail): No  · Radiation Treatment (If YES, please provide more detail): No  · Any other major medical conditions/concerns? (If Yes, which types)- No    Social History:     What is/was your primary occupation?  What are your hobbies/past-times? Family History:  Have any of your "first degree relatives" (parent, brother, sister, or child) had any of the following       · Skin cancer such as Melanoma or Merkel Cell Carcinoma or Pancreatic Cancer? No  · Eczema, Asthma, Hay Fever or Seasonal Allergies: No  · Psoriasis or Psoriatic Arthritis: No  · Do any other medical conditions seem to run in your family? If Yes, what condition and which relatives?   No    Current Medications:         Current Outpatient Medications:     betamethasone dipropionate (DIPROSONE) 0 05 % cream, Apply topically 2 (two) times a day, Disp: 30 g, Rfl: 0   fluticasone-salmeterol (Wixela Inhub) 100-50 mcg/dose inhaler, Inhale 1 puff 2 (two) times a day Rinse mouth after use , Disp: 60 blister, Rfl: 5    levothyroxine 50 mcg tablet, Take 1 tablet (50 mcg total) by mouth daily, Disp: 30 tablet, Rfl: 2    metoprolol tartrate (LOPRESSOR) 50 mg tablet, Take 1 tablet (50 mg total) by mouth 2 (two) times a day, Disp: 180 tablet, Rfl: 1    Promethazine-DM (PHENERGAN-DM) 6 25-15 mg/5 mL oral syrup, Take 5 mL by mouth every 6 (six) hours as needed for cough, Disp: 118 mL, Rfl: 0    omeprazole (PriLOSEC) 20 mg delayed release capsule, Take 1 capsule (20 mg total) by mouth daily, Disp: 30 capsule, Rfl: 0      Review of Systems:  Have you recently had or currently have any of the following? If YES, what are you doing for the problem? · Fever, chills or unintended weight loss: No  · Sudden loss or change in your vision: No  · Nausea, vomiting or blood in your stool: No  · Painful or swollen joints: YES  · Wheezing or cough: YES  · Changing mole or non-healing wound: No  · Nosebleeds: No  · Excessive sweating: No  · Easy or prolonged bleeding? No  · Over the last 2 weeks, how often have you been bothered by the following problems? · Taking little interest or pleasure in doing things: 1 - Not at All  · Feeling down, depressed, or hopeless: 1 - Not at All  · Rapid heartbeat with epinephrine:  No    · FEMALES ONLY:    · Are you pregnant or planning to become pregnant? No  · Are you currently or planning to be nursing or breast feeding? No    · Any known allergies? · No Known Allergies      Physical Exam:     Was a chaperone (Derm Clinical Assistant) present throughout the entire Physical Exam? Yes     Did the Dermatology Team specifically  the patient on the importance of a Full Skin Exam to be sure that nothing is missed clinically?  Yes}  o Did the patient ultimately request or accept a Full Skin Exam?  NO  o Did the patient specifically refuse to have the areas "under-the-bra" examined by the Dermatologist? Ash davis Did the patient specifically refuse to have the areas "under-the-underwear" examined by the Dermatologist? YES    CONSTITUTIONAL:   Vitals:    08/31/21 0943   Temp: 97 8 °F (36 6 °C)   TempSrc: Tympanic   Weight: 113 kg (250 lb)   Height: 5' 4" (1 626 m)       PSYCH: Normal mood and affect  EYES: Normal conjunctiva  ENT: Normal lips and oral mucosa  CARDIOVASCULAR: No edema  RESPIRATORY: Normal respirations  HEME/LYMPH/IMMUNO:  No regional lymphadenopathy except as noted below in "ASSESSMENT AND PLAN BY DIAGNOSIS"    SKIN:  FULL ORGAN SYSTEM EXAM   Hair, Scalp, Ears, Face Normal except as noted below in Assessment   Neck, Cervical Chain Nodes    Right Arm/Hand/Fingers    Left Arm/Hand/Fingers    Chest/Breasts/Axillae    Abdomen, Umbilicus    Back/Spine    Groin/Genitalia/Buttocks    Right Leg, Foot, Toes    Left Leg, Foot, Toes         Assessment and Plan by Diagnosis:    History of Present Condition:     Duration:  How long has this been an issue for you? o  6 months    Location Affected:  Where on the body is this affecting you? o  scalp    Quality:  Is there any bleeding, pain, itch, burning/irritation, or redness associated with the skin lesion? o  itch and irritation    Severity:  Describe any bleeding, pain, itch, burning/irritation, or redness on a scale of 1 to 10 (with 10 being the worst)  o  5   Timing:  Does this condition seem to be there pretty constantly or do you notice it more at specific times throughout the day?     o  constant    Context:  Have you ever noticed that this condition seems to be associated with specific activities you do?    o  denies    Modifying Factors:    o Anything that seems to make the condition worse?    -  denies   o What have you tried to do to make the condition better?    -  diprosone 0 05% cream and  Pepcid    Associated Signs and Symptoms:  Does this skin lesion seem to be associated with any of the following:  o  SL AMB DERM SIGNS AND SYMPTOMS: Itching and Scratching     1  TELOGEN EFFLUVIUM  2 ANDROGENIC ALOPECIA  3  SEBORRHEIC DERMATITIS     Physical Exam:   Anatomic Location Affected:  Scalp    Morphological Description:  Scale, top of scalp with increased variability    Pertinent Positives:   Pertinent Negatives: Additional History of Present Condition:  Located on the scalp, patient states has been present for about six months  Patient states that it itches sometimes and its irritated  Assessment and Plan:  Based on a thorough discussion of this condition and the management approach to it (including a comprehensive discussion of the known risks, side effects and potential benefits of treatment), the patient (family) agrees to implement the following specific plan:  over the counter rogaine (minoxidil) 5% foam  Use one cap full a day on dry hair, part hair and apply directly to scalp  Do not do too close to bedtime  Need to do this daily  If you stop abruptly, you will lose hair  Can taper off if you don't like it  Could do laser therapy like laser caps or keating (examples: capillus, laser hair max)  Buy over the counter, online  Cosmetic treatments like platelet rich plasma (PRP) do it monthly for 3 months  Hair transplant surgery   Hair fibers, put on topically so it looks more full (example topix)      What is telogen effluvium? Telogen effluvium is the name for temporary hair loss due to the shedding of resting or telogen hair after some shock to the system  New hair continues to grow  Telogen hair has a bulb or club-shaped tip  It should be distinguished from anagen effluvium, in which the hair shedding is due to interruption of active or anagen hair growth by drugs, toxins or inflammation (eg, alopecia areata)  Anagen hair has a pointed or tapered tip  What is the cause of telogen effluvium?   In a normal healthy person's scalp about 47% of the hair follicles are actively growing hair (anagen hair) and 15% are resting hair (telogen hair)  A hair follicle usually grows anagen hair for 4 years or so, then rests for about 4 months  The resting or telogen hair has a club or bulb at the tip  A new anagen hair begins to grow under the resting telogen hair and pushes it out  Thus, it is normal to lose up to about 100 hairs a day on one's comb, brush, in the basin or on the pillow, as a result of the normal scalp hair cycle  If there is some shock to the system, as many as 70% of the anagen hairs can be precipitated into telogen, thus reversing the usual ratio  Typical "shock" triggers include:   Illness, especially if there is fever    Surgical operation    Accident    Childbirth    Nervous shock    Weight loss or unusual diet    Certain medications    Discontinuing the contraceptive pill    Overseas travel resulting in jetlag    Excessive sun exposure    At first, the resting scalp hairs, now in the form of club hairs, remain firmly attached to the hair follicles  It is only about 2-4 months after the shock that the new hairs coming up through the scalp push out the 'dead' club hairs and increased hair fall are noticed  Thus, paradoxically, with this type of hair loss, hair fall is a sign of hair regrowth  As the new hair first comes up through the scalp and pushes out the dead hair a fine fringe of new hair is often evident along the forehead hairline  At first, the fall of club hairs is profuse and a general thinning of the scalp hair may become evident but after several months a peak is reached and hair fall begins to lessen, gradually tapering back to normal over 6-9 months  As the hair fall tapers off the scalp thickens back up to normal, but recovery may be incomplete in some cases      Because nail and hair growth are under the same influences, an arrest in hair growth is often mirrored in the nails by a groove across them coinciding with the time of the shock to the system  This is called Beau's line  The time of the shock can be estimated from the fact that a fingernail takes 5 months to grow from the posterior nail fold to the free edge  So if the groove in the nail is half way down the nail then the shock must have been 2 1/2 months ago  Chronic telogen effluvium  In some patients, hair shedding continues to be intermittently or continuously greater than normal for long periods of time, sometimes for years  The hair cycle appears to be reset so that the anagen period is shortened  Chronic telogen effluvium often presents in women that actually continue to have quite thick and moderately long hair - this is because they notice the shed hair more than those with finer or shorter hair  Telogen effluvium does not cause complete baldness, although it may unmask a genetic tendency to genetic balding i e  female pattern hair loss, or in men, male pattern hair loss  What is the treatment for telogen effluvium? Telogen effluvium is self-correcting  It is really not influenced by any treatment that can be given  However, gentle handling of the hair, avoiding over-vigorous combing, brushing and any type of scalp massage are important  You should also ensure a nutritious diet, with plenty of protein, fruit and vegetables  The doctor may check your thyroid function, and levels of iron, vitamin T03 and folic acid, as any deficiency in these can slow hair growth  The psychological effects of hair loss should not be ignored        Scribe Attestation    I,:  Preethi Quevedo MA am acting as a scribe while in the presence of the attending physician :       I,:  Mani Shah MD personally performed the services described in this documentation    as scribed in my presence :

## 2021-09-01 ENCOUNTER — CONSULT (OUTPATIENT)
Dept: CARDIOLOGY CLINIC | Facility: CLINIC | Age: 46
End: 2021-09-01
Payer: COMMERCIAL

## 2021-09-01 VITALS
HEIGHT: 64 IN | DIASTOLIC BLOOD PRESSURE: 76 MMHG | WEIGHT: 282.6 LBS | OXYGEN SATURATION: 95 % | SYSTOLIC BLOOD PRESSURE: 132 MMHG | BODY MASS INDEX: 48.25 KG/M2

## 2021-09-01 DIAGNOSIS — R00.2 PALPITATIONS: Primary | ICD-10-CM

## 2021-09-01 DIAGNOSIS — I49.3 PVC (PREMATURE VENTRICULAR CONTRACTION): ICD-10-CM

## 2021-09-01 PROCEDURE — 93000 ELECTROCARDIOGRAM COMPLETE: CPT | Performed by: INTERNAL MEDICINE

## 2021-09-01 PROCEDURE — 3008F BODY MASS INDEX DOCD: CPT | Performed by: INTERNAL MEDICINE

## 2021-09-01 PROCEDURE — 99244 OFF/OP CNSLTJ NEW/EST MOD 40: CPT | Performed by: INTERNAL MEDICINE

## 2021-09-01 PROCEDURE — 1036F TOBACCO NON-USER: CPT | Performed by: INTERNAL MEDICINE

## 2021-09-01 RX ORDER — FAMOTIDINE 10 MG
10 TABLET ORAL DAILY
COMMUNITY
End: 2022-02-22 | Stop reason: ALTCHOICE

## 2021-09-01 RX ORDER — LORATADINE 10 MG/1
1 CAPSULE, LIQUID FILLED ORAL DAILY
COMMUNITY
End: 2022-02-22 | Stop reason: ALTCHOICE

## 2021-09-01 NOTE — PROGRESS NOTES
Cardiology Consultation     Illa Homans  [id number]  1975  [facility] [address] [facility] [place] [address] [place]  [id number] [phone]      1  Palpitations  Ambulatory referral to Cardiology    POCT ECG   2  PVC (premature ventricular contraction)  POCT ECG       Discussion/Summary:      Palpitations consistent with PVCs which have been documented previously on Holter monitor  She initially did have some relief with metoprolol, but now is feeling more this  Recent blood work did suspect thyroid dysfunction and she had elevated ESR and CRP  Diagnosis of COVID in December  We are finding that people can have PVCs and palpitations long-lasting after this and take some time for them to resolve  Additionally, she has significant sleep issues, and I suspect anxiety is playing a part here as well  There was 1 episode of a little bit longer lasting palpitations which was of unclear etiology  There has been no recurrence of this  Overall, discussed the diagnosis at length with the patient  I gave her the option of conservative treatment because her echocardiogram was otherwise normal in her Holter did not show a high burden of these  Additionally, she should be working to get her other issues under control and is recently started on thyroid medication  Otherwise, we could increase the metoprolol  She can take an additional dose if she is feeling a particularly bad day or having a lot of symptoms  She was comfortable with this recommendation, and did not want to increase her baseline metoprolol dose daily  If her symptoms change or worsen, she can certainly contact me and we can increase the dose  Otherwise, if she has more of the longer standing palpitations we can do repeat monitoring to try to capture 1 of these episodes            History of Present Illness:     50-year-old female is referred to the office today for palpitations  Had Palmer in December  In January, she is feeling palpitations  She had a few ER visits  She had a Holter monitor which showed PVCs  It is suspected that this is the etiology of her symptoms  Echocardiogram done at that time was unremarkable  She has continued to feel these  She has had very poor sleep hygiene because she has a lot of symptoms  She ultimately was started on metoprolol and the symptoms improved  Now, more recently, she has been feeling them again  In July, she had an episode of longer lasting palpitations for approximately 1 minute which prompted an ER visit  Unremarkable  Follow-up with PCP blood work was done and she had abnormal thyroid function  She is felt to have Hashimoto's thyroiditis  She has been started on supplemental thyroid medication  She has been having hair loss  She saw Dermatology yesterday, but this is not felt to either be related to medication or to the thyroid  Felt to be related to stress  She does overall seem anxious  She tried taking some medication for this, but she felt worse with it  No dizziness or lightheadedness  She feels a sensation in her chest as though it is tight  She had PFTs which were abnormal suspecting asthma, she started on an inhaler and this does help with that as well  She has gained about 35 lb since her diagnosis of COVID  She is less active, but is interested in resuming her previous activities      Patient Active Problem List   Diagnosis    Palpitations    Moderate asthma without complication     Past Medical History:   Diagnosis Date    No known health problems      Social History     Tobacco Use    Smoking status: Former Smoker     Packs/day: 0 25     Types: Cigarettes    Smokeless tobacco: Never Used   Vaping Use    Vaping Use: Never used   Substance Use Topics    Alcohol use: No    Drug use: Never      Family History   Problem Relation Age of Onset    Cardiomyopathy Mother     Anxiety disorder Mother     No Known Problems Father     Anxiety disorder Brother     Heart attack Maternal Grandfather     Thalassemia Maternal Aunt     Lupus Family      Past Surgical History:   Procedure Laterality Date    NO PAST SURGERIES         Current Outpatient Medications:     famotidine (Pepcid AC) 10 mg tablet, Take 10 mg by mouth daily, Disp: , Rfl:     fluticasone-salmeterol (Wixela Inhub) 100-50 mcg/dose inhaler, Inhale 1 puff 2 (two) times a day Rinse mouth after use , Disp: 60 blister, Rfl: 5    levothyroxine 50 mcg tablet, Take 1 tablet (50 mcg total) by mouth daily, Disp: 30 tablet, Rfl: 2    Loratadine (Claritin) 10 MG CAPS, Take 1 capsule by mouth daily, Disp: , Rfl:     metoprolol tartrate (LOPRESSOR) 50 mg tablet, Take 1 tablet (50 mg total) by mouth 2 (two) times a day, Disp: 180 tablet, Rfl: 1    betamethasone dipropionate (DIPROSONE) 0 05 % cream, Apply topically 2 (two) times a day, Disp: 30 g, Rfl: 0    omeprazole (PriLOSEC) 20 mg delayed release capsule, Take 1 capsule (20 mg total) by mouth daily (Patient not taking: Reported on 9/1/2021), Disp: 30 capsule, Rfl: 0    Promethazine-DM (PHENERGAN-DM) 6 25-15 mg/5 mL oral syrup, Take 5 mL by mouth every 6 (six) hours as needed for cough (Patient not taking: Reported on 9/1/2021), Disp: 118 mL, Rfl: 0  No Known Allergies    Vitals:    09/01/21 1439   BP: 132/76   BP Location: Left arm   Patient Position: Sitting   Cuff Size: Large   SpO2: 95%   Weight: 128 kg (282 lb 9 6 oz)   Height: 5' 4" (1 626 m)     Vitals:    09/01/21 1439   Weight: 128 kg (282 lb 9 6 oz)      Height: 5' 4" (162 6 cm)   Body mass index is 48 51 kg/m²  Physical Exam:  GENERAL: Anxious, Otherwise NAD    HEENT:  PERRL, EOMI, no scleral icterus, no conjunctival pallor  NECK:  Supple, No elevated JVP, no thyromegaly, no carotid bruits  HEART:  Regular rate and rhythm, normal S1/S2, no S3/S4, no murmur or rub  LUNGS:  Clear to auscultation bilaterally  ABDOMEN:  Soft, non-tender, positive bowel sounds, no rebound or guarding  EXTREMITIES:  No edema  VASCULAR:  Normal pedal pulses   NEURO: No focal deficits,  SKIN: Normal without suspicious lesions on exposed skin      ROS:  Positive for anxiety, palpitations, shortness of breath, chest tightness  Except as noted in HPI, is otherwise reviewed in detail and a 12 point review of systems is negative  Labs:  Lab Results   Component Value Date    SODIUM 140 07/19/2021    K 4 3 07/19/2021     07/19/2021    CREATININE 0 95 07/19/2021    BUN 14 07/19/2021    CO2 26 07/19/2021    ALT 31 01/27/2021    AST 13 01/27/2021    GLUF 94 01/27/2021    WBC 9 54 07/19/2021    HGB 13 4 07/19/2021    HCT 43 8 07/19/2021     (H) 07/19/2021       No results found for: CHOL  No results found for: HDL  No results found for: LDLCALC  No results found for: TRIG    Testing:  Holter:  IMPRESSION:  1  Predominantly sinus rhythm, with an average heart rate of 67 beats per minute  2  Occasional premature ventricular contractions, constituting 1 7% of total beats  3  Some of the reported symptoms of "chest pinch" and "fluttering" during the monitoring period, correlated with single PVCs  4  Rare premature atrial contractions  5  No significant pauses or advanced degree heart block    Echo:  IMPRESSIONS:  The study was within normal limits      SUMMARY     LEFT VENTRICLE:  Size was normal   Systolic function was normal  Ejection fraction was estimated to be 60 %  There were no regional wall motion abnormalities  Wall thickness was normal   There was no evidence of concentric hypertrophy      TRICUSPID VALVE:  There was trace regurgitation  EKG:    Sinus rhythm  69 beats per minute  Poor R-wave progression  Otherwise normal EKG

## 2021-09-29 ENCOUNTER — APPOINTMENT (OUTPATIENT)
Dept: LAB | Facility: CLINIC | Age: 46
End: 2021-09-29
Payer: COMMERCIAL

## 2021-09-29 ENCOUNTER — OFFICE VISIT (OUTPATIENT)
Dept: FAMILY MEDICINE CLINIC | Facility: CLINIC | Age: 46
End: 2021-09-29
Payer: COMMERCIAL

## 2021-09-29 VITALS
SYSTOLIC BLOOD PRESSURE: 120 MMHG | WEIGHT: 286 LBS | BODY MASS INDEX: 48.83 KG/M2 | DIASTOLIC BLOOD PRESSURE: 80 MMHG | TEMPERATURE: 98 F | HEART RATE: 68 BPM | HEIGHT: 64 IN

## 2021-09-29 DIAGNOSIS — H00.11 CHALAZION OF RIGHT UPPER EYELID: Primary | ICD-10-CM

## 2021-09-29 DIAGNOSIS — E03.9 HYPOTHYROIDISM, UNSPECIFIED TYPE: ICD-10-CM

## 2021-09-29 LAB — TSH SERPL DL<=0.05 MIU/L-ACNC: 2.57 UIU/ML (ref 0.36–3.74)

## 2021-09-29 PROCEDURE — 36415 COLL VENOUS BLD VENIPUNCTURE: CPT

## 2021-09-29 PROCEDURE — 1036F TOBACCO NON-USER: CPT | Performed by: FAMILY MEDICINE

## 2021-09-29 PROCEDURE — 84443 ASSAY THYROID STIM HORMONE: CPT

## 2021-09-29 PROCEDURE — 99213 OFFICE O/P EST LOW 20 MIN: CPT | Performed by: FAMILY MEDICINE

## 2021-09-29 PROCEDURE — 3008F BODY MASS INDEX DOCD: CPT | Performed by: FAMILY MEDICINE

## 2021-09-29 RX ORDER — OFLOXACIN 3 MG/ML
1 SOLUTION/ DROPS OPHTHALMIC 4 TIMES DAILY
Qty: 5 ML | Refills: 0 | Status: SHIPPED | OUTPATIENT
Start: 2021-09-29 | End: 2021-10-06

## 2021-09-29 NOTE — PROGRESS NOTES
Norma Campos 1975 female MRN: [id number]    Acute Visit    Assessment/Plan   Lokesh Gastelum was seen today for eye pain  Diagnoses and all orders for this visit:    Chalazion of right upper eyelid  -     ofloxacin (OCUFLOX) 0 3 % ophthalmic solution; Administer 1 drop to the right eye 4 (four) times a day for 7 days    continue compresses  Call if worsening  Reviewed ER precautions    Houston Al MD  [address]  9/29/2021      Please be aware that this note contains text that was dictated and there may be errors pertaining to "sound-alike "words during the dictation process  SUBJECTIVE    CC: Eye Pain (right eye)      HPI:  Norma Campos is a 40 y o  female who presented for an acute visit complaining of right eyelid swelling, pain, and redness  Started Sunday after they were out at [employer] [facility] patch  Denies ocular movement pain, photophobia, blurred vision, fever  Has been doing warm compresses  Woke up with increased swelling today  Review of Systems   Constitutional: Negative for fever  HENT: Negative for congestion  Eyes: Positive for pain and redness  Negative for photophobia, discharge, itching and visual disturbance  Respiratory: Negative for cough  All other systems reviewed and are negative  Medications:   Meds/Allergies   Current Outpatient Medications   Medication Sig Dispense Refill    betamethasone dipropionate (DIPROSONE) 0 05 % cream Apply topically 2 (two) times a day 30 g 0    famotidine (Pepcid AC) 10 mg tablet Take 10 mg by mouth daily      fluticasone-salmeterol (Wixela Inhub) 100-50 mcg/dose inhaler Inhale 1 puff 2 (two) times a day Rinse mouth after use   60 blister 5    levothyroxine 50 mcg tablet Take 1 tablet (50 mcg total) by mouth daily 30 tablet 2    Loratadine (Claritin) 10 MG CAPS Take 1 capsule by mouth daily      metoprolol tartrate (LOPRESSOR) 50 mg tablet Take 1 tablet (50 mg total) by mouth 2 (two) times a day 180 tablet 1  ofloxacin (OCUFLOX) 0 3 % ophthalmic solution Administer 1 drop to the right eye 4 (four) times a day for 7 days 5 mL 0     No current facility-administered medications for this visit  No Known Allergies    OBJECTIVE    Vitals:   Vitals:    09/29/21 1420   BP: 120/80   Pulse: 68   Temp: 98 °F (36 7 °C)   Weight: 130 kg (286 lb)   Height: 5' 4" (1 626 m)       Physical Exam  Vitals and nursing note reviewed  Constitutional:       General: She is not in acute distress  Appearance: Normal appearance  She is well-developed  She is not ill-appearing or diaphoretic  HENT:      Head: Normocephalic and atraumatic  Right Ear: External ear normal       Left Ear: External ear normal       Nose: Nose normal    Eyes:      General: Lids are normal  Lids are everted, no foreign bodies appreciated  Gaze aligned appropriately  Right eye: Hordeolum present  Extraocular Movements: Extraocular movements intact  Conjunctiva/sclera: Conjunctivae normal    Neck:      Vascular: No JVD  Trachea: No tracheal deviation  Pulmonary:      Effort: No accessory muscle usage or respiratory distress  Skin:     Findings: No rash  Neurological:      Mental Status: She is alert

## 2021-10-15 DIAGNOSIS — I49.3 FREQUENT PVCS: ICD-10-CM

## 2021-10-15 DIAGNOSIS — R00.2 PALPITATIONS: ICD-10-CM

## 2021-10-15 RX ORDER — METOPROLOL TARTRATE 50 MG/1
50 TABLET, FILM COATED ORAL 2 TIMES DAILY
Qty: 180 TABLET | Refills: 1 | Status: SHIPPED | OUTPATIENT
Start: 2021-10-15 | End: 2022-04-04 | Stop reason: SDUPTHER

## 2021-11-12 ENCOUNTER — TELEPHONE (OUTPATIENT)
Dept: FAMILY MEDICINE CLINIC | Facility: CLINIC | Age: 46
End: 2021-11-12

## 2021-11-12 DIAGNOSIS — E03.9 HYPOTHYROIDISM, UNSPECIFIED TYPE: ICD-10-CM

## 2021-11-12 RX ORDER — LEVOTHYROXINE SODIUM 0.05 MG/1
50 TABLET ORAL DAILY
Qty: 30 TABLET | Refills: 2 | Status: SHIPPED | OUTPATIENT
Start: 2021-11-12 | End: 2022-01-27

## 2021-12-02 ENCOUNTER — OFFICE VISIT (OUTPATIENT)
Dept: DERMATOLOGY | Facility: CLINIC | Age: 46
End: 2021-12-02
Payer: COMMERCIAL

## 2021-12-02 VITALS — HEIGHT: 64 IN | WEIGHT: 280 LBS | BODY MASS INDEX: 47.8 KG/M2 | TEMPERATURE: 98.6 F

## 2021-12-02 DIAGNOSIS — L64.9 ANDROGENIC ALOPECIA: ICD-10-CM

## 2021-12-02 DIAGNOSIS — L21.9 SEBORRHEIC DERMATITIS: ICD-10-CM

## 2021-12-02 DIAGNOSIS — L65.0 TELOGEN EFFLUVIUM: Primary | ICD-10-CM

## 2021-12-02 PROCEDURE — 99213 OFFICE O/P EST LOW 20 MIN: CPT | Performed by: DERMATOLOGY

## 2021-12-02 PROCEDURE — 3008F BODY MASS INDEX DOCD: CPT | Performed by: NURSE PRACTITIONER

## 2021-12-02 RX ORDER — KETOCONAZOLE 20 MG/ML
SHAMPOO TOPICAL
Qty: 120 ML | Refills: 11 | Status: SHIPPED | OUTPATIENT
Start: 2021-12-02 | End: 2022-02-22 | Stop reason: ALTCHOICE

## 2021-12-06 ENCOUNTER — TELEMEDICINE (OUTPATIENT)
Dept: FAMILY MEDICINE CLINIC | Facility: CLINIC | Age: 46
End: 2021-12-06
Payer: COMMERCIAL

## 2021-12-06 DIAGNOSIS — J01.40 ACUTE NON-RECURRENT PANSINUSITIS: Primary | ICD-10-CM

## 2021-12-06 PROCEDURE — 99213 OFFICE O/P EST LOW 20 MIN: CPT | Performed by: NURSE PRACTITIONER

## 2021-12-06 RX ORDER — AMOXICILLIN AND CLAVULANATE POTASSIUM 875; 125 MG/1; MG/1
1 TABLET, FILM COATED ORAL EVERY 12 HOURS SCHEDULED
Qty: 14 TABLET | Refills: 0 | Status: SHIPPED | OUTPATIENT
Start: 2021-12-06 | End: 2021-12-13

## 2021-12-06 RX ORDER — METHYLPREDNISOLONE 4 MG/1
TABLET ORAL
Qty: 21 EACH | Refills: 0 | Status: SHIPPED | OUTPATIENT
Start: 2021-12-06 | End: 2022-01-14 | Stop reason: ALTCHOICE

## 2021-12-08 ENCOUNTER — TELEPHONE (OUTPATIENT)
Dept: FAMILY MEDICINE CLINIC | Facility: CLINIC | Age: 46
End: 2021-12-08

## 2021-12-08 ENCOUNTER — OFFICE VISIT (OUTPATIENT)
Dept: FAMILY MEDICINE CLINIC | Facility: CLINIC | Age: 46
End: 2021-12-08
Payer: COMMERCIAL

## 2021-12-08 DIAGNOSIS — J06.9 URI WITH COUGH AND CONGESTION: Primary | ICD-10-CM

## 2021-12-08 PROBLEM — D64.9 ANEMIA: Status: ACTIVE | Noted: 2021-12-08

## 2021-12-08 PROBLEM — E03.9 HYPOTHYROIDISM: Status: ACTIVE | Noted: 2021-12-08

## 2021-12-08 PROBLEM — D56.9 THALASSEMIA: Status: ACTIVE | Noted: 2021-12-08

## 2021-12-08 PROBLEM — E06.3 HASHIMOTO'S DISEASE: Status: ACTIVE | Noted: 2021-08-20

## 2021-12-08 PROCEDURE — 99214 OFFICE O/P EST MOD 30 MIN: CPT | Performed by: NURSE PRACTITIONER

## 2021-12-08 RX ORDER — BROMPHENIRAMINE MALEATE, PSEUDOEPHEDRINE HYDROCHLORIDE, AND DEXTROMETHORPHAN HYDROBROMIDE 2; 30; 10 MG/5ML; MG/5ML; MG/5ML
5 SYRUP ORAL EVERY 6 HOURS PRN
Qty: 118 ML | Refills: 0 | Status: SHIPPED | OUTPATIENT
Start: 2021-12-08 | End: 2021-12-14

## 2021-12-28 DIAGNOSIS — J45.41 MODERATE PERSISTENT ASTHMA WITH ACUTE EXACERBATION: ICD-10-CM

## 2022-01-14 ENCOUNTER — OFFICE VISIT (OUTPATIENT)
Dept: FAMILY MEDICINE CLINIC | Facility: CLINIC | Age: 47
End: 2022-01-14
Payer: MEDICARE

## 2022-01-14 VITALS
OXYGEN SATURATION: 98 % | HEART RATE: 77 BPM | HEIGHT: 64 IN | SYSTOLIC BLOOD PRESSURE: 120 MMHG | BODY MASS INDEX: 48.83 KG/M2 | DIASTOLIC BLOOD PRESSURE: 88 MMHG | TEMPERATURE: 97.7 F | WEIGHT: 286 LBS

## 2022-01-14 DIAGNOSIS — Z12.31 ENCOUNTER FOR SCREENING MAMMOGRAM FOR MALIGNANT NEOPLASM OF BREAST: ICD-10-CM

## 2022-01-14 DIAGNOSIS — D64.9 ANEMIA, UNSPECIFIED TYPE: ICD-10-CM

## 2022-01-14 DIAGNOSIS — M25.50 ARTHRALGIA, UNSPECIFIED JOINT: ICD-10-CM

## 2022-01-14 DIAGNOSIS — R06.02 SHORTNESS OF BREATH: ICD-10-CM

## 2022-01-14 DIAGNOSIS — Z00.00 ANNUAL PHYSICAL EXAM: Primary | ICD-10-CM

## 2022-01-14 DIAGNOSIS — J45.40 MODERATE PERSISTENT ASTHMA WITHOUT COMPLICATION: ICD-10-CM

## 2022-01-14 DIAGNOSIS — E06.3 HASHIMOTO'S DISEASE: ICD-10-CM

## 2022-01-14 PROCEDURE — 99396 PREV VISIT EST AGE 40-64: CPT | Performed by: NURSE PRACTITIONER

## 2022-01-14 PROCEDURE — 94010 BREATHING CAPACITY TEST: CPT | Performed by: NURSE PRACTITIONER

## 2022-01-14 PROCEDURE — 3008F BODY MASS INDEX DOCD: CPT | Performed by: NURSE PRACTITIONER

## 2022-01-14 RX ORDER — BIOTIN 1 MG
1000 TABLET ORAL 3 TIMES DAILY
COMMUNITY

## 2022-01-14 NOTE — PATIENT INSTRUCTIONS

## 2022-01-17 NOTE — PROGRESS NOTES
Progress Note     Patient: Christiana Vivas               Sex: female           MRN: [id number]       YOB: 1960      Age:  61 year old                 Christiana Vivas is a 61 year old female who is being seen for esrd    Subjective:  New [facility] TDC placed this pm, seen on HD today, tolerating well. Reports she will not pull this new catheter out.    PMHx, FHx, SHx, and ROS reviewed and otherwise unchanged   Reviewed 1/17/2022    Objective:  Physical Exam:   Visit Vitals  BP (!) 146/86   Pulse 81   Temp 98.4 °F (36.9 °C) (Oral)   Resp (!) 22   Ht 5' 6\" (1.676 m)   Wt 98.5 kg (217 lb 2.5 oz)   SpO2 100%   BMI 35.05 kg/m²       I/O last 3 completed shifts:  In: -   Out: 275 [Urine:275]  General Appearance: No apparent distress   HEENT:  EOMI, no jaundice   Neck: No JVD   Pulmonary:   Clear to auscultation bilaterally   Cardiovascular:  Regular rate and rhythm, normal S1 and S2   Gastrointestinal:   Soft and non-tender   Musculoskeletal: No cyanosis or edema   Dermatologic: No rashes, warm and dry, normal turgor   Neurologic: Non-focal   Psychiatric:  HD Access:     Cooperative  [facility] TDC     Lab/Data Reviewed:  Recent Labs   Lab 01/17/22  0550 01/16/22  0524 01/15/22  2013   SODIUM 139 139 139   POTASSIUM 5.7* 4.9 4.6   CHLORIDE 111* 109* 107   CO2 22 25 26   BUN 50* 40* 35*   CREATININE 8.32* 6.91* 6.35*   GLUCOSE 81 89 114*   CALCIUM 10.1 10.1 10.5*   PHOS 4.6  --   --      Recent Labs   Lab 01/16/22  0524 01/15/22  2013   WBC 7.4 9.2   HGB 8.4* 9.8*   HCT 26.5* 30.6*   PLT 81* 80*       Medications Reviewed    Assessment/Plan:  1. End-stage renal disease  -- Continue Monday Wednesday Friday schedule as able  -- appreciate IR assistance with new TDC placed 1/17     2. Anemia secondary to chronic kidney disease  -- Transfuse as needed  -- Avoid SEAN therapy due to likely renal cell carcinoma     3. Hypotension with CKD  -- BP was elevated when not adhering to HD  -- Blood pressure is difficult to control, UF as  ADULT ANNUAL [address]    NAME: Valerie Mcmahon  AGE: 40 y o  SEX: female  : 1975     DATE: 2022     Assessment and Plan:     Problem List Items Addressed This Visit        Endocrine    Hashimoto's disease    Relevant Orders    TSH, 3rd generation with Free T4 reflex    Thyroid Antibodies Panel       Respiratory    Moderate asthma without complication    Relevant Medications    fluticasone-salmeterol (Wixela Inhub) 250-50 mcg/dose inhaler       Other    Anemia    Relevant Orders    CBC and differential      Other Visit Diagnoses     Annual physical exam    -  Primary    Relevant Orders    Comprehensive metabolic panel    Lipid panel    Shortness of breath        Relevant Orders    POCT spirometry    Encounter for screening mammogram for malignant neoplasm of breast        Relevant Orders    Mammo screening bilateral w 3d & cad    Arthralgia, unspecified joint        Relevant Orders    Ambulatory Referral to Rheumatology        Immunizations and preventive care screenings were discussed with patient today  Appropriate education was printed on patient's after visit summary  Counseling:  Alcohol/drug use: discussed moderation in alcohol intake, the recommendations for healthy alcohol use, and avoidance of illicit drug use  Dental Health: discussed importance of regular tooth brushing, flossing, and dental visits  Injury prevention: discussed safety/seat belts, safety helmets, smoke detectors, carbon dioxide detectors, and smoking near bedding or upholstery  Sexual health: discussed sexually transmitted diseases, partner selection, use of condoms, avoidance of unintended pregnancy, and contraceptive alternatives  · Exercise: the importance of regular exercise/physical activity was discussed  Recommend exercise 3-5 times per week for at least 30 minutes  BMI Counseling: Body mass index is 49 09 kg/m²   The BMI is above normal  Nutrition recommendations include decreasing portion sizes, encouraging healthy choices of fruits and vegetables, consuming healthier snacks, moderation in carbohydrate intake and increasing intake of lean protein  Exercise recommendations include exercising 3-5 times per week and strength training exercises  Rationale for BMI follow-up plan is due to patient being overweight or obese  Depression Screening and Follow-up Plan: Patient was screened for depression during today's encounter  They screened negative with a PHQ-2 score of 0  Return in about 3 months (around 4/14/2022)  Chief Complaint:     Chief Complaint   Patient presents with    Physical Exam      History of Present Illness:     Adult Annual Physical   Patient here for a comprehensive physical exam  The patient reports having issues with breathing on exertion  Spirometry obtained which did exhibit some reversible restriction symptoms  Plan to increase dosage of daily inhaler  She continues to have generalized joint pains and previously did have some increased inflammation markers so plan to refer to rheumatology for a further evaluation  Diet and Physical Activity  · Diet/Nutrition: well balanced diet, limited junk food, consuming 3-5 servings of fruits/vegetables daily, adequate fiber intake and adequate whole grain intake  · Exercise: walking, 3-4 times a week on average and 30-60 minutes on average  Depression Screening  PHQ-2/9 Depression Screening    Little interest or pleasure in doing things: 0 - not at all  Feeling down, depressed, or hopeless: 0 - not at all  PHQ-2 Score: 0  PHQ-2 Interpretation: Negative depression screen       General Health  · Sleep: sleeps well and gets 7-8 hours of sleep on average  · Hearing: normal - bilateral   · Vision: no vision problems  · Dental: regular dental visits and brushes teeth twice daily         /GYN Health  · Patient is: perimenopausal  · Last menstrual period: able.     4. CKD mineral bone disease/hypercalcemia  -- Continue Renvela as patient allows     5.  Schizophrenia- sitter     6. Hypercalcemia  - Likely from immobility, follow-up PTH and calcium     7. Hyperkalemia   -- Adjust dialysate as needed  -- renal diet    [facility]   Office Phone: [phone]  Office Fax: [phone]  1/17/2022  3:10 PM   12/23/2021  · Contraceptive method: None  Review of Systems:     Review of Systems   Constitutional: Negative  Negative for activity change, appetite change and unexpected weight change  HENT: Negative for dental problem, ear pain, hearing loss, nosebleeds, sneezing, sore throat, tinnitus and trouble swallowing  Eyes: Negative for visual disturbance  Respiratory: Positive for chest tightness, shortness of breath and wheezing  Negative for cough  Cardiovascular: Negative for chest pain, palpitations and leg swelling  Gastrointestinal: Negative for abdominal pain, constipation, diarrhea and nausea  Endocrine: Negative for polydipsia and polyuria  Genitourinary: Negative  Musculoskeletal: Positive for arthralgias ( generalized)  Negative for back pain, myalgias and neck pain  Skin: Negative for color change and rash  Allergic/Immunologic: Negative for environmental allergies  Neurological: Negative for dizziness, weakness, light-headedness and headaches  Hematological: Negative  Psychiatric/Behavioral: Negative  Negative for dysphoric mood and sleep disturbance  The patient is not nervous/anxious         Past Medical History:     Past Medical History:   Diagnosis Date    Anemia 12/8/2021    No known health problems       Past Surgical History:     Past Surgical History:   Procedure Laterality Date    NO PAST SURGERIES        Social History:     Social History     Socioeconomic History    Marital status: Single     Spouse name: None    Number of children: None    Years of education: None    Highest education level: None   Occupational History    None   Tobacco Use    Smoking status: Former Smoker     Packs/day: 0 25     Types: Cigarettes    Smokeless tobacco: Never Used   Vaping Use    Vaping Use: Never used   Substance and Sexual Activity    Alcohol use: No    Drug use: Never    Sexual activity: Yes     Partners: Male   Other Topics Concern    None   Social History Narrative    None     Social Determinants of Health     Financial Resource Strain: Not on file   Food Insecurity: Not on file   Transportation Needs: Not on file   Physical Activity: Not on file   Stress: Not on file   Social Connections: Not on file   Intimate Partner Violence: Not on file   Housing Stability: Not on file      Family History:     Family History   Problem Relation Age of Onset    Cardiomyopathy Mother     Anxiety disorder Mother     No Known Problems Father     Anxiety disorder Brother     Heart attack Maternal Grandfather     Thalassemia Maternal Aunt     Lupus Family       Current Medications:     Current Outpatient Medications   Medication Sig Dispense Refill    Biotin 1000 MCG tablet Take 1,000 mcg by mouth 3 (three) times a day      famotidine (Pepcid AC) 10 mg tablet Take 10 mg by mouth daily      ketoconazole (NIZORAL) 2 % shampoo Daily for 2 weeks straight and then on "Mondays, Wednesdays and Fridays":  Lather into scalp; leave on for 5 minutes and then rinse off completely  120 mL 11    levothyroxine 50 mcg tablet Take 1 tablet (50 mcg total) by mouth daily 30 tablet 2    Loratadine (Claritin) 10 MG CAPS Take 1 capsule by mouth daily      metoprolol tartrate (LOPRESSOR) 50 mg tablet Take 1 tablet (50 mg total) by mouth 2 (two) times a day 180 tablet 1    fluticasone-salmeterol (Wixela Inhub) 250-50 mcg/dose inhaler Inhale 1 puff 2 (two) times a day Rinse mouth after use  60 blister 3     No current facility-administered medications for this visit  Allergies:     No Known Allergies   Physical Exam:     /88   Pulse 77   Temp 97 7 °F (36 5 °C)   Ht 5' 4" (1 626 m)   Wt 130 kg (286 lb)   LMP 12/23/2021 (Exact Date)   SpO2 98%   BMI 49 09 kg/m²     Physical Exam  Vitals reviewed  Constitutional:       General: She is not in acute distress  Appearance: Normal appearance  She is well-developed and well-groomed  She is not ill-appearing     HENT:      Head: Normocephalic and atraumatic  Right Ear: Tympanic membrane, ear canal and external ear normal       Left Ear: Tympanic membrane, ear canal and external ear normal       Nose: Nose normal       Comments: Patient had a facial covering in place as per office protocol       Mouth/Throat:      Lips: Pink  Mouth: Mucous membranes are moist       Pharynx: Oropharynx is clear  Eyes:      General: Lids are normal       Extraocular Movements: Extraocular movements intact  Conjunctiva/sclera: Conjunctivae normal       Pupils: Pupils are equal, round, and reactive to light  Neck:      Thyroid: No thyromegaly  Trachea: Trachea normal    Cardiovascular:      Rate and Rhythm: Normal rate and regular rhythm  Pulses:           Radial pulses are 2+ on the right side and 2+ on the left side  Dorsalis pedis pulses are 2+ on the right side and 2+ on the left side  Posterior tibial pulses are 2+ on the right side and 2+ on the left side  Heart sounds: Normal heart sounds  No murmur heard  Pulmonary:      Effort: Pulmonary effort is normal  No respiratory distress  Breath sounds: Normal breath sounds  No wheezing or rhonchi  Abdominal:      General: Abdomen is flat  Bowel sounds are normal       Palpations: Abdomen is soft  Tenderness: There is no abdominal tenderness  Musculoskeletal:         General: Normal range of motion  Cervical back: Normal range of motion and neck supple  Right lower leg: No edema  Left lower leg: No edema  Lymphadenopathy:      Cervical: No cervical adenopathy  Skin:     General: Skin is warm and dry  Capillary Refill: Capillary refill takes less than 2 seconds  Neurological:      General: No focal deficit present  Mental Status: She is alert and oriented to person, place, and time  Cranial Nerves: Cranial nerves are intact  Motor: Motor function is intact     Psychiatric:         Mood and Affect: Mood normal          Behavior: Behavior normal  Behavior is cooperative            Johann

## 2022-01-26 ENCOUNTER — APPOINTMENT (OUTPATIENT)
Dept: LAB | Facility: CLINIC | Age: 47
End: 2022-01-26
Payer: MEDICARE

## 2022-01-26 DIAGNOSIS — E06.3 HASHIMOTO'S DISEASE: ICD-10-CM

## 2022-01-26 DIAGNOSIS — D64.9 ANEMIA, UNSPECIFIED TYPE: ICD-10-CM

## 2022-01-26 DIAGNOSIS — Z00.00 ANNUAL PHYSICAL EXAM: ICD-10-CM

## 2022-01-26 LAB
ALBUMIN SERPL BCP-MCNC: 3.6 G/DL (ref 3.5–5)
ALP SERPL-CCNC: 69 U/L (ref 46–116)
ALT SERPL W P-5'-P-CCNC: 60 U/L (ref 12–78)
ANION GAP SERPL CALCULATED.3IONS-SCNC: 5 MMOL/L (ref 4–13)
ANISOCYTOSIS BLD QL SMEAR: PRESENT
ARTIFACT: PRESENT
AST SERPL W P-5'-P-CCNC: 29 U/L (ref 5–45)
BASOPHILS # BLD MANUAL: 0.12 THOUSAND/UL (ref 0–0.1)
BASOPHILS NFR MAR MANUAL: 1 % (ref 0–1)
BILIRUB SERPL-MCNC: 1.13 MG/DL (ref 0.2–1)
BUN SERPL-MCNC: 13 MG/DL (ref 5–25)
CALCIUM SERPL-MCNC: 9.4 MG/DL (ref 8.3–10.1)
CHLORIDE SERPL-SCNC: 105 MMOL/L (ref 100–108)
CHOLEST SERPL-MCNC: 193 MG/DL
CO2 SERPL-SCNC: 27 MMOL/L (ref 21–32)
CREAT SERPL-MCNC: 0.92 MG/DL (ref 0.6–1.3)
EOSINOPHIL # BLD MANUAL: 0.37 THOUSAND/UL (ref 0–0.4)
EOSINOPHIL NFR BLD MANUAL: 3 % (ref 0–6)
ERYTHROCYTE [DISTWIDTH] IN BLOOD BY AUTOMATED COUNT: 17.4 % (ref 11.6–15.1)
GFR SERPL CREATININE-BSD FRML MDRD: 75 ML/MIN/1.73SQ M
GLUCOSE P FAST SERPL-MCNC: 100 MG/DL (ref 65–99)
HCT VFR BLD AUTO: 44.5 % (ref 34.8–46.1)
HDLC SERPL-MCNC: 44 MG/DL
HGB BLD-MCNC: 13.5 G/DL (ref 11.5–15.4)
LDLC SERPL CALC-MCNC: 121 MG/DL (ref 0–100)
LYMPHOCYTES # BLD AUTO: 43 % (ref 14–44)
LYMPHOCYTES # BLD AUTO: 5.33 THOUSAND/UL (ref 0.6–4.47)
MCH RBC QN AUTO: 21.8 PG (ref 26.8–34.3)
MCHC RBC AUTO-ENTMCNC: 30.3 G/DL (ref 31.4–37.4)
MCV RBC AUTO: 72 FL (ref 82–98)
MICROCYTES BLD QL AUTO: PRESENT
MONOCYTES # BLD AUTO: 0.37 THOUSAND/UL (ref 0–1.22)
MONOCYTES NFR BLD: 3 % (ref 4–12)
NEUTROPHILS # BLD MANUAL: 6.2 THOUSAND/UL (ref 1.85–7.62)
NEUTS SEG NFR BLD AUTO: 50 % (ref 43–75)
NONHDLC SERPL-MCNC: 149 MG/DL
PLATELET # BLD AUTO: 404 THOUSANDS/UL (ref 149–390)
PLATELET BLD QL SMEAR: ABNORMAL
PMV BLD AUTO: 9.8 FL (ref 8.9–12.7)
POLYCHROMASIA BLD QL SMEAR: PRESENT
POTASSIUM SERPL-SCNC: 3.9 MMOL/L (ref 3.5–5.3)
PROT SERPL-MCNC: 7.8 G/DL (ref 6.4–8.2)
RBC # BLD AUTO: 6.19 MILLION/UL (ref 3.81–5.12)
RBC MORPH BLD: PRESENT
SODIUM SERPL-SCNC: 137 MMOL/L (ref 136–145)
T4 FREE SERPL-MCNC: 0.97 NG/DL (ref 0.76–1.46)
TRIGL SERPL-MCNC: 140 MG/DL
TSH SERPL DL<=0.05 MIU/L-ACNC: 5.26 UIU/ML (ref 0.36–3.74)
WBC # BLD AUTO: 12.4 THOUSAND/UL (ref 4.31–10.16)

## 2022-01-26 PROCEDURE — 85027 COMPLETE CBC AUTOMATED: CPT

## 2022-01-26 PROCEDURE — 86376 MICROSOMAL ANTIBODY EACH: CPT

## 2022-01-26 PROCEDURE — 84439 ASSAY OF FREE THYROXINE: CPT

## 2022-01-26 PROCEDURE — 85007 BL SMEAR W/DIFF WBC COUNT: CPT

## 2022-01-26 PROCEDURE — 80061 LIPID PANEL: CPT

## 2022-01-26 PROCEDURE — 36415 COLL VENOUS BLD VENIPUNCTURE: CPT

## 2022-01-26 PROCEDURE — 84443 ASSAY THYROID STIM HORMONE: CPT

## 2022-01-26 PROCEDURE — 80053 COMPREHEN METABOLIC PANEL: CPT

## 2022-01-26 PROCEDURE — 86800 THYROGLOBULIN ANTIBODY: CPT

## 2022-01-27 LAB
THYROGLOB AB SERPL-ACNC: 1 IU/ML (ref 0–0.9)
THYROPEROXIDASE AB SERPL-ACNC: >600 IU/ML (ref 0–34)

## 2022-01-28 ENCOUNTER — TELEPHONE (OUTPATIENT)
Dept: HEMATOLOGY ONCOLOGY | Facility: CLINIC | Age: 47
End: 2022-01-28

## 2022-01-28 NOTE — TELEPHONE ENCOUNTER
New Patient Intake Form   Patient Details:    Destini Deleon  1975  [id number]    Appointment Information   Who is calling to schedule? Patient    If not self, what is the caller's name? Please put name of [facility] nurse as well  Referring provider Dr Jeanne Somers    What is the diagnosis? Family history of thalassemia   Is there a confirmed tissue diagnosis? No    Is patient aware of diagnosis? yes   Have you had any imaging or labs done? If yes, where? (If imaging done outside of [facility], please remind patient to bring a disk ) yes   Have you been seen by another Oncologist/Hematologist?  If so, who and where? no   Are the records in [facility] or Care Everywhere? yes   Are records needed from an outside facility? no   If yes, Name of facility, city and state where facility is located  N/a    Preferred [place]   Is the patient willing to be seen by another provider?   (This is for breast patients only)    Miscellaneous Information:

## 2022-02-16 NOTE — PROGRESS NOTES
[facility] [place] [facility]  [address] [id number]  Hematology Ambulatory Consult  Jarvis, 1975, [id number]  2/18/2022    Assessment/Plan:  1  Microcytosis  2  Family history of thalassemia  Ms Holly Strickland is a 41-year-old female seen in consultation for macrocytosis and a family history of thalassemia she incidentally also has thrombocytosis and leukocytosis  She has multiple symptoms/complaints since having COVID in December of 2020  She has been seen by her family doctor, Cardiology, and has a consult with rheumatology regarding the symptoms  She has a significant family history of thalassemia on her mother side  She has been taking oral iron for over 1 year and her microcytosis is not improved  I told her to stop oral iron until thalassemia testing is complete  I will also order an iron panel  I discussed with her that if she does have thalassemia her 70-year-old son will need to be tested as well  - Hemoglobin Electrophoresis; Future  - Gene test beta-thalassemia; Future  - Iron Panel (Includes Ferritin, Iron Sat%, Iron, and TIBC); Future  - CBC and differential; Future  - Comprehensive metabolic panel  - Peripheral Smear; Future    3  Thrombocytosis  Unable to review historic labs as the only blood work available to me is after her COVID infection  Unsure if this is related to inflammation  Will complete workup listed below  She has never had a VTE     - Iron Panel (Includes Ferritin, Iron Sat%, Iron, and TIBC); Future  - CBC and differential; Future  - Comprehensive metabolic panel  - ODILIA Screen w/ Reflex to Titer/Pattern; Future  - Calreticulin (CALR) Mutation; Future  - C-reactive protein; Future  - JAK2 V617F,Ql,W/RFL Exons 12,13 and MPL T973,E376; Future  - Leukemia/Lymphoma flow cytometry; Future  - RF Screen w/ Reflex to Titer; Future  - Sedimentation rate, automated; Future  - Peripheral Smear; Future    4  Leukocytosis, unspecified type  Unable to review historic labs as the only blood work available to me is after her COVID infection  Unsure if this is related to inflammation  Will complete workup listed below  Low suspicion for leukemia lymphoma etiology but will will out to be sure  I explained to her that if the inflammatory markers are elevated she will need to discuss this further with the rheumatologist at her appointment with them in June  - CBC and differential; Future  - Comprehensive metabolic panel  - ODILIA Screen w/ Reflex to Titer/Pattern; Future  - BCR/ABL, PCR; Future  - C-reactive protein; Future  - Leukemia/Lymphoma flow cytometry; Future  - RF Screen w/ Reflex to Titer; Future  - Sedimentation rate, automated; Future  - Peripheral Smear; Future    5  Hashimoto's disease  6  Hypothyroidism due to Hashimoto's thyroiditis  7  Moderate asthma without complication, unspecified whether persistent  These all could be contributing to chronic inflammation and be the source of the leukocytosis or thrombocytosis  - CBC and differential; Future  - Comprehensive metabolic panel      8  PULLIAM (dyspnea on exertion)  9  Palpitations  10  Fatigue, unspecified type  All of these symptoms could be related to iron deficiency, thrombocytosis, inflammation, or post COVID  I explained to her that depending on the workup we may not have direct answers or able to reverse the symptoms and she will need to see multiple specialists to rule out serious causes  - Iron Panel (Includes Ferritin, Iron Sat%, Iron, and TIBC); Future  - CBC and differential; Future  - Comprehensive metabolic panel  - ODILIA Screen w/ Reflex to Titer/Pattern; Future  - BCR/ABL, PCR; Future  - C-reactive protein; Future  - Leukemia/Lymphoma flow cytometry; Future  - RF Screen w/ Reflex to Titer; Future  - Sedimentation rate, automated;  Future    She will wait to hear from our office regarding insurance prior authorization for the workup listed above   She knows not to go get these tests drawn until she hears from us  I explained to her that I will call her if any result abnormalities need explanation prior to our follow-up visit in 4 weeks, otherwise we will review in detail at our office visit at that time  I discussed with the patient and her  that due to her symptoms, multiple cell lines involved, and unable to review records prior to COVID infection it is hard to determine which route to take with the workup  If insurance does not cover all the tests above we will need to rule out specific etiologies before running all the test     The patient is scheduled for follow-up in approximately 1 month  Patient voiced agreement and understanding to the above  Patient knows to call the Hematology/Oncology office with any questions and concerns regarding the above  Barrier(s) to care: None  The patient is able to self care   -------------------------------------------------------------------------------------------------------    Chief Complaint   Patient presents with    Consult     Family history of thalassemia       Referring provider:    Luwanna Mohs, [address] [place] [address]    History of present illness:  Jeremy Dumont is a 71-year-old female with history of Hashimoto thyroid disease and asthma  She seen in consultation at the request of her PCP for a family history of thalassemia and longstanding microcytosis, along with other symptoms  History is obtained from the patient, her , and review of records  She has a family history of thalassemia in her paternal grandfather, maternal aunt, cousin, and mother  She has never been tested  She denies history of gastric bypass  She has never had a colonoscopy  She denies coffee ground stools, tarry stools, bright red blood per rectum, hematuria, or menorrhagia  Her menstrual cycle is regular if not starting to get longer    Her bleeding last for 4 days and only has 1 heavy day  She denies fevers, chills, unintentional weight loss, abdominal pain/distension, nausea, vomiting, constipation, diarrhea, petechiae/purpura, unexplained ecchymosis, or LE swelling  She has her spleen  She has never had a blood clot  She was diagnosed with COVID in December of 2020 and since then her symptoms have been significantly worse  I do not have blood work to review prior to this  She craves salty foods, restless legs, brittle nails, thinning hair, fatigue, lightheadedness, dizziness, shortness of breath, dyspnea on exertion, palpitations, chest pain  She also has joint pains, hair loss, headaches, nose bleeds  She follows with cardiology and has a follow-up next month  She will have a consult with rheumatology in June of this year  She was only seen by pulmonology for pulmonary function testing but does not see them in the office  09/16/2021:  Hemoglobin 12 7, MCV 69, WBC 14 16, platelets 243, ANC 63 9  01/27/2021:  Hemoglobin 12 5, MCV 71, WBC 10 15, platelets 046, ANC 7 18  05/03/2021:  Hemoglobin 12 8 MCV 72, WBC 9 80, platelets 208, differential normal  07/19/2021:  Hemoglobin 13 4, MCV 71, WBC 9 54, platelets 307  21/94/5297:  Hemoglobin 13 5, MCV 72, WBC 12 40, platelets 806        Review of Systems   Constitutional: Positive for activity change and fatigue  Negative for appetite change and fever  HENT: Negative for trouble swallowing and voice change  Eyes: Negative for photophobia and visual disturbance  Respiratory: Positive for chest tightness and shortness of breath  Negative for cough and wheezing  Cardiovascular: Positive for chest pain and palpitations  Negative for leg swelling  Gastrointestinal: Negative for abdominal pain, blood in stool, constipation, diarrhea, nausea and vomiting  Endocrine: Negative for cold intolerance and heat intolerance  Genitourinary: Negative for dysuria, hematuria and menstrual problem  Musculoskeletal: Positive for arthralgias  Negative for joint swelling and myalgias  Skin: Negative for pallor and rash  Neurological: Positive for light-headedness and headaches  Hematological: Negative for adenopathy  Does not bruise/bleed easily  Psychiatric/Behavioral: Negative for confusion and sleep disturbance         Patient Active Problem List   Diagnosis    Palpitations    Moderate asthma without complication    Anemia    Hashimoto's disease    Thalassemia    Hypothyroidism    Microcytosis    Family history of thalassemia    Thrombocytosis    Leukocytosis       Past Medical History:   Diagnosis Date    Anemia 12/8/2021    No known health problems        Past Surgical History:   Procedure Laterality Date    NO PAST SURGERIES         Family History   Problem Relation Age of Onset    Cardiomyopathy Mother     Anxiety disorder Mother     No Known Problems Father     Anxiety disorder Brother     Heart attack Maternal Grandfather     Thalassemia Maternal Aunt     Lupus Family        Social History     Socioeconomic History    Marital status: Single     Spouse name: None    Number of children: None    Years of education: None    Highest education level: None   Occupational History    None   Tobacco Use    Smoking status: Former Smoker     Packs/day: 0 25     Years: 0 00     Pack years: 0 00     Types: Cigarettes    Smokeless tobacco: Never Used   Vaping Use    Vaping Use: Never used   Substance and Sexual Activity    Alcohol use: No    Drug use: Never    Sexual activity: Yes     Partners: Male     Birth control/protection: Condom Male, Rhythm, None   Other Topics Concern    None   Social History Narrative    None     Social Determinants of Health     Financial Resource Strain: Not on file   Food Insecurity: Not on file   Transportation Needs: Not on file   Physical Activity: Not on file   Stress: Not on file   Social Connections: Not on file   Intimate Partner Violence: Not on file   Housing Stability: Not on file         Current Outpatient Medications:     Biotin 1000 MCG tablet, Take 1,000 mcg by mouth 3 (three) times a day, Disp: , Rfl:     famotidine (Pepcid AC) 10 mg tablet, Take 10 mg by mouth daily, Disp: , Rfl:     fluticasone-salmeterol (Wixela Inhub) 250-50 mcg/dose inhaler, Inhale 1 puff 2 (two) times a day Rinse mouth after use , Disp: 60 blister, Rfl: 3    ketoconazole (NIZORAL) 2 % shampoo, Daily for 2 weeks straight and then on "Mondays, Wednesdays and Fridays":  Lather into scalp; leave on for 5 minutes and then rinse off completely  , Disp: 120 mL, Rfl: 11    levothyroxine (Synthroid) 75 mcg tablet, Take 1 tablet (75 mcg total) by mouth daily in the early morning, Disp: 30 tablet, Rfl: 5    Loratadine (Claritin) 10 MG CAPS, Take 1 capsule by mouth daily, Disp: , Rfl:     metoprolol tartrate (LOPRESSOR) 50 mg tablet, Take 1 tablet (50 mg total) by mouth 2 (two) times a day, Disp: 180 tablet, Rfl: 1    No Known Allergies    Objective:  /84 (BP Location: Left arm, Patient Position: Sitting, Cuff Size: Large)   Pulse 75   Temp 97 5 °F (36 4 °C)   Resp 18   Ht 5' 4" (1 626 m)   Wt 130 kg (286 lb)   SpO2 98%   BMI 49 09 kg/m²   Physical Exam  Constitutional:       General: She is not in acute distress  Appearance: Normal appearance  She is not ill-appearing  Eyes:      Extraocular Movements: Extraocular movements intact  Conjunctiva/sclera: Conjunctivae normal    Cardiovascular:      Rate and Rhythm: Normal rate and regular rhythm  Pulses: Normal pulses  Heart sounds: Normal heart sounds  Pulmonary:      Effort: Pulmonary effort is normal  No respiratory distress  Breath sounds: Normal breath sounds  No wheezing or rhonchi  Abdominal:      General: Bowel sounds are normal  There is no distension  Palpations: Abdomen is soft  Tenderness: There is no abdominal tenderness     Musculoskeletal:      Cervical back: Neck supple  Right lower leg: No edema  Left lower leg: No edema  Lymphadenopathy:      Cervical: No cervical adenopathy  Skin:     General: Skin is warm and dry  Capillary Refill: Capillary refill takes less than 2 seconds  Coloration: Skin is not pale  Findings: No bruising  Neurological:      General: No focal deficit present  Mental Status: She is alert and oriented to person, place, and time  Mental status is at baseline  Motor: No weakness  Gait: Gait normal    Psychiatric:         Mood and Affect: Mood normal          Behavior: Behavior normal          Thought Content: Thought content normal          Judgment: Judgment normal          Result Review  Labs:   Appointment on 01/26/2022   Component Date Value Ref Range Status    TSH 3RD GENERATON 01/26/2022 5 260* 0 358 - 3 740 uIU/mL Final    The recommended reference ranges for TSH during pregnancy are as follows:   First trimester 0 1 to 2 5 uIU/mL   Second trimester  0 2 to 3 0 uIU/mL   Third trimester 0 3 to 3 0 uIU/m    Note: Normal ranges may not apply to patients who are transgender, non-binary, or whose legal sex, sex at birth, and gender identity differ   Sodium 01/26/2022 137  136 - 145 mmol/L Final    Potassium 01/26/2022 3 9  3 5 - 5 3 mmol/L Final    Chloride 01/26/2022 105  100 - 108 mmol/L Final    CO2 01/26/2022 27  21 - 32 mmol/L Final    ANION GAP 01/26/2022 5  4 - 13 mmol/L Final    BUN 01/26/2022 13  5 - 25 mg/dL Final    Creatinine 01/26/2022 0 92  0 60 - 1 30 mg/dL Final    Standardized to IDMS reference method    Glucose, Fasting 01/26/2022 100* 65 - 99 mg/dL Final    Specimen collection should occur prior to Sulfasalazine administration due to the potential for falsely depressed results  Specimen collection should occur prior to Sulfapyridine administration due to the potential for falsely elevated results      Calcium 01/26/2022 9 4  8 3 - 10 1 mg/dL Final    AST 01/26/2022 29  5 - 45 U/L Final    Specimen collection should occur prior to Sulfasalazine administration due to the potential for falsely depressed results   ALT 01/26/2022 60  12 - 78 U/L Final    Specimen collection should occur prior to Sulfasalazine and/or Sulfapyridine administration due to the potential for falsely depressed results   Alkaline Phosphatase 01/26/2022 69  46 - 116 U/L Final    Total Protein 01/26/2022 7 8  6 4 - 8 2 g/dL Final    Albumin 01/26/2022 3 6  3 5 - 5 0 g/dL Final    Total Bilirubin 01/26/2022 1 13* 0 20 - 1 00 mg/dL Final    Use of this assay is not recommended for patients undergoing treatment with eltrombopag due to the potential for falsely elevated results   eGFR 01/26/2022 75  ml/min/1 73sq m Final    Cholesterol 01/26/2022 193  See Comment mg/dL Final    Cholesterol:         Pediatric <18 Years        Desirable          <170 mg/dL      Borderline High    170-199 mg/dL      High               >=200 mg/dL        Adult >=18 Years            Desirable         <200 mg/dL      Borderline High   200-239 mg/dL      High              >239 mg/dL      Triglycerides 01/26/2022 140  See Comment mg/dL Final    Triglyceride:     0-9Y            <75mg/dL     10Y-17Y         <90 mg/dL       >=18Y     Normal          <150 mg/dL     Borderline High 150-199 mg/dL     High            200-499 mg/dL        Very High       >499 mg/dL    Specimen collection should occur prior to N-Acetylcysteine or Metamizole administration due to the potential for falsely depressed results   HDL, Direct 01/26/2022 44* >=50 mg/dL Final    Specimen collection should occur prior to Metamizole administration due to the potential for falsley depressed results      LDL Calculated 01/26/2022 121* 0 - 100 mg/dL Final    LDL Cholesterol:     Optimal           <100 mg/dl     Near Optimal      100-129 mg/dl     Above Optimal       Borderline High 130-159 mg/dl       High            160-189 mg/dl       Very High       >189 mg/dl         This screening LDL is a calculated result  It does not have the accuracy of the Direct Measured LDL in the monitoring of patients with hyperlipidemia and/or statin therapy  Direct Measure LDL (BOR036) must be ordered separately in these patients   Non-HDL-Chol (CHOL-HDL) 01/26/2022 149  mg/dl Final    WBC 01/26/2022 12 40* 4 31 - 10 16 Thousand/uL Final    RBC 01/26/2022 6 19* 3 81 - 5 12 Million/uL Final    Hemoglobin 01/26/2022 13 5  11 5 - 15 4 g/dL Final    Hematocrit 01/26/2022 44 5  34 8 - 46 1 % Final    MCV 01/26/2022 72* 82 - 98 fL Final    MCH 01/26/2022 21 8* 26 8 - 34 3 pg Final    MCHC 01/26/2022 30 3* 31 4 - 37 4 g/dL Final    RDW 01/26/2022 17 4* 11 6 - 15 1 % Final    MPV 01/26/2022 9 8  8 9 - 12 7 fL Final    Platelets 84/82/3590 404* 149 - 390 Thousands/uL Final    THYROID MICROSOMAL ANTIBODY 01/26/2022 >600* 0 - 34 IU/mL Final    Thyroglobulin Ab 01/26/2022 1 0* 0 0 - 0 9 IU/mL Final    Thyroglobulin Antibody measured by [facility]    Free T4 01/26/2022 0 97  0 76 - 1 46 ng/dL Final    Specimen collection should occur prior to Sulfasalazine administration due to the potential for falsely elevated results      Segmented % 01/26/2022 50  43 - 75 % Final    Lymphocytes % 01/26/2022 43  14 - 44 % Final    Monocytes % 01/26/2022 3* 4 - 12 % Final    Eosinophils, % 01/26/2022 3  0 - 6 % Final    Basophils % 01/26/2022 1  0 - 1 % Final    Absolute Neutrophils 01/26/2022 6 20  1 85 - 7 62 Thousand/uL Final    Lymphocytes Absolute 01/26/2022 5 33* 0 60 - 4 47 Thousand/uL Final    Monocytes Absolute 01/26/2022 0 37  0 00 - 1 22 Thousand/uL Final    Eosinophils Absolute 01/26/2022 0 37  0 00 - 0 40 Thousand/uL Final    Basophils Absolute 01/26/2022 0 12* 0 00 - 0 10 Thousand/uL Final    RBC Morphology 01/26/2022 Present   Final    Anisocytosis 01/26/2022 Present   Final    Microcytes 01/26/2022 Present   Final    Polychromasia 01/26/2022 Present Final    Platelet Estimate  Increased* Adequate Final    Artifact 2022 Present   Final         Imagin/6/21-CTA ED chest PE study:  No pulmonary embolism  Thyroid enlargement without discrete nodule  Fatty liver  Please note: This report has been generated by a voice recognition software system  Therefore there may be syntax, spelling, and/or grammatical errors  Please call if you have any questions

## 2022-02-18 ENCOUNTER — CONSULT (OUTPATIENT)
Dept: HEMATOLOGY ONCOLOGY | Facility: CLINIC | Age: 47
End: 2022-02-18
Payer: MEDICARE

## 2022-02-18 VITALS
OXYGEN SATURATION: 98 % | HEART RATE: 75 BPM | BODY MASS INDEX: 48.83 KG/M2 | DIASTOLIC BLOOD PRESSURE: 84 MMHG | TEMPERATURE: 97.5 F | WEIGHT: 286 LBS | RESPIRATION RATE: 18 BRPM | SYSTOLIC BLOOD PRESSURE: 122 MMHG | HEIGHT: 64 IN

## 2022-02-18 DIAGNOSIS — E03.8 HYPOTHYROIDISM DUE TO HASHIMOTO'S THYROIDITIS: ICD-10-CM

## 2022-02-18 DIAGNOSIS — R53.83 FATIGUE, UNSPECIFIED TYPE: ICD-10-CM

## 2022-02-18 DIAGNOSIS — J45.909 MODERATE ASTHMA WITHOUT COMPLICATION, UNSPECIFIED WHETHER PERSISTENT: ICD-10-CM

## 2022-02-18 DIAGNOSIS — D75.839 THROMBOCYTOSIS: ICD-10-CM

## 2022-02-18 DIAGNOSIS — E06.3 HYPOTHYROIDISM DUE TO HASHIMOTO'S THYROIDITIS: ICD-10-CM

## 2022-02-18 DIAGNOSIS — R71.8 MICROCYTOSIS: Primary | ICD-10-CM

## 2022-02-18 DIAGNOSIS — E06.3 HASHIMOTO'S DISEASE: ICD-10-CM

## 2022-02-18 DIAGNOSIS — Z83.2 FAMILY HISTORY OF THALASSEMIA: ICD-10-CM

## 2022-02-18 DIAGNOSIS — D47.1 MYELOPROLIFERATIVE DISORDER (HCC): ICD-10-CM

## 2022-02-18 DIAGNOSIS — D72.829 LEUKOCYTOSIS, UNSPECIFIED TYPE: ICD-10-CM

## 2022-02-18 DIAGNOSIS — R06.00 DOE (DYSPNEA ON EXERTION): ICD-10-CM

## 2022-02-18 DIAGNOSIS — R00.2 PALPITATIONS: ICD-10-CM

## 2022-02-18 PROCEDURE — 99244 OFF/OP CNSLTJ NEW/EST MOD 40: CPT

## 2022-02-22 ENCOUNTER — OFFICE VISIT (OUTPATIENT)
Dept: FAMILY MEDICINE CLINIC | Facility: CLINIC | Age: 47
End: 2022-02-22
Payer: MEDICARE

## 2022-02-22 VITALS
SYSTOLIC BLOOD PRESSURE: 122 MMHG | BODY MASS INDEX: 48.65 KG/M2 | OXYGEN SATURATION: 100 % | HEART RATE: 66 BPM | HEIGHT: 64 IN | DIASTOLIC BLOOD PRESSURE: 78 MMHG | TEMPERATURE: 97.9 F | WEIGHT: 285 LBS

## 2022-02-22 DIAGNOSIS — R31.9 URINARY TRACT INFECTION WITH HEMATURIA, SITE UNSPECIFIED: Primary | ICD-10-CM

## 2022-02-22 DIAGNOSIS — N39.0 URINARY TRACT INFECTION WITH HEMATURIA, SITE UNSPECIFIED: Primary | ICD-10-CM

## 2022-02-22 LAB
SL AMB  POCT GLUCOSE, UA: ABNORMAL
SL AMB LEUKOCYTE ESTERASE,UA: ABNORMAL
SL AMB POCT BILIRUBIN,UA: ABNORMAL
SL AMB POCT BLOOD,UA: ABNORMAL
SL AMB POCT CLARITY,UA: ABNORMAL
SL AMB POCT COLOR,UA: YELLOW
SL AMB POCT KETONES,UA: ABNORMAL
SL AMB POCT NITRITE,UA: ABNORMAL
SL AMB POCT PH,UA: 5
SL AMB POCT SPECIFIC GRAVITY,UA: 1.01
SL AMB POCT URINE PROTEIN: ABNORMAL
SL AMB POCT UROBILINOGEN: ABNORMAL

## 2022-02-22 PROCEDURE — 87186 SC STD MICRODIL/AGAR DIL: CPT | Performed by: NURSE PRACTITIONER

## 2022-02-22 PROCEDURE — 87086 URINE CULTURE/COLONY COUNT: CPT | Performed by: NURSE PRACTITIONER

## 2022-02-22 PROCEDURE — 81002 URINALYSIS NONAUTO W/O SCOPE: CPT | Performed by: NURSE PRACTITIONER

## 2022-02-22 PROCEDURE — 99214 OFFICE O/P EST MOD 30 MIN: CPT | Performed by: NURSE PRACTITIONER

## 2022-02-22 PROCEDURE — 87077 CULTURE AEROBIC IDENTIFY: CPT | Performed by: NURSE PRACTITIONER

## 2022-02-22 RX ORDER — NITROFURANTOIN 25; 75 MG/1; MG/1
100 CAPSULE ORAL 2 TIMES DAILY
Qty: 10 CAPSULE | Refills: 0 | Status: SHIPPED | OUTPATIENT
Start: 2022-02-22 | End: 2022-02-27

## 2022-02-22 NOTE — PROGRESS NOTES
Assessment/Plan:     Diagnoses and all orders for this visit:    Urinary tract infection with hematuria, site unspecified  -     POCT urine dip  -     nitrofurantoin (MACROBID) 100 mg capsule; Take 1 capsule (100 mg total) by mouth 2 (two) times a day for 5 days  -     Urine culture        Discussed with patient plan to treat with 5 days of nitrofurantoin  Patient instructed to call if no improvement in 72 hours or symptoms worsen    Subjective:      Patient ID: Zoila Griffith is a 56 y o  female  56 y  o female presenting with urinary frequency, urgency, pelvic and lower back pain for the past three days  She also reports throbbing sensation after voiding and cloudy urine  The symptoms are improving but still remain present  A urine dip was performed that exhibited small amount of hematuria and moderate amount of leukocytes  The urine will be sent out for culture          Family History   Problem Relation Age of Onset    Cardiomyopathy Mother     Anxiety disorder Mother     No Known Problems Father     Anxiety disorder Brother     Heart attack Maternal Grandfather     Thalassemia Maternal Aunt     Lupus Family      Social History     Socioeconomic History    Marital status: Single     Spouse name: Not on file    Number of children: Not on file    Years of education: Not on file    Highest education level: Not on file   Occupational History    Not on file   Tobacco Use    Smoking status: Former Smoker     Packs/day: 0 25     Years: 0 00     Pack years: 0 00     Types: Cigarettes    Smokeless tobacco: Never Used   Vaping Use    Vaping Use: Never used   Substance and Sexual Activity    Alcohol use: No    Drug use: Never    Sexual activity: Yes     Partners: Male     Birth control/protection: Condom Male, Rhythm, None   Other Topics Concern    Not on file   Social History Narrative    Not on file     Social Determinants of Health     Financial Resource Strain: Not on file   Food Insecurity: Not on file   Transportation Needs: Not on file   Physical Activity: Not on file   Stress: Not on file   Social Connections: Not on file   Intimate Partner Violence: Not on file   Housing Stability: Not on file     E-Cigarette/Vaping    E-Cigarette Use Never User      E-Cigarette/Vaping Substances    Nicotine No     THC No     CBD No     Flavoring No     Other No     Unknown No      Past Medical History:   Diagnosis Date    Anemia 12/8/2021    No known health problems      Past Surgical History:   Procedure Laterality Date    NO PAST SURGERIES       No Known Allergies    Current Outpatient Medications:     Biotin 1000 MCG tablet, Take 1,000 mcg by mouth 3 (three) times a day, Disp: , Rfl:     fluticasone-salmeterol (Wixela Inhub) 250-50 mcg/dose inhaler, Inhale 1 puff 2 (two) times a day Rinse mouth after use , Disp: 60 blister, Rfl: 3    levothyroxine (Synthroid) 75 mcg tablet, Take 1 tablet (75 mcg total) by mouth daily in the early morning, Disp: 30 tablet, Rfl: 5    metoprolol tartrate (LOPRESSOR) 50 mg tablet, Take 1 tablet (50 mg total) by mouth 2 (two) times a day, Disp: 180 tablet, Rfl: 1    nitrofurantoin (MACROBID) 100 mg capsule, Take 1 capsule (100 mg total) by mouth 2 (two) times a day for 5 days, Disp: 10 capsule, Rfl: 0    Review of Systems   Constitutional: Negative  Respiratory: Negative  Cardiovascular: Negative  Gastrointestinal: Negative  Genitourinary: Positive for dysuria, frequency, pelvic pain and urgency  Musculoskeletal: Positive for back pain  Neurological: Negative  Psychiatric/Behavioral: Negative  Objective:    /78   Pulse 66   Temp 97 9 °F (36 6 °C)   Ht 5' 4" (1 626 m)   Wt 129 kg (285 lb)   LMP 02/04/2022   SpO2 100%   BMI 48 92 kg/m² (Reviewed)     Physical Exam  Vitals reviewed  Constitutional:       General: She is not in acute distress  Appearance: She is well-developed and well-groomed  She is not ill-appearing     HENT: Head: Normocephalic and atraumatic  Right Ear: External ear normal       Left Ear: External ear normal       Nose:      Comments: Patient had a facial covering in place as per office protocol  Eyes:      General: Lids are normal       Extraocular Movements: Extraocular movements intact  Conjunctiva/sclera: Conjunctivae normal       Pupils: Pupils are equal, round, and reactive to light  Neck:      Thyroid: No thyromegaly or thyroid tenderness  Trachea: Trachea and phonation normal    Cardiovascular:      Rate and Rhythm: Normal rate and regular rhythm  Pulses: Normal pulses  Heart sounds: Normal heart sounds  Pulmonary:      Effort: Pulmonary effort is normal       Breath sounds: Normal breath sounds  Abdominal:      General: Abdomen is flat  Bowel sounds are normal  There is no distension  Palpations: Abdomen is soft  Tenderness: There is no abdominal tenderness  There is no right CVA tenderness or left CVA tenderness  Musculoskeletal:      Cervical back: Neck supple  Skin:     General: Skin is warm and dry  Capillary Refill: Capillary refill takes less than 2 seconds  Neurological:      General: No focal deficit present  Mental Status: She is alert and oriented to person, place, and time  Psychiatric:         Mood and Affect: Mood normal          Behavior: Behavior normal  Behavior is cooperative  Thought Content:  Thought content normal

## 2022-02-24 LAB — BACTERIA UR CULT: ABNORMAL

## 2022-02-25 NOTE — PROGRESS NOTES
[facility] Now        NAME: Lewis Martinez is a 38 y o  female  : 1975    MRN: [id number]  DATE: 2019  TIME: 9:24 AM    Assessment and Plan   Dental abscess [K04 7]  1  Dental abscess  amoxicillin (AMOXIL) 500 MG tablet    ibuprofen (MOTRIN) 600 mg tablet         Patient Instructions     1  Take Amox; 2 as 1st dose, then 1 tablet 3x daily x 10 days  2  Use Motrin 600mg  Every 8 hours as needed for pain  3  Dental consult ASAP  Chief Complaint     Chief Complaint   Patient presents with    Facial Pain     Pt  C/O simus pressure and pain with related right ear and jaw pain for the past two days  Denies fever at home  History of Present Illness       Edison Valencia is a 26-year-old female presents with a 2 day history of right-sided ear pain, jaw pain and sinus pressure  Patient awoke from sleep this morning with increasing pain in her lower gum line and jaw, she has had no fever, chills or body aches since the onset of her symptoms  Review of Systems   Review of Systems   Constitutional: Negative  HENT: Positive for congestion, ear pain and facial swelling  Negative for trouble swallowing  Respiratory: Negative            Current Medications       Current Outpatient Medications:     acyclovir (ZOVIRAX) 400 MG tablet, Take 1 tablet (400 mg total) by mouth 5 (five) times a day for 5 days, Disp: 25 tablet, Rfl: 0    albuterol (VENTOLIN HFA) 90 mcg/act inhaler, Inhale 2 puffs every 4 (four) hours as needed for wheezing (Patient not taking: Reported on 10/13/2019), Disp: 18 g, Rfl: 0    amoxicillin (AMOXIL) 500 MG tablet, Take 1 tablet (500 mg total) by mouth 3 (three) times a day for 10 days, Disp: 30 tablet, Rfl: 0    benzonatate (TESSALON) 200 MG capsule, Take 1 capsule (200 mg total) by mouth 3 (three) times a day as needed for cough (Patient not taking: Reported on 10/13/2019), Disp: 20 capsule, Rfl: 0    guaiFENesin (MUCINEX) 600 mg 12 hr tablet, Take 2 tablets (1,200 mg total) by mouth every 12 (twelve) hours (Patient not taking: Reported on 4/16/2019), Disp: 20 tablet, Rfl: 0    ibuprofen (MOTRIN) 600 mg tablet, Take 1 tablet (600 mg total) by mouth every 8 (eight) hours as needed for mild pain or moderate pain for up to 5 days, Disp: 30 tablet, Rfl: 0    ibuprofen (MOTRIN) 800 mg tablet, Take 1 tablet (800 mg total) by mouth every 8 (eight) hours as needed for mild pain (Patient not taking: Reported on 10/13/2019), Disp: 20 tablet, Rfl: 0    traMADol (ULTRAM) 50 mg tablet, Take 1 tablet (50 mg total) by mouth every 6 (six) hours as needed for moderate pain (Patient not taking: Reported on 3/12/2019), Disp: 8 tablet, Rfl: 0    Current Allergies     Allergies as of 10/13/2019    (No Known Allergies)            The following portions of the patient's history were reviewed and updated as appropriate: allergies, current medications, past family history, past medical history, past social history, past surgical history and problem list      Past Medical History:   Diagnosis Date    No known health problems        Past Surgical History:   Procedure Laterality Date    NO PAST SURGERIES         Family History   Problem Relation Age of Onset    No Known Problems Mother     No Known Problems Father          Medications have been verified  Objective   /94   Pulse 96   Temp 98 2 °F (36 8 °C) (Temporal)   Resp 18   Ht 5' 4" (1 626 m)   Wt 116 kg (256 lb 9 6 oz)   SpO2 98%   BMI 44 05 kg/m²        Physical Exam     Physical Exam   Constitutional: She appears well-developed and well-nourished  No distress  HENT:   Head: Normocephalic and atraumatic  Right Ear: Tympanic membrane and ear canal normal    Left Ear: Tympanic membrane and ear canal normal    Nose: Nose normal    Mouth/Throat: Uvula is midline  Cardiovascular: Normal rate, regular rhythm and normal heart sounds  No murmur heard    Pulmonary/Chest: Effort normal and breath sounds normal  PAST SURGICAL HISTORY:  No significant past surgical history

## 2022-02-28 ENCOUNTER — TELEPHONE (OUTPATIENT)
Dept: FAMILY MEDICINE CLINIC | Facility: CLINIC | Age: 47
End: 2022-02-28

## 2022-02-28 NOTE — TELEPHONE ENCOUNTER
Pt called stating when she saw you earlier this month you had mentioned pt getting blood work in 6-8 weeks  Pt is asking if you still want her to get blood work done

## 2022-03-01 ENCOUNTER — OFFICE VISIT (OUTPATIENT)
Dept: CARDIOLOGY CLINIC | Facility: CLINIC | Age: 47
End: 2022-03-01
Payer: MEDICARE

## 2022-03-01 VITALS
HEART RATE: 89 BPM | OXYGEN SATURATION: 97 % | BODY MASS INDEX: 48.65 KG/M2 | WEIGHT: 285 LBS | DIASTOLIC BLOOD PRESSURE: 80 MMHG | HEIGHT: 64 IN | SYSTOLIC BLOOD PRESSURE: 124 MMHG

## 2022-03-01 DIAGNOSIS — I49.3 PVC (PREMATURE VENTRICULAR CONTRACTION): ICD-10-CM

## 2022-03-01 DIAGNOSIS — R00.2 PALPITATIONS: Primary | ICD-10-CM

## 2022-03-01 PROCEDURE — 99213 OFFICE O/P EST LOW 20 MIN: CPT | Performed by: INTERNAL MEDICINE

## 2022-03-01 NOTE — PROGRESS NOTES
Cardiology Follow Up    Sarika Gonzales  [id number]  1975  [address] [facility] [address] [address] [phone]      1  Palpitations     2  PVC (premature ventricular contraction)         Discussion/Summary:    PVCs have previously correlated with her palpitations  Generally, these are pretty well controlled  She does have days here there which she feels more symptoms, but she has not needed to take an extra dose of metoprolol  She is currently taking 50 mg twice a day  Her echo previously was unremarkable  Recent episode on her birthday was likely precipitated by alcohol although this was not excessive  No changes are needed from a cardiac standpoint currently, and she is still free to take an additional metoprolol if she is having severe symptoms on a day, otherwise can continue with her usual maintenance dose  No testing needed at this time, can follow up in 1 year  Interval History: On and off palpitations  She was doing pretty well but then in February on her birthday, she felt a strong episode of palpitations  She had had an alcoholic beverage 5 hours earlier, but had not had any other caffeine which was atypical trigger for her  She has felt okay since  She is feeling a strong pulse heartbeat at times  Not sure these are truly PVCs  She is getting winded with exertion  She is being evaluated by Hematology for thalassemia and is going to undergo a thorough evaluation for that        Patient Active Problem List   Diagnosis    Palpitations    Moderate asthma without complication    Anemia    Hashimoto's disease    Thalassemia    Hypothyroidism    Microcytosis    Family history of thalassemia    Thrombocytosis    Leukocytosis    PVC (premature ventricular contraction)     Past Medical History:   Diagnosis Date    Anemia 12/8/2021    No known health problems      Social History     Tobacco Use    Smoking status: Former Smoker     Packs/day: 0 25     Years: 0 00     Pack years: 0 00     Types: Cigarettes    Smokeless tobacco: Never Used   Vaping Use    Vaping Use: Never used   Substance Use Topics    Alcohol use: No    Drug use: Never      Family History   Problem Relation Age of Onset    Cardiomyopathy Mother     Anxiety disorder Mother     No Known Problems Father     Anxiety disorder Brother     Heart attack Maternal Grandfather     Thalassemia Maternal Aunt     Lupus Family      Past Surgical History:   Procedure Laterality Date    NO PAST SURGERIES         Current Outpatient Medications:     Biotin 1000 MCG tablet, Take 1,000 mcg by mouth 3 (three) times a day, Disp: , Rfl:     fluticasone-salmeterol (Wixela Inhub) 250-50 mcg/dose inhaler, Inhale 1 puff 2 (two) times a day Rinse mouth after use , Disp: 60 blister, Rfl: 3    levothyroxine (Synthroid) 75 mcg tablet, Take 1 tablet (75 mcg total) by mouth daily in the early morning, Disp: 30 tablet, Rfl: 5    metoprolol tartrate (LOPRESSOR) 50 mg tablet, Take 1 tablet (50 mg total) by mouth 2 (two) times a day, Disp: 180 tablet, Rfl: 1  No Known Allergies    Vitals:    03/01/22 1054   BP: 124/80   BP Location: Left arm   Patient Position: Sitting   Cuff Size: Large   Pulse: 89   SpO2: 97%   Weight: 129 kg (285 lb)   Height: 5' 4" (1 626 m)     Vitals:    03/01/22 1054   Weight: 129 kg (285 lb)      Height: 5' 4" (162 6 cm)   Body mass index is 48 92 kg/m²      Physical Exam:  GEN: Tameka Mo appears well, alert and oriented x 3, pleasant and cooperative   HEENT: pupils equal, round, and reactive to light; extraocular muscles intact  NECK: supple, no carotid bruits   HEART: regular rhythm, normal S1 and S2, no murmurs, clicks, gallops or rubs   LUNGS: clear to auscultation bilaterally; no wheezes, rales, or rhonchi   ABDOMEN: Obese, normal bowel sounds, soft, no tenderness, no distention  EXTREMITIES: peripheral pulses normal; no clubbing, cyanosis, or edema  NEURO: no focal findings   SKIN: normal without suspicious lesions on exposed skin      ROS:  Positive for anxiety, palpitations, shortness of breath, chest tightness  Except as noted in HPI, is otherwise reviewed in detail and a 12 point review of systems is negative  ROS reviewed and is unchanged    Labs:  Lab Results   Component Value Date    SODIUM 137 01/26/2022    K 3 9 01/26/2022     01/26/2022    CREATININE 0 92 01/26/2022    BUN 13 01/26/2022    CO2 27 01/26/2022    ALT 60 01/26/2022    AST 29 01/26/2022    GLUF 100 (H) 01/26/2022    WBC 12 40 (H) 01/26/2022    HGB 13 5 01/26/2022    HCT 44 5 01/26/2022     (H) 01/26/2022       No results found for: CHOL  Lab Results   Component Value Date    HDL 44 (L) 01/26/2022     Lab Results   Component Value Date    LDLCALC 121 (H) 01/26/2022     Lab Results   Component Value Date    TRIG 140 01/26/2022       Testing:  Holter 1/2021:  IMPRESSION:  1  Predominantly sinus rhythm, with an average heart rate of 67 beats per minute  2  Occasional premature ventricular contractions, constituting 1 7% of total beats  3  Some of the reported symptoms of "chest pinch" and "fluttering" during the monitoring period, correlated with single PVCs  4  Rare premature atrial contractions  5  No significant pauses or advanced degree heart block    Echo 1/2021:  IMPRESSIONS:  The study was within normal limits      SUMMARY     LEFT VENTRICLE:  Size was normal   Systolic function was normal  Ejection fraction was estimated to be 60 %  There were no regional wall motion abnormalities  Wall thickness was normal   There was no evidence of concentric hypertrophy      TRICUSPID VALVE:  There was trace regurgitation

## 2022-03-08 ENCOUNTER — TELEMEDICINE (OUTPATIENT)
Dept: FAMILY MEDICINE CLINIC | Facility: CLINIC | Age: 47
End: 2022-03-08
Payer: MEDICARE

## 2022-03-08 DIAGNOSIS — J01.40 ACUTE NON-RECURRENT PANSINUSITIS: Primary | ICD-10-CM

## 2022-03-08 PROCEDURE — 99214 OFFICE O/P EST MOD 30 MIN: CPT | Performed by: NURSE PRACTITIONER

## 2022-03-08 RX ORDER — AMOXICILLIN AND CLAVULANATE POTASSIUM 875; 125 MG/1; MG/1
1 TABLET, FILM COATED ORAL EVERY 12 HOURS SCHEDULED
Qty: 14 TABLET | Refills: 0 | Status: SHIPPED | OUTPATIENT
Start: 2022-03-08 | End: 2022-03-15

## 2022-03-08 NOTE — PROGRESS NOTES
Virtual Regular Visit    Verification of patient location:    Patient is located in the following state in which I hold an active license [place]      Assessment/Plan:    Problem List Items Addressed This Visit     None      Visit Diagnoses     Acute non-recurrent pansinusitis    -  Primary    Relevant Medications    amoxicillin-clavulanate (AUGMENTIN) 875-125 mg per tablet      Discussed with patient plan to treat with 7 day course of Augmention  Discussed with patient she may continue use of conservative measures: anti-histamines, Flonase or Mucinex to assist with decongestion  Patient instructed to call if no improvement in 72 hours or symptoms worsen         Reason for visit is   Chief Complaint   Patient presents with    Virtual Regular Visit        Encounter provider Dread Posada, [address]    Provider located at [address] [place], [place] [address]      Recent Visits  No visits were found meeting these conditions  Showing recent visits within past 7 days and meeting all other requirements  Today's Visits  Date Type Provider Dept   03/08/22 Telemedicine Maren Dennis Brinson 429 today's visits and meeting all other requirements  Future Appointments  No visits were found meeting these conditions  Showing future appointments within next 150 days and meeting all other requirements       The patient was identified by name and date of birth  Sarika Dawley was informed that this is a telemedicine visit and that the visit is being conducted through [facility] and patient was informed this is a secure, HIPAA-complaint platform  She agrees to proceed     My office door was closed  No one else was in the room  She acknowledged consent and understanding of privacy and security of the video platform  The patient has agreed to participate and understands they can discontinue the visit at any time  Patient is aware this is a billable service  Tash Mariee is a 56 y o  female     56 y  o female presenting with with nasal congestion and sinus pressure for the past week  She also read reports a scratchy throat and some crackling in her ears bilaterally  patient denies fever, chills, headache, general body aches, shortness of breath, chest tightness GI symptoms  Patient reports that she has taken some over-the-counter cold medication with no relief to her symptoms  Past Medical History:   Diagnosis Date    Anemia 12/8/2021    No known health problems        Past Surgical History:   Procedure Laterality Date    NO PAST SURGERIES         Current Outpatient Medications   Medication Sig Dispense Refill    amoxicillin-clavulanate (AUGMENTIN) 875-125 mg per tablet Take 1 tablet by mouth every 12 (twelve) hours for 7 days 14 tablet 0    Biotin 1000 MCG tablet Take 1,000 mcg by mouth 3 (three) times a day      fluticasone-salmeterol (Wixela Inhub) 250-50 mcg/dose inhaler Inhale 1 puff 2 (two) times a day Rinse mouth after use  60 blister 3    levothyroxine (Synthroid) 75 mcg tablet Take 1 tablet (75 mcg total) by mouth daily in the early morning 30 tablet 5    metoprolol tartrate (LOPRESSOR) 50 mg tablet Take 1 tablet (50 mg total) by mouth 2 (two) times a day 180 tablet 1     No current facility-administered medications for this visit  No Known Allergies    Review of Systems   Constitutional: Negative for chills, fatigue and fever  HENT: Positive for congestion, ear pain (Crackling sounds), postnasal drip, sinus pressure and sore throat  Respiratory: Negative for cough, chest tightness, shortness of breath and wheezing  Cardiovascular: Negative for chest pain and palpitations  Gastrointestinal: Negative for abdominal distention, abdominal pain, diarrhea, nausea and vomiting  Musculoskeletal: Negative for myalgias  Neurological: Negative for dizziness, light-headedness, numbness and headaches  Psychiatric/Behavioral: Negative  Video Exam    There were no vitals filed for this visit  (Vital signs not performed during visit due to limitations of telemedicine format along with patient's availability to needed equipment)    Physical Exam  Constitutional:       General: She is not in acute distress  Appearance: Normal appearance  She is well-developed and well-groomed  She is not ill-appearing  HENT:      Head: Normocephalic and atraumatic  Right Ear: External ear normal       Left Ear: External ear normal       Nose: Congestion present  Right Sinus: Maxillary sinus tenderness and frontal sinus tenderness present  Left Sinus: Maxillary sinus tenderness and frontal sinus tenderness present  Mouth/Throat:      Lips: Pink  Mouth: Mucous membranes are moist       Pharynx: Oropharynx is clear  Eyes:      General: Lids are normal       Extraocular Movements: Extraocular movements intact  Conjunctiva/sclera: Conjunctivae normal       Pupils: Pupils are equal, round, and reactive to light  Neck:      Trachea: Trachea normal    Cardiovascular:      Rate and Rhythm: Normal rate  Pulmonary:      Effort: Pulmonary effort is normal  No respiratory distress  Breath sounds: No wheezing  Chest:      Chest wall: No tenderness  Neurological:      General: No focal deficit present  Mental Status: She is alert and oriented to person, place, and time  Psychiatric:         Mood and Affect: Mood normal          Behavior: Behavior normal  Behavior is cooperative  I spent 15 minutes directly with the patient during this visit    VIRTUAL VISIT DISCLAIMER      Gisela Brown verbally agrees to participate in [employer]   Pt is aware that [employer] could be limited without vital signs or the ability to perform a full hands-on physical Lauren Confer understands she or the provider may request at any time to terminate the video visit and request the patient to seek care or treatment in person

## 2022-03-09 ENCOUNTER — TELEPHONE (OUTPATIENT)
Dept: HEMATOLOGY ONCOLOGY | Facility: CLINIC | Age: 47
End: 2022-03-09

## 2022-03-09 NOTE — TELEPHONE ENCOUNTER
Patient called in requesting an update regarding an approval for her lab work required for her appointment with Hermelinda Fuller on 3/18/22  Patient is unsure if they should keep her appointment for Hermelinda Fuller and is also requesting an update if she should keep the appointment or not  Patient can be reached back at [phone]

## 2022-03-10 ENCOUNTER — APPOINTMENT (OUTPATIENT)
Dept: LAB | Facility: CLINIC | Age: 47
End: 2022-03-10
Payer: MEDICARE

## 2022-03-10 ENCOUNTER — TELEPHONE (OUTPATIENT)
Dept: HEMATOLOGY ONCOLOGY | Facility: CLINIC | Age: 47
End: 2022-03-10

## 2022-03-10 DIAGNOSIS — E03.8 HYPOTHYROIDISM DUE TO HASHIMOTO'S THYROIDITIS: ICD-10-CM

## 2022-03-10 DIAGNOSIS — E06.3 HYPOTHYROIDISM DUE TO HASHIMOTO'S THYROIDITIS: ICD-10-CM

## 2022-03-10 LAB — TSH SERPL DL<=0.05 MIU/L-ACNC: 1.32 UIU/ML (ref 0.36–3.74)

## 2022-03-10 PROCEDURE — 84443 ASSAY THYROID STIM HORMONE: CPT

## 2022-03-10 PROCEDURE — 36415 COLL VENOUS BLD VENIPUNCTURE: CPT

## 2022-03-10 NOTE — TELEPHONE ENCOUNTER
Spoke w/ pt about canceling appt on 3/18/22 due to no insurance approval yet on her labwork  Advised pt that nurse will contact her back to reschedule appt

## 2022-04-04 DIAGNOSIS — I49.3 FREQUENT PVCS: ICD-10-CM

## 2022-04-04 DIAGNOSIS — R00.2 PALPITATIONS: ICD-10-CM

## 2022-04-04 RX ORDER — METOPROLOL TARTRATE 50 MG/1
50 TABLET, FILM COATED ORAL 2 TIMES DAILY
Qty: 180 TABLET | Refills: 1 | Status: SHIPPED | OUTPATIENT
Start: 2022-04-04

## 2022-04-07 ENCOUNTER — TELEPHONE (OUTPATIENT)
Dept: HEMATOLOGY ONCOLOGY | Facility: CLINIC | Age: 47
End: 2022-04-07

## 2022-04-07 NOTE — TELEPHONE ENCOUNTER
Called and spoke with patient  She is aware per Jennie Beal with oncology finance, prior authorization for blood work has been approved  Patient will complete blood work next week at the [facility]  She is aware BCR/ABL needs to be completed at a hospital campus Monday-Thursday  Emailed patient blood work orders to bring to lab with her to make sure all labs are drawn  She is aware I will call her once we have the results to schedule a follow up with Bang Garza  Patient verbalized understanding

## 2022-04-07 NOTE — TELEPHONE ENCOUNTER
Called and spoke with patient  She is aware she can take all medications prior to getting blood work drawn

## 2022-04-07 NOTE — TELEPHONE ENCOUNTER
CALL RETURN FORM   Reason for patient call? Patient wants to know if she should take her blood pressure medication the morning of getting her blood work  She plans to get it done next week    Patient's primary oncologist?  Tammie Krueger   Name of person the patient was calling for? kina    Any additional information to add, if applicable? Best call back number is [phone] patient ok with a vm being left   Informed patient that the message will be forwarded to the team and someone will get back to them as soon as possible    Did you relay this information to the patient?  yes

## 2022-04-08 ENCOUNTER — TELEPHONE (OUTPATIENT)
Dept: HEMATOLOGY ONCOLOGY | Facility: CLINIC | Age: 47
End: 2022-04-08

## 2022-04-08 PROBLEM — D47.1 MYELOPROLIFERATIVE DISORDER (HCC): Status: ACTIVE | Noted: 2022-04-08

## 2022-04-08 NOTE — TELEPHONE ENCOUNTER
Placed call to patient to discuss outstanding blood work that has not been fully authorized by her insurance company  Explained to patient that she should continue with having her all ordered blood work completed  Patient requested to have lab script emailed to her so she knows exactly what testing she needs to have completed, advised her that I would communicate this to Brenda Mistry  She is emailing a copy of the letter she got from [employer]  Reta Park is aware that I will follow up on any denial/billing issues to ensure that she is not financially exposed

## 2022-04-08 NOTE — TELEPHONE ENCOUNTER
CALL RETURN FORM   Reason for patient call? Patient is calling about lab tests and coverage by insurance    Patient's primary oncologist? Kaycee Kennedy    Name of person the patient was calling for? Goble Dandy    Any additional information to add, if applicable? Please call [phone]   Informed patient that the message will be forwarded to the team and someone will get back to them as soon as possible    Did you relay this information to the patient?   Yes

## 2022-04-08 NOTE — TELEPHONE ENCOUNTER
Received call from patient  Patient states she received a call from her insurance company today asking if she would like to partake in appeal hearing today at 11:30 regarding her blood work  Insurance company states blood work is NOT approved  Patient is very upset  Patient aware I was told by oncology finance on 4/6 that blood work was approved  Patient aware I will be reaching out to oncology finance for another update

## 2022-04-11 ENCOUNTER — APPOINTMENT (OUTPATIENT)
Dept: LAB | Facility: CLINIC | Age: 47
End: 2022-04-11
Payer: MEDICARE

## 2022-04-11 ENCOUNTER — TELEPHONE (OUTPATIENT)
Dept: HEMATOLOGY ONCOLOGY | Facility: CLINIC | Age: 47
End: 2022-04-11

## 2022-04-11 DIAGNOSIS — E03.8 HYPOTHYROIDISM DUE TO HASHIMOTO'S THYROIDITIS: ICD-10-CM

## 2022-04-11 DIAGNOSIS — R06.00 DOE (DYSPNEA ON EXERTION): ICD-10-CM

## 2022-04-11 DIAGNOSIS — E06.3 HASHIMOTO'S DISEASE: ICD-10-CM

## 2022-04-11 DIAGNOSIS — R71.8 MICROCYTOSIS: ICD-10-CM

## 2022-04-11 DIAGNOSIS — E06.3 HYPOTHYROIDISM DUE TO HASHIMOTO'S THYROIDITIS: ICD-10-CM

## 2022-04-11 DIAGNOSIS — R00.2 PALPITATIONS: ICD-10-CM

## 2022-04-11 DIAGNOSIS — Z83.2 FAMILY HISTORY OF THALASSEMIA: ICD-10-CM

## 2022-04-11 DIAGNOSIS — R53.83 FATIGUE, UNSPECIFIED TYPE: ICD-10-CM

## 2022-04-11 DIAGNOSIS — J45.909 MODERATE ASTHMA WITHOUT COMPLICATION, UNSPECIFIED WHETHER PERSISTENT: ICD-10-CM

## 2022-04-11 DIAGNOSIS — D75.839 THROMBOCYTOSIS: ICD-10-CM

## 2022-04-11 DIAGNOSIS — D47.1 MYELOPROLIFERATIVE DISORDER (HCC): ICD-10-CM

## 2022-04-11 DIAGNOSIS — D72.829 LEUKOCYTOSIS, UNSPECIFIED TYPE: ICD-10-CM

## 2022-04-11 LAB
ALBUMIN SERPL BCP-MCNC: 3.4 G/DL (ref 3.5–5)
ALP SERPL-CCNC: 67 U/L (ref 46–116)
ALT SERPL W P-5'-P-CCNC: 36 U/L (ref 12–78)
ANION GAP SERPL CALCULATED.3IONS-SCNC: 7 MMOL/L (ref 4–13)
AST SERPL W P-5'-P-CCNC: 20 U/L (ref 5–45)
BASOPHILS NFR BLD MANUAL: 2 % (ref 0–1)
BILIRUB SERPL-MCNC: 0.54 MG/DL (ref 0.2–1)
BUN SERPL-MCNC: 15 MG/DL (ref 5–25)
CALCIUM ALBUM COR SERPL-MCNC: 9.4 MG/DL (ref 8.3–10.1)
CALCIUM SERPL-MCNC: 8.9 MG/DL (ref 8.3–10.1)
CHLORIDE SERPL-SCNC: 103 MMOL/L (ref 100–108)
CO2 SERPL-SCNC: 25 MMOL/L (ref 21–32)
CREAT SERPL-MCNC: 1.04 MG/DL (ref 0.6–1.3)
CRP SERPL QL: 41.2 MG/L
ERYTHROCYTE [DISTWIDTH] IN BLOOD BY AUTOMATED COUNT: 17.2 % (ref 11.6–15.1)
ERYTHROCYTE [SEDIMENTATION RATE] IN BLOOD: 65 MM/HOUR (ref 0–19)
FERRITIN SERPL-MCNC: 255 NG/ML (ref 8–388)
GFR SERPL CREATININE-BSD FRML MDRD: 64 ML/MIN/1.73SQ M
GLUCOSE SERPL-MCNC: 94 MG/DL (ref 65–140)
HCT VFR BLD AUTO: 42.9 % (ref 34.8–46.1)
HGB BLD-MCNC: 13.1 G/DL (ref 11.5–15.4)
IMM EOSINOPHIL NFR BLD MANUAL: 1 % (ref 0–6)
IRON SATN MFR SERPL: 25 % (ref 15–50)
IRON SERPL-MCNC: 88 UG/DL (ref 50–170)
LYMPHOCYTES NFR BLD: 35 % (ref 14–44)
MCH RBC QN AUTO: 21.9 PG (ref 26.8–34.3)
MCHC RBC AUTO-ENTMCNC: 30.5 G/DL (ref 31.4–37.4)
MCV RBC AUTO: 72 FL (ref 82–98)
MONOCYTES NFR BLD AUTO: 7 % (ref 4–12)
NEUTS SEG NFR BLD AUTO: 55 % (ref 45–77)
NRBC BLD AUTO-RTO: 0 /100 WBCS
PLATELET # BLD AUTO: 432 THOUSANDS/UL (ref 149–390)
PMV BLD AUTO: 9.4 FL (ref 8.9–12.7)
POTASSIUM SERPL-SCNC: 4 MMOL/L (ref 3.5–5.3)
PROT SERPL-MCNC: 7.8 G/DL (ref 6.4–8.2)
RBC # BLD AUTO: 5.98 MILLION/UL (ref 3.81–5.12)
RHEUMATOID FACT SER QL LA: NEGATIVE
SODIUM SERPL-SCNC: 135 MMOL/L (ref 136–145)
TIBC SERPL-MCNC: 355 UG/DL (ref 250–450)
TOTAL CELLS COUNTED SPEC: 100
WBC # BLD AUTO: 13.35 THOUSAND/UL (ref 4.31–10.16)

## 2022-04-11 PROCEDURE — 86140 C-REACTIVE PROTEIN: CPT

## 2022-04-11 PROCEDURE — 86430 RHEUMATOID FACTOR TEST QUAL: CPT

## 2022-04-11 PROCEDURE — 86038 ANTINUCLEAR ANTIBODIES: CPT

## 2022-04-11 PROCEDURE — 81450 HL NEO GSAP 5-50DNA/DNA&RNA: CPT

## 2022-04-11 PROCEDURE — 81404 MOPATH PROCEDURE LEVEL 5: CPT

## 2022-04-11 PROCEDURE — 83540 ASSAY OF IRON: CPT

## 2022-04-11 PROCEDURE — 85027 COMPLETE CBC AUTOMATED: CPT

## 2022-04-11 PROCEDURE — 85652 RBC SED RATE AUTOMATED: CPT

## 2022-04-11 PROCEDURE — 85007 BL SMEAR W/DIFF WBC COUNT: CPT

## 2022-04-11 PROCEDURE — 81207 BCR/ABL1 GENE MINOR BP: CPT

## 2022-04-11 PROCEDURE — 80053 COMPREHEN METABOLIC PANEL: CPT

## 2022-04-11 PROCEDURE — 82728 ASSAY OF FERRITIN: CPT

## 2022-04-11 PROCEDURE — 36415 COLL VENOUS BLD VENIPUNCTURE: CPT

## 2022-04-11 PROCEDURE — 88185 FLOWCYTOMETRY/TC ADD-ON: CPT

## 2022-04-11 PROCEDURE — 88184 FLOWCYTOMETRY/ TC 1 MARKER: CPT

## 2022-04-11 PROCEDURE — 83550 IRON BINDING TEST: CPT

## 2022-04-11 PROCEDURE — 81206 BCR/ABL1 GENE MAJOR BP: CPT

## 2022-04-11 PROCEDURE — 83020 HEMOGLOBIN ELECTROPHORESIS: CPT

## 2022-04-11 NOTE — TELEPHONE ENCOUNTER
Responded to the email that [facility] sent on Friday with a copy of the letter from [employer]  Advised patient that I see she went for her lab work today and I will continue to follow the account to make sure she isn't billed

## 2022-04-11 NOTE — TELEPHONE ENCOUNTER
CALL RETURN FORM   Reason for patient call? patient is calling to speak to the nurse in regards to test that were ordered for her to get done today  She would like to know if the paper work from the appeal was received  Patient's primary oncologist? Dr Alexia Garcia   Name of person the patient was calling for? Shena Ambrose   Any additional information to add, if applicable? Best call back number is [phone]   Informed patient that the message will be forwarded to the team and someone will get back to them as soon as possible    Did you relay this information to the patient?  yes

## 2022-04-13 LAB — RYE IGE QN: NEGATIVE

## 2022-04-15 LAB
HGB A MFR BLD: 5 % (ref 1.8–3.2)
HGB A MFR BLD: 95 % (ref 96.4–98.8)
HGB F MFR BLD: 0 % (ref 0–2)
HGB FRACT BLD-IMP: ABNORMAL
HGB S MFR BLD: 0 %
SCAN RESULT: NORMAL
SCAN RESULT: NORMAL

## 2022-04-18 LAB
HBB GENE MUT ANL BLD/T: NORMAL
MISCELLANEOUS LAB TEST RESULT: NORMAL

## 2022-04-21 ENCOUNTER — TELEPHONE (OUTPATIENT)
Dept: HEMATOLOGY ONCOLOGY | Facility: CLINIC | Age: 47
End: 2022-04-21

## 2022-04-21 NOTE — TELEPHONE ENCOUNTER
Appointment Cancellation Or Reschedule     Person calling in Patient    Provider [facility] Visit Date and Time 3/18/22 at 11   [facility]   Did patient want to reschedule their office appointment? If so, when was it scheduled to? 4/29/22 at 10:30   Is this patient calling to reschedule an infusion appointment? No   When is their next infusion appointment? N/a   Is this patient a Chemo patient? No   Reason for Cancellation or Reschedule Reschedule canceled appointment      If the patient is a treatment patient, please route this to the office nurse  If the patient is not on treatment, please route to the office MA

## 2022-04-21 NOTE — TELEPHONE ENCOUNTER
Called and spoke with patient  She is aware Niurka Buenrostro does not need any more blood work drawn prior to appointment on 4/29  She is aware we are just waiting on one test to be faxed over from [facility] and then we will have all results  Patient is asking about the appeal status for labs that were not covered  I explained I will reach out to Kiera Hatfield as she has been working on appeal for patient  Patient verbalized understanding

## 2022-04-21 NOTE — TELEPHONE ENCOUNTER
CALL RETURN FORM   Reason for patient call? Patient called in for an update regarding her labs and insurance      Patient states that all of her labs were approved by her insurance company except for one, does not know which one    Was told by insurance company that Nilton Bowman had a appeal put in    Does not know if insurance approval was for recent lab work or for future lab work     Wants to know if she would need to be required to have more lab work completed before her appointment that she may need to let her insurance company know about    Patient's primary oncologist? Madelaine Light    Name of person the patient was calling for? Madelaine Light   Any additional information to add, if applicable? Informed patient that the message will be forwarded to the team and someone will get back to them as soon as possible    Did you relay this information to the patient?   Yes

## 2022-04-27 ENCOUNTER — OFFICE VISIT (OUTPATIENT)
Dept: FAMILY MEDICINE CLINIC | Facility: CLINIC | Age: 47
End: 2022-04-27
Payer: MEDICARE

## 2022-04-27 VITALS
HEIGHT: 64 IN | SYSTOLIC BLOOD PRESSURE: 130 MMHG | DIASTOLIC BLOOD PRESSURE: 80 MMHG | HEART RATE: 86 BPM | TEMPERATURE: 97.8 F | BODY MASS INDEX: 49 KG/M2 | OXYGEN SATURATION: 98 % | WEIGHT: 287 LBS

## 2022-04-27 DIAGNOSIS — R06.02 POST-COVID CHRONIC SHORTNESS OF BREATH: Primary | ICD-10-CM

## 2022-04-27 DIAGNOSIS — R10.30 LOWER ABDOMINAL PAIN: ICD-10-CM

## 2022-04-27 DIAGNOSIS — D56.9 THALASSEMIA, UNSPECIFIED TYPE: ICD-10-CM

## 2022-04-27 DIAGNOSIS — M79.10 MYALGIA: ICD-10-CM

## 2022-04-27 DIAGNOSIS — U09.9 POST-COVID CHRONIC SHORTNESS OF BREATH: Primary | ICD-10-CM

## 2022-04-27 PROCEDURE — 99214 OFFICE O/P EST MOD 30 MIN: CPT | Performed by: NURSE PRACTITIONER

## 2022-04-27 NOTE — PROGRESS NOTES
Assessment/Plan:     Diagnoses and all orders for this visit:    Post-COVID chronic shortness of breath  -     Ambulatory Referral to Pulmonology; Future    Lower abdominal pain    Thalassemia, unspecified type    Myalgia    #1 Post COVID chronic shortness of breath  Discussed with patient plan to refer to pulmonary due to other work-ups being negative  #2 Lower abdominal pain  Discussed with patient plan to perform dietary changes to possible identify trigger  Discussed with patient need for colon cancer screening and/or investigate abdominal pains with colonoscopy  #3 Thalassemia, unspecified type  Patient has a follow-up appointment scheduled to discuss lab results and treatment plan on 04/29/2022  #4 Myalgia  Discussed with patient that some of her inflammatory markers were positive and she does have a scheduled appointment with rheumatology on 06/14/2022 for further evaluation  Patient instructed to return in 6 months or sooner if needed  Patient instructed to call office for problems or concerns in the interim    Subjective:      Patient ID: Rina Galvez is a 56 y o  female  56 y  o female presenting for a three month follow-up for chronic medical conditions  Patient has completed requested lab work for thalassemia work-up and is scheduled for lab review with hematology on 04/29/2022  She did have some elevated inflammation markers so she scheduled for an appointment with rheumatology on 06/14/2022 to further be evaluated for generalized myalgia issues  She reports that the dyspnea on exertion continues to impend her quality of life and cardiology work-up was negative  Discussed with patient plan to refer to pulmonology for post COVID shortness of breath work-up  She has been experiencing intermittent right mid-section abdominal pain and has not been able to identify possible triggers  She states that if she places her index finger on her umbilicus her palm hits were the pain usually occurs   She is going attempt avoidance of possible triggers to GI pain like dairy, high fiber and high fat content foods  Patient is due for colon cancer screening so did discuss having colonoscopy for screening and evaluation of abdominal pain  Patient would like to try dietary changes first than will discuss colon screening options  Discussed with patient need for cervical cancer screening also with possible PAP being performed by this office or referral to gynecology office  Patient will think about her options         Family History   Problem Relation Age of Onset    Cardiomyopathy Mother     Anxiety disorder Mother     No Known Problems Father     Anxiety disorder Brother     Heart attack Maternal Grandfather     Thalassemia Maternal Aunt     Lupus Family      Social History     Socioeconomic History    Marital status: Single     Spouse name: Not on file    Number of children: Not on file    Years of education: Not on file    Highest education level: Not on file   Occupational History    Not on file   Tobacco Use    Smoking status: Former Smoker     Packs/day: 0 25     Years: 0 00     Pack years: 0 00     Types: Cigarettes    Smokeless tobacco: Never Used   Vaping Use    Vaping Use: Never used   Substance and Sexual Activity    Alcohol use: No    Drug use: Never    Sexual activity: Yes     Partners: Male     Birth control/protection: Condom Male, Rhythm, None   Other Topics Concern    Not on file   Social History Narrative    Not on file     Social Determinants of Health     Financial Resource Strain: Not on file   Food Insecurity: Not on file   Transportation Needs: Not on file   Physical Activity: Not on file   Stress: Not on file   Social Connections: Not on file   Intimate Partner Violence: Not on file   Housing Stability: Not on file     E-Cigarette/Vaping    E-Cigarette Use Never User      E-Cigarette/Vaping Substances    Nicotine No     THC No     CBD No     Flavoring No     Other No     Unknown No Past Medical History:   Diagnosis Date    Anemia 12/8/2021    No known health problems      Past Surgical History:   Procedure Laterality Date    NO PAST SURGERIES       No Known Allergies    Current Outpatient Medications:     Biotin 1000 MCG tablet, Take 1,000 mcg by mouth 3 (three) times a day, Disp: , Rfl:     fluticasone-salmeterol (Wixela Inhub) 250-50 mcg/dose inhaler, Inhale 1 puff 2 (two) times a day Rinse mouth after use , Disp: 60 blister, Rfl: 3    levothyroxine (Synthroid) 75 mcg tablet, Take 1 tablet (75 mcg total) by mouth daily in the early morning, Disp: 30 tablet, Rfl: 5    metoprolol tartrate (LOPRESSOR) 50 mg tablet, Take 1 tablet (50 mg total) by mouth 2 (two) times a day, Disp: 180 tablet, Rfl: 1    Review of Systems   Constitutional: Positive for fatigue  Negative for chills and fever  HENT: Negative  Respiratory: Positive for shortness of breath  Negative for cough, chest tightness and wheezing  Gastrointestinal: Positive for abdominal pain and diarrhea  Negative for abdominal distention, constipation, nausea and vomiting  Musculoskeletal: Positive for myalgias  Neurological: Negative  Psychiatric/Behavioral: Negative  Objective:    /80   Pulse 86   Temp 97 8 °F (36 6 °C)   Ht 5' 4" (1 626 m)   Wt 130 kg (287 lb)   LMP 04/12/2022 (Approximate)   SpO2 98%   BMI 49 26 kg/m² (Reviewed)     Physical Exam  Vitals reviewed  Constitutional:       General: She is not in acute distress  Appearance: She is well-developed and well-groomed  She is not ill-appearing  HENT:      Head: Normocephalic and atraumatic  Right Ear: External ear normal       Left Ear: External ear normal    Eyes:      General: Lids are normal       Extraocular Movements: Extraocular movements intact  Conjunctiva/sclera: Conjunctivae normal       Pupils: Pupils are equal, round, and reactive to light     Neck:      Thyroid: No thyroid mass, thyromegaly or thyroid tenderness  Trachea: Trachea and phonation normal    Cardiovascular:      Rate and Rhythm: Normal rate and regular rhythm  Pulses: Normal pulses  Heart sounds: Normal heart sounds  Pulmonary:      Effort: Pulmonary effort is normal       Breath sounds: Normal breath sounds  Abdominal:      General: Abdomen is flat  Bowel sounds are normal  There is no distension  Palpations: Abdomen is soft  There is no mass  Tenderness: There is abdominal tenderness in the right lower quadrant  There is no right CVA tenderness, left CVA tenderness, guarding or rebound  Musculoskeletal:      Cervical back: Neck supple  Skin:     General: Skin is warm and dry  Capillary Refill: Capillary refill takes less than 2 seconds  Neurological:      General: No focal deficit present  Mental Status: She is alert and oriented to person, place, and time  Psychiatric:         Mood and Affect: Mood normal          Behavior: Behavior normal  Behavior is cooperative  Thought Content:  Thought content normal

## 2022-04-29 ENCOUNTER — OFFICE VISIT (OUTPATIENT)
Dept: HEMATOLOGY ONCOLOGY | Facility: CLINIC | Age: 47
End: 2022-04-29
Payer: MEDICARE

## 2022-04-29 VITALS
HEART RATE: 80 BPM | SYSTOLIC BLOOD PRESSURE: 132 MMHG | TEMPERATURE: 97 F | RESPIRATION RATE: 20 BRPM | OXYGEN SATURATION: 98 % | DIASTOLIC BLOOD PRESSURE: 90 MMHG | WEIGHT: 288 LBS | HEIGHT: 64 IN | BODY MASS INDEX: 49.17 KG/M2

## 2022-04-29 DIAGNOSIS — D75.839 THROMBOCYTOSIS: ICD-10-CM

## 2022-04-29 DIAGNOSIS — D56.3 BETA THALASSEMIA MINOR: Primary | ICD-10-CM

## 2022-04-29 DIAGNOSIS — D72.829 LEUKOCYTOSIS, UNSPECIFIED TYPE: ICD-10-CM

## 2022-04-29 DIAGNOSIS — R71.8 MICROCYTOSIS: ICD-10-CM

## 2022-04-29 PROBLEM — D47.1 MYELOPROLIFERATIVE DISORDER (HCC): Status: RESOLVED | Noted: 2022-04-08 | Resolved: 2022-04-29

## 2022-04-29 PROCEDURE — 99214 OFFICE O/P EST MOD 30 MIN: CPT

## 2022-04-29 NOTE — PROGRESS NOTES
[id number] [facility]  [facility] [place] [id number]  Hematology Ambulatory Follow-Up  [place]ar[place], 1975, [id number]  4/29/2022    Assessment/Plan:    1  Beta thalassemia minor  2  Microcytosis  Ms Gonsalo Milligan is a 59-year-old female with post COVID symptoms seen in consultation in February for leukocytosis, thrombocytosis of microcytosis  She has a significant family history for beta thalassemia and his newly diagnosed herself  I educated her that her children must be tested  We discussed that beta thalassemia minor usually does not result in symptomatic anemia and her hemoglobin is normal therefore she is not need transfusion  Her iron panel was normal and she does not need any supplemental iron  3  Thrombocytosis  4  Leukocytosis, unspecified type  These are both likely related to her inflammation  We will continue to monitor  Discussed that her platelet levels are high enough to warrant any prophylactic baby aspirin daily  I suggested she touch base with her cardiologist in case they feel otherwise  I reiterated that she should stay hydrated  Had discussion that her recent possible diagnosis of colitis could also be contributing to the leukocytosis  - CBC and differential; Future  - Comprehensive metabolic panel; Future  - Sedimentation rate, automated; Future  - C-reactive protein; Future  - Peripheral Smear; Future    I will see her back in the office in 4 months with repeat CBC, CMP, sed rate, CRP and peripheral smear  We had a long discussion regarding the utility in a bone marrow biopsy and that is not necessary at this time  White count beginning to elevate even further and if there was abnormal cells on her peripheral smear that would be the time that I would suggested  The patient is scheduled for follow-up in approximately 4 months  Patient voiced agreement and understanding to the above   Patient knows to call the Hematology/Oncology office with any questions and concerns regarding the above  Barrier(s) to care: None  The patient is able to self care   ------------------------------------------------------------------------------------------------------    No chief complaint on file  History of present illness:   Sierra Kawasaki is a 78-year-old female who was seen in consultation in February 2022 for macrocytosis, thrombocytosis and leukocytosis  She has been having symptoms since COVID in December of 2020 and following with multiple specialists  She has a significant family history of thalassemia     09/16/2021:  Hemoglobin 12 7, MCV 69, WBC 14 16, platelets 865, ANC 54 3  01/27/2021:  Hemoglobin 12 5, MCV 71, WBC 10 15, platelets 673, ANC 8 56  05/03/2021:  Hemoglobin 12 8 MCV 72, WBC 9 80, platelets 303, differential normal  07/19/2021:  Hemoglobin 13 4, MCV 71, WBC 9 54, platelets 533  72/54/7546:  Hemoglobin 13 5, MCV 72, WBC 12 40, platelets 333   6/97/3990:  Hemoglobin 13 1, MCV 72, platelets 841, WBC 58 39, ferritin 255, iron saturation 25%, TIBC 355, serum iron 88  ODILIA, RF: Negative  BCR/ABL, leukemia lymphoma flow cytometry: Negative  JAK2, CALR, MPL: Negative  Peripheral smear:  Mild elevation basophils  Sed rate: 65  C reactive protein: 41 2  Hgb Fractionation Cascade: Hgb a 95%, A2 Quant 5%, consistent with beta-Thalassemia Minor    She has been feeling okay and has her good days and bad days  She recently had a discussion with her PCP and was thought to be having symptoms of colitis had such taking her diet her symptoms have resolved she feels much better  She has an upcoming consult with pulmonology regarding her post COVID symptoms  Review of Systems   Constitutional: Positive for activity change and fatigue  Negative for appetite change and fever  HENT: Negative for trouble swallowing and voice change  Eyes: Negative for photophobia and visual disturbance     Respiratory: Positive for chest tightness and shortness of breath  Negative for cough and wheezing  Cardiovascular: Positive for chest pain and palpitations  Negative for leg swelling  Gastrointestinal: Negative for abdominal pain, blood in stool, constipation, diarrhea, nausea and vomiting  Endocrine: Negative for cold intolerance and heat intolerance  Genitourinary: Negative for dysuria, hematuria and menstrual problem  Musculoskeletal: Positive for arthralgias  Negative for joint swelling and myalgias  Skin: Negative for pallor and rash  Neurological: Positive for light-headedness and headaches  Hematological: Negative for adenopathy  Does not bruise/bleed easily  Psychiatric/Behavioral: Negative for confusion and sleep disturbance         Patient Active Problem List   Diagnosis    Palpitations    Moderate asthma without complication    Anemia    Hashimoto's disease    Thalassemia    Hypothyroidism    Microcytosis    Family history of thalassemia    Thrombocytosis    Leukocytosis    PVC (premature ventricular contraction)       Past Medical History:   Diagnosis Date    Anemia 12/8/2021    No known health problems        Past Surgical History:   Procedure Laterality Date    NO PAST SURGERIES         Family History   Problem Relation Age of Onset    Cardiomyopathy Mother     Anxiety disorder Mother     No Known Problems Father     Anxiety disorder Brother     Heart attack Maternal Grandfather     Thalassemia Maternal Aunt     Lupus Family        Social History     Socioeconomic History    Marital status: Single     Spouse name: Not on file    Number of children: Not on file    Years of education: Not on file    Highest education level: Not on file   Occupational History    Not on file   Tobacco Use    Smoking status: Former Smoker     Packs/day: 0 25     Years: 0 00     Pack years: 0 00     Types: Cigarettes    Smokeless tobacco: Never Used   Vaping Use    Vaping Use: Never used   Substance and Sexual Activity    Alcohol use: No    Drug use: Never    Sexual activity: Yes     Partners: Male     Birth control/protection: Condom Male, Rhythm, None   Other Topics Concern    Not on file   Social History Narrative    Not on file     Social Determinants of Health     Financial Resource Strain: Not on file   Food Insecurity: Not on file   Transportation Needs: Not on file   Physical Activity: Not on file   Stress: Not on file   Social Connections: Not on file   Intimate Partner Violence: Not on file   Housing Stability: Not on file         Current Outpatient Medications:     Biotin 1000 MCG tablet, Take 1,000 mcg by mouth 3 (three) times a day, Disp: , Rfl:     fluticasone-salmeterol (Sharrell Longest) 250-50 mcg/dose inhaler, Inhale 1 puff 2 (two) times a day Rinse mouth after use , Disp: 60 blister, Rfl: 3    levothyroxine (Synthroid) 75 mcg tablet, Take 1 tablet (75 mcg total) by mouth daily in the early morning, Disp: 30 tablet, Rfl: 5    metoprolol tartrate (LOPRESSOR) 50 mg tablet, Take 1 tablet (50 mg total) by mouth 2 (two) times a day, Disp: 180 tablet, Rfl: 1    No Known Allergies    Objective:  /90   Pulse 80   Temp (!) 97 °F (36 1 °C)   Resp 20   Ht 5' 4" (1 626 m)   Wt 131 kg (288 lb)   LMP 04/12/2022 (Approximate)   SpO2 98%   BMI 49 44 kg/m²    Physical Exam  Constitutional:       General: She is not in acute distress  Appearance: Normal appearance  She is not ill-appearing  Eyes:      Extraocular Movements: Extraocular movements intact  Conjunctiva/sclera: Conjunctivae normal    Cardiovascular:      Rate and Rhythm: Normal rate and regular rhythm  Pulses: Normal pulses  Heart sounds: Normal heart sounds  Pulmonary:      Effort: Pulmonary effort is normal  No respiratory distress  Breath sounds: Normal breath sounds  No wheezing or rhonchi  Abdominal:      General: Bowel sounds are normal  There is no distension  Palpations: Abdomen is soft  Tenderness: There is no abdominal tenderness  Musculoskeletal:      Cervical back: Neck supple  Right lower leg: No edema  Left lower leg: No edema  Lymphadenopathy:      Cervical: No cervical adenopathy  Skin:     General: Skin is warm and dry  Capillary Refill: Capillary refill takes less than 2 seconds  Coloration: Skin is not pale  Findings: No bruising  Neurological:      General: No focal deficit present  Mental Status: She is alert and oriented to person, place, and time  Mental status is at baseline  Motor: No weakness  Gait: Gait normal    Psychiatric:         Mood and Affect: Mood normal          Behavior: Behavior normal          Thought Content: Thought content normal          Judgment: Judgment normal          Result Review  Labs:  Appointment on 04/11/2022   Component Date Value Ref Range Status    ROUTING (BETA-THALASSEMIA) 04/11/2022 Comment   Final    The test has been completed and the results have been faxed to you      WBC 04/11/2022 13 35* 4 31 - 10 16 Thousand/uL Final    RBC 04/11/2022 5 98* 3 81 - 5 12 Million/uL Final    Hemoglobin 04/11/2022 13 1  11 5 - 15 4 g/dL Final    Hematocrit 04/11/2022 42 9  34 8 - 46 1 % Final    MCV 04/11/2022 72* 82 - 98 fL Final    MCH 04/11/2022 21 9* 26 8 - 34 3 pg Final    MCHC 04/11/2022 30 5* 31 4 - 37 4 g/dL Final    RDW 04/11/2022 17 2* 11 6 - 15 1 % Final    MPV 04/11/2022 9 4  8 9 - 12 7 fL Final    Platelets 70/87/5186 432* 149 - 390 Thousands/uL Final    nRBC 04/11/2022 0  /100 WBCs Final    ODILIA 04/11/2022 Negative  Negative Final    Scan Result 04/11/2022 SEE WRITTEN REPORT   Final    CRP 04/11/2022 41 2* <3 0 mg/L Final    Miscellaneous Lab Test Result 04/11/2022 SEE WRITTEN REPORT   Final    Scan Result 04/11/2022 SEE WRITTEN REPORT   Final    Rheumatoid Factor 04/11/2022 Negative  Negative Final    Sed Rate 04/11/2022 65* 0 - 19 mm/hour Final    Segmented Neutrophils Manual 04/11/2022 55  45 - 77 % Final    Lymphocytes Manual 04/11/2022 35  14 - 44 % Final    Monocytes Manual 04/11/2022 7  4 - 12 % Final    Eosinophils Manual 04/11/2022 1  0 - 6 % Final    Basos 04/11/2022 2* 0 - 1 % Final    Total Counted 04/11/2022 100   Final    Hgb F Quant 04/11/2022 0 0  0 0 - 2 0 % Final    Hgb A 04/11/2022 95 0* 96 4 - 98 8 % Final    Hgb S Quant 04/11/2022 0 0  0 0 % Final    Hgb A2 Quant 04/11/2022 5 0* 1 8 - 3 2 % Final    Hgb Interp  04/11/2022 Comment   Final    Hemoglobin pattern and concentrations are consistent with beta-  Thalassemia minor  Suggest hematologic and clinical correlation   Iron Saturation 04/11/2022 25  15 - 50 % Final    TIBC 04/11/2022 355  250 - 450 ug/dL Final    Iron 04/11/2022 88  50 - 170 ug/dL Final    Patients treated with metal-binding drugs (ie  Deferoxamine) may have depressed iron values   Ferritin 04/11/2022 255  8 - 388 ng/mL Final       Imaging:   No relevant imaging to review  Please note: This report has been generated by a voice recognition software system  Therefore there may be syntax, spelling, and/or grammatical errors  Please call if you have any questions

## 2022-05-04 ENCOUNTER — OFFICE VISIT (OUTPATIENT)
Dept: FAMILY MEDICINE CLINIC | Facility: CLINIC | Age: 47
End: 2022-05-04
Payer: MEDICARE

## 2022-05-04 ENCOUNTER — TELEPHONE (OUTPATIENT)
Dept: FAMILY MEDICINE CLINIC | Facility: CLINIC | Age: 47
End: 2022-05-04

## 2022-05-04 VITALS
RESPIRATION RATE: 17 BRPM | DIASTOLIC BLOOD PRESSURE: 76 MMHG | WEIGHT: 288 LBS | SYSTOLIC BLOOD PRESSURE: 124 MMHG | TEMPERATURE: 99 F | BODY MASS INDEX: 49.17 KG/M2 | HEART RATE: 76 BPM | HEIGHT: 64 IN | OXYGEN SATURATION: 98 %

## 2022-05-04 DIAGNOSIS — R07.89 PAIN, CHEST WALL: Primary | ICD-10-CM

## 2022-05-04 DIAGNOSIS — R10.11 RIGHT UPPER QUADRANT PAIN: ICD-10-CM

## 2022-05-04 PROCEDURE — 99214 OFFICE O/P EST MOD 30 MIN: CPT | Performed by: NURSE PRACTITIONER

## 2022-05-04 RX ORDER — PREDNISONE 10 MG/1
TABLET ORAL
Qty: 26 TABLET | Refills: 0 | Status: SHIPPED | OUTPATIENT
Start: 2022-05-04 | End: 2022-07-15 | Stop reason: ALTCHOICE

## 2022-05-04 NOTE — TELEPHONE ENCOUNTER
Pt is calling with some questions  She can get her ultrasound done on Monday but is asking if the prednisone she is taking for the inflammation around her lungs, will that take away the inflammation from her gallbladder? Should she rescheduled the test to another day? Also, when she called central scheduling, they told her it was ok to take her meds and drink small sips of water but the directions state nothing to eat or drink  Can she take her medicines?

## 2022-05-04 NOTE — PROGRESS NOTES
Assessment/Plan:     Diagnoses and all orders for this visit:    Pain, chest wall  -     predniSONE 10 mg tablet; Take 3 tabs twice a day x2 days, then 2 tabs twice a day x2 days, then 1 tab twice a day x2 days, then 1 tablet daily x2 days    Right upper quadrant pain  -     US right upper quadrant; Future  #1 Pain, chest wall  Discussed with patient plan to treat with tapering dose of prednisone to decrease inflammation  #2 Right upper quadrant pain  Discussed with patient plan to obtain ultrasound of gallbladder due to back pains and positive Rutledge's sign  Patient instructed to call if no improvement in 72 hours or symptoms worsen    Subjective:      Patient ID: Meeta Bethea is a 56 y o  female  56 y  o female presenting with sudden onset dull back pain that radiates into sternum for the past three days  She reports that if she uses a heating pad or hot shower the pain goes away  She denies fever, chills, other body aches, nausea or vomiting  She reports that since she started avoid raw foods her right lower quadaretn pains have improved but now she has to deal with more constiptation          Family History   Problem Relation Age of Onset    Cardiomyopathy Mother     Anxiety disorder Mother     No Known Problems Father     Anxiety disorder Brother     Heart attack Maternal Grandfather     Thalassemia Maternal Aunt     Lupus Family      Social History     Socioeconomic History    Marital status: Single     Spouse name: Not on file    Number of children: Not on file    Years of education: Not on file    Highest education level: Not on file   Occupational History    Not on file   Tobacco Use    Smoking status: Former Smoker     Packs/day: 0 25     Years: 0 00     Pack years: 0 00     Types: Cigarettes    Smokeless tobacco: Never Used   Vaping Use    Vaping Use: Never used   Substance and Sexual Activity    Alcohol use: No    Drug use: Never    Sexual activity: Yes     Partners: Male     Birth control/protection: Condom Male, Rhythm, None   Other Topics Concern    Not on file   Social History Narrative    Not on file     Social Determinants of Health     Financial Resource Strain: Not on file   Food Insecurity: Not on file   Transportation Needs: Not on file   Physical Activity: Not on file   Stress: Not on file   Social Connections: Not on file   Intimate Partner Violence: Not on file   Housing Stability: Not on file     E-Cigarette/Vaping    E-Cigarette Use Never User      E-Cigarette/Vaping Substances    Nicotine No     THC No     CBD No     Flavoring No     Other No     Unknown No      Past Medical History:   Diagnosis Date    Anemia 12/8/2021    No known health problems      Past Surgical History:   Procedure Laterality Date    NO PAST SURGERIES       No Known Allergies    Current Outpatient Medications:     Biotin 1000 MCG tablet, Take 1,000 mcg by mouth 3 (three) times a day, Disp: , Rfl:     fluticasone-salmeterol (Wixela Inhub) 250-50 mcg/dose inhaler, Inhale 1 puff 2 (two) times a day Rinse mouth after use , Disp: 60 blister, Rfl: 3    levothyroxine (Synthroid) 75 mcg tablet, Take 1 tablet (75 mcg total) by mouth daily in the early morning, Disp: 30 tablet, Rfl: 5    metoprolol tartrate (LOPRESSOR) 50 mg tablet, Take 1 tablet (50 mg total) by mouth 2 (two) times a day, Disp: 180 tablet, Rfl: 1    predniSONE 10 mg tablet, Take 3 tabs twice a day x2 days, then 2 tabs twice a day x2 days, then 1 tab twice a day x2 days, then 1 tablet daily x2 days, Disp: 26 tablet, Rfl: 0    Review of Systems   Constitutional: Negative for chills, fatigue and fever  Respiratory: Positive for chest tightness  Negative for cough, shortness of breath and wheezing  Cardiovascular: Positive for palpitations ( chronic PVC more frequent)  Negative for chest pain and leg swelling  Gastrointestinal: Positive for abdominal distention and constipation   Negative for abdominal pain, diarrhea, nausea and vomiting  Genitourinary: Negative  Musculoskeletal: Positive for back pain  Skin: Negative  Neurological: Negative  Psychiatric/Behavioral: Negative  Objective:    /76 (BP Location: Left arm, Patient Position: Sitting, Cuff Size: Large)   Pulse 76   Temp 99 °F (37 2 °C)   Resp 17   Ht 5' 4" (1 626 m)   Wt 131 kg (288 lb)   LMP 04/12/2022 (Approximate)   SpO2 98%   Breastfeeding No   BMI 49 44 kg/m² (Reviewed)     Physical Exam  Vitals reviewed  Constitutional:       General: She is not in acute distress  Appearance: She is well-developed and well-groomed  She is not ill-appearing  HENT:      Head: Normocephalic and atraumatic  Right Ear: External ear normal       Left Ear: External ear normal    Eyes:      General: Lids are normal       Extraocular Movements: Extraocular movements intact  Conjunctiva/sclera: Conjunctivae normal       Pupils: Pupils are equal, round, and reactive to light  Neck:      Thyroid: No thyromegaly or thyroid tenderness  Trachea: Trachea and phonation normal    Cardiovascular:      Rate and Rhythm: Normal rate and regular rhythm  Chest Wall: PMI is not displaced  Pulses: Normal pulses  Carotid pulses are 2+ on the right side and 2+ on the left side  Radial pulses are 2+ on the right side and 2+ on the left side  Dorsalis pedis pulses are 2+ on the right side and 2+ on the left side  Posterior tibial pulses are 2+ on the right side and 2+ on the left side  Heart sounds: Normal heart sounds  No murmur heard  No friction rub  No gallop  Pulmonary:      Effort: Pulmonary effort is normal       Breath sounds: Normal breath sounds  Abdominal:      General: Abdomen is flat  Bowel sounds are normal  There is no distension  Palpations: Abdomen is soft  There is no mass  Tenderness: There is abdominal tenderness  Positive signs include Rutledge's sign     Musculoskeletal: Cervical back: Neck supple  Right lower leg: No edema  Left lower leg: No edema  Skin:     General: Skin is warm and dry  Capillary Refill: Capillary refill takes less than 2 seconds  Neurological:      General: No focal deficit present  Mental Status: She is alert and oriented to person, place, and time  Psychiatric:         Mood and Affect: Mood normal          Behavior: Behavior normal  Behavior is cooperative

## 2022-05-09 ENCOUNTER — HOSPITAL ENCOUNTER (OUTPATIENT)
Dept: ULTRASOUND IMAGING | Facility: HOSPITAL | Age: 47
Discharge: HOME | End: 2022-05-09
Payer: MEDICARE

## 2022-05-09 DIAGNOSIS — R10.11 RIGHT UPPER QUADRANT PAIN: ICD-10-CM

## 2022-05-09 PROCEDURE — 76705 ECHO EXAM OF ABDOMEN: CPT

## 2022-05-11 NOTE — TELEPHONE ENCOUNTER
Pt has a couple of questions  Do you think she still has colitis?  Also, what foods should she stay away from due to the fatty liver

## 2022-05-11 NOTE — TELEPHONE ENCOUNTER
I think she had colitis but may have improved because pain improved  You can go back to eating some of the raw veggies and fruit than if symptoms restart go back to avoiding  Foods to avoid with a fatty liver is basically anything fried

## 2022-05-17 DIAGNOSIS — J45.40 MODERATE PERSISTENT ASTHMA WITHOUT COMPLICATION: ICD-10-CM

## 2022-06-13 NOTE — PROGRESS NOTES
Assessment and Plan:   Ms Jarred Silveira is a 56-year-old female with history significant for Hashimoto's thyroiditis, beta thalassemia minor and COVID-19 infection in December 2020 who presents for an evaluation of diffuse joint pains and an elevated ESR and CRP of 65 and 41 2, respectively  She is referred by SANGEETA Asif for a rheumatology consult  Elvis Freed presents today for an evaluation of significantly elevated inflammatory markers that were detected due to multiple symptoms she has been experiencing following the COVID-19 infection in December 2020  Given the timeline I question if she may be presenting with COVID-19 long-haul symptoms versus unmasking of an underlying inflammatory disease triggered by the COVID-19 infection, although there is no clear presentation to narrow down her diagnosis at this time  To further evaluate her symptoms primarily consisting of the widespread arthralgias and elevated inflammatory markers I will complete a thorough rheumatic workup, but based on her current examination there is no evidence of an inflammatory arthritis and she also did not respond to a course of oral steroids  If her inflammatory markers are still persistently elevated I may consider obtaining a CT chest/abdomen/pelvis which would also help in evaluating the dyspnea on exertion and ruling out etiologies other than asthma     - I will contact her with the results on MyChart and determine if any acute change in treatment is required versus continued monitoring and supportive care  Plan:  Diagnoses and all orders for this visit:    Diffuse arthralgia  -     Sjogren's Antibodies; Future  -     Sedimentation rate, automated; Future  -     C-reactive protein; Future  -     CK; Future  -     Anti-neutrophilic cytoplasmic antibody; Future  -     HLA-B27 antigen; Future  -     Protein electrophoresis, serum; Future  -     Ambulatory Referral to Rheumatology  -     XR sacroiliac joints 3+ views;  Future  - XR knee 3 vw left non injury; Future  -     XR knee 3 vw right non injury; Future  -     Angiotensin converting enzyme; Future  -     C3 complement; Future  -     C4 complement; Future  -     Anti-DNA antibody, double-stranded; Future  -     Nuclear antigen antibody; Future  -     MISCELLANEOUS LAB TEST; Future    Elevated sed rate  -     Sjogren's Antibodies; Future  -     Sedimentation rate, automated; Future  -     C-reactive protein; Future  -     CK; Future  -     Anti-neutrophilic cytoplasmic antibody; Future  -     HLA-B27 antigen; Future  -     Protein electrophoresis, serum; Future  -     XR sacroiliac joints 3+ views; Future  -     XR knee 3 vw left non injury; Future  -     XR knee 3 vw right non injury; Future  -     Angiotensin converting enzyme; Future  -     C3 complement; Future  -     C4 complement; Future  -     Anti-DNA antibody, double-stranded; Future  -     Nuclear antigen antibody; Future  -     MISCELLANEOUS LAB TEST; Future    Elevated C-reactive protein  -     Sjogren's Antibodies; Future  -     Sedimentation rate, automated; Future  -     C-reactive protein; Future  -     CK; Future  -     Anti-neutrophilic cytoplasmic antibody; Future  -     HLA-B27 antigen; Future  -     Protein electrophoresis, serum; Future  -     XR sacroiliac joints 3+ views; Future  -     XR knee 3 vw left non injury; Future  -     XR knee 3 vw right non injury; Future  -     Angiotensin converting enzyme; Future  -     C3 complement; Future  -     C4 complement; Future  -     Anti-DNA antibody, double-stranded; Future  -     Nuclear antigen antibody; Future  -     MISCELLANEOUS LAB TEST; Future    History of recent steroid use    PULLIAM (dyspnea on exertion)    Hashimoto's thyroiditis    Beta thalassemia minor    Need for hepatitis C screening test  -     Chronic Hepatitis Panel;  Future    History of COVID-19  Comments:  12/2020    Class 3 severe obesity without serious comorbidity with body mass index (BMI) of 45 0 to 49 9 in adult, unspecified obesity type ([facility] [address] )      I have personally reviewed pertinent films in PACS of the chest x-ray which does not show osseous disease  Activities as tolerated  Exercise: try to maintain a low impact exercise regimen as much as possible  Continue other medications as prescribed by PCP and other specialists  RTC in 5 months  HPI  Ms Radhika Tyler is a 81-year-old female with history significant for Hashimoto's thyroiditis, beta thalassemia minor and COVID-19 infection in December 2020 who presents for an evaluation of diffuse joint pains and an elevated ESR and CRP of 65 and 41 2, respectively  She is referred by Chrisandra Meigs, CRNP for a rheumatology consult  Patient reports she was in her usual state of health up until December 2020 at which time she was diagnosed with COVID-19 infection  She reports since then she has been dealing with multiple issues  Initially she was diagnosed with pleurisy and had been following with Cardiology  While the chest pain has improved she is still experiencing shortness of breath even with minimal exertion  She did have pulmonary function testing done in June 2021 which was consistent with bronchial asthma  The DLCO was normal   Due to her persistent symptoms she has an appointment scheduled with pulmonology at the end of this month  She reports widespread joint pains which have improved to some degree since its onset and may appear intermittently but has affected her hands, wrists, elbows, low back, outer aspect of her hip region, knees and ankles  No significant joint pains in her shoulders, feet or throughout the muscles  She reports the most constant pain is in her lower legs below the knees that is especially worse at night and she experiences a restless sensation  In regards to the low back pain this is not associated with nighttime pain and no prolonged morning stiffness    She reports the back pain may worsen with activities, walking or if she is seated for a duration of time  She denies joint swelling  She experiences morning stiffness mostly in her lower body that improves within a few seconds  She takes NSAIDs as needed which does help her but she tries to avoid taking this frequently due to stomach upset  Of note she was prescribed a recent course of prednisone due to chest pain and reports while this helped with the chest pain she did not really notice an impact on her joint pains  Since the COVID-19 infection she has had significant hair thinning especially in the shower  She reports a few years ago she had 2 areas of focal alopecia for which she was seen by Dermatology and had intralesional steroids done  There was hair regrowth noted and she was diagnosed with alopecia (?areata)  She has also noticed more frequent sinus infections requiring antibiotic use and had a few episodes of epistaxis  She denies fevers, unintentional weight loss, dry eyes, dry mouth, inflammatory eye disease, skin rash (had asthma while on iron supplements), psoriasis, photosensitivity, mouth/nose ulcers, swollen glands, pleuritic chest pain, cough, hemoptysis, inflammatory bowel disease, blood clots, miscarriages or Raynaud's  She reports a maternal cousin with lupus  In view of her symptoms she has had extensive testing done which showed the elevated inflammation markers  An ODILIA screen, rheumatoid factor, anti CCP antibody and ferritin levels were normal   An evaluation done through Hematology was unrevealing     - Patient messaged after the visit and reports possible photosensitive type symptoms       The following portions of the patient's history were reviewed and updated as appropriate: allergies, current medications, past family history, past medical history, past social history, past surgical history and problem list       Review of Systems  Constitutional: Negative for weight change, fevers, chills, night sweats, fatigue  ENT/Mouth: Negative for hearing changes, ear pain, nasal congestion, sinus pain, hoarseness, sore throat, rhinorrhea, swallowing difficulty  Eyes: Negative for pain, redness, discharge, vision changes  Cardiovascular: Negative for chest pain, SOB, palpitations  Respiratory: Negative for cough, sputum, wheezing, dyspnea  Gastrointestinal: Negative for nausea, vomiting, diarrhea, constipation, pain, heartburn  Genitourinary: Negative for dysuria, urinary frequency, hematuria  Musculoskeletal: As per HPI  Skin: Negative for skin rash, color changes  Neuro: Negative for weakness, numbness, tingling, loss of consciousness  Psych: Negative for anxiety, depression  Heme/Lymph: Negative for easy bruising, bleeding, lymphadenopathy          Past Medical History:   Diagnosis Date    Anemia 12/8/2021    No known health problems        Past Surgical History:   Procedure Laterality Date    NO PAST SURGERIES         Social History     Socioeconomic History    Marital status: Single     Spouse name: Not on file    Number of children: Not on file    Years of education: Not on file    Highest education level: Not on file   Occupational History    Not on file   Tobacco Use    Smoking status: Former Smoker     Packs/day: 0 25     Years: 0 00     Pack years: 0 00     Types: Cigarettes    Smokeless tobacco: Never Used   Vaping Use    Vaping Use: Never used   Substance and Sexual Activity    Alcohol use: No    Drug use: Never    Sexual activity: Yes     Partners: Male     Birth control/protection: Condom Male, Rhythm, None   Other Topics Concern    Not on file   Social History Narrative    Not on file     Social Determinants of Health     Financial Resource Strain: Not on file   Food Insecurity: Not on file   Transportation Needs: Not on file   Physical Activity: Not on file   Stress: Not on file   Social Connections: Not on file   Intimate Partner Violence: Not on file   Housing Stability: Not on file       Family History   Problem Relation Age of Onset    Cardiomyopathy Mother     Anxiety disorder Mother     No Known Problems Father     Anxiety disorder Brother     Heart attack Maternal Grandfather     Thalassemia Maternal Aunt     Lupus Family        No Known Allergies      Current Outpatient Medications:     Biotin 1000 MCG tablet, Take 1,000 mcg by mouth 3 (three) times a day, Disp: , Rfl:     fluticasone-salmeterol (Advair) 250-50 mcg/dose inhaler, Inhale 1 puff  in the morning and 1 puff in the evening  Rinse mouth after use   , Disp: 60 blister, Rfl: 3    levothyroxine (Synthroid) 75 mcg tablet, Take 1 tablet (75 mcg total) by mouth daily in the early morning, Disp: 30 tablet, Rfl: 5    metoprolol tartrate (LOPRESSOR) 50 mg tablet, Take 1 tablet (50 mg total) by mouth 2 (two) times a day, Disp: 180 tablet, Rfl: 1    predniSONE 10 mg tablet, Take 3 tabs twice a day x2 days, then 2 tabs twice a day x2 days, then 1 tab twice a day x2 days, then 1 tablet daily x2 days, Disp: 26 tablet, Rfl: 0      Objective:    Vitals:    06/14/22 1246   BP: 128/74   Pulse: 87   Weight: 131 kg (288 lb)       Physical Exam  General: Well appearing, well nourished, in no distress  Oriented x 3, normal mood and affect  Ambulating without difficulty  Skin: Good turgor, no rash, unusual bruising or prominent lesions  Hair: Normal texture and distribution  Nails: Normal color, no deformities  HEENT:  Head: Normocephalic, atraumatic  Eyes: Conjunctiva clear, sclera non-icteric, EOM intact  Extremities: No amputations or deformities, cyanosis, edema  Musculoskeletal:  There is no peripheral joint soft tissue swelling or tenderness noted today  Neurologic: Alert and oriented  No focal neurological deficits appreciated  Psychiatric: Normal mood and affect  VIKI Huitron    Rheumatology

## 2022-06-14 ENCOUNTER — OFFICE VISIT (OUTPATIENT)
Dept: RHEUMATOLOGY | Facility: CLINIC | Age: 47
End: 2022-06-14
Payer: MEDICARE

## 2022-06-14 VITALS
WEIGHT: 288 LBS | SYSTOLIC BLOOD PRESSURE: 128 MMHG | HEART RATE: 87 BPM | DIASTOLIC BLOOD PRESSURE: 74 MMHG | BODY MASS INDEX: 49.44 KG/M2

## 2022-06-14 DIAGNOSIS — D56.3 BETA THALASSEMIA MINOR: ICD-10-CM

## 2022-06-14 DIAGNOSIS — R79.82 ELEVATED C-REACTIVE PROTEIN: ICD-10-CM

## 2022-06-14 DIAGNOSIS — E06.3 HASHIMOTO'S THYROIDITIS: ICD-10-CM

## 2022-06-14 DIAGNOSIS — R06.00 DOE (DYSPNEA ON EXERTION): ICD-10-CM

## 2022-06-14 DIAGNOSIS — Z86.16 HISTORY OF COVID-19: ICD-10-CM

## 2022-06-14 DIAGNOSIS — R70.0 ELEVATED SED RATE: ICD-10-CM

## 2022-06-14 DIAGNOSIS — Z11.59 NEED FOR HEPATITIS C SCREENING TEST: ICD-10-CM

## 2022-06-14 DIAGNOSIS — Z92.241 HISTORY OF RECENT STEROID USE: ICD-10-CM

## 2022-06-14 DIAGNOSIS — M25.50 DIFFUSE ARTHRALGIA: Primary | ICD-10-CM

## 2022-06-14 DIAGNOSIS — E66.01 CLASS 3 SEVERE OBESITY WITHOUT SERIOUS COMORBIDITY WITH BODY MASS INDEX (BMI) OF 45.0 TO 49.9 IN ADULT, UNSPECIFIED OBESITY TYPE (HCC): ICD-10-CM

## 2022-06-14 PROCEDURE — 99245 OFF/OP CONSLTJ NEW/EST HI 55: CPT | Performed by: INTERNAL MEDICINE

## 2022-06-22 ENCOUNTER — LAB (OUTPATIENT)
Dept: LAB | Facility: CLINIC | Age: 47
End: 2022-06-22
Payer: MEDICARE

## 2022-06-22 DIAGNOSIS — Z11.59 NEED FOR HEPATITIS C SCREENING TEST: ICD-10-CM

## 2022-06-22 DIAGNOSIS — M25.50 DIFFUSE ARTHRALGIA: ICD-10-CM

## 2022-06-22 DIAGNOSIS — R79.82 ELEVATED C-REACTIVE PROTEIN: ICD-10-CM

## 2022-06-22 DIAGNOSIS — R70.0 ELEVATED SED RATE: ICD-10-CM

## 2022-06-22 LAB
C3 SERPL-MCNC: 189 MG/DL (ref 90–180)
C4 SERPL-MCNC: 30 MG/DL (ref 10–40)
CK SERPL-CCNC: 105 U/L (ref 26–192)
CRP SERPL QL: 29.5 MG/L
ERYTHROCYTE [SEDIMENTATION RATE] IN BLOOD: 52 MM/HOUR (ref 0–19)
HBV CORE AB SER QL: NORMAL
HBV CORE IGM SER QL: NORMAL
HBV SURFACE AG SER QL: NORMAL
HCV AB SER QL: NORMAL

## 2022-06-22 PROCEDURE — 84165 PROTEIN E-PHORESIS SERUM: CPT

## 2022-06-22 PROCEDURE — 84165 PROTEIN E-PHORESIS SERUM: CPT | Performed by: PATHOLOGY

## 2022-06-22 PROCEDURE — 86140 C-REACTIVE PROTEIN: CPT

## 2022-06-22 PROCEDURE — 83520 IMMUNOASSAY QUANT NOS NONAB: CPT

## 2022-06-22 PROCEDURE — 86803 HEPATITIS C AB TEST: CPT

## 2022-06-22 PROCEDURE — 87340 HEPATITIS B SURFACE AG IA: CPT

## 2022-06-22 PROCEDURE — 36415 COLL VENOUS BLD VENIPUNCTURE: CPT

## 2022-06-22 PROCEDURE — 86225 DNA ANTIBODY NATIVE: CPT

## 2022-06-22 PROCEDURE — 81374 HLA I TYPING 1 ANTIGEN LR: CPT

## 2022-06-22 PROCEDURE — 86038 ANTINUCLEAR ANTIBODIES: CPT

## 2022-06-22 PROCEDURE — 86705 HEP B CORE ANTIBODY IGM: CPT

## 2022-06-22 PROCEDURE — 85652 RBC SED RATE AUTOMATED: CPT

## 2022-06-22 PROCEDURE — 82550 ASSAY OF CK (CPK): CPT

## 2022-06-22 PROCEDURE — 86704 HEP B CORE ANTIBODY TOTAL: CPT

## 2022-06-22 PROCEDURE — 86235 NUCLEAR ANTIGEN ANTIBODY: CPT

## 2022-06-22 PROCEDURE — 86160 COMPLEMENT ANTIGEN: CPT

## 2022-06-22 PROCEDURE — 86037 ANCA TITER EACH ANTIBODY: CPT

## 2022-06-22 PROCEDURE — 82164 ANGIOTENSIN I ENZYME TEST: CPT

## 2022-06-23 LAB
ACE SERPL-CCNC: 25 U/L (ref 14–82)
ALBUMIN SERPL ELPH-MCNC: 4.14 G/DL (ref 3.5–5)
ALBUMIN SERPL ELPH-MCNC: 55.9 % (ref 52–65)
ALPHA1 GLOB SERPL ELPH-MCNC: 0.39 G/DL (ref 0.1–0.4)
ALPHA1 GLOB SERPL ELPH-MCNC: 5.3 % (ref 2.5–5)
ALPHA2 GLOB SERPL ELPH-MCNC: 0.92 G/DL (ref 0.4–1.2)
ALPHA2 GLOB SERPL ELPH-MCNC: 12.4 % (ref 7–13)
BETA GLOB ABNORMAL SERPL ELPH-MCNC: 0.51 G/DL (ref 0.4–0.8)
BETA1 GLOB SERPL ELPH-MCNC: 6.9 % (ref 5–13)
BETA2 GLOB SERPL ELPH-MCNC: 6.6 % (ref 2–8)
BETA2+GAMMA GLOB SERPL ELPH-MCNC: 0.49 G/DL (ref 0.2–0.5)
DSDNA AB SER-ACNC: <1 IU/ML (ref 0–9)
ENA RNP AB SER-ACNC: <0.2 AI (ref 0–0.9)
ENA SM AB SER-ACNC: <0.2 AI (ref 0–0.9)
ENA SS-A AB SER-ACNC: <0.2 AI (ref 0–0.9)
ENA SS-B AB SER-ACNC: <0.2 AI (ref 0–0.9)
GAMMA GLOB ABNORMAL SERPL ELPH-MCNC: 0.95 G/DL (ref 0.5–1.6)
GAMMA GLOB SERPL ELPH-MCNC: 12.9 % (ref 12–22)
IGG/ALB SER: 1.27 {RATIO} (ref 1.1–1.8)
PROT PATTERN SERPL ELPH-IMP: ABNORMAL
PROT SERPL-MCNC: 7.4 G/DL (ref 6.4–8.2)

## 2022-06-24 ENCOUNTER — APPOINTMENT (OUTPATIENT)
Dept: RADIOLOGY | Facility: MEDICAL CENTER | Age: 47
End: 2022-06-24
Payer: MEDICARE

## 2022-06-24 DIAGNOSIS — R70.0 ELEVATED SED RATE: ICD-10-CM

## 2022-06-24 DIAGNOSIS — R79.82 ELEVATED C-REACTIVE PROTEIN: ICD-10-CM

## 2022-06-24 DIAGNOSIS — M25.50 DIFFUSE ARTHRALGIA: ICD-10-CM

## 2022-06-24 LAB
ANTI-MPO AB (RDL): <0.2 UNITS (ref 0–0.9)
C-ANCA TITR SER IF: NORMAL TITER
P-ANCA ATYPICAL TITR SER IF: NORMAL TITER
P-ANCA TITR SER IF: NORMAL TITER
PROTEINASE3 AB SER IA-ACNC: <0.2 UNITS (ref 0–0.9)

## 2022-06-24 PROCEDURE — 73562 X-RAY EXAM OF KNEE 3: CPT

## 2022-06-24 PROCEDURE — 72202 X-RAY EXAM SI JOINTS 3/> VWS: CPT

## 2022-06-29 LAB — HLA-B27 QL NAA+PROBE: NEGATIVE

## 2022-06-30 ENCOUNTER — CONSULT (OUTPATIENT)
Dept: PULMONOLOGY | Facility: CLINIC | Age: 47
End: 2022-06-30
Payer: MEDICARE

## 2022-06-30 VITALS
WEIGHT: 289 LBS | BODY MASS INDEX: 49.34 KG/M2 | SYSTOLIC BLOOD PRESSURE: 120 MMHG | HEART RATE: 87 BPM | HEIGHT: 64 IN | OXYGEN SATURATION: 98 % | DIASTOLIC BLOOD PRESSURE: 80 MMHG | TEMPERATURE: 97.4 F

## 2022-06-30 DIAGNOSIS — U09.9 POST-COVID CHRONIC SHORTNESS OF BREATH: ICD-10-CM

## 2022-06-30 DIAGNOSIS — J45.40 MODERATE PERSISTENT ASTHMA WITHOUT COMPLICATION: Primary | ICD-10-CM

## 2022-06-30 DIAGNOSIS — R06.02 POST-COVID CHRONIC SHORTNESS OF BREATH: ICD-10-CM

## 2022-06-30 DIAGNOSIS — E66.01 CLASS 3 SEVERE OBESITY WITH SERIOUS COMORBIDITY AND BODY MASS INDEX (BMI) OF 45.0 TO 49.9 IN ADULT, UNSPECIFIED OBESITY TYPE (HCC): ICD-10-CM

## 2022-06-30 DIAGNOSIS — U09.9 POST-ACUTE SEQUELAE OF COVID-19 (PASC): ICD-10-CM

## 2022-06-30 PROBLEM — E66.813 CLASS 3 SEVERE OBESITY WITH SERIOUS COMORBIDITY AND BODY MASS INDEX (BMI) OF 45.0 TO 49.9 IN ADULT (HCC): Status: ACTIVE | Noted: 2022-06-30

## 2022-06-30 PROCEDURE — 99244 OFF/OP CNSLTJ NEW/EST MOD 40: CPT | Performed by: INTERNAL MEDICINE

## 2022-06-30 PROCEDURE — 94664 DEMO&/EVAL PT USE INHALER: CPT | Performed by: INTERNAL MEDICINE

## 2022-06-30 RX ORDER — ALBUTEROL SULFATE 90 UG/1
2 AEROSOL, METERED RESPIRATORY (INHALATION) EVERY 6 HOURS PRN
Qty: 8.5 G | Refills: 2 | Status: SHIPPED | OUTPATIENT
Start: 2022-06-30 | End: 2022-07-30

## 2022-06-30 RX ORDER — FLUTICASONE FUROATE AND VILANTEROL 200; 25 UG/1; UG/1
1 POWDER RESPIRATORY (INHALATION) DAILY
Qty: 60 BLISTER | Refills: 0 | Status: SHIPPED | OUTPATIENT
Start: 2022-06-30 | End: 2022-07-08

## 2022-06-30 NOTE — PROGRESS NOTES
Pulmonary Consultation   Jessica Thornton 56 y o  female MRN: [id number]  6/30/2022    Referring Physician or Provider:  Hector Acharya, [address]  [address] [facility],  [address]       Chief Complaint:  Shortness of breath  Dyspnea on exertion    HPI:    64-year-old female with past medical history of  COVID-19 in December of 2020, morbid obesity, presents for evaluation of significant dyspnea on exertion since infection with SARS-CoV-2 in December of 2020  Patient states prior to infection with COVID-19 she did not have any significant dyspnea on exertion  She states that she has 3 children and had no difficulty keeping up with them prior to this time  She continues to have intermittent pleuritic chest pain and has had cardiac complications as well including PVCs, however states that this has been controlled over the last several weeks to months  Exercise Tolerance:  Poor    Typically cannot ambulate more than 1 block without stopping for significant shortness of breath    Past Medical Hx  Past Medical History:   Diagnosis Date    Anemia 12/8/2021    No known health problems            Past Surgical Hx  Past Surgical History:   Procedure Laterality Date    NO PAST SURGERIES             Family Hx  Family History   Problem Relation Age of Onset    Cardiomyopathy Mother     Anxiety disorder Mother     No Known Problems Father     Anxiety disorder Brother     Heart attack Maternal Grandfather     Thalassemia Maternal Aunt     Lupus Family            Occupational History:     No known previous inhalation injuries      Social History:   Social History     Socioeconomic History    Marital status: Single     Spouse name: Not on file    Number of children: Not on file    Years of education: Not on file    Highest education level: Not on file   Occupational History    Not on file   Tobacco Use    Smoking status: Former Smoker     Packs/day: 0 25     Years: 15 00     Pack years: 3 75 Types: Cigarettes    Smokeless tobacco: Never Used   Vaping Use    Vaping Use: Never used   Substance and Sexual Activity    Alcohol use: No    Drug use: Never    Sexual activity: Yes     Partners: Male     Birth control/protection: Condom Male, Rhythm, None   Other Topics Concern    Not on file   Social History Narrative    Not on file     Social Determinants of Health     Financial Resource Strain: Not on file   Food Insecurity: Not on file   Transportation Needs: Not on file   Physical Activity: Not on file   Stress: Not on file   Social Connections: Not on file   Intimate Partner Violence: Not on file   Housing Stability: Not on file       Pertinent Meds  Advair 250 twice a day      ROS:  Constitutional:  + Fatigue, - chills, - fever, - weight change  HEENT: - rhinorrhea, - sneezing, - sore throat  Respiratory: - cough, - sputum production, +exertional  shortness of breath, - wheezing  Cardiovascular:  +occasional  chest pain,  -palpitations, + leg swelling  Gastrointestinal: - abdominal pain, - constipation, - diarrhea, - nausea, - vomiting  Endocrine: - cold intolerance, - heat intolerance  Genitourinary: - dysuria  Musculoskeletal: - arthralgias  Skin:- rash, - wound  Allergic/Immunologic: - allergies  Neurological: - dizziness, - numbness      Vitals: Blood pressure 120/80, pulse 87, temperature (!) 97 4 °F (36 3 °C), height 5' 4" (1 626 m), weight 131 kg (289 lb), SpO2 98 %, not currently breastfeeding , Body mass index is 49 61 kg/m²  Physical Exam  GEN  NAD  HEENT  ncat, non icteric, MM moist  NECK  supple, no JVD, no LAD  CV  +s1s2, no mrg, RRR  PULM  CTA BL, no wrr  ABD  soft, nt, morbidly obese, + BS  EXT +lower extremity edema, no cyanosis, no clubbing  NEURO  Aox3, no focal weakness    Imaging and other studies     I have personally viewed and interpreted the following studies:  CT chest 01/06/2021 shows no gross intraparenchymal or pleural abnormalities    There is no gross pulmonary embolus      Pulmonary function testing:   Pulmonary function tests from 06/24/2021 shows moderate obstructive defect with significant bronchodilator response    Assessment:  1  PASC  2  Moderate persistent asthma  3  Morbid obesity  4  Severe pulmonary deconditioning    Plan:   I suspect patient's symptoms are predominantly due to severe PASC   Discontinue Advair 250   Prescribed Breo 200 daily   Prescribed albuterol as needed   Given severe deconditioning in the setting of asthma and PASC, I recommend pulmonary rehabilitation   I counseled patient on the importance of weight loss   Repeat full PFTs given once I suspect is poor effort on 1st full study   Cont to follow with PCP regarding obesity    Return visit in 6-8 weeks  On next visit we will evaluate symptoms, effective pulmonary rehab, affective adding Breo, PFT results, discuss tobacco cessation        [facility]

## 2022-07-07 ENCOUNTER — TELEPHONE (OUTPATIENT)
Dept: PULMONOLOGY | Facility: CLINIC | Age: 47
End: 2022-07-07

## 2022-07-07 NOTE — TELEPHONE ENCOUNTER
Pt LM re: she was prescribed Breo inhaler by Dr Leola Enamorado on 6/30/22 but it requires a prior auth from her insurance    Please advise: [phone]

## 2022-07-08 DIAGNOSIS — J45.40 MODERATE PERSISTENT ASTHMA WITHOUT COMPLICATION: Primary | ICD-10-CM

## 2022-07-08 RX ORDER — BUDESONIDE AND FORMOTEROL FUMARATE DIHYDRATE 160; 4.5 UG/1; UG/1
2 AEROSOL RESPIRATORY (INHALATION) 2 TIMES DAILY
Qty: 30.6 G | Refills: 3 | Status: SHIPPED | OUTPATIENT
Start: 2022-07-08 | End: 2022-08-08

## 2022-07-08 NOTE — TELEPHONE ENCOUNTER
[facility]  Preferred alternatives are Dulera and brand symbicort   Please advise

## 2022-07-14 NOTE — TELEPHONE ENCOUNTER
Called the pharmacy and they stated that the pt picked up Advair on 6/23 that was ordered by her PCP and that is why the insurance will not pay for the Symbicort  Can we please contact the pt and see if she is actually taking this inhaler before we contact her insurance

## 2022-07-14 NOTE — TELEPHONE ENCOUNTER
Pt calling saying we ordered symbicort for her but she was told by the pharmacy that they cannot fill it until the order for the fluticasone is cancelled  Please advise this and call pt once this is cleared up

## 2022-07-15 ENCOUNTER — TELEPHONE (OUTPATIENT)
Dept: FAMILY MEDICINE CLINIC | Facility: CLINIC | Age: 47
End: 2022-07-15

## 2022-07-15 NOTE — TELEPHONE ENCOUNTER
Pt called stating her pulmonologist is trying to start her on symbicort inhaler but needs the fluticasone proprianate discontinued first

## 2022-07-15 NOTE — TELEPHONE ENCOUNTER
I called pt and informed her to contact her PCP office since they are the ones that prescribed the Advair and have them cancel the prescription  Her insurance will not pay for the Symbicort with an active script for Advair   She is going to call the PCP so they can cancel Advair and then have the pharmacy process the Symbicort

## 2022-07-15 NOTE — TELEPHONE ENCOUNTER
Pt states per pharmacy, have PCP office call insurance company and verbally tell them that pt is no longer taking the inhaler    Number given to call:    Pharmacy help desk: [phone]

## 2022-07-21 ENCOUNTER — CLINICAL SUPPORT (OUTPATIENT)
Dept: PULMONOLOGY | Age: 47
End: 2022-07-21
Payer: MEDICARE

## 2022-07-21 DIAGNOSIS — U09.9 POST-COVID CHRONIC SHORTNESS OF BREATH: ICD-10-CM

## 2022-07-21 DIAGNOSIS — R06.02 POST-COVID CHRONIC SHORTNESS OF BREATH: ICD-10-CM

## 2022-07-21 DIAGNOSIS — U09.9 POST-ACUTE SEQUELAE OF COVID-19 (PASC): ICD-10-CM

## 2022-07-21 DIAGNOSIS — J45.40 MODERATE PERSISTENT ASTHMA WITHOUT COMPLICATION: ICD-10-CM

## 2022-07-21 PROCEDURE — 94625 PHY/QHP OP PULM RHB W/O MNTR: CPT

## 2022-07-21 NOTE — PROGRESS NOTES
Pulmonary Rehabilitation Plan of Care   Initial Care Plan      Today's date: 2022   # of Exercise Sessions Completed: Eval - 1  Patient name: Kishore Starkey      : 1975  Age: 56 y o  MRN: [id number]  Referring Physician: Sakshi Johnston MD  Pulmonologist: Sakshi Johnston MD  Provider: Karlee  Clinician: Topher Ellis MS, CEP    Dx:   Encounter Diagnoses   Name Primary?  Post-COVID chronic shortness of breath     Moderate persistent asthma without complication     Post-acute sequelae of COVID-19 (PASC)      Date of onset: 2020      SUMMARY OF PROGRESS:  Today is Eli's initial evaluation to begin Pulmonary Rehab for the diagnosis of Post-COVID chronic shortness of breath  Since their diagnosis, the patient has been experiencing increased dyspnea, increased sitting time, decreased physical activity and increased fatigue  They report dyspnea and fatigue when completing ADLs   The patient currently does follow a formal exercise program at home, including walking outside 2-3 days/week for 15 minutes  She would like to exercise more, but her shortness of breath limits her walking duration  Depression screening using the PHQ-9 interprets the patient's score 5-9 = Mild Depression  DEWAYNE-7 screening tool for anxiety suggests 5-9 = Mild anxiety  When addressed, the patient denies having depression/anxiety  She reports she had anxiety when she was first getting frequent PVC's  Now that it is under control with her heart medications, she does not report anxiety  Patient reports excellent social/emotional support  Information to begin using [employer] was provided as well as contact information for counseling through [facility]  PHQ-9 score will be reassessed in 30 days  The patient is a former smoker (quit 10 years ago)  Patient admits to 100% medication compliance  At rest, the patient rated dyspnea 1/10 with SpO2 96% on room air    They completed an initial 6MWT, walking 1080 ft on room air  The patients rating of perceived dyspnea during the 6MWT was 3/10 with SpO2 96%  Patient took 0 rest breaks  Telemetry revealed NSR  She will be put on telemetry for her first couple sessions, due to her history of PVC's  Resting  /70 with appropriate hemodynamic response to exercise reaching 142/74  Patient will exercise on room air  Education on smoking cessation, oxygen use, breathing techniques, pulmonary anatomy, exercise for the pulmonary patient, healthy eating, stress, and relaxation will be provided  Patient goals include: getting back to activity level prior to COVID-19, be able to walk longer w/o SOB, lose weight, heart healthy diet changes  Gwen Monzon will attend 24 exercise sessions, 2x/wk for 12-18 weeks beginning 22  Will progress patient as tolerated over the next 30 days        Medication compliance: Yes   Comments: Pt reports to be compliant with medications  Fall Risk: Low   Comments: Ambulates with a steady gait with no assist device    Smoking: Former user    RPD at Rest: 1/10  RPD with Exercise:  3/10    Assessment of progression of lung disease and functional status:  CAT: 15/40  Shortness of breath questionnaire: 50/120      EXERCISE ASSESSMENT and PLAN    Current Exercise Program in Rehab:       Frequency: 2 days/week        Minutes: 15         METS: 2 5              SpO2: 96-94%              RPD: 0-3                      HR:    RPE: 4-5         Modalities: Room walking      Exercise Progression 30 Day Goals :    Frequency: 2 days/week        Minutes: 30-35         METS: 2 0-2 8              SpO2: > 90%              RPD: 4-6                      HR: 30 +RHR   RPE: 4-5        Modalities: Treadmill, UBE, NuStep and Recumbent bike     Strength trainin-3 days / week  12-15 repetitions  1-2 sets per modality   Will be added following 2-3 weeks of monitored exercise sessions   Modalities: Leg Press, Chest Press, Pull Downs, Lateral Raise, Arm Curl, Sit to [place], Upright Rows, Front Raises and Shoulder Shrugs    Oxygen Needs: on room air at rest and on room air with exercise  Oxygen Goal: Maintain SpO2>90% during exercise    Home Exercise: Type: walking outside, Frequency: 2-3 days/week, Duration: 15 mins  Education: pursed lipped breathing, diaphragmatic breathing, relaxation breathing, home exercise, benefits of exercise for pulmonary disease, RPE scale, RPD scale, O2 saturation monitoring and appropriate O2 response to exercise    Goals: reduced score on  USCD Shortness of Breath Questionnaire, Improved 6MW distance by 10%, improved exercise tolerance (max METs tolerated in pulmonary rehab), SpO2 >90% during exercise, reduced score on CAT, attend pulmonary rehab regularly and decrease sitting time at home  Progressing:  Reviewed Pt goals and determined plan of care    Plan: Titrate supplemental oxygen as needed to maintain SpO2>90% with exercise, learn to conserve energy with ADLs , practice breathing techniques 3x/day and reduce time sitting at home    Readiness to change: Preparation:  (Getting ready to change)       NUTRITION ASSESSMENT AND PLAN    Weight control:    Starting weight: 286   Current weight:       Diabetes: N/A    Goals:reduced BMI to < 25, decreased body fat % <33%  (F), reduced waist circumference <35 inches (F), Improved Rate Your Plate score  >79, 0 6-9%  wt loss, choose lean meat (93-95%), reduce portion sizes of meat to 3oz or less, increase intake of fish, shellfish, use low fat dairy, reduce cheese intake or use reduced-fat, eat 3 or more servings of whole grains a day, Eat 4-5 cups of fruits and vegetables daily, choose low sodium processed foods, choose healthy snacks: light popcorn, plain pretzels, Increase intake of nuts and seeds, seldom eat or choose low fat ice-cream, fruit juice bars or frozen yogurt , eliminate or choose low-fat sweets and daily saturated fat intake <7%/13g  Education: heart healthy eating  low sodium diet  wt  loss portion control  Progressing:Reviewed Pt goals and determined plan of care  Plan: Education class: Reading Food Labels, Education Class: Heart Healthy Eating, switch to low fat cheeses, replace refined grain bread with whole grain bread, replace unhealthy snacks with fruits & vegs, reduce portion sizes, reduce red meat 1x/wk, eat fewer desserts and sweets, avoid processed foods, will try new grains like brown rice, quinoa, farro, learn how to read food labels and keep added daily sugar <25g/day  Readiness to change: Preparation:  (Getting ready to change)       PSYCHOSOCIAL ASSESSMENT AND PLAN    Emotional:  Depression assessment:  PHQ-9 = 5-9 = Mild Depression            Anxiety measure:  DEWAYNE-7 = 5-9 = Mild anxiety  Self-reported stress level: 7   Social support: Very Good    Goals:  Reduce perceived stress to 1-3/10, improved [facility] QOL < 27, PHQ-9 - reduced severity by one level, Feelings in [facility] Score < 3, Physical Fitness in [facility] Score < 3, Social Support in [facility] Score < 3, Daily Activity in [facility] Score < 3, Social Activities in [facility] Score < 3, Pain in [facility] Score < 3, Overall Health in [facility] Score < 3, Quality of Life in [facility] Score < 3 , Change in Health in [facility] Score < 3 , increased energy and take time to relax  Education: benefits of a positive support system and stress management techniques    Progressing:Reviewed Pt goals and determined plan of care  Plan: Class: Stress and Your Health, Class: Relaxation, Practice relaxation techniques, Exercise, Keep a positive mindset, Learn how to relax and slow down and Repeat PHQ-9 every 30 days if score >5  Readiness to change: Preparation:  (Getting ready to change)       OTHER CORE COMPONENTS     Tobacco:   Social History     Tobacco Use   Smoking Status Former Smoker    Packs/day: 0 25    Years: 15 00    Pack years: 3 75    Types: Cigarettes   Smokeless Tobacco Never Used       Tobacco Use Intervention: Referral to tobacco expert:   N/A: Pt has a remote history of smoking    Blood pressure:    Restin/80   Exercise: 142/74    Goals: consistent BP < 130/80, reduced dietary sodium <2300mg, moderate intensity exercise >150 mins/wk and medication compliance  Education:  pathophysiology of pulmonary disease and bronchodilators  Progressing:Reviewed Pt goals and determined plan of care  Plan: Class: Understanding Heart Disease, Class: Common Heart Medications, Avoid Processed foods, engage in regular exercise and check labels for sodium content  Readiness to change: Preparation:  (Getting ready to change)

## 2022-07-21 NOTE — PROGRESS NOTES
PULMONARY REHAB ASSESSMENT    Today's date: 2022  Patient name: Lloyd Clark     : 1975       MRN: [id number]  PCP: SANGEETA Gustafson  Referring Physician: April Osei MD  Pulmonologist: April Osei MD    Dx: Post COVID - U09 9  Moderate persistent asthma without complication - H15 06      Date of onset: 22      Weight:    Wt Readings from Last 1 Encounters:   22 131 kg (289 lb)      Height:   Ht Readings from Last 1 Encounters:   22 5' 4" (1 626 m)     Medical History:   Past Medical History:   Diagnosis Date    Anemia 2021    No known health problems          Physical Limitations: lower back and legs     Oxygen needs: none    History of Toxic Exposure:   None    Risk Factors   Cholesterol: No  Smoking: Former user  HTN: No  DM: No  Obesity: Yes   Inactivity: Yes  Stress:  perceived  stress: 7/10   Stressors: medical/health, children, trying to buy house   Goals for Stress Management: ways to cope with stress -  gardening, outdoor activities; being with family, friends    Family History:  Family History   Problem Relation Age of Onset    Cardiomyopathy Mother     Anxiety disorder Mother     No Known Problems Father     Anxiety disorder Brother     Heart attack Maternal Grandfather     Thalassemia Maternal Aunt     Lupus Family        Allergies: Patient has no known allergies    ETOH:   Social History     Substance and Sexual Activity   Alcohol Use No         Current Medications:   Current Outpatient Medications   Medication Sig Dispense Refill    albuterol (ProAir HFA) 90 mcg/act inhaler Inhale 2 puffs every 6 (six) hours as needed for wheezing 8 5 g 2    Biotin 1000 MCG tablet Take 1,000 mcg by mouth 3 (three) times a day      levothyroxine (Synthroid) 75 mcg tablet Take 1 tablet (75 mcg total) by mouth daily in the early morning 30 tablet 5    metoprolol tartrate (LOPRESSOR) 50 mg tablet Take 1 tablet (50 mg total) by mouth 2 (two) times a day 180 tablet 1    Symbicort 160-4 5 MCG/ACT inhaler Inhale 2 puffs 2 (two) times a day Rinse mouth after use  30 6 g 3     No current facility-administered medications for this visit  Functional Status Prior to Diagnosis for Treatment   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  Recreation: hiking, fishing  ADLs: No limitations resumed all ADLs  [place]: No limitations resumed all ADLs  Exercise: walking, tennis  Other: with friends and family outdoors    Current Functional Status  Occupation: wants to find part time job that she enjoys and doesn't get too much SOB   Recreation: swimming in pool  ADLs:Capable of performing light to moderate ADLs  [place]: Capable of performing light to moderate ADLs  Exercise: walking dogs: 2-3 days/week for 15 minutes  Other: with family and friends outdoors    Patient Specific Goals:  Get back to activity level prior to COVID-19, be able to walk longer w/o SOB, lose weight, heart healthy diet changes    Short Term Program Goals: dietary modifications improved energy/stamina with ADLs exercise 120-150 mins/wk wt loss 1-2 ppw    Long Term Goals: increased maximal walking duration  increased intial training workload  Improved [facility] Activity Status score  Improved functional capacity  Improved Quality of Life - [facility] score reduced  Reduced body fat%  Reduced stress  improved Rate Your Plate Score    Oxygen Goals: Maintain SpO2>90% titrating supplemental oxygen as needed     Ability to reach goals/rehabilitation potential:  Good    Projected return to function: 8-12 weeks  Objective tests: 6 MWT      Nutritional   Reviewed details of Rate your Plate  Discussed key elements of heart healthy eating  Reviewed patient goals for dietary modifications and their clinical implications  Reviewed most recent lipid profile       Goals for dietary modification: choose lean cuts of meat  poultry without the skin  low fat ground meat and poultry  reduce portions of meat to 3 oz  increase fish intake  low fat dairy   reduced fat cheese  increase whole grains  low sodium  improved snack choices  more nuts/seeds  reduce sweets/frozen desserts      Emotional/Social  Patient reports he/she is coping well with good social support and denies depression or anxiety  Patient has a history of anxiety     SOCIAL SUPPORT NETWORK    Marital status: significant other      Domestic Violence Screening: No    Comments: Post-COVID - December 2020 (12/22/20)  SOB and felt really bad chest pain  Went to hospital and they diagnosed PVCs and pleurisy around heart  On medications to control PVCs  Feels very winded and SOB with any activity  Put on 40-50 lbs since not being able to do any exercise due to PULLIAM  Feels like can't get good breaths   Uses inhaler

## 2022-07-22 DIAGNOSIS — E03.8 HYPOTHYROIDISM DUE TO HASHIMOTO'S THYROIDITIS: ICD-10-CM

## 2022-07-22 DIAGNOSIS — E06.3 HYPOTHYROIDISM DUE TO HASHIMOTO'S THYROIDITIS: ICD-10-CM

## 2022-07-22 RX ORDER — LEVOTHYROXINE SODIUM 0.07 MG/1
75 TABLET ORAL
Qty: 30 TABLET | Refills: 5 | Status: SHIPPED | OUTPATIENT
Start: 2022-07-22

## 2022-07-26 ENCOUNTER — TELEPHONE (OUTPATIENT)
Dept: HEMATOLOGY ONCOLOGY | Facility: CLINIC | Age: 47
End: 2022-07-26

## 2022-07-26 ENCOUNTER — CLINICAL SUPPORT (OUTPATIENT)
Dept: PULMONOLOGY | Age: 47
End: 2022-07-26
Payer: MEDICARE

## 2022-07-26 DIAGNOSIS — U09.9 POST-COVID CHRONIC SHORTNESS OF BREATH: Primary | ICD-10-CM

## 2022-07-26 DIAGNOSIS — R06.02 POST-COVID CHRONIC SHORTNESS OF BREATH: Primary | ICD-10-CM

## 2022-07-26 DIAGNOSIS — J45.40 MODERATE PERSISTENT ASTHMA WITHOUT COMPLICATION: ICD-10-CM

## 2022-07-27 ENCOUNTER — TELEPHONE (OUTPATIENT)
Dept: PULMONOLOGY | Facility: CLINIC | Age: 47
End: 2022-07-27

## 2022-07-27 NOTE — TELEPHONE ENCOUNTER
Khris Sandhu, from [facility], called re: [facility] was denied for pulm rehab because it is not medically necessary and her diagnosis does not meet the guidelines for this therapy  The denial number is: [id number]   You have until 8/9/22 to do a peer to peer @ phone number [phone]

## 2022-08-02 ENCOUNTER — APPOINTMENT (OUTPATIENT)
Dept: PULMONOLOGY | Age: 47
End: 2022-08-02
Payer: MEDICARE

## 2022-08-02 ENCOUNTER — TELEPHONE (OUTPATIENT)
Dept: HEMATOLOGY ONCOLOGY | Facility: CLINIC | Age: 47
End: 2022-08-02

## 2022-08-02 NOTE — TELEPHONE ENCOUNTER
Patient calling in to confirm her labs were still available due to her follow up appt being rescheduled  I confirm the labs were placed and still available , patient verbalized understanding, stating she will complete labs closer to new appointment date

## 2022-08-03 NOTE — TELEPHONE ENCOUNTER
Pt left  asking about an update on her pulm rehab  She stated she was told we were going to be working on this  Please advise

## 2022-08-03 NOTE — TELEPHONE ENCOUNTER
I called pt to gather more information since Pulmonary rehab is the one that does there own prior auths  Pt stated that pulmonary rehab attempted twice and was denied so they are now requesting for our doctor to call and do a peer to peer  I called manny Paez and she notified me that our provider can reach out to them tomorrow or Friday for the peer to peer  Peer to peer ahs to be done before 8/9/2022

## 2022-08-04 ENCOUNTER — APPOINTMENT (OUTPATIENT)
Dept: PULMONOLOGY | Age: 47
End: 2022-08-04
Payer: MEDICARE

## 2022-08-08 NOTE — TELEPHONE ENCOUNTER
I called [employer] and left them a message informing them that Dr Ghazal Hair did a Peer to peer on Friday and as per Dr Marilee North, the pulmonary rehab has been approved  I stated that the pt is in need of the pulmonary rehab for the pt is extremely important and that we have been going back and forth with this process  I called the pt and also advised her to call the insurance and provided her with the number that we have and with the denial case number and have her make a complaint

## 2022-08-08 NOTE — TELEPHONE ENCOUNTER
Pt left  stating she called [facility] to set up rehab but one of the girls called her insurance and they told her there was no record of anything regarding rehab being approved  She is asking to speak to [facility] regarding this  Please advise   [phone]

## 2022-08-09 ENCOUNTER — APPOINTMENT (OUTPATIENT)
Dept: PULMONOLOGY | Age: 47
End: 2022-08-09
Payer: MEDICARE

## 2022-08-11 ENCOUNTER — OFFICE VISIT (OUTPATIENT)
Dept: DERMATOLOGY | Facility: CLINIC | Age: 47
End: 2022-08-11
Payer: MEDICARE

## 2022-08-11 ENCOUNTER — CLINICAL SUPPORT (OUTPATIENT)
Dept: PULMONOLOGY | Age: 47
End: 2022-08-11
Payer: MEDICARE

## 2022-08-11 VITALS — TEMPERATURE: 97.8 F | WEIGHT: 260 LBS | BODY MASS INDEX: 44.39 KG/M2 | HEIGHT: 64 IN

## 2022-08-11 DIAGNOSIS — U09.9 POST-ACUTE SEQUELAE OF COVID-19 (PASC): ICD-10-CM

## 2022-08-11 DIAGNOSIS — L21.9 SEBORRHEIC DERMATITIS: Primary | ICD-10-CM

## 2022-08-11 DIAGNOSIS — U09.9 POST-COVID CHRONIC SHORTNESS OF BREATH: Primary | ICD-10-CM

## 2022-08-11 DIAGNOSIS — L71.9 ROSACEA: ICD-10-CM

## 2022-08-11 DIAGNOSIS — R06.02 POST-COVID CHRONIC SHORTNESS OF BREATH: Primary | ICD-10-CM

## 2022-08-11 DIAGNOSIS — J45.40 MODERATE PERSISTENT ASTHMA WITHOUT COMPLICATION: ICD-10-CM

## 2022-08-11 PROCEDURE — 94625 PHY/QHP OP PULM RHB W/O MNTR: CPT

## 2022-08-11 PROCEDURE — 99213 OFFICE O/P EST LOW 20 MIN: CPT | Performed by: DERMATOLOGY

## 2022-08-11 RX ORDER — KETOCONAZOLE 20 MG/G
CREAM TOPICAL
Qty: 60 G | Refills: 11 | Status: SHIPPED | OUTPATIENT
Start: 2022-08-11

## 2022-08-11 RX ORDER — DOXYCYCLINE HYCLATE 20 MG
20 TABLET ORAL DAILY
Qty: 90 TABLET | Refills: 3 | Status: SHIPPED | OUTPATIENT
Start: 2022-08-11 | End: 2022-11-09

## 2022-08-11 NOTE — PROGRESS NOTES
[facility] Follow Up Note    Patient Name: Fabio Sabillon  Encounter Date: 08/11/2022    Today's Chief Concerns:  Oneal Meehan Concern #1:  Rash follow up    Current Medications:    Current Outpatient Medications:     levothyroxine (Synthroid) 75 mcg tablet, Take 1 tablet (75 mcg total) by mouth daily in the early morning, Disp: 30 tablet, Rfl: 5    Biotin 1000 MCG tablet, Take 1,000 mcg by mouth 3 (three) times a day, Disp: , Rfl:     metoprolol tartrate (LOPRESSOR) 50 mg tablet, Take 1 tablet (50 mg total) by mouth 2 (two) times a day, Disp: 180 tablet, Rfl: 1    CONSTITUTIONAL:   There were no vitals filed for this visit  Specific Alerts:    Have you been seen by a [facility] Dermatologist in the last 3 years? YES    Are you pregnant or planning to become pregnant? No    Are you currently or planning to be nursing or breast feeding? No    No Known Allergies    May we call your Preferred Phone number to discuss your specific medical information? YES    May we leave a detailed message that includes your specific medical information? YES    Have you traveled outside of the [facility] in the past 3 months? No    Do you currently have a pacemaker or defibrillator? No    Do you have any artificial heart valves, joints, plates, screws, rods, stents, pins, etc? No   - If Yes, were any placed within the last 2 years? Do you require any medications prior to a surgical procedure? No   - If Yes, for which procedure? n a   - If Yes, what medications to you require? n a    Are you taking any medications that cause you to bleed more easily ("blood thinners") No    Have you ever experienced a rapid heartbeat with epinephrine? No    Have you ever been treated with "gold" (gold sodium thiomalate) therapy? No    [facility] can help with wrinkles, "laugh lines," facial volume loss, "double chin," "love handles," age spots, and more   Are you interested in learning today about some of the skin enhancement procedures that we offer? (If Yes, please provide more detail) No    Review of Systems:  Have you recently had or currently have any of the following? · Fever or chills: No  · Night Sweats: No  · Headaches: No  · Weight Gain: No  · Weight Loss: No  · Blurry Vision: No  · Nausea: No  · Vomiting: No  · Diarrhea: No  · Blood in Stool: No  · Abdominal Pain: No  · Itchy Skin: YES  · Painful Joints: No  · Swollen Joints: No  · Muscle Pain: No  · Irregular Mole: No  · Sun Burn: No  · Dry Skin: YES  · Skin Color Changes: No  · Scar or Keloid: No  · Cold Sores/Fever Blisters: No  · Bacterial Infections/MRSA: No  · Anxiety: No  · Depression: No  · Suicidal or Homicidal Thoughts: No      PSYCH: Normal mood and affect  EYES: Normal conjunctiva  ENT: Normal lips and oral mucosa  CARDIOVASCULAR: No edema  RESPIRATORY: Normal respirations  HEME/LYMPH/IMMUNO:  No regional lymphadenopathy except as noted below in ASSESSMENT AND PLAN BY DIAGNOSIS    FULL ORGAN SYSTEM SKIN EXAM (SKIN)   Hair, Scalp, Ears, Face Normal except as noted below in Assessment   Neck, Cervical Chain Nodes Normal except as noted below in Assessment   Right Arm/Hand/Fingers Normal except as noted below in Assessment   Left Arm/Hand/Fingers Normal except as noted below in Assessment   Chest/Breasts/Axillae    Abdomen, Umbilicus    Back/Spine    Groin/Genitalia/Buttocks    Right Leg, Foot, Toes    Left Leg, Foot, Toes        1   SEBORRHEIC DERMATITIS    Physical Exam:   Anatomic Location Affected:  forehead    Morphological Description:  Dry pink patch    Pertinent Positives:   Pertinent Negatives:    Assessment and Plan:  Based on a thorough discussion of this condition and the management approach to it (including a comprehensive discussion of the known risks, side effects and potential benefits of treatment), the patient (family) agrees to implement the following specific plan:   Start ketoconazole 2 % cream, apply topically twice a day when active, apply topically twice a week when not active      Seborrheic Dermatitis   Seborrheic dermatitis is a common, chronic or relapsing form of eczema/dermatitis that mainly affects the sebaceous, gland-rich regions of the scalp, face, and trunk  There are infantile and adult forms of seborrhoeic dermatitis  It is sometimes associated with psoriasis and, in that clinical scenario, may be referred to as "sebo-psoriasis "  Seborrheic dermatitis is also known as "seborrheic eczema "  Dandruff (also called "pityriasis capitis") is an uninflamed form of seborrhoeic dermatitis  Dandruff presents as bran-like scaly patches scattered within hair-bearing areas of the scalp  In an infant, this condition may be referred to as "cradle cap "  The cause of seborrheic dermatitis is not completely understood  It is associated with proliferation of various species of the skin commensal Malassezia, in its yeast (non-pathogenic) form  Its metabolites (such as the fatty acids oleic acid, malssezin, and indole-3-carbaldehyde) may cause an inflammatory reaction  Differences in skin barrier lipid content and function may account for individual presentations  Infantile Seborrheic Dermatitis  Infantile seborrheic dermatitis affects babies under the age of 1 months and usually resolves by 1012 months of age  Infantile seborrheic dermatitis causes "cradle cap" (diffuse, greasy scaling on scalp)  The rash may spread to affect armpit and groin folds (a type of "napkin dermatitis")  There may be associated salmon-pink colored patches that may flake or peel  The rash in this case is usually not especially itchy, so the baby often appears undisturbed by the rash, even when more generalized  Adult Seborrheic Dermatitis  Adult seborrheic dermatitis tends to begin in late adolescence; prevalence is greatest in young adults and in the elderly  It is more common in males than in females      The following factors are sometimes associated with severe adult seborrheic dermatitis:   Oily skin   Familial tendency to seborrhoeic dermatitis or a family history of psoriasis   Immunosuppression: organ transplant recipient, human immunodeficiency virus (HIV) infection and patients with lymphoma   Neurological and psychiatric diseases: Parkinson disease, tardive dyskinesia, depression, epilepsy, facial nerve palsy, spinal cord injury and congenital disorders such as Down syndrome   Treatment for psoriasis with psoralen and ultraviolet A (PUVA) therapy   Lack of sleep   Stressful events  In adults, seborrheic dermatitis may typically affect the scalp, face (creases around the nose, behind ears, within eyebrows) and upper trunk  Typical clinical features include:   Winter flares, improving in summer following sun exposure   Minimal itch most of the time   Combination oily and dry mid-facial skin   Ill-defined localized scaly patches or diffuse scale in the scalp   Blepharitis; scaly red eyelid margins   [place]-pink, thin, scaly, and ill-defined plaques in skin folds on both sides of the face   Petal or ring-shaped flaky patches on hair-line and on anterior chest   Rash in armpits, under the breasts, in the groin folds and genital creases   Superficial folliculitis (inflamed hair follicles) on cheeks and upper trunk    Seborrheic dermatitis is diagnosed by its clinical appearance and behavior  Skin biopsy may be helpful but is rarely necessary to make this diagnosis      2  ROSACEA    Physical Exam:   Anatomic Location Affected:  Cheeks and chin    Morphological Description:  Pink papules    Pertinent Positives:   Pertinent Negatives:    Assessment and Plan:  Based on a thorough discussion of this condition and the management approach to it (including a comprehensive discussion of the known risks, side effects and potential benefits of treatment), the patient (family) agrees to implement the following specific plan:   sensitive skin, will try doxycycline 20 mg daily     Rosacea is a chronic rash affecting the mid-face including the nose, cheeks, chin, forehead, and eyelids  The incidence is usually greatest between the ages of 30-60 years and is more common in people with fair skin  Common characteristics include redness, telangiectasias, papules and pustules over affected areas  Rosacea may look similar to acne, but there is a lack of comedones  Occasionally the eyes may also be involved in ocular rosacea  In advanced disease, enlargement of the sebaceous glands in the nose, termed rhinophyma, may be present  Rosacea results in red spots (papules) and sometimes pustules over the face, but unlike acne there are no blackheads, whiteheads, or cystic nodules  Patients often experience increased facial flushing with prominent blood vessels (erythematotelangiectatic rosacea) and dry, sensitive skin  These symptoms are exacerbated by sun exposure, hot or spicy foods, topical steroids and oil-based facial products  In ocular rosacea, eyelids may be red and sore due to conjunctivitis, keratitis, and episcleritis  If rhinophyma develops due to enlargement of sebaceous glands, the patient may have an enlarged and irregularly shaped nose with prominent pores  In rosacea that is refractory to treatment, patients can develop persistent redness and swelling of the face due to lymphatic obstruction (Morbihan disease)  Distribution around the cheeks may be confused with the malar or butterfly rash of lupus  However, the rash of lupus spares the nasal creases and lacks papules and pustules  If signs of photosensitivity, oral ulcers, arthritis, and kidney dysfunction are present then consider referral to a rheumatologist      There are many potential causes of rosacea including genetic, environmental, vascular, and inflammatory factors   These include, but are not limited to:   Chronic exposure to ultraviolet radiation    Increased immune responses in the form of cathelicidins that promote vessel dilation and infiltration with white blood cells (neutrophils) into the dermis   Increased matrix metalloproteinases such as collagen and elastase that remodel normal tissue may contribute to inflammation of the skin making it thicker and harder   There is some evidence to suggest that increased numbers of demodex mites on patient skin may contribute to rosacea papules     General Treatment Approach    Avoid exacerbating factors such as heat, spicy foods, and alcohol    Use daily SPF30+ sunscreen and other methods of coverage for sun protection   Use water-based make-up    Avoid applying topical steroids to affected areas as they can cause perioral dermatitis and exacerbate rosacea     Topical Treatment Approach   Metronidazole cream or gel by itself or in combination with oral antibiotics for more severe cases   Azelaic acid cream or lotion is effective for mild inflammatory rosacea when applied twice daily to affected areas   Brimonidine gel and oxymetazoline hydrochloride cream can reduce facial redness temporarily    Ivermectin cream can treat papulopustular rosacea by controlling demodex mites and inflammation    Pimecrolimus cream or tacrolimus ointment twice a day for 2-3 months can help reduce inflammation    Oral Treatment Approach   Antibiotics such as doxycycline, minocycline, or erythromycin for 1-3 months   Clonidine and carvedilol can help reduce facial flushing and are generally well tolerated  Common side effects include low blood pressure, gastrointestinal upset, dry eyes, blurred vision and low heart rate   Isotretinoin at low doses can be effective for long term treatment when antibiotics fail  Side effects may make it unsuitable for some patients   NSAIDs such as diclofenac can help reduce discomfort and redness in the skin       Procedural/Surgical Treatment Approach    Vascular lasers or intense pulsed light treatment may be used to treat persistent telangiectasia and papulopustular rosacea   Plastic surgery and carbon dioxide lasers may be used to treat rhinophyma     Scribe Attestation    I,:  Pilo Bobo MA am acting as a scribe while in the presence of the attending physician :       I,:  Jeanne Millan MD personally performed the services described in this documentation    as scribed in my presence :

## 2022-08-11 NOTE — PATIENT INSTRUCTIONS
1  SEBORRHEIC DERMATITIS  Assessment and Plan:  Based on a thorough discussion of this condition and the management approach to it (including a comprehensive discussion of the known risks, side effects and potential benefits of treatment), the patient (family) agrees to implement the following specific plan:  Start ketoconazole 2 % cream, apply topically twice a day when active, apply topically twice a week when not active      Seborrheic Dermatitis   Seborrheic dermatitis is a common, chronic or relapsing form of eczema/dermatitis that mainly affects the sebaceous, gland-rich regions of the scalp, face, and trunk  There are infantile and adult forms of seborrhoeic dermatitis  It is sometimes associated with psoriasis and, in that clinical scenario, may be referred to as "sebo-psoriasis "  Seborrheic dermatitis is also known as "seborrheic eczema "  Dandruff (also called "pityriasis capitis") is an uninflamed form of seborrhoeic dermatitis  Dandruff presents as bran-like scaly patches scattered within hair-bearing areas of the scalp  In an infant, this condition may be referred to as "cradle cap "  The cause of seborrheic dermatitis is not completely understood  It is associated with proliferation of various species of the skin commensal Malassezia, in its yeast (non-pathogenic) form  Its metabolites (such as the fatty acids oleic acid, malssezin, and indole-3-carbaldehyde) may cause an inflammatory reaction  Differences in skin barrier lipid content and function may account for individual presentations  Infantile Seborrheic Dermatitis  Infantile seborrheic dermatitis affects babies under the age of 1 months and usually resolves by 1012 months of age  Infantile seborrheic dermatitis causes "cradle cap" (diffuse, greasy scaling on scalp)  The rash may spread to affect armpit and groin folds (a type of "napkin dermatitis")  There may be associated salmon-pink colored patches that may flake or peel    The rash in this case is usually not especially itchy, so the baby often appears undisturbed by the rash, even when more generalized  Adult Seborrheic Dermatitis  Adult seborrheic dermatitis tends to begin in late adolescence; prevalence is greatest in young adults and in the elderly  It is more common in males than in females  The following factors are sometimes associated with severe adult seborrheic dermatitis:  Oily skin  Familial tendency to seborrhoeic dermatitis or a family history of psoriasis  Immunosuppression: organ transplant recipient, human immunodeficiency virus (HIV) infection and patients with lymphoma  Neurological and psychiatric diseases: Parkinson disease, tardive dyskinesia, depression, epilepsy, facial nerve palsy, spinal cord injury and congenital disorders such as Down syndrome  Treatment for psoriasis with psoralen and ultraviolet A (PUVA) therapy  Lack of sleep  Stressful events  In adults, seborrheic dermatitis may typically affect the scalp, face (creases around the nose, behind ears, within eyebrows) and upper trunk  Typical clinical features include: Winter flares, improving in summer following sun exposure  Minimal itch most of the time  Combination oily and dry mid-facial skin  Ill-defined localized scaly patches or diffuse scale in the scalp  Blepharitis; scaly red eyelid margins  [place]-pink, thin, scaly, and ill-defined plaques in skin folds on both sides of the face  Petal or ring-shaped flaky patches on hair-line and on anterior chest  Rash in armpits, under the breasts, in the groin folds and genital creases  Superficial folliculitis (inflamed hair follicles) on cheeks and upper trunk    Seborrheic dermatitis is diagnosed by its clinical appearance and behavior  Skin biopsy may be helpful but is rarely necessary to make this diagnosis      2  ROSACEA  Assessment and Plan:  Based on a thorough discussion of this condition and the management approach to it (including a comprehensive discussion of the known risks, side effects and potential benefits of treatment), the patient (family) agrees to implement the following specific plan:  sensitive skin, will try doxycycline 20 mg daily     Rosacea is a chronic rash affecting the mid-face including the nose, cheeks, chin, forehead, and eyelids  The incidence is usually greatest between the ages of 30-60 years and is more common in people with fair skin  Common characteristics include redness, telangiectasias, papules and pustules over affected areas  Rosacea may look similar to acne, but there is a lack of comedones  Occasionally the eyes may also be involved in ocular rosacea  In advanced disease, enlargement of the sebaceous glands in the nose, termed rhinophyma, may be present  Rosacea results in red spots (papules) and sometimes pustules over the face, but unlike acne there are no blackheads, whiteheads, or cystic nodules  Patients often experience increased facial flushing with prominent blood vessels (erythematotelangiectatic rosacea) and dry, sensitive skin  These symptoms are exacerbated by sun exposure, hot or spicy foods, topical steroids and oil-based facial products  In ocular rosacea, eyelids may be red and sore due to conjunctivitis, keratitis, and episcleritis  If rhinophyma develops due to enlargement of sebaceous glands, the patient may have an enlarged and irregularly shaped nose with prominent pores  In rosacea that is refractory to treatment, patients can develop persistent redness and swelling of the face due to lymphatic obstruction (Morbihan disease)  Distribution around the cheeks may be confused with the malar or butterfly rash of lupus  However, the rash of lupus spares the nasal creases and lacks papules and pustules   If signs of photosensitivity, oral ulcers, arthritis, and kidney dysfunction are present then consider referral to a rheumatologist      There are many potential causes of rosacea including genetic, environmental, vascular, and inflammatory factors  These include, but are not limited to:  Chronic exposure to ultraviolet radiation   Increased immune responses in the form of cathelicidins that promote vessel dilation and infiltration with white blood cells (neutrophils) into the dermis  Increased matrix metalloproteinases such as collagen and elastase that remodel normal tissue may contribute to inflammation of the skin making it thicker and harder  There is some evidence to suggest that increased numbers of demodex mites on patient skin may contribute to rosacea papules     General Treatment Approach   Avoid exacerbating factors such as heat, spicy foods, and alcohol   Use daily SPF30+ sunscreen and other methods of coverage for sun protection  Use water-based make-up   Avoid applying topical steroids to affected areas as they can cause perioral dermatitis and exacerbate rosacea     Topical Treatment Approach  Metronidazole cream or gel by itself or in combination with oral antibiotics for more severe cases  Azelaic acid cream or lotion is effective for mild inflammatory rosacea when applied twice daily to affected areas  Brimonidine gel and oxymetazoline hydrochloride cream can reduce facial redness temporarily   Ivermectin cream can treat papulopustular rosacea by controlling demodex mites and inflammation   Pimecrolimus cream or tacrolimus ointment twice a day for 2-3 months can help reduce inflammation    Oral Treatment Approach  Antibiotics such as doxycycline, minocycline, or erythromycin for 1-3 months  Clonidine and carvedilol can help reduce facial flushing and are generally well tolerated  Common side effects include low blood pressure, gastrointestinal upset, dry eyes, blurred vision and low heart rate  Isotretinoin at low doses can be effective for long term treatment when antibiotics fail  Side effects may make it unsuitable for some patients     NSAIDs such as diclofenac can help reduce discomfort and redness in the skin       Procedural/Surgical Treatment Approach   Vascular lasers or intense pulsed light treatment may be used to treat persistent telangiectasia and papulopustular rosacea  Plastic surgery and carbon dioxide lasers may be used to treat rhinophyma

## 2022-08-16 ENCOUNTER — CLINICAL SUPPORT (OUTPATIENT)
Dept: PULMONOLOGY | Age: 47
End: 2022-08-16
Payer: MEDICARE

## 2022-08-16 DIAGNOSIS — U09.9 POST-ACUTE SEQUELAE OF COVID-19 (PASC): ICD-10-CM

## 2022-08-16 DIAGNOSIS — U09.9 POST-COVID CHRONIC SHORTNESS OF BREATH: Primary | ICD-10-CM

## 2022-08-16 DIAGNOSIS — R06.02 POST-COVID CHRONIC SHORTNESS OF BREATH: Primary | ICD-10-CM

## 2022-08-16 DIAGNOSIS — J45.40 MODERATE PERSISTENT ASTHMA WITHOUT COMPLICATION: ICD-10-CM

## 2022-08-16 PROCEDURE — 94625 PHY/QHP OP PULM RHB W/O MNTR: CPT

## 2022-08-16 NOTE — PROGRESS NOTES
Pulmonary Rehabilitation Plan of Care   30 Day Reassessment      Today's date: 2022   # of Exercise Sessions Completed: 2  Patient name: Timoteo Parekh      : 1975  Age: 56 y o  MRN: [id number]  Referring Physician: Leo Sánchez MD  Pulmonologist: Leo Sánchez MD  Provider: Nicole Wolf  Clinician: Kellee Herrera MS, CEP    Dx:   Encounter Diagnoses   Name Primary?  Post-COVID chronic shortness of breath Yes    Moderate persistent asthma without complication     Post-acute sequelae of COVID-19 (PASC)      Date of onset: 2020      SUMMARY OF PROGRESS:   2022: 30 day ITP for [employer]  Cherise Pillai has only completed 2 sessions since initial eval d/t peer to peer review for insurance authorization  Her insurance authorized her for 24 sessions in the time frame of 22-10/21/2022  Cherise Pillai completes 28-35 mins of moderate intensity exercise at 1 9-2 7 METS  Resting HR 84-94  EX HR   Resting //68  EX BP's 138//72  Resting SpO2's 98-95%  EX SpO'2 97-95%  She is on room air  Telemetry reveals NSR  The patient currently does follow a formal exercise program at home, including walking outside 2-3 days/week for 10-15 minutes and swimming 2-3x/wk for 20 mins  Repeat PHQ revealed score of 2 an improvement from 5 and GAD7 score of 2 an improvement from 5  Telemetry reveals NSR w/ isolated PVC  She will no longer wear telemetry to exercise  She admits to no medication changes and only rarely uses her rescue inhaler as needed  Current weight is 288, a gain of 2 lbs since eval  She admits she has been working on dietary changes including reducing her consumption of added sugars and carbs  She admits she has colitis which can make eating healthier foods challenging as those are her trigger foods  She admits she is staying hydrated  She admits to no medication changes  She has not yet noticed an improvement in her SOB symptoms    Patient goals include: getting back to activity level prior to COVID-19, be able to walk longer w/o SOB, lose weight, heart healthy diet changes  Will continue to monitor  7/26/2022: Today is Eli's initial evaluation to begin Pulmonary Rehab for the diagnosis of Post-COVID chronic shortness of breath  Since their diagnosis, the patient has been experiencing increased dyspnea, increased sitting time, decreased physical activity and increased fatigue  They report dyspnea and fatigue when completing ADLs   The patient currently does follow a formal exercise program at home, including walking outside 2-3 days/week for 15 minutes  She would like to exercise more, but her shortness of breath limits her walking duration  Depression screening using the PHQ-9 interprets the patient's score 5-9 = Mild Depression  DEWAYNE-7 screening tool for anxiety suggests 5-9 = Mild anxiety  When addressed, the patient denies having depression/anxiety  She reports she had anxiety when she was first getting frequent PVC's  Now that it is under control with her heart medications, she does not report anxiety  Patient reports excellent social/emotional support  Information to begin using [employer] was provided as well as contact information for counseling through [facility]  PHQ-9 score will be reassessed in 30 days  The patient is a former smoker (quit 10 years ago)  Patient admits to 100% medication compliance  At rest, the patient rated dyspnea 1/10 with SpO2 96% on room air  They completed an initial 6MWT, walking 1080 ft on room air  The patients rating of perceived dyspnea during the 6MWT was 3/10 with SpO2 96%  Patient took 0 rest breaks  Telemetry revealed NSR  She will be put on telemetry for her first couple sessions, due to her history of PVC's  Resting  /70 with appropriate hemodynamic response to exercise reaching 142/74  Patient will exercise on room air   Education on smoking cessation, oxygen use, breathing techniques, pulmonary anatomy, exercise for the pulmonary patient, healthy eating, stress, and relaxation will be provided  Patient goals include: getting back to activity level prior to COVID-19, be able to walk longer w/o SOB, lose weight, heart healthy diet changes  Marylen Estelle will attend 24 exercise sessions, 2x/wk for 12-18 weeks beginning 22  Will progress patient as tolerated over the next 30 days        Medication compliance: Yes   Comments: Pt reports to be compliant with medications  Fall Risk: Low   Comments: Ambulates with a steady gait with no assist device    Smoking: Former user    RPD at Rest: 1/10  RPD with Exercise:  3/10    Assessment of progression of lung disease and functional status:  CAT: 15/40  Shortness of breath questionnaire: 50/120      EXERCISE ASSESSMENT and PLAN    Current Exercise Program in Rehab:       Frequency: 2 days/week        Minutes: 28-35         METS: 1 9-2 7              SpO2: >90%              RPD: 0-3                      HR:    RPE: 4-5         Modalities: Treadmill, UBE, NuStep and Recumbent bike      Exercise Progression 30 Day Goals :    Frequency: 2 days/week        Minutes: 30-40        METS: 2 0-3 5              SpO2: > 90%              RPD: 4-6                      HR: 30 +RHR   RPE: 4-5        Modalities: Treadmill, UBE, Lifecycle, NuStep and Recumbent bike     Strength trainin-3 days / week  12-15 repetitions  1-2 sets per modality   Will be added following 2-3 weeks of monitored exercise sessions   Modalities: Leg Press, Chest Press, Pull Downs, Lateral Raise, Arm Curl, Sit to Stands, Upright Rows, Front Raises and Shoulder Shrugs    Oxygen Needs: on room air at rest and on room air with exercise  Oxygen Goal: Maintain SpO2>90% during exercise    Home Exercise: Type: walking outside, swimming, Frequency: 2-3 days/week, Duration: 10-20 mins  Education: pursed lipped breathing, diaphragmatic breathing, relaxation breathing, home exercise, benefits of exercise for pulmonary disease, RPE scale, RPD scale, O2 saturation monitoring and appropriate O2 response to exercise    Goals: reduced score on  USCD Shortness of Breath Questionnaire, Improved 6MW distance by 10%, improved exercise tolerance (max METs tolerated in pulmonary rehab), SpO2 >90% during exercise, reduced score on CAT, attend pulmonary rehab regularly and decrease sitting time at home  Progressing:  Pt is progressing and showing improvement  toward the following goals:   reduced score on  USCD Shortness of Breath Questionnaire, Improved 6MW distance by 10%, improved exercise tolerance (max METs tolerated in pulmonary rehab), SpO2 >90% during exercise, reduced score on CAT, attend pulmonary rehab regularly and decrease sitting time at home   , Will continue to educate and progress as tolerated      Plan: Titrate supplemental oxygen as needed to maintain SpO2>90% with exercise, learn to conserve energy with ADLs , practice breathing techniques 3x/day and reduce time sitting at home    Readiness to change: Action:  (Changing behavior)      NUTRITION ASSESSMENT AND PLAN    Weight control:    Starting weight: 286   Current weight:   288  Diabetes: N/A    Goals:reduced BMI to < 25, decreased body fat % <33%  (F), reduced waist circumference <35 inches (F), Improved Rate Your Plate score  >86, 5 1-1%  wt loss, choose lean meat (93-95%), reduce portion sizes of meat to 3oz or less, increase intake of fish, shellfish, use low fat dairy, reduce cheese intake or use reduced-fat, eat 3 or more servings of whole grains a day, Eat 4-5 cups of fruits and vegetables daily, choose low sodium processed foods, choose healthy snacks: light popcorn, plain pretzels, Increase intake of nuts and seeds, seldom eat or choose low fat ice-cream, fruit juice bars or frozen yogurt , eliminate or choose low-fat sweets and daily saturated fat intake <7%/13g  Education: heart healthy eating  low sodium diet  wt  loss   portion control  Progressing:Pt is progressing and showing improvement  toward the following goals:  reducing consumption of added sugars, carbs, staying hydrated, making changes to improve RYP score   , Pt has not made progress toward the following goals: reducing BMI, losing weight  , Patient will continue w/ healthy dietary choices taking into consideration her colitis and certain foods do not agree with her in the next 30 days, Will continue to educate and progress as tolerated    Plan: Education class: Reading Food Labels, Education Class: Heart Healthy Eating, switch to low fat cheeses, replace refined grain bread with whole grain bread, replace unhealthy snacks with fruits & vegs, reduce portion sizes, reduce red meat 1x/wk, eat fewer desserts and sweets, avoid processed foods, will try new grains like brown rice, quinoa, farro, learn how to read food labels and keep added daily sugar <25g/day  Readiness to change: Preparation:  (Getting ready to change)       PSYCHOSOCIAL ASSESSMENT AND PLAN    Emotional:  Depression assessment:  PHQ-9 = 1-4 = Minimal Depression            Anxiety measure:  DEWAYNE-7 = 0-4  = Not anxious  Self-reported stress level: 7   Social support: Very Good    Goals:  Reduce perceived stress to 1-3/10, improved [place] QOL < 27, PHQ-9 - reduced severity by one level, Feelings in [place] Score < 3, Physical Fitness in [place] Score < 3, Social Support in [place] Score < 3, Daily Activity in [place] Score < 3, Social Activities in [place] Score < 3, Pain in [place] Score < 3, Overall Health in [place] Score < 3, Quality of Life in [place] Score < 3 , Change in Health in [place] Score < 3 , increased energy and take time to relax  Education: benefits of a positive support system and stress management techniques    Progressing:Pt is progressing and showing improvement  toward the following goals:  Reduce perceived stress to 1-3/10, improved [place] QOL < 27, PHQ-9 - reduced severity by one level, Feelings in [place] Score < 3, Physical Fitness in [facility] Score < 3, Social Support in [facility] Score < 3, Daily Activity in [facility] Score < 3, Social Activities in [facility] Score < 3, Pain in [facility] Score < 3, Overall Health in [facility] Score < 3, Quality of Life in [facility] Score < 3 , Change in Health in [place] Score < 3 , increased energy and take time to relax  , Will continue to educate and progress as tolerated  Plan: Class: Stress and Your Health, Class: Relaxation, Practice relaxation techniques, Exercise, Keep a positive mindset, Learn how to relax and slow down and Repeat PHQ-9 every 30 days if score >5  Readiness to change: Action:  (Changing behavior)      OTHER CORE COMPONENTS     Tobacco:   Social History     Tobacco Use   Smoking Status Former Smoker    Packs/day: 0 25    Years: 15 00    Pack years: 3 75    Types: Cigarettes   Smokeless Tobacco Never Used       Tobacco Use Intervention: Referral to tobacco expert:   N/A: Pt has a remote history of smoking    Blood pressure:    Restin//68   Exercise: 138//72    Goals: consistent BP < 130/80, reduced dietary sodium <2300mg, moderate intensity exercise >150 mins/wk and medication compliance  Education:  pathophysiology of pulmonary disease and bronchodilators  Progressing:Pt is progressing and showing improvement  toward the following goals:  consistent BP < 130/80 at rest, reduced dietary sodium <2300mg, moderate intensity exercise >150 mins/wk and medication compliance   , Will continue to educate and progress as tolerated    Plan: Class: Understanding Heart Disease, Class: Common Heart Medications, Avoid Processed foods, engage in regular exercise and check labels for sodium content  Readiness to change: Action:  (Changing behavior)

## 2022-08-17 ENCOUNTER — CLINICAL SUPPORT (OUTPATIENT)
Dept: PULMONOLOGY | Age: 47
End: 2022-08-17
Payer: MEDICARE

## 2022-08-17 DIAGNOSIS — U09.9 POST-COVID SYNDROME: ICD-10-CM

## 2022-08-17 PROCEDURE — G0239 OTH RESP PROC, GROUP: HCPCS

## 2022-08-18 ENCOUNTER — APPOINTMENT (OUTPATIENT)
Dept: PULMONOLOGY | Age: 47
End: 2022-08-18
Payer: MEDICARE

## 2022-08-19 ENCOUNTER — HOSPITAL ENCOUNTER (OUTPATIENT)
Dept: PULMONOLOGY | Facility: HOSPITAL | Age: 47
Discharge: HOME | End: 2022-08-19
Attending: INTERNAL MEDICINE
Payer: MEDICARE

## 2022-08-19 DIAGNOSIS — U09.9 POST-ACUTE SEQUELAE OF COVID-19 (PASC): ICD-10-CM

## 2022-08-19 DIAGNOSIS — J45.40 MODERATE PERSISTENT ASTHMA WITHOUT COMPLICATION: ICD-10-CM

## 2022-08-19 DIAGNOSIS — U09.9 POST-COVID CHRONIC SHORTNESS OF BREATH: ICD-10-CM

## 2022-08-19 DIAGNOSIS — R06.02 POST-COVID CHRONIC SHORTNESS OF BREATH: ICD-10-CM

## 2022-08-19 PROCEDURE — 94060 EVALUATION OF WHEEZING: CPT

## 2022-08-19 PROCEDURE — 94761 N-INVAS EAR/PLS OXIMETRY MLT: CPT

## 2022-08-19 PROCEDURE — 94729 DIFFUSING CAPACITY: CPT | Performed by: INTERNAL MEDICINE

## 2022-08-19 PROCEDURE — 94618 PULMONARY STRESS TESTING: CPT | Performed by: INTERNAL MEDICINE

## 2022-08-19 PROCEDURE — 94726 PLETHYSMOGRAPHY LUNG VOLUMES: CPT | Performed by: INTERNAL MEDICINE

## 2022-08-19 PROCEDURE — 94760 N-INVAS EAR/PLS OXIMETRY 1: CPT

## 2022-08-19 PROCEDURE — 94729 DIFFUSING CAPACITY: CPT

## 2022-08-19 PROCEDURE — 94060 EVALUATION OF WHEEZING: CPT | Performed by: INTERNAL MEDICINE

## 2022-08-19 PROCEDURE — 94726 PLETHYSMOGRAPHY LUNG VOLUMES: CPT

## 2022-08-19 RX ORDER — ALBUTEROL SULFATE 2.5 MG/3ML
2.5 SOLUTION RESPIRATORY (INHALATION) ONCE
Status: COMPLETED | OUTPATIENT
Start: 2022-08-19 | End: 2022-08-19

## 2022-08-19 RX ADMIN — ALBUTEROL SULFATE 2.5 MG: 2.5 SOLUTION RESPIRATORY (INHALATION) at 10:15

## 2022-08-23 ENCOUNTER — CLINICAL SUPPORT (OUTPATIENT)
Dept: PULMONOLOGY | Age: 47
End: 2022-08-23
Payer: MEDICARE

## 2022-08-23 DIAGNOSIS — U09.9 POST-ACUTE SEQUELAE OF COVID-19 (PASC): ICD-10-CM

## 2022-08-23 DIAGNOSIS — U09.9 POST-COVID SYNDROME: Primary | ICD-10-CM

## 2022-08-23 DIAGNOSIS — J45.40 MODERATE PERSISTENT ASTHMA WITHOUT COMPLICATION: ICD-10-CM

## 2022-08-23 PROCEDURE — 94625 PHY/QHP OP PULM RHB W/O MNTR: CPT

## 2022-08-25 ENCOUNTER — CLINICAL SUPPORT (OUTPATIENT)
Dept: PULMONOLOGY | Age: 47
End: 2022-08-25
Payer: MEDICARE

## 2022-08-25 DIAGNOSIS — J45.40 MODERATE PERSISTENT ASTHMA WITHOUT COMPLICATION: Primary | ICD-10-CM

## 2022-08-25 DIAGNOSIS — U09.9 POST-ACUTE SEQUELAE OF COVID-19 (PASC): ICD-10-CM

## 2022-08-25 DIAGNOSIS — U09.9 POST-COVID SYNDROME: ICD-10-CM

## 2022-08-25 PROCEDURE — 94625 PHY/QHP OP PULM RHB W/O MNTR: CPT

## 2022-08-30 ENCOUNTER — CLINICAL SUPPORT (OUTPATIENT)
Dept: PULMONOLOGY | Age: 47
End: 2022-08-30
Payer: MEDICARE

## 2022-08-30 DIAGNOSIS — J45.40 MODERATE PERSISTENT ASTHMA WITHOUT COMPLICATION: ICD-10-CM

## 2022-08-30 DIAGNOSIS — U09.9 POST-COVID SYNDROME: Primary | ICD-10-CM

## 2022-08-30 DIAGNOSIS — U09.9 POST-ACUTE SEQUELAE OF COVID-19 (PASC): ICD-10-CM

## 2022-08-30 PROCEDURE — 94625 PHY/QHP OP PULM RHB W/O MNTR: CPT

## 2022-08-31 NOTE — PROGRESS NOTES
Pulmonary Rehabilitation Plan of Care   60 Day Reassessment      Today's date: 2022   # of Exercise Sessions Completed: 8  Patient name: Kailey See      : 1975  Age: 56 y o  MRN: [id number]  Referring Physician: Tamie Dominguez MD  Pulmonologist: Tamie Dominguez MD  Provider: Mallika Galindo  Clinician: Matty Zarate MS, CEP    Dx:   Encounter Diagnoses   Name Primary?  Moderate persistent asthma without complication Yes    Post-acute sequelae of COVID-19 (PASC)     Post-COVID syndrome      Date of onset: 2020      SUMMARY OF PROGRESS:   2022: 60 day ITP for [employer] [employer] [facility]  Khoa Awad has only completed 8 sessions of UT to date  She is compliant attending 2x/wk  Khoa Awad completes 35-40 mins of moderate intensity exercise at 2 7-3 1 METS with light strength training component  She was just introduced to weights on   Resting HR 62-99  EX HR   Resting //92  EX BP's 130/92  Resting SpO2's 97%  EX SpO'2 99-93%  She is on room air  Pt no longer requires telemetry monitoring  The patient currently does follow a formal exercise program at home, including walking outside 2-3 days/week for 10-15 minutes and swimming 2x/wk for 15-20 mins  She admits to no medication changes and only rarely uses her rescue inhaler as needed  She admits her stress is reducing  Current weight is 287, a gain of 1 lbs since eval  She admits she has been working on dietary changes including reducing her consumption of added sugars and carbs  She admits she has colitis which can make eating healthier foods challenging as those are her trigger foods  She can not tolerate raw vegetables, only steamed  Increasing her consumption of chicken and she no longer eats dairy products  She has reduced her consumption of soda, tea and creamers  She admits she is staying hydrated  Her doctor recommended Gatorade packets for electrolyte replacement    She has noticed improvements in her endurance/stamina, especially when walking up incline  Patient goals include: getting back to activity level prior to COVID-19, be able to walk longer w/o SOB, lose weight, heart healthy diet changes  Will continue to monitor  8/16/2022: 30 day ITP for Terese Marshall has only completed 2 sessions since initial eval d/t peer to peer review for insurance authorization  Her insurance authorized her for 24 sessions in the time frame of 7/21/22-10/21/2022  Lenny Marshall completes 28-35 mins of moderate intensity exercise at 1 9-2 7 METS  Resting HR 84-94  EX HR   Resting //68  EX BP's 138//72  Resting SpO2's 98-95%  EX SpO'2 97-95%  She is on room air  Telemetry reveals NSR  The patient currently does follow a formal exercise program at home, including walking outside 2-3 days/week for 10-15 minutes and swimming 2-3x/wk for 20 mins  Repeat PHQ revealed score of 2 an improvement from 5 and GAD7 score of 2 an improvement from 5  Telemetry reveals NSR w/ isolated PVC  She will no longer wear telemetry to exercise  She admits to no medication changes and only rarely uses her rescue inhaler as needed  Current weight is 288, a gain of 2 lbs since eval  She admits she has been working on dietary changes including reducing her consumption of added sugars and carbs  She admits she has colitis which can make eating healthier foods challenging as those are her trigger foods  She admits she is staying hydrated  She admits to no medication changes  She has not yet noticed an improvement in her SOB symptoms  Patient goals include: getting back to activity level prior to COVID-19, be able to walk longer w/o SOB, lose weight, heart healthy diet changes  Will continue to monitor  7/26/2022: Today is Eli's initial evaluation to begin Pulmonary Rehab for the diagnosis of Post-COVID chronic shortness of breath   Since their diagnosis, the patient has been experiencing increased dyspnea, increased sitting time, decreased physical activity and increased fatigue  They report dyspnea and fatigue when completing ADLs   The patient currently does follow a formal exercise program at home, including walking outside 2-3 days/week for 15 minutes  She would like to exercise more, but her shortness of breath limits her walking duration  Depression screening using the PHQ-9 interprets the patient's score 5-9 = Mild Depression  DEWAYNE-7 screening tool for anxiety suggests 5-9 = Mild anxiety  When addressed, the patient denies having depression/anxiety  She reports she had anxiety when she was first getting frequent PVC's  Now that it is under control with her heart medications, she does not report anxiety  Patient reports excellent social/emotional support  Information to begin using [employer] was provided as well as contact information for counseling through [employer]  PHQ-9 score will be reassessed in 30 days  The patient is a former smoker (quit 10 years ago)  Patient admits to 100% medication compliance  At rest, the patient rated dyspnea 1/10 with SpO2 96% on room air  They completed an initial 6MWT, walking 1080 ft on room air  The patients rating of perceived dyspnea during the 6MWT was 3/10 with SpO2 96%  Patient took 0 rest breaks  Telemetry revealed NSR  She will be put on telemetry for her first couple sessions, due to her history of PVC's  Resting  /70 with appropriate hemodynamic response to exercise reaching 142/74  Patient will exercise on room air  Education on smoking cessation, oxygen use, breathing techniques, pulmonary anatomy, exercise for the pulmonary patient, healthy eating, stress, and relaxation will be provided  Patient goals include: getting back to activity level prior to COVID-19, be able to walk longer w/o SOB, lose weight, heart healthy diet changes  Marylen Estelle will attend 24 exercise sessions, 2x/wk for 12-18 weeks beginning 7/26/22  Will progress patient as tolerated over the next 30 days        Medication compliance: Yes   Comments: Pt reports to be compliant with medications  Fall Risk: Low   Comments: Ambulates with a steady gait with no assist device    Smoking: Former user    RPD at Rest: 1/10  RPD with Exercise:  3/10    Assessment of progression of lung disease and functional status:  CAT: 15/40  Shortness of breath questionnaire: 50/120      EXERCISE ASSESSMENT and PLAN    Current Exercise Program in Rehab:       Frequency: 2 days/week + supplement w/ home exercise days opposite MS        Minutes: 40         METS: 2 7-3 1              SpO2: >90%              RPD: 0-3                      HR:    RPE: 4-5         Modalities: Treadmill, Airdyne bike, UBE, Lifecycle and NuStep      Exercise Progression 30 Day Goals :    Frequency: 2 days/week + supplement w/ home exercise days opposite MS        Minutes: 40       METS: 2 8-3 5              SpO2: > 90%              RPD: 4-6                      HR: 30 +RHR   RPE: 4-5        Modalities: Treadmill, UBE, Lifecycle and Recumbent bike     Strength trainin-3 days / week  12-15 repetitions  1-2 sets per modality    Modalities: Leg Press, Chest Press, Pull Downs, Lateral Raise, Arm Curl, Sit to Stands, Upright Rows, Front Raises and Shoulder Shrugs    Oxygen Needs: on room air at rest and on room air with exercise  Oxygen Goal: Maintain SpO2>90% during exercise    Home Exercise: Type: walking outside, swimming, Frequency: 2-3 days/week, Duration: 10-20 mins  Education: pursed lipped breathing, diaphragmatic breathing, relaxation breathing, home exercise, benefits of exercise for pulmonary disease, RPE scale, RPD scale, O2 saturation monitoring and appropriate O2 response to exercise    Goals: reduced score on  USCD Shortness of Breath Questionnaire, Improved 6MW distance by 10%, improved exercise tolerance (max METs tolerated in pulmonary rehab), SpO2 >90% during exercise, reduced score on CAT, attend pulmonary rehab regularly and decrease sitting time at home  Progressing: Pt is progressing and showing improvement  toward the following goals:   reduced score on  USCD Shortness of Breath Questionnaire, Improved 6MW distance by 10%, improved exercise tolerance (max METs tolerated in pulmonary rehab), SpO2 >90% during exercise, reduced score on CAT, attend pulmonary rehab regularly and decrease sitting time at home   , Will continue to educate and progress as tolerated      Plan: Titrate supplemental oxygen as needed to maintain SpO2>90% with exercise, learn to conserve energy with ADLs , practice breathing techniques 3x/day and reduce time sitting at home    Readiness to change: Action:  (Changing behavior)      NUTRITION ASSESSMENT AND PLAN    Weight control:    Starting weight: 286   Current weight:   287  Diabetes: N/A    Goals:reduced BMI to < 25, decreased body fat % <33%  (F), reduced waist circumference <35 inches (F), Improved Rate Your Plate score  >96, 4 6-9%  wt loss, choose lean meat (93-95%), reduce portion sizes of meat to 3oz or less, increase intake of fish, shellfish, use low fat dairy, reduce cheese intake or use reduced-fat, eat 3 or more servings of whole grains a day, Eat 4-5 cups of fruits and vegetables daily, choose low sodium processed foods, choose healthy snacks: light popcorn, plain pretzels, Increase intake of nuts and seeds, seldom eat or choose low fat ice-cream, fruit juice bars or frozen yogurt , eliminate or choose low-fat sweets and daily saturated fat intake <7%/13g  Education: heart healthy eating  low sodium diet  wt  loss   portion control  Progressing:Pt is progressing and showing improvement  toward the following goals:  reducing consumption of added sugars, carbs, staying hydrated, making changes to improve RYP score   , Pt has not made progress toward the following goals: reducing BMI, losing weight  , Patient will continue w/ healthy dietary choices taking into consideration her colitis and certain foods do not agree with her in the next 30 days, Will continue to educate and progress as tolerated    Plan: Education class: Reading Food Labels, Education Class: Heart Healthy Eating, switch to low fat cheeses, replace refined grain bread with whole grain bread, replace unhealthy snacks with fruits & vegs, reduce portion sizes, reduce red meat 1x/wk, eat fewer desserts and sweets, avoid processed foods, will try new grains like brown rice, quinoa, farro, learn how to read food labels and keep added daily sugar <25g/day  Readiness to change: Action:  (Changing behavior)      PSYCHOSOCIAL ASSESSMENT AND PLAN    Emotional:  Depression assessment:  PHQ-9 = 1-4 = Minimal Depression            Anxiety measure:  DEWAYNE-7 = 0-4  = Not anxious  Self-reported stress level: 7   Social support: Very Good    Goals:  Reduce perceived stress to 1-3/10, improved [place] QOL < 27, PHQ-9 - reduced severity by one level, Feelings in [place] Score < 3, Physical Fitness in [place] Score < 3, Social Support in [place] Score < 3, Daily Activity in [place] Score < 3, Social Activities in [place] Score < 3, Pain in [place] Score < 3, Overall Health in [place] Score < 3, Quality of Life in [place] Score < 3 , Change in Health in [place] Score < 3 , increased energy and take time to relax  Education: benefits of a positive support system, stress management techniques, class:  Stress and Your Health , class:  Relaxation and class:  Stress and Pulmonary Disease    Progressing:Pt is progressing and showing improvement  toward the following goals:  Reduce perceived stress to 1-3/10, improved [place] QOL < 27, PHQ-9 - reduced severity by one level, Feelings in [place] Score < 3, Physical Fitness in [place] Score < 3, Social Support in [place] Score < 3, Daily Activity in [place] Score < 3, Social Activities in [place] Score < 3, Pain in [place] Score < 3, Overall Health in [place] Score < 3, Quality of Life in [place] Score < 3 , Change in Health in [facility] Score < 3 , increased energy and take time to relax  , Will continue to educate and progress as tolerated  Plan: Practice relaxation techniques, Exercise, Keep a positive mindset, Learn how to relax and slow down and Repeat PHQ-9 every 30 days if score >5  Readiness to change: Action:  (Changing behavior)      OTHER CORE COMPONENTS     Tobacco:   Social History     Tobacco Use   Smoking Status Former Smoker    Packs/day: 0 25    Years: 15 00    Pack years: 3 75    Types: Cigarettes   Smokeless Tobacco Never Used       Tobacco Use Intervention: Referral to tobacco expert:   N/A: Pt has a remote history of smoking    Blood pressure:    Restin//92   Exercise: 130/92    Goals: consistent BP < 130/80, reduced dietary sodium <2300mg, moderate intensity exercise >150 mins/wk and medication compliance  Education:  pathophysiology of pulmonary disease and bronchodilators  Progressing:Pt is progressing and showing improvement  toward the following goals:  consistent BP < 130/80 at rest, reduced dietary sodium <2300mg, moderate intensity exercise >150 mins/wk and medication compliance   , Will continue to educate and progress as tolerated    Plan: Avoid Processed foods, engage in regular exercise and check labels for sodium content  Readiness to change: Action:  (Changing behavior)

## 2022-09-01 ENCOUNTER — CLINICAL SUPPORT (OUTPATIENT)
Dept: PULMONOLOGY | Age: 47
End: 2022-09-01
Payer: MEDICARE

## 2022-09-01 DIAGNOSIS — J45.40 MODERATE PERSISTENT ASTHMA WITHOUT COMPLICATION: Primary | ICD-10-CM

## 2022-09-01 DIAGNOSIS — U09.9 POST-COVID SYNDROME: ICD-10-CM

## 2022-09-01 DIAGNOSIS — U09.9 POST-ACUTE SEQUELAE OF COVID-19 (PASC): ICD-10-CM

## 2022-09-01 PROCEDURE — 94625 PHY/QHP OP PULM RHB W/O MNTR: CPT

## 2022-09-01 RX ORDER — BUDESONIDE AND FORMOTEROL FUMARATE DIHYDRATE 160; 4.5 UG/1; UG/1
2 AEROSOL RESPIRATORY (INHALATION) 2 TIMES DAILY
COMMUNITY
Start: 2022-08-24 | End: 2022-09-06

## 2022-09-01 RX ORDER — ALBUTEROL SULFATE 90 UG/1
AEROSOL, METERED RESPIRATORY (INHALATION)
COMMUNITY
Start: 2022-08-22

## 2022-09-01 RX ORDER — BUDESONIDE AND FORMOTEROL FUMARATE DIHYDRATE 160; 4.5 UG/1; UG/1
AEROSOL RESPIRATORY (INHALATION)
COMMUNITY
Start: 2022-07-19 | End: 2022-09-06

## 2022-09-02 ENCOUNTER — TELEPHONE (OUTPATIENT)
Dept: OTHER | Facility: OTHER | Age: 47
End: 2022-09-02

## 2022-09-02 NOTE — TELEPHONE ENCOUNTER
Pt called in to set up an appt for March of 2023 for her 1 yr follow up  She is requesting a call back

## 2022-09-06 ENCOUNTER — OFFICE VISIT (OUTPATIENT)
Dept: PULMONOLOGY | Facility: CLINIC | Age: 47
End: 2022-09-06
Payer: MEDICARE

## 2022-09-06 ENCOUNTER — CLINICAL SUPPORT (OUTPATIENT)
Dept: PULMONOLOGY | Age: 47
End: 2022-09-06
Payer: MEDICARE

## 2022-09-06 VITALS
TEMPERATURE: 98.4 F | DIASTOLIC BLOOD PRESSURE: 80 MMHG | BODY MASS INDEX: 49.68 KG/M2 | HEART RATE: 78 BPM | OXYGEN SATURATION: 98 % | RESPIRATION RATE: 12 BRPM | WEIGHT: 291 LBS | HEIGHT: 64 IN | SYSTOLIC BLOOD PRESSURE: 132 MMHG

## 2022-09-06 DIAGNOSIS — J45.40 MODERATE PERSISTENT ASTHMA WITHOUT COMPLICATION: ICD-10-CM

## 2022-09-06 DIAGNOSIS — J45.40 MODERATE PERSISTENT ASTHMA WITHOUT COMPLICATION: Primary | ICD-10-CM

## 2022-09-06 DIAGNOSIS — E66.01 CLASS 3 SEVERE OBESITY WITH SERIOUS COMORBIDITY AND BODY MASS INDEX (BMI) OF 45.0 TO 49.9 IN ADULT, UNSPECIFIED OBESITY TYPE (HCC): ICD-10-CM

## 2022-09-06 DIAGNOSIS — U09.9 POST-ACUTE SEQUELAE OF COVID-19 (PASC): ICD-10-CM

## 2022-09-06 DIAGNOSIS — U09.9 POST-COVID SYNDROME: Primary | ICD-10-CM

## 2022-09-06 PROCEDURE — 99214 OFFICE O/P EST MOD 30 MIN: CPT | Performed by: INTERNAL MEDICINE

## 2022-09-06 PROCEDURE — 94625 PHY/QHP OP PULM RHB W/O MNTR: CPT

## 2022-09-06 NOTE — TELEPHONE ENCOUNTER
Pt lm on check in line, I called her back and left her a message stating that his sched was not out yet and we would call when his sched came out

## 2022-09-08 ENCOUNTER — CLINICAL SUPPORT (OUTPATIENT)
Dept: PULMONOLOGY | Age: 47
End: 2022-09-08
Payer: MEDICARE

## 2022-09-08 DIAGNOSIS — U09.9 POST-ACUTE SEQUELAE OF COVID-19 (PASC): ICD-10-CM

## 2022-09-08 DIAGNOSIS — J45.40 MODERATE PERSISTENT ASTHMA WITHOUT COMPLICATION: Primary | ICD-10-CM

## 2022-09-08 PROCEDURE — 94625 PHY/QHP OP PULM RHB W/O MNTR: CPT

## 2022-09-09 ENCOUNTER — TELEPHONE (OUTPATIENT)
Dept: PULMONOLOGY | Facility: CLINIC | Age: 47
End: 2022-09-09

## 2022-09-09 NOTE — TELEPHONE ENCOUNTER
Py LM re: her Breo inhaler needs a prior [facility]    Please advise: [phone]

## 2022-09-12 NOTE — TELEPHONE ENCOUNTER
Pt LM re: she hasn't heard from us yet in regards to the prior auth that she needs for her Breo    Please advise: [phone]

## 2022-09-13 ENCOUNTER — CLINICAL SUPPORT (OUTPATIENT)
Dept: PULMONOLOGY | Age: 47
End: 2022-09-13
Payer: MEDICARE

## 2022-09-13 DIAGNOSIS — U09.9 POST-ACUTE SEQUELAE OF COVID-19 (PASC): ICD-10-CM

## 2022-09-13 DIAGNOSIS — U09.9 POST-COVID SYNDROME: Primary | ICD-10-CM

## 2022-09-13 DIAGNOSIS — J45.40 MODERATE PERSISTENT ASTHMA WITHOUT COMPLICATION: ICD-10-CM

## 2022-09-13 PROCEDURE — 94625 PHY/QHP OP PULM RHB W/O MNTR: CPT

## 2022-09-15 ENCOUNTER — APPOINTMENT (OUTPATIENT)
Dept: PULMONOLOGY | Age: 47
End: 2022-09-15
Payer: MEDICARE

## 2022-09-16 ENCOUNTER — TELEPHONE (OUTPATIENT)
Dept: FAMILY MEDICINE CLINIC | Facility: CLINIC | Age: 47
End: 2022-09-16

## 2022-09-16 DIAGNOSIS — J06.9 ACUTE URI: Primary | ICD-10-CM

## 2022-09-16 RX ORDER — AMOXICILLIN AND CLAVULANATE POTASSIUM 875; 125 MG/1; MG/1
1 TABLET, FILM COATED ORAL EVERY 12 HOURS SCHEDULED
Qty: 14 TABLET | Refills: 0 | Status: SHIPPED | OUTPATIENT
Start: 2022-09-16 | End: 2022-09-23

## 2022-09-16 NOTE — TELEPHONE ENCOUNTER
Karen Harden was approved  Pt was called and notified  I have attempted several times to contact the pharmacy but was unsuccessful   The phone rings and no one answers and no Vm either

## 2022-09-16 NOTE — TELEPHONE ENCOUNTER
Patient is calling in regards to her insurance issue with [facility], she received a letter that her [facility] was approved  She has questions about this and would like to speak with you   Please advise

## 2022-09-16 NOTE — TELEPHONE ENCOUNTER
I will send in an antibiotic for you if needed over the weekend but I think the symptoms are related to the vaccine

## 2022-09-16 NOTE — TELEPHONE ENCOUNTER
Patient states she received the 3rd COVID vaccine yesterday morning    Today she woke up to a large lump on her L collar bone, itchy ears and throat, nasal congestion, and a slight cough with chest congestion     She states that it feels like a sinus infection but thinks it could also be a reaction to the booster    She did test for COVID this morning and it was negative     She is asking if she should start taking an antibiotic?    Please advise

## 2022-09-20 ENCOUNTER — APPOINTMENT (OUTPATIENT)
Dept: PULMONOLOGY | Age: 47
End: 2022-09-20
Payer: MEDICARE

## 2022-09-21 ENCOUNTER — CLINICAL SUPPORT (OUTPATIENT)
Dept: PULMONOLOGY | Age: 47
End: 2022-09-21
Payer: MEDICARE

## 2022-09-21 DIAGNOSIS — J45.40 MODERATE PERSISTENT ASTHMA WITHOUT COMPLICATION: ICD-10-CM

## 2022-09-21 DIAGNOSIS — U09.9 POST-ACUTE SEQUELAE OF COVID-19 (PASC): ICD-10-CM

## 2022-09-21 DIAGNOSIS — R06.02 SHORTNESS OF BREATH: ICD-10-CM

## 2022-09-21 PROCEDURE — 94625 PHY/QHP OP PULM RHB W/O MNTR: CPT

## 2022-09-22 ENCOUNTER — APPOINTMENT (OUTPATIENT)
Dept: PULMONOLOGY | Age: 47
End: 2022-09-22
Payer: MEDICARE

## 2022-09-23 ENCOUNTER — APPOINTMENT (OUTPATIENT)
Dept: LAB | Facility: CLINIC | Age: 47
End: 2022-09-23
Payer: MEDICARE

## 2022-09-23 DIAGNOSIS — D75.839 THROMBOCYTOSIS: ICD-10-CM

## 2022-09-23 DIAGNOSIS — D72.829 LEUKOCYTOSIS, UNSPECIFIED TYPE: ICD-10-CM

## 2022-09-23 DIAGNOSIS — R71.8 MICROCYTOSIS: ICD-10-CM

## 2022-09-23 LAB
ALBUMIN SERPL BCP-MCNC: 3.2 G/DL (ref 3.5–5)
ALP SERPL-CCNC: 63 U/L (ref 46–116)
ALT SERPL W P-5'-P-CCNC: 44 U/L (ref 12–78)
ANION GAP SERPL CALCULATED.3IONS-SCNC: 2 MMOL/L (ref 4–13)
ANISOCYTOSIS BLD QL SMEAR: PRESENT
AST SERPL W P-5'-P-CCNC: 20 U/L (ref 5–45)
BILIRUB SERPL-MCNC: 0.66 MG/DL (ref 0.2–1)
BUN SERPL-MCNC: 12 MG/DL (ref 5–25)
CALCIUM ALBUM COR SERPL-MCNC: 10.3 MG/DL (ref 8.3–10.1)
CALCIUM SERPL-MCNC: 9.7 MG/DL (ref 8.3–10.1)
CHLORIDE SERPL-SCNC: 107 MMOL/L (ref 96–108)
CO2 SERPL-SCNC: 27 MMOL/L (ref 21–32)
CREAT SERPL-MCNC: 1.09 MG/DL (ref 0.6–1.3)
CRP SERPL QL: 23.6 MG/L
ERYTHROCYTE [DISTWIDTH] IN BLOOD BY AUTOMATED COUNT: 16 % (ref 11.6–15.1)
ERYTHROCYTE [SEDIMENTATION RATE] IN BLOOD: 32 MM/HOUR (ref 0–19)
GFR SERPL CREATININE-BSD FRML MDRD: 61 ML/MIN/1.73SQ M
GLUCOSE P FAST SERPL-MCNC: 87 MG/DL (ref 65–99)
HCT VFR BLD AUTO: 44.6 % (ref 34.8–46.1)
HGB BLD-MCNC: 13.2 G/DL (ref 11.5–15.4)
MCH RBC QN AUTO: 21.4 PG (ref 26.8–34.3)
MCHC RBC AUTO-ENTMCNC: 29.6 G/DL (ref 31.4–37.4)
MCV RBC AUTO: 72 FL (ref 82–98)
MICROCYTES BLD QL AUTO: PRESENT
NRBC BLD AUTO-RTO: 0 /100 WBCS
PLATELET # BLD AUTO: 460 THOUSANDS/UL (ref 149–390)
PMV BLD AUTO: 9.5 FL (ref 8.9–12.7)
POLYCHROMASIA BLD QL SMEAR: PRESENT
POTASSIUM SERPL-SCNC: 4.3 MMOL/L (ref 3.5–5.3)
PROT SERPL-MCNC: 7.8 G/DL (ref 6.4–8.4)
RBC # BLD AUTO: 6.16 MILLION/UL (ref 3.81–5.12)
SODIUM SERPL-SCNC: 136 MMOL/L (ref 135–147)
WBC # BLD AUTO: 11.39 THOUSAND/UL (ref 4.31–10.16)

## 2022-09-23 PROCEDURE — 36415 COLL VENOUS BLD VENIPUNCTURE: CPT

## 2022-09-23 PROCEDURE — 85007 BL SMEAR W/DIFF WBC COUNT: CPT

## 2022-09-23 PROCEDURE — 86140 C-REACTIVE PROTEIN: CPT

## 2022-09-23 PROCEDURE — 80053 COMPREHEN METABOLIC PANEL: CPT

## 2022-09-23 PROCEDURE — 85652 RBC SED RATE AUTOMATED: CPT

## 2022-09-23 PROCEDURE — 85027 COMPLETE CBC AUTOMATED: CPT

## 2022-09-27 ENCOUNTER — CLINICAL SUPPORT (OUTPATIENT)
Dept: PULMONOLOGY | Age: 47
End: 2022-09-27
Payer: MEDICARE

## 2022-09-27 DIAGNOSIS — U09.9 POST-ACUTE SEQUELAE OF COVID-19 (PASC): ICD-10-CM

## 2022-09-27 DIAGNOSIS — R06.02 SHORTNESS OF BREATH: ICD-10-CM

## 2022-09-27 DIAGNOSIS — J45.40 MODERATE PERSISTENT ASTHMA WITHOUT COMPLICATION: Primary | ICD-10-CM

## 2022-09-27 PROCEDURE — 94625 PHY/QHP OP PULM RHB W/O MNTR: CPT

## 2022-09-29 ENCOUNTER — TELEPHONE (OUTPATIENT)
Dept: HEMATOLOGY ONCOLOGY | Facility: CLINIC | Age: 47
End: 2022-09-29

## 2022-09-29 ENCOUNTER — CLINICAL SUPPORT (OUTPATIENT)
Dept: PULMONOLOGY | Age: 47
End: 2022-09-29
Payer: MEDICARE

## 2022-09-29 DIAGNOSIS — U09.9 POST-COVID SYNDROME: Primary | ICD-10-CM

## 2022-09-29 PROCEDURE — 94625 PHY/QHP OP PULM RHB W/O MNTR: CPT

## 2022-09-29 NOTE — TELEPHONE ENCOUNTER
Appointment Cancellation Or Reschedule     Person calling in Patient    Provider Michi Mix   Office Visit Date and Time  10/03 10:30AM   [facility]   Did patient want to reschedule their office appointment? If so, when was it scheduled to? Yes 10/17 11AM   Did you have STAR scheduled for this appointment? No   Do you need STAR set up for your new appointment? If yes, please send to "PATIENT RIDESHARE" pool for STAR rescheduling N/A   If you are cancelling appointment, can we notify STAR to cancel ride? If yes, please send to "PATIENT RIDESHARE" pool for STAR to cancel service N/A   Is this patient calling to reschedule an infusion appointment? No   When is their next infusion appointment? N/A   Is this patient a Chemo patient? No   Reason for Cancellation or Reschedule Scheduling conflict     If the patient is a treatment patient, please route this to the office nurse  If the patient is not on treatment, please route to the office MA  If the patient is a surgical oncology patient, please route to surg/onc clinical pool

## 2022-09-29 NOTE — PROGRESS NOTES
Pulmonary Rehabilitation Plan of Care   90 Day Reassessment      Today's date: 2022   # of Exercise Sessions Completed: 15  Patient name: Avis Irwin      : 1975  Age: 56 y o  MRN: [id number]  Referring Physician: Jeanie Rivera MD  Pulmonologist: Jeanie Rivera MD  Provider: Lazaro Teixeira  Clinician: Chikis Argueta MS, CEP    Dx:   Encounter Diagnosis   Name Primary?  Post-COVID syndrome Yes     Date of onset: 2020      SUMMARY OF PROGRESS:   22: 90 day ITP for [employer] Jaya Anderson is compliant attending 2x/wk  She has completed 15 sessions of MA to date  Justin completes 40 mins of moderate intensity exercise at 3 2-4 6 METS with light strength training component  Resting HR 77-88  EX HR   Resting -128/72-88  EX BP's 140//86  Resting SpO2's 97-94%  EX SpO'2 98-91%  She is on room air  Pt no longer requires telemetry monitoring  The patient currently does follow a formal exercise program at home, including walking outside 2-3 days/week for 10-15 minutes and light weights days opposite MA  She admits huge improvements in her overall health, reducing SOB and increasing exercise capacity  She admits to no medication changes and only rarely uses her rescue inhaler anymore  She admits her stress is reducing  Current weight is 284, a loss of 3 lbs in last 30 days  She admits she has been working on dietary changes including reducing her consumption of added sugars, sweets and carbs  Completely cut out soda  She admits she has colitis which can make eating healthier foods challenging as those are her trigger foods  She can not tolerate raw vegetables, only steamed  Increasing her consumption of lean proteins  She has switched to tea with stevia and no longer drinks coffee  Electrolytes were low so she is using gatorade electrolyte replacement at the recommendation of her Dr  Her doctor recommended Gatorade packets for electrolyte replacement    She has noticed improvements in her endurance/stamina, especially when walking up incline  Patient goals include: getting back to activity level prior to COVID-19, be able to walk longer w/o SOB, lose weight, heart healthy diet changes  Will continue to monitor  9/1/2022: 60 day ITP for [employer] Jaya Ibarra has only completed 8 sessions of [facility] to date  She is compliant attending 2x/wk  Faustino Ibarra completes 35-40 mins of moderate intensity exercise at 2 7-3 1 METS with light strength training component  She was just introduced to weights on 8/30  Resting HR 62-99  EX HR   Resting //92  EX BP's 130/92  Resting SpO2's 97%  EX SpO'2 99-93%  She is on room air  Pt no longer requires telemetry monitoring  The patient currently does follow a formal exercise program at home, including walking outside 2-3 days/week for 10-15 minutes and swimming 2x/wk for 15-20 mins  She admits to no medication changes and only rarely uses her rescue inhaler as needed  She admits her stress is reducing  Current weight is 287, a gain of 1 lbs since eval  She admits she has been working on dietary changes including reducing her consumption of added sugars and carbs  She admits she has colitis which can make eating healthier foods challenging as those are her trigger foods  She can not tolerate raw vegetables, only steamed  Increasing her consumption of chicken and she no longer eats dairy products  She has reduced her consumption of soda, tea and creamers  She admits she is staying hydrated  Her doctor recommended Gatorade packets for electrolyte replacement  She has noticed improvements in her endurance/stamina, especially when walking up incline  Patient goals include: getting back to activity level prior to COVID-19, be able to walk longer w/o SOB, lose weight, heart healthy diet changes  Will continue to monitor  8/16/2022: 30 day ITP for Jaya [employer] Jaya Ibarra has only completed 2 sessions since initial eval d/t peer to peer review for insurance authorization   Her insurance authorized her for 24 sessions in the time frame of 7/21/22-10/21/2022  Miroslava Goode completes 28-35 mins of moderate intensity exercise at 1 9-2 7 METS  Resting HR 84-94  EX HR   Resting //68  EX BP's 138//72  Resting SpO2's 98-95%  EX SpO'2 97-95%  She is on room air  Telemetry reveals NSR  The patient currently does follow a formal exercise program at home, including walking outside 2-3 days/week for 10-15 minutes and swimming 2-3x/wk for 20 mins  Repeat PHQ revealed score of 2 an improvement from 5 and GAD7 score of 2 an improvement from 5  Telemetry reveals NSR w/ isolated PVC  She will no longer wear telemetry to exercise  She admits to no medication changes and only rarely uses her rescue inhaler as needed  Current weight is 288, a gain of 2 lbs since eval  She admits she has been working on dietary changes including reducing her consumption of added sugars and carbs  She admits she has colitis which can make eating healthier foods challenging as those are her trigger foods  She admits she is staying hydrated  She admits to no medication changes  She has not yet noticed an improvement in her SOB symptoms  Patient goals include: getting back to activity level prior to COVID-19, be able to walk longer w/o SOB, lose weight, heart healthy diet changes  Will continue to monitor  7/26/2022: Today is Eli's initial evaluation to begin Pulmonary Rehab for the diagnosis of Post-COVID chronic shortness of breath  Since their diagnosis, the patient has been experiencing increased dyspnea, increased sitting time, decreased physical activity and increased fatigue  They report dyspnea and fatigue when completing ADLs   The patient currently does follow a formal exercise program at home, including walking outside 2-3 days/week for 15 minutes  She would like to exercise more, but her shortness of breath limits her walking duration    Depression screening using the PHQ-9 interprets the patient's score 5-9 = Mild Depression  DEWAYNE-7 screening tool for anxiety suggests 5-9 = Mild anxiety  When addressed, the patient denies having depression/anxiety  She reports she had anxiety when she was first getting frequent PVC's  Now that it is under control with her heart medications, she does not report anxiety  Patient reports excellent social/emotional support  Information to begin using [employer] was provided as well as contact information for counseling through [facility]  PHQ-9 score will be reassessed in 30 days  The patient is a former smoker (quit 10 years ago)  Patient admits to 100% medication compliance  At rest, the patient rated dyspnea 1/10 with SpO2 96% on room air  They completed an initial 6MWT, walking 1080 ft on room air  The patients rating of perceived dyspnea during the 6MWT was 3/10 with SpO2 96%  Patient took 0 rest breaks  Telemetry revealed NSR  She will be put on telemetry for her first couple sessions, due to her history of PVC's  Resting  /70 with appropriate hemodynamic response to exercise reaching 142/74  Patient will exercise on room air  Education on smoking cessation, oxygen use, breathing techniques, pulmonary anatomy, exercise for the pulmonary patient, healthy eating, stress, and relaxation will be provided  Patient goals include: getting back to activity level prior to COVID-19, be able to walk longer w/o SOB, lose weight, heart healthy diet changes  Kirill Mccoy will attend 24 exercise sessions, 2x/wk for 12-18 weeks beginning 7/26/22  Will progress patient as tolerated over the next 30 days        Medication compliance: Yes   Comments: Pt reports to be compliant with medications  Fall Risk: Low   Comments: Ambulates with a steady gait with no assist device    Smoking: Former user    RPD at Rest: 1/10  RPD with Exercise:  3/10    Assessment of progression of lung disease and functional status:  CAT: 15/40  Shortness of breath questionnaire: 50/120      EXERCISE ASSESSMENT and PLAN    Current Exercise Program in Rehab:       Frequency: 2 days/week + supplement w/ home exercise days opposite IN        Minutes: 40         METS: 3 2-4 6              SpO2: >90%              RPD: 0-3                      HR:    RPE: 4-5         Modalities: Treadmill, Airdyne bike, UBE, Lifecycle and Rower      Exercise Progression 30 Day Goals :    Frequency: 2 days/week + supplement w/ home exercise days opposite IN        Minutes: 40       METS: 2 8-3 5              SpO2: > 90%              RPD: 4-6                      HR: 30 +RHR   RPE: 4-5        Modalities: Treadmill, UBE, Lifecycle and Elliptical     Strength trainin-3 days / week  12-15 repetitions  1-2 sets per modality    Modalities: Leg Press, Chest Press, Pull Downs and Lateral Raise    Oxygen Needs: on room air at rest and on room air with exercise  Oxygen Goal: Maintain SpO2>90% during exercise    Home Exercise: Type: walking outside, strength training , Frequency: 2-3 days/week, Duration: 10-15 mins  Education: pursed lipped breathing, diaphragmatic breathing, relaxation breathing, home exercise, benefits of exercise for pulmonary disease, RPE scale, RPD scale, O2 saturation monitoring and appropriate O2 response to exercise    Goals: reduced score on  USCD Shortness of Breath Questionnaire, Improved 6MW distance by 10%, improved exercise tolerance (max METs tolerated in pulmonary rehab), SpO2 >90% during exercise, reduced score on CAT, attend pulmonary rehab regularly and decrease sitting time at home  Progressing:  Pt is progressing and showing improvement  toward the following goals:   reduced score on  USCD Shortness of Breath Questionnaire, Improved 6MW distance by 10%, improved exercise tolerance (max METs tolerated in pulmonary rehab), SpO2 >90% during exercise, reduced score on CAT, attend pulmonary rehab regularly and decrease sitting time at home   , Will continue to educate and progress as tolerated  Plan: Titrate supplemental oxygen as needed to maintain SpO2>90% with exercise, learn to conserve energy with ADLs , practice breathing techniques 3x/day and reduce time sitting at home    Readiness to change: Action:  (Changing behavior)      NUTRITION ASSESSMENT AND PLAN    Weight control:    Starting weight: 286   Current weight:   284  Diabetes: N/A    Goals:reduced BMI to < 25, decreased body fat % <33%  (F), reduced waist circumference <35 inches (F), Improved Rate Your Plate score  >29, 6 9-1%  wt loss, choose lean meat (93-95%), reduce portion sizes of meat to 3oz or less, increase intake of fish, shellfish, use low fat dairy, reduce cheese intake or use reduced-fat, eat 3 or more servings of whole grains a day, Eat 4-5 cups of fruits and vegetables daily, choose low sodium processed foods, choose healthy snacks: light popcorn, plain pretzels, Increase intake of nuts and seeds, seldom eat or choose low fat ice-cream, fruit juice bars or frozen yogurt , eliminate or choose low-fat sweets and daily saturated fat intake <7%/13g  Education: heart healthy eating  low sodium diet  wt  loss   portion control  Progressing:Pt is progressing and showing improvement  toward the following goals:  reducing consumption of added sugars, carbs, staying hydrated, making changes to improve RYP score, weight loss  , Patient will continue w/ healthy dietary choices taking into consideration her colitis and certain foods do not agree with her in the next 30 days, Will continue to educate and progress as tolerated    Plan: Education class: Reading Food Labels, Education Class: Heart Healthy Eating, switch to low fat cheeses, replace refined grain bread with whole grain bread, replace unhealthy snacks with fruits & vegs, reduce portion sizes, reduce red meat 1x/wk, eat fewer desserts and sweets, avoid processed foods, will try new grains like brown rice, quinoa, farro, learn how to read food labels and keep added daily sugar <25g/day  Readiness to change: Action:  (Changing behavior)      PSYCHOSOCIAL ASSESSMENT AND PLAN    Emotional:  Depression assessment:  PHQ-9 = 1-4 = Minimal Depression            Anxiety measure:  DEWAYNE-7 = 0-4  = Not anxious  Self-reported stress level: 7   Social support: Very Good    Goals:  Reduce perceived stress to 1-3/10, improved [place] QOL < 27, PHQ-9 - reduced severity by one level, Feelings in [place] Score < 3, Physical Fitness in [place] Score < 3, Social Support in [place] Score < 3, Daily Activity in [place] Score < 3, Social Activities in [place] Score < 3, Pain in [place] Score < 3, Overall Health in [place] Score < 3, Quality of Life in [place] Score < 3 , Change in Health in [place] Score < 3 , increased energy and take time to relax  Education: benefits of a positive support system, stress management techniques, class:  Stress and Your Health , class:  Relaxation and class:  Stress and Pulmonary Disease    Progressing:Pt is progressing and showing improvement  toward the following goals:  Reduce perceived stress to 1-3/10, improved [facility] QOL < 27, PHQ-9 - reduced severity by one level, Feelings in [place] Score < 3, Physical Fitness in [place] Score < 3, Social Support in [place] Score < 3, Daily Activity in [place] Score < 3, Social Activities in [place] Score < 3, Pain in [place] Score < 3, Overall Health in [place] Score < 3, Quality of Life in [place] Score < 3 , Change in Health in [place] Score < 3 , increased energy and take time to relax  , Will continue to educate and progress as tolerated    Plan: Practice relaxation techniques, Exercise, Keep a positive mindset, Learn how to relax and slow down and Repeat PHQ-9 every 30 days if score >5  Readiness to change: Action:  (Changing behavior)      OTHER CORE COMPONENTS     Tobacco:   Social History     Tobacco Use   Smoking Status Former Smoker    Packs/day: 0 25    Years: 15 00    Pack years: 3 75    Types: Cigarettes    Quit date:     Years since quittin 7   Smokeless Tobacco Never Used       Tobacco Use Intervention: Referral to tobacco expert:   N/A: Pt has a remote history of smoking    Blood pressure:    Restin-128/72-88   Exercise: 140//86    Goals: consistent BP < 130/80, reduced dietary sodium <2300mg, moderate intensity exercise >150 mins/wk and medication compliance  Education:  pathophysiology of pulmonary disease and bronchodilators  Progressing:Pt is progressing and showing improvement  toward the following goals:  consistent BP < 130/80 at rest, reduced dietary sodium <2300mg, moderate intensity exercise >150 mins/wk and medication compliance   , Will continue to educate and progress as tolerated    Plan: Avoid Processed foods, engage in regular exercise and check labels for sodium content  Readiness to change: Action:  (Changing behavior)

## 2022-10-04 ENCOUNTER — CLINICAL SUPPORT (OUTPATIENT)
Dept: PULMONOLOGY | Age: 47
End: 2022-10-04
Payer: MEDICARE

## 2022-10-04 DIAGNOSIS — U09.9 POST-ACUTE SEQUELAE OF COVID-19 (PASC): Primary | ICD-10-CM

## 2022-10-04 PROCEDURE — 94625 PHY/QHP OP PULM RHB W/O MNTR: CPT

## 2022-10-06 ENCOUNTER — APPOINTMENT (OUTPATIENT)
Dept: PULMONOLOGY | Age: 47
End: 2022-10-06

## 2022-10-06 ENCOUNTER — TELEPHONE (OUTPATIENT)
Dept: FAMILY MEDICINE CLINIC | Facility: CLINIC | Age: 47
End: 2022-10-06

## 2022-10-06 NOTE — TELEPHONE ENCOUNTER
Patient called stating that her back is hurting  She doesn't know how she hurt it  She states that the pain is in the lower back right above her bottom, wrapping around her hip to the front of her groin area  She is asking what providers advise, as she canceled her therapy due to the pain being so bad

## 2022-10-07 ENCOUNTER — OFFICE VISIT (OUTPATIENT)
Dept: FAMILY MEDICINE CLINIC | Facility: CLINIC | Age: 47
End: 2022-10-07
Payer: MEDICARE

## 2022-10-07 VITALS
BODY MASS INDEX: 48.91 KG/M2 | HEIGHT: 64 IN | DIASTOLIC BLOOD PRESSURE: 84 MMHG | OXYGEN SATURATION: 99 % | SYSTOLIC BLOOD PRESSURE: 126 MMHG | WEIGHT: 286.5 LBS | RESPIRATION RATE: 16 BRPM | TEMPERATURE: 98.6 F | HEART RATE: 83 BPM

## 2022-10-07 DIAGNOSIS — M54.50 LUMBAR BACK PAIN: Primary | ICD-10-CM

## 2022-10-07 PROCEDURE — 99214 OFFICE O/P EST MOD 30 MIN: CPT | Performed by: NURSE PRACTITIONER

## 2022-10-07 RX ORDER — METHOCARBAMOL 500 MG/1
500 TABLET, FILM COATED ORAL
Qty: 3 TABLET | Refills: 0 | Status: SHIPPED | OUTPATIENT
Start: 2022-10-07 | End: 2022-10-28 | Stop reason: ALTCHOICE

## 2022-10-07 RX ORDER — METHYLPREDNISOLONE 4 MG/1
TABLET ORAL
Qty: 21 EACH | Refills: 0 | Status: SHIPPED | OUTPATIENT
Start: 2022-10-07 | End: 2022-10-28 | Stop reason: ALTCHOICE

## 2022-10-07 NOTE — PROGRESS NOTES
Name: Natasha Beauchamp      : 1975      MRN: [id number]  Encounter Provider: SANGEETA Perea  Encounter Date: 10/7/2022   Encounter department: [address] [place] [address]  Lumbar back pain  -     methylPREDNISolone 4 MG tablet therapy pack; Use as directed on package  -     methocarbamol (ROBAXIN) 500 mg tablet; Take 1 tablet (500 mg total) by mouth daily at bedtime as needed for muscle spasms  Discussed with patient plan to treat with a muscle relaxer at bedtime along with a Medrol dose pack  Discussed with patient conservative measures: rest and ice application to decrease inflammation  Patient instructed to call if no improvement in 72 hours or symptoms worsen       Subjective      47 y  o female presenting with right sided lower back pain for the past three days  She reports the pain had a sudden onset two days ago which has slowing worsened  She reports radiating into her groin and down into upper leg  She reports that the pain is better when standing or walking but is the worse when sitting or bending over  She feels that she has been compensating for the right sided pain so now other parts of her body are starting to hurt  She denies an bladder or bowel function involvement  Review of Systems   Constitutional: Negative  Respiratory: Negative  Cardiovascular: Negative  Gastrointestinal: Negative  Genitourinary: Negative  Musculoskeletal: Positive for back pain and myalgias  Neurological: Negative  Negative for weakness and numbness  Psychiatric/Behavioral: Negative        Current Outpatient Medications on File Prior to Visit   Medication Sig    albuterol (PROVENTIL HFA,VENTOLIN HFA) 90 mcg/act inhaler inhale 2 puffs by mouth and INTO THE LUNGS every 6 hours if needed for wheezing    doxycycline (PERIOSTAT) 20 MG tablet Take 1 tablet (20 mg total) by mouth daily With a full glass of water and food    fluticasone-vilanterol (Breo Ellipta) 200-25 MCG/INH inhaler Inhale 1 puff daily Rinse mouth after use   ketoconazole (NIZORAL) 2 % cream Apply topically twice a day when rash is active, apply topically twice a week when not active    levothyroxine (Synthroid) 75 mcg tablet Take 1 tablet (75 mcg total) by mouth daily in the early morning    metoprolol tartrate (LOPRESSOR) 50 mg tablet Take 1 tablet (50 mg total) by mouth 2 (two) times a day       Objective     /84   Pulse 83   Temp 98 6 °F (37 °C)   Resp 16   Ht 5' 4" (1 626 m)   Wt 130 kg (286 lb 8 oz)   LMP 10/03/2022 (Exact Date)   SpO2 99%   Breastfeeding No   BMI 49 18 kg/m² (Reviewed)    Physical Exam  Vitals reviewed  Constitutional:       General: She is not in acute distress  Appearance: She is well-developed and well-groomed  She is not ill-appearing  HENT:      Head: Normocephalic and atraumatic  Eyes:      General: Lids are normal       Extraocular Movements: Extraocular movements intact  Conjunctiva/sclera: Conjunctivae normal       Pupils: Pupils are equal, round, and reactive to light  Neck:      Trachea: Trachea and phonation normal    Cardiovascular:      Rate and Rhythm: Normal rate and regular rhythm  Pulses:           Dorsalis pedis pulses are 2+ on the right side and 2+ on the left side  Posterior tibial pulses are 2+ on the right side and 2+ on the left side  Heart sounds: Normal heart sounds  Pulmonary:      Effort: Pulmonary effort is normal       Breath sounds: Normal breath sounds  Musculoskeletal:         General: Tenderness present  Lumbar back: Spasms and tenderness present  Decreased range of motion  Negative right straight leg raise test and negative left straight leg raise test       Right lower leg: No edema  Left lower leg: No edema  Skin:     General: Skin is warm and dry  Capillary Refill: Capillary refill takes less than 2 seconds  Neurological:      General: No focal deficit present  Mental Status: She is alert and oriented to person, place, and time  Psychiatric:         Mood and Affect: Mood normal          Behavior: Behavior normal  Behavior is cooperative  Thought Content:  Thought content normal        SANGEETA Banda

## 2022-10-11 ENCOUNTER — CLINICAL SUPPORT (OUTPATIENT)
Dept: PULMONOLOGY | Age: 47
End: 2022-10-11
Payer: MEDICARE

## 2022-10-11 DIAGNOSIS — U09.9 POST-COVID SYNDROME: Primary | ICD-10-CM

## 2022-10-11 PROCEDURE — 94625 PHY/QHP OP PULM RHB W/O MNTR: CPT

## 2022-10-12 DIAGNOSIS — I49.3 FREQUENT PVCS: ICD-10-CM

## 2022-10-12 DIAGNOSIS — R00.2 PALPITATIONS: ICD-10-CM

## 2022-10-12 RX ORDER — METOPROLOL TARTRATE 50 MG/1
50 TABLET, FILM COATED ORAL 2 TIMES DAILY
Qty: 180 TABLET | Refills: 3 | Status: SHIPPED | OUTPATIENT
Start: 2022-10-12

## 2022-10-13 ENCOUNTER — CLINICAL SUPPORT (OUTPATIENT)
Dept: PULMONOLOGY | Age: 47
End: 2022-10-13
Payer: MEDICARE

## 2022-10-13 DIAGNOSIS — U09.9 POST-COVID SYNDROME: Primary | ICD-10-CM

## 2022-10-13 DIAGNOSIS — U09.9 POST-ACUTE SEQUELAE OF COVID-19 (PASC): ICD-10-CM

## 2022-10-13 DIAGNOSIS — J45.909 MODERATE ASTHMA WITHOUT COMPLICATION, UNSPECIFIED WHETHER PERSISTENT: ICD-10-CM

## 2022-10-13 PROCEDURE — 94625 PHY/QHP OP PULM RHB W/O MNTR: CPT

## 2022-10-14 ENCOUNTER — TELEPHONE (OUTPATIENT)
Dept: HEMATOLOGY ONCOLOGY | Facility: CLINIC | Age: 47
End: 2022-10-14

## 2022-10-14 DIAGNOSIS — E06.3 HYPOTHYROIDISM DUE TO HASHIMOTO'S THYROIDITIS: ICD-10-CM

## 2022-10-14 DIAGNOSIS — R53.83 FATIGUE, UNSPECIFIED TYPE: ICD-10-CM

## 2022-10-14 DIAGNOSIS — E03.8 HYPOTHYROIDISM DUE TO HASHIMOTO'S THYROIDITIS: ICD-10-CM

## 2022-10-14 DIAGNOSIS — D72.829 LEUKOCYTOSIS, UNSPECIFIED TYPE: ICD-10-CM

## 2022-10-14 DIAGNOSIS — D75.839 THROMBOCYTOSIS: ICD-10-CM

## 2022-10-14 DIAGNOSIS — R06.09 DOE (DYSPNEA ON EXERTION): ICD-10-CM

## 2022-10-14 DIAGNOSIS — D56.3 BETA THALASSEMIA MINOR: ICD-10-CM

## 2022-10-14 DIAGNOSIS — E06.3 HASHIMOTO'S DISEASE: ICD-10-CM

## 2022-10-14 DIAGNOSIS — R71.8 MICROCYTOSIS: Primary | ICD-10-CM

## 2022-10-14 DIAGNOSIS — D47.1 MYELOPROLIFERATIVE DISORDER (HCC): ICD-10-CM

## 2022-10-14 DIAGNOSIS — Z83.2 FAMILY HISTORY OF THALASSEMIA: ICD-10-CM

## 2022-10-14 NOTE — TELEPHONE ENCOUNTER
Patient did not complete labs and will be away for the weekend  Appointment has been rescheduled to Monday 10/24/2022 @ 10:30 AM     Labs to be completed 1 week prior

## 2022-10-18 ENCOUNTER — APPOINTMENT (OUTPATIENT)
Dept: LAB | Facility: CLINIC | Age: 47
End: 2022-10-18
Payer: MEDICARE

## 2022-10-18 ENCOUNTER — CLINICAL SUPPORT (OUTPATIENT)
Dept: PULMONOLOGY | Age: 47
End: 2022-10-18
Payer: MEDICARE

## 2022-10-18 DIAGNOSIS — D56.3 BETA THALASSEMIA MINOR: ICD-10-CM

## 2022-10-18 DIAGNOSIS — E06.3 HYPOTHYROIDISM DUE TO HASHIMOTO'S THYROIDITIS: ICD-10-CM

## 2022-10-18 DIAGNOSIS — R53.83 FATIGUE, UNSPECIFIED TYPE: ICD-10-CM

## 2022-10-18 DIAGNOSIS — E03.8 HYPOTHYROIDISM DUE TO HASHIMOTO'S THYROIDITIS: ICD-10-CM

## 2022-10-18 DIAGNOSIS — R71.8 MICROCYTOSIS: ICD-10-CM

## 2022-10-18 DIAGNOSIS — D75.839 THROMBOCYTOSIS: ICD-10-CM

## 2022-10-18 DIAGNOSIS — U09.9 POST-COVID SYNDROME: Primary | ICD-10-CM

## 2022-10-18 DIAGNOSIS — J45.909 MODERATE ASTHMA WITHOUT COMPLICATION, UNSPECIFIED WHETHER PERSISTENT: ICD-10-CM

## 2022-10-18 DIAGNOSIS — R06.09 DOE (DYSPNEA ON EXERTION): ICD-10-CM

## 2022-10-18 DIAGNOSIS — E06.3 HASHIMOTO'S DISEASE: ICD-10-CM

## 2022-10-18 DIAGNOSIS — D72.829 LEUKOCYTOSIS, UNSPECIFIED TYPE: ICD-10-CM

## 2022-10-18 DIAGNOSIS — Z83.2 FAMILY HISTORY OF THALASSEMIA: ICD-10-CM

## 2022-10-18 LAB
ALBUMIN SERPL BCP-MCNC: 3.3 G/DL (ref 3.5–5)
ALP SERPL-CCNC: 56 U/L (ref 46–116)
ALT SERPL W P-5'-P-CCNC: 43 U/L (ref 12–78)
ANION GAP SERPL CALCULATED.3IONS-SCNC: 3 MMOL/L (ref 4–13)
AST SERPL W P-5'-P-CCNC: 23 U/L (ref 5–45)
BILIRUB SERPL-MCNC: 0.55 MG/DL (ref 0.2–1)
BUN SERPL-MCNC: 12 MG/DL (ref 5–25)
CALCIUM ALBUM COR SERPL-MCNC: 10.1 MG/DL (ref 8.3–10.1)
CALCIUM SERPL-MCNC: 9.5 MG/DL (ref 8.3–10.1)
CHLORIDE SERPL-SCNC: 107 MMOL/L (ref 96–108)
CO2 SERPL-SCNC: 26 MMOL/L (ref 21–32)
CREAT SERPL-MCNC: 1.04 MG/DL (ref 0.6–1.3)
CRP SERPL QL: 27.5 MG/L
DACRYOCYTES BLD QL SMEAR: PRESENT
ERYTHROCYTE [DISTWIDTH] IN BLOOD BY AUTOMATED COUNT: 16.6 % (ref 11.6–15.1)
ERYTHROCYTE [SEDIMENTATION RATE] IN BLOOD: 52 MM/HOUR (ref 0–19)
GFR SERPL CREATININE-BSD FRML MDRD: 64 ML/MIN/1.73SQ M
GLUCOSE P FAST SERPL-MCNC: 95 MG/DL (ref 65–99)
HCT VFR BLD AUTO: 42.1 % (ref 34.8–46.1)
HGB BLD-MCNC: 12.6 G/DL (ref 11.5–15.4)
IMM EOSINOPHIL NFR BLD MANUAL: 3 % (ref 0–6)
LYMPHOCYTES NFR BLD: 33 % (ref 14–44)
MCH RBC QN AUTO: 21.5 PG (ref 26.8–34.3)
MCHC RBC AUTO-ENTMCNC: 29.9 G/DL (ref 31.4–37.4)
MCV RBC AUTO: 72 FL (ref 82–98)
MONOCYTES NFR BLD AUTO: 13 % (ref 4–12)
NEUTS SEG NFR BLD AUTO: 50 % (ref 45–77)
NRBC BLD AUTO-RTO: 0 /100 WBCS
PLATELET # BLD AUTO: 433 THOUSANDS/UL (ref 149–390)
PLATELET BLD QL SMEAR: ADEQUATE
PMV BLD AUTO: 9.8 FL (ref 8.9–12.7)
POIKILOCYTOSIS BLD QL SMEAR: PRESENT
POTASSIUM SERPL-SCNC: 4.3 MMOL/L (ref 3.5–5.3)
PROT SERPL-MCNC: 7.1 G/DL (ref 6.4–8.4)
RBC # BLD AUTO: 5.85 MILLION/UL (ref 3.81–5.12)
SODIUM SERPL-SCNC: 136 MMOL/L (ref 135–147)
TOTAL CELLS COUNTED SPEC: 100
VARIANT LYMPHS BLD QL SMEAR: 1 % (ref 0–0)
WBC # BLD AUTO: 13.24 THOUSAND/UL (ref 4.31–10.16)

## 2022-10-18 PROCEDURE — 86140 C-REACTIVE PROTEIN: CPT

## 2022-10-18 PROCEDURE — 80053 COMPREHEN METABOLIC PANEL: CPT

## 2022-10-18 PROCEDURE — 94625 PHY/QHP OP PULM RHB W/O MNTR: CPT

## 2022-10-18 PROCEDURE — 85652 RBC SED RATE AUTOMATED: CPT

## 2022-10-18 PROCEDURE — 85027 COMPLETE CBC AUTOMATED: CPT

## 2022-10-18 PROCEDURE — 36415 COLL VENOUS BLD VENIPUNCTURE: CPT

## 2022-10-18 PROCEDURE — 85007 BL SMEAR W/DIFF WBC COUNT: CPT

## 2022-10-20 ENCOUNTER — CLINICAL SUPPORT (OUTPATIENT)
Dept: PULMONOLOGY | Age: 47
End: 2022-10-20
Payer: MEDICARE

## 2022-10-20 DIAGNOSIS — U09.9 POST-COVID SYNDROME: Primary | ICD-10-CM

## 2022-10-20 PROCEDURE — 94625 PHY/QHP OP PULM RHB W/O MNTR: CPT

## 2022-10-20 NOTE — PROGRESS NOTES
Pulmonary Rehabilitation Plan of Care   Discharge      Today's date: 10/20/2022   # of Exercise Sessions Completed: 20  Patient name: Jose Daniel Clifton      : 1975  Age: 56 y o  MRN: [id number]  Referring Physician: Stephania Chen MD  Pulmonologist: Stephania Chen MD  Provider: Pavel  Clinician: Cris Diana MS, CEP    Dx:   Encounter Diagnosis   Name Primary? • Post-COVID syndrome Yes     Date of onset: 2020      SUMMARY OF PROGRESS:  10/20/22: Discharge note for Dada Yung  She had 11 5% improvement in functional capacity increasing 6 MW distance by  20 4% walking ft on on room air with exercise  Resting SpO2 97% with maintained and decreased O2 saturation during exercise 97 - 95% room air  RPE 3/10 and dyspnea  0/10  Her max METs in pulmonary rehab increased from 2 7 to 4 6  Her [facility] QOL improved by 31%  PHQ-9 score decreased from 5 to 1  DEWAYNE-7 decreased from 5 to 2  CAT score decreased from 15 to 11/40  SOBQ score decreased  from 50 to 24  Her weight decreased by 3 pounds  Asher Boss has been supplementing KS sessions with home exercise which includes walking outside 2-3 days/week for 10-15 minutes and light weights days opposite KS  She reports increased stamina, strength and reduced SOB with ADLs  Eli tolerates 40 mins at 3 3 - 4 6 METs plus wt training  RHR 79 - 93, ExHR 107 - 113  Resting /80 - 124/72 with appropriate response to exercise reaching 142/74 - 146/78  All group education classes on pulmonary risk factor modification were attended by the patient  Discharge plans include continuing home exercise and joining [employer], she also plans to return to playing tennis  Encouraged Pt to continue exercise  Frequency: 4-6 days/wk, Intensity: RPE 4-5, Time: 40-50 mins daily, 150-200 mins/wk  Pt was encouraged to remain compliant with medications and f/u with pulmonologist with any pulmonary symptoms and medication management  22: 90 day ITP for Dada Yung  Justin is compliant attending 2x/wk  She has completed 15 sessions of AL to date  Justin completes 40 mins of moderate intensity exercise at 3 2-4 6 METS with light strength training component  Resting HR 77-88  EX HR   Resting -128/72-88  EX BP's 140//86  Resting SpO2's 97-94%  EX SpO'2 98-91%  She is on room air  Pt no longer requires telemetry monitoring  The patient currently does follow a formal exercise program at home, including walking outside 2-3 days/week for 10-15 minutes and light weights days opposite AL  She admits huge improvements in her overall health, reducing SOB and increasing exercise capacity  She admits to no medication changes and only rarely uses her rescue inhaler anymore  She admits her stress is reducing  Current weight is 284, a loss of 3 lbs in last 30 days  She admits she has been working on dietary changes including reducing her consumption of added sugars, sweets and carbs  Completely cut out soda  She admits she has colitis which can make eating healthier foods challenging as those are her trigger foods  She can not tolerate raw vegetables, only steamed  Increasing her consumption of lean proteins  She has switched to tea with stevia and no longer drinks coffee  Electrolytes were low so she is using gatorade electrolyte replacement at the recommendation of her Dr  Her doctor recommended Gatorade packets for electrolyte replacement  She has noticed improvements in her endurance/stamina, especially when walking up incline  Patient goals include: getting back to activity level prior to COVID-19, be able to walk longer w/o SOB, lose weight, heart healthy diet changes  Will continue to monitor  9/1/2022: 60 day ITP for OYE!  Justin has only completed 8 sessions of AL to date  She is compliant attending 2x/wk  Justin completes 35-40 mins of moderate intensity exercise at 2 7-3 1 METS with light strength training component  She was just introduced to weights on 8/30  Resting HR 62-99  EX HR   Resting //92  EX BP's 130/92  Resting SpO2's 97%  EX SpO'2 99-93%  She is on room air  Pt no longer requires telemetry monitoring  The patient currently does follow a formal exercise program at home, including walking outside 2-3 days/week for 10-15 minutes and swimming 2x/wk for 15-20 mins  She admits to no medication changes and only rarely uses her rescue inhaler as needed  She admits her stress is reducing  Current weight is 287, a gain of 1 lbs since eval  She admits she has been working on dietary changes including reducing her consumption of added sugars and carbs  She admits she has colitis which can make eating healthier foods challenging as those are her trigger foods  She can not tolerate raw vegetables, only steamed  Increasing her consumption of chicken and she no longer eats dairy products  She has reduced her consumption of soda, tea and creamers  She admits she is staying hydrated  Her doctor recommended Gatorade packets for electrolyte replacement  She has noticed improvements in her endurance/stamina, especially when walking up incline  Patient goals include: getting back to activity level prior to COVID-19, be able to walk longer w/o SOB, lose weight, heart healthy diet changes  Will continue to monitor  8/16/2022: 30 day ITP for [facility]  Margoth Abbasi has only completed 2 sessions since initial eval d/t peer to peer review for insurance authorization  Her insurance authorized her for 24 sessions in the time frame of 7/21/22-10/21/2022  Margoth Abbasi completes 28-35 mins of moderate intensity exercise at 1 9-2 7 METS  Resting HR 84-94  EX HR   Resting //68  EX BP's 138//72  Resting SpO2's 98-95%  EX SpO'2 97-95%  She is on room air  Telemetry reveals NSR  The patient currently does follow a formal exercise program at home, including walking outside 2-3 days/week for 10-15 minutes and swimming 2-3x/wk for 20 mins   Repeat PHQ revealed score of 2 an improvement from 5 and GAD7 score of 2 an improvement from 5  Telemetry reveals NSR w/ isolated PVC  She will no longer wear telemetry to exercise  She admits to no medication changes and only rarely uses her rescue inhaler as needed  Current weight is 288, a gain of 2 lbs since eval  She admits she has been working on dietary changes including reducing her consumption of added sugars and carbs  She admits she has colitis which can make eating healthier foods challenging as those are her trigger foods  She admits she is staying hydrated  She admits to no medication changes  She has not yet noticed an improvement in her SOB symptoms  Patient goals include: getting back to activity level prior to COVID-19, be able to walk longer w/o SOB, lose weight, heart healthy diet changes  Will continue to monitor  7/26/2022: Today is Eli's initial evaluation to begin Pulmonary Rehab for the diagnosis of Post-COVID chronic shortness of breath  Since their diagnosis, the patient has been experiencing increased dyspnea, increased sitting time, decreased physical activity and increased fatigue  They report dyspnea and fatigue when completing ADLs   The patient currently does follow a formal exercise program at home, including walking outside 2-3 days/week for 15 minutes  She would like to exercise more, but her shortness of breath limits her walking duration  Depression screening using the PHQ-9 interprets the patient's score 5-9 = Mild Depression  DEWAYNE-7 screening tool for anxiety suggests 5-9 = Mild anxiety  When addressed, the patient denies having depression/anxiety  She reports she had anxiety when she was first getting frequent PVC's  Now that it is under control with her heart medications, she does not report anxiety  Patient reports excellent social/emotional support  Information to begin using [facility] was provided as well as contact information for counseling through [employer]    PHQ-9 score will be reassessed in 30 days  The patient is a former smoker (quit 10 years ago)  Patient admits to 100% medication compliance  At rest, the patient rated dyspnea 1/10 with SpO2 96% on room air  They completed an initial 6MWT, walking 1080 ft on room air  The patient’s rating of perceived dyspnea during the 6MWT was 3/10 with SpO2 96%  Patient took 0 rest breaks  Telemetry revealed NSR  She will be put on telemetry for her first couple sessions, due to her history of PVC's  Resting  /70 with appropriate hemodynamic response to exercise reaching 142/74  Patient will exercise on room air  Education on smoking cessation, oxygen use, breathing techniques, pulmonary anatomy, exercise for the pulmonary patient, healthy eating, stress, and relaxation will be provided  Patient goals include: getting back to activity level prior to COVID-19, be able to walk longer w/o SOB, lose weight, heart healthy diet changes  Jermain Stratton will attend 24 exercise sessions, 2x/wk for 12-18 weeks beginning 22  Will progress patient as tolerated over the next 30 days        Medication compliance: Yes   Comments: Pt reports to be compliant with medications  Fall Risk: Low   Comments: Ambulates with a steady gait with no assist device    Smoking: Former user    RPD at Rest: 1/10  RPD with Exercise:  3/10    Assessment of progression of lung disease and functional status:  CAT: 15/40  Shortness of breath questionnaire: 50/120      EXERCISE ASSESSMENT and PLAN    Current Exercise Program in Rehab:       Frequency: 2 days/week + supplement w/ home exercise days opposite [place]        Minutes: 40         METS: 3 3-4 6              SpO2: >90%              RPD: 0-3                      HR:    RPE: 4-5         Modalities: Treadmill, Airdyne bike, UBE, Lifecycle and Rower      Strength trainin-3 days / week  12-15 repetitions  1-2 sets per modality    Modalities: Leg Press, Chest Press, Pull Downs and Lateral Raise    Oxygen Needs: on room air at rest and on room air with exercise  Oxygen Goal: Maintain SpO2>90% during exercise    Home Exercise: Type: walking outside, strength training , Frequency: 2-3 days/week, Duration: 10-15 mins  Education: pursed lipped breathing, diaphragmatic breathing, relaxation breathing, home exercise, benefits of exercise for pulmonary disease, RPE scale, RPD scale, O2 saturation monitoring, appropriate O2 response to exercise and education class: Exercise For The Pulmonary Patient    Goals: reduced score on  [facility]CD Shortness of Breath Questionnaire, Improved 6MW distance by 10%, improved exercise tolerance (max METs tolerated in pulmonary rehab), SpO2 >90% during exercise, reduced score on CAT, attend pulmonary rehab regularly and decrease sitting time at home  Progressing:  Goals met: reduced score on  USCD Shortness of Breath Questionnaire, Improved 6MW distance by 10%, improved exercise tolerance (max METs tolerated in pulmonary rehab), SpO2 >90% during exercise, reduced score on CAT, attend pulmonary rehab regularly and decrease sitting time at home , Patient will be encouraged to focus on lifestyle modifications following discharge      Plan: Titrate supplemental oxygen as needed to maintain SpO2>90% with exercise, learn to conserve energy with ADLs , practice breathing techniques 3x/day and reduce time sitting at home    Readiness to change: Maintenance: (Maintaining the behavior change)      NUTRITION ASSESSMENT AND PLAN    Weight control:    Starting weight: 286   Current weight:   283  Diabetes: N/A    Goals:reduced BMI to < 25, decreased body fat % <33%  (F), reduced waist circumference <35 inches (F), Improved Rate Your Plate score  >81, 3 1-0%  wt loss, choose lean meat (93-95%), reduce portion sizes of meat to 3oz or less, increase intake of fish, shellfish, use low fat dairy, reduce cheese intake or use reduced-fat, eat 3 or more servings of whole grains a day, Eat 4-5 cups of fruits and vegetables daily, choose low sodium processed foods, choose healthy snacks: light popcorn, plain pretzels, Increase intake of nuts and seeds, seldom eat or choose low fat ice-cream, fruit juice bars or frozen yogurt , eliminate or choose low-fat sweets and daily saturated fat intake <7%/13g  Education: heart healthy eating  low sodium diet  wt  loss   portion control  education class: Heart Healthy Eating  education class:  Label Reading  education class: healthy choices for managing pulmonary illness  Progressing:Goals met: reduced BMI to < 25, decreased body fat % <33%  (F), reduced waist circumference <35 inches (F), Improved Rate Your Plate score  >68, 5 9-8%  wt loss, choose lean meat (93-95%), reduce portion sizes of meat to 3oz or less, increase intake of fish, shellfish, use low fat dairy, reduce cheese intake or use reduced-fat, eat 3 or more servings of whole grains a day, Eat 4-5 cups of fruits and vegetables daily, choose low sodium processed foods, choose healthy snacks: light popcorn, plain pretzels, Increase intake of nuts and seeds, seldom eat or choose low fat ice-cream, fruit juice bars or frozen yogurt , eliminate or choose low-fat sweets and daily saturated fat intake <7%/13g , Patient will be encouraged to focus on lifestyle modifications following discharge    Plan: switch to low fat cheeses, replace refined grain bread with whole grain bread, replace unhealthy snacks with fruits & vegs, reduce portion sizes, reduce red meat 1x/wk, eat fewer desserts and sweets, avoid processed foods, will try new grains like brown rice, quinoa, farro, learn how to read food labels and keep added daily sugar <25g/day  Readiness to change: Maintenance: (Maintaining the behavior change)      PSYCHOSOCIAL ASSESSMENT AND PLAN    Emotional:  Depression assessment:  PHQ-9 = 1 1-4 = Minimal Depression            Anxiety measure:  DEWAYNE-7 = 2 0-4  = Not anxious  Self-reported stress level: 7   Social support: Very Good    Goals:  Reduce perceived stress to 1-3/10, improved [facility] QOL < 27, PHQ-9 - reduced severity by one level, Feelings in [facility] Score < 3, Physical Fitness in [facility] Score < 3, Social Support in [place] Score < 3, Daily Activity in [place] Score < 3, Social Activities in [facility] Score < 3, Pain in [facility] Score < 3, Overall Health in [facility] Score < 3, Quality of Life in [facility] Score < 3 , Change in Health in [facility] Score < 3 , increased energy and take time to relax  Education: benefits of a positive support system, stress management techniques, class:  Stress and Your Health , class:  Relaxation and class:  Stress and Pulmonary Disease    Progressing:Goals not met: Overall Helaht score in [facility] <3   , Goals met: Reduce perceived stress to 1-3/10, improved [facility] QOL < 27, PHQ-9 - reduced severity by one level, Feelings in [facility] Score < 3, Physical Fitness in [facility] Score < 3, Social Support in [place] Score < 3, Daily Activity in [place] Score < 3, Social Activities in [place] Score < 3, Pain in [facility] Score < 3, Quality of Life in [facility] Score < 3 , Change in Health in [facility] Score < 3 , increased energy and take time to relax  , Patient will be encouraged to focus on lifestyle modifications following discharge    Plan: Practice relaxation techniques, Exercise, Keep a positive mindset, Learn how to relax and slow down and Repeat PHQ-9 every 30 days if score >5  Readiness to change: Maintenance: (Maintaining the behavior change)      OTHER CORE COMPONENTS     Tobacco:   Social History     Tobacco Use   Smoking Status Former Smoker   • Packs/day: 0 25   • Years: 15 00   • Pack years: 3 75   • Types: Cigarettes   • Quit date:    • Years since quittin 8   Smokeless Tobacco Never Used       Tobacco Use Intervention: Referral to tobacco expert:   N/A: Pt has a remote history of smoking    Blood pressure:    Restin/80 - 124/72   Exercise: 142//78    Goals: consistent BP < 130/80, reduced dietary sodium <2300mg, moderate intensity exercise >150 mins/wk and medication compliance  Education:  pathophysiology of pulmonary disease, inspiratory muscle training, bronchodilators and EDU: Exercise for the Pulm Pt  Progressing:Goals met: consistent BP < 130/80, reduced dietary sodium <2300mg, moderate intensity exercise >150 mins/wk and medication compliance , Patient will be encouraged to focus on lifestyle modifications following discharge    Plan: Avoid Processed foods, engage in regular exercise and check labels for sodium content  Readiness to change: Maintenance: (Maintaining the behavior change)

## 2022-10-24 ENCOUNTER — OFFICE VISIT (OUTPATIENT)
Dept: HEMATOLOGY ONCOLOGY | Facility: CLINIC | Age: 47
End: 2022-10-24
Payer: MEDICARE

## 2022-10-24 VITALS
HEART RATE: 86 BPM | TEMPERATURE: 97.6 F | OXYGEN SATURATION: 98 % | SYSTOLIC BLOOD PRESSURE: 136 MMHG | DIASTOLIC BLOOD PRESSURE: 88 MMHG | HEIGHT: 64 IN | BODY MASS INDEX: 49 KG/M2 | WEIGHT: 287 LBS | RESPIRATION RATE: 18 BRPM

## 2022-10-24 DIAGNOSIS — D56.3 BETA THALASSEMIA MINOR: ICD-10-CM

## 2022-10-24 DIAGNOSIS — D72.829 LEUKOCYTOSIS, UNSPECIFIED TYPE: ICD-10-CM

## 2022-10-24 DIAGNOSIS — R71.8 MICROCYTOSIS: Primary | ICD-10-CM

## 2022-10-24 DIAGNOSIS — D75.839 THROMBOCYTOSIS: ICD-10-CM

## 2022-10-24 PROCEDURE — 99214 OFFICE O/P EST MOD 30 MIN: CPT

## 2022-10-24 NOTE — PROGRESS NOTES
[id number] [facility]  [facility]OLOGY [facility]Y SPECIAL[facility] [address] [address]  Hematology Ambulatory Follow-Up  Jarvis, 1975, [id number]  10/24/2022    Assessment/Plan:    1  Microcytosis  2  Beta thalassemia minor  Ms Ernestina Vazquez is a 60-year-old female with post COVID symptoms seen in consultation in February 2022 for leukocytosis, thrombocytosis, and microcytosis  She has a significant family history beta thalassemia and is newly diagnosed herself  She has no evidence of iron deficiency at this time and hemoglobin remained stable  3  Thrombocytosis  4  Leukocytosis, unspecified type  Elevation in platelets and white blood cells are likely due to chronic inflammation  Most recently her sed rate and CRP were slightly more elevated than previously  She continues to follow with Pulmonary for post COVID symptoms  She knows the importance of staying hydrated  She also has chronic colitis which could also be contributing to her leukocytosis  We discussed that at this time a bone marrow biopsy is not necessary  She will repeat labs in 3 months prior to follow-up with me in 6 months  She knows to call the office with any new or concerning symptoms     - CBC and differential; Standing  - Comprehensive metabolic panel; Standing  - C-reactive protein; Standing  - Sedimentation rate, automated; Standing  - Peripheral Smear; Standing      The patient is scheduled for follow-up in approximately 6 months  Patient voiced agreement and understanding to the above  Patient knows to call the Hematology/Oncology office with any questions and concerns regarding the above        Barrier(s) to care: None  The patient is able to self care   ------------------------------------------------------------------------------------------------------    Chief Complaint   Patient presents with   • Follow-up     4 month follow-up     History of present illness:   Princess Sue is a 60-year-old female who was seen in consultation in February 2022 for macrocytosis, thrombocytosis and leukocytosis  She has been having symptoms since COVID in December of 2020 and following with multiple specialists  She has a significant family history of thalassemia      09/16/2021:  Hemoglobin 12 7, MCV 69, WBC 14 16, platelets 598, ANC 31 7  01/27/2021:  Hemoglobin 12 5, MCV 71, WBC 10 15, platelets 272, ANC 8 87  05/03/2021:  Hemoglobin 12 8 MCV 72, WBC 9 80, platelets 164, differential normal  07/19/2021:  Hemoglobin 13 4, MCV 71, WBC 9 54, platelets 506  40/98/8009:  Hemoglobin 13 5, MCV 72, WBC 12 40, platelets 040   6/76/7383:  Hemoglobin 13 1, MCV 72, platelets 601, WBC 78 18, ferritin 255, iron saturation 25%, TIBC 355, serum iron 88  ODILIA, RF: Negative  BCR/ABL, leukemia lymphoma flow cytometry: Negative  JAK2, CALR, MPL: Negative  Peripheral smear:  Mild elevation basophils  Sed rate: 65  C reactive protein: 41 2  Hgb Fractionation Cascade: Hgb a 95%, A2 Quant 5%, consistent with beta-Thalassemia Minor    10/18/2022:  Hemoglobin 12 6, MCV 72, platelets 154, WBC 86 69   Monocytes 13%, atypical lymphocytes 1%   Sed rate 52, CRP 27 5      Interval history:   She recently finished with pulmonary rehab which helped a lot with her breathing  She continues with good and bad days for with hand stiffness and lower back discomfort  She continues to follow with her PCP, Cardiology, and Pulmonary  At the time of her most recent blood work she was finishing a steroid pack due to back pain/spasm  Review of Systems   Constitutional: Negative for activity change, appetite change, fatigue (improved, rare) and fever  HENT: Negative for trouble swallowing and voice change  Eyes: Negative for photophobia and visual disturbance  Respiratory: Negative for cough, chest tightness, shortness of breath (improved) and wheezing  Cardiovascular: Positive for palpitations  Negative for chest pain and leg swelling  Gastrointestinal: Negative for abdominal pain, blood in stool, constipation, diarrhea, nausea and vomiting  Endocrine: Negative for cold intolerance and heat intolerance  Genitourinary: Negative for dysuria, hematuria and menstrual problem  Musculoskeletal: Positive for arthralgias (hands and lower back)  Negative for joint swelling and myalgias  Skin: Negative for pallor and rash  Neurological: Negative for dizziness, weakness, light-headedness and headaches  Hematological: Negative for adenopathy  Does not bruise/bleed easily  Psychiatric/Behavioral: Negative for confusion and sleep disturbance         Patient Active Problem List   Diagnosis   • Palpitations   • Moderate asthma without complication   • Anemia   • Hashimoto's disease   • Thalassemia   • Hypothyroidism   • Microcytosis   • Family history of thalassemia   • Thrombocytosis   • Leukocytosis   • PVC (premature ventricular contraction)   • Post-acute sequelae of COVID-19 ([facility])   • Class 3 severe obesity with serious comorbidity and body mass index (BMI) of 45 0 to 49 9 in adult [facility])   • Beta thalassemia minor       Past Medical History:   Diagnosis Date   • Anemia 2021   • No known health problems        Past Surgical History:   Procedure Laterality Date   • NO PAST SURGERIES         Family History   Problem Relation Age of Onset   • Cardiomyopathy Mother    • Anxiety disorder Mother    • No Known Problems Father    • Anxiety disorder Brother    • Heart attack Maternal Grandfather    • Thalassemia Maternal Aunt    • Lupus Family        Social History     Socioeconomic History   • Marital status: Single     Spouse name: None   • Number of children: None   • Years of education: None   • Highest education level: None   Occupational History   • None   Tobacco Use   • Smoking status: Former Smoker     Packs/day: 0 25     Years: 15 00     Pack years: 3 75     Types: Cigarettes     Quit date:      Years since quittin 8   • Smokeless tobacco: Never Used   Vaping Use   • Vaping Use: Never used   Substance and Sexual Activity   • Alcohol use: Not Currently   • Drug use: Never   • Sexual activity: Yes     Partners: Male     Birth control/protection: Condom Male, Rhythm, None   Other Topics Concern   • None   Social History Narrative   • None     Social Determinants of Health     Financial Resource Strain: Not on file   Food Insecurity: Not on file   Transportation Needs: Not on file   Physical Activity: Not on file   Stress: Not on file   Social Connections: Not on file   Intimate Partner Violence: Not on file   Housing Stability: Not on file         Current Outpatient Medications:   •  albuterol (PROVENTIL HFA,VENTOLIN HFA) 90 mcg/act inhaler, inhale 2 puffs by mouth and INTO THE LUNGS every 6 hours if needed for wheezing, Disp: , Rfl:   •  fluticasone-vilanterol (Breo Ellipta) 200-25 MCG/INH inhaler, Inhale 1 puff daily Rinse mouth after use , Disp: 180 blister, Rfl: 0  •  ketoconazole (NIZORAL) 2 % cream, Apply topically twice a day when rash is active, apply topically twice a week when not active, Disp: 60 g, Rfl: 11  •  levothyroxine (Synthroid) 75 mcg tablet, Take 1 tablet (75 mcg total) by mouth daily in the early morning, Disp: 30 tablet, Rfl: 5  •  metoprolol tartrate (LOPRESSOR) 50 mg tablet, Take 1 tablet (50 mg total) by mouth 2 (two) times a day, Disp: 180 tablet, Rfl: 3  •  doxycycline (PERIOSTAT) 20 MG tablet, Take 1 tablet (20 mg total) by mouth daily With a full glass of water and food (Patient not taking: Reported on 10/24/2022), Disp: 90 tablet, Rfl: 3  •  methocarbamol (ROBAXIN) 500 mg tablet, Take 1 tablet (500 mg total) by mouth daily at bedtime as needed for muscle spasms (Patient not taking: Reported on 10/24/2022), Disp: 3 tablet, Rfl: 0  •  methylPREDNISolone 4 MG tablet therapy pack, Use as directed on package (Patient not taking: Reported on 10/24/2022), Disp: 21 each, Rfl: 0    No Known Allergies    Objective:  /88 (BP Location: Left arm, Patient Position: Sitting, Cuff Size: Large)   Pulse 86   Temp 97 6 °F (36 4 °C) (Temporal)   Resp 18   Ht 5' 4" (1 626 m)   Wt 130 kg (287 lb)   LMP 10/03/2022 (Exact Date)   SpO2 98%   BMI 49 26 kg/m²    Physical Exam  Constitutional:       General: She is not in acute distress  Appearance: Normal appearance  She is not ill-appearing  Eyes:      Extraocular Movements: Extraocular movements intact  Conjunctiva/sclera: Conjunctivae normal    Cardiovascular:      Rate and Rhythm: Normal rate and regular rhythm  Pulses: Normal pulses  Heart sounds: Normal heart sounds  Pulmonary:      Effort: Pulmonary effort is normal  No respiratory distress  Breath sounds: Normal breath sounds  No wheezing or rhonchi  Abdominal:      General: Bowel sounds are normal  There is no distension  Palpations: Abdomen is soft  Tenderness: There is no abdominal tenderness  Musculoskeletal:      Cervical back: Neck supple  Right lower leg: No edema  Left lower leg: No edema  Lymphadenopathy:      Cervical: No cervical adenopathy  Skin:     General: Skin is warm and dry  Capillary Refill: Capillary refill takes less than 2 seconds  Coloration: Skin is not pale  Findings: No bruising  Neurological:      General: No focal deficit present  Mental Status: She is alert and oriented to person, place, and time  Mental status is at baseline  Motor: No weakness  Gait: Gait normal    Psychiatric:         Mood and Affect: Mood normal          Behavior: Behavior normal          Thought Content:  Thought content normal          Judgment: Judgment normal          Result Review  Labs:  Appointment on 10/18/2022   Component Date Value Ref Range Status   • Segmented Neutrophils Manual 10/18/2022 50  45 - 77 % Final   • Lymphocytes Manual 10/18/2022 33  14 - 44 % Final   • Monocytes Manual 10/18/2022 13 (A) 4 - 12 % Final   • Eosinophils Manual 10/18/2022 3  0 - 6 % Final   • Atypical Lymphocytes Relative 10/18/2022 1 (A) 0 - 0 % Final   • Total Counted 10/18/2022 100   Final   • Poikilocytes 10/18/2022 Present   Final   • Tear Drop Cells 10/18/2022 Present   Final   • Platelet Estimate 63/63/3394 Adequate  Adequate Final   • Sed Rate 10/18/2022 52 (A) 0 - 19 mm/hour Final   • Sodium 10/18/2022 136  135 - 147 mmol/L Final   • Potassium 10/18/2022 4 3  3 5 - 5 3 mmol/L Final   • Chloride 10/18/2022 107  96 - 108 mmol/L Final   • CO2 10/18/2022 26  21 - 32 mmol/L Final   • ANION GAP 10/18/2022 3 (A) 4 - 13 mmol/L Final   • BUN 10/18/2022 12  5 - 25 mg/dL Final   • Creatinine 10/18/2022 1 04  0 60 - 1 30 mg/dL Final    Standardized to IDMS reference method   • Glucose, Fasting 10/18/2022 95  65 - 99 mg/dL Final    Specimen collection should occur prior to Sulfasalazine administration due to the potential for falsely depressed results  Specimen collection should occur prior to Sulfapyridine administration due to the potential for falsely elevated results  • Calcium 10/18/2022 9 5  8 3 - 10 1 mg/dL Final   • Corrected Calcium 10/18/2022 10 1  8 3 - 10 1 mg/dL Final   • AST 10/18/2022 23  5 - 45 U/L Final    Specimen collection should occur prior to Sulfasalazine administration due to the potential for falsely depressed results  • ALT 10/18/2022 43  12 - 78 U/L Final    Specimen collection should occur prior to Sulfasalazine and/or Sulfapyridine administration due to the potential for falsely depressed results  • Alkaline Phosphatase 10/18/2022 56  46 - 116 U/L Final   • Total Protein 10/18/2022 7 1  6 4 - 8 4 g/dL Final   • Albumin 10/18/2022 3 3 (A) 3 5 - 5 0 g/dL Final   • Total Bilirubin 10/18/2022 0 55  0 20 - 1 00 mg/dL Final    Use of this assay is not recommended for patients undergoing treatment with eltrombopag due to the potential for falsely elevated results     • eGFR 10/18/2022 64  ml/min/1 73sq m Final • CRP 10/18/2022 27 5 (A) <3 0 mg/L Final   • WBC 10/18/2022 13 24 (A) 4 31 - 10 16 Thousand/uL Final   • RBC 10/18/2022 5 85 (A) 3 81 - 5 12 Million/uL Final   • Hemoglobin 10/18/2022 12 6  11 5 - 15 4 g/dL Final   • Hematocrit 10/18/2022 42 1  34 8 - 46 1 % Final   • MCV 10/18/2022 72 (A) 82 - 98 fL Final   • MCH 10/18/2022 21 5 (A) 26 8 - 34 3 pg Final   • MCHC 10/18/2022 29 9 (A) 31 4 - 37 4 g/dL Final   • RDW 10/18/2022 16 6 (A) 11 6 - 15 1 % Final   • MPV 10/18/2022 9 8  8 9 - 12 7 fL Final   • Platelets 11/61/4229 433 (A) 149 - 390 Thousands/uL Final   • nRBC 10/18/2022 0  /100 WBCs Final       Imaging:   No relevant imaging to review    Please note: This report has been generated by a voice recognition software system  Therefore there may be syntax, spelling, and/or grammatical errors  Please call if you have any questions

## 2022-10-25 ENCOUNTER — APPOINTMENT (OUTPATIENT)
Dept: PULMONOLOGY | Age: 47
End: 2022-10-25

## 2022-10-27 ENCOUNTER — APPOINTMENT (OUTPATIENT)
Dept: PULMONOLOGY | Age: 47
End: 2022-10-27

## 2022-10-28 ENCOUNTER — OFFICE VISIT (OUTPATIENT)
Dept: FAMILY MEDICINE CLINIC | Facility: CLINIC | Age: 47
End: 2022-10-28

## 2022-10-28 VITALS
RESPIRATION RATE: 17 BRPM | WEIGHT: 285.5 LBS | SYSTOLIC BLOOD PRESSURE: 132 MMHG | DIASTOLIC BLOOD PRESSURE: 84 MMHG | TEMPERATURE: 97.3 F | BODY MASS INDEX: 48.74 KG/M2 | HEIGHT: 64 IN | HEART RATE: 87 BPM

## 2022-10-28 DIAGNOSIS — M54.50 LUMBAR BACK PAIN: ICD-10-CM

## 2022-10-28 DIAGNOSIS — E66.01 CLASS 3 SEVERE OBESITY WITH SERIOUS COMORBIDITY AND BODY MASS INDEX (BMI) OF 45.0 TO 49.9 IN ADULT, UNSPECIFIED OBESITY TYPE (HCC): ICD-10-CM

## 2022-10-28 DIAGNOSIS — E06.3 HYPOTHYROIDISM DUE TO HASHIMOTO'S THYROIDITIS: ICD-10-CM

## 2022-10-28 DIAGNOSIS — U09.9 POST-ACUTE SEQUELAE OF COVID-19 (PASC): Primary | ICD-10-CM

## 2022-10-28 DIAGNOSIS — R00.2 PALPITATIONS: ICD-10-CM

## 2022-10-28 DIAGNOSIS — E03.8 HYPOTHYROIDISM DUE TO HASHIMOTO'S THYROIDITIS: ICD-10-CM

## 2022-10-28 DIAGNOSIS — Z12.11 SCREENING FOR COLON CANCER: ICD-10-CM

## 2022-10-28 DIAGNOSIS — R10.30 LOWER ABDOMINAL PAIN: ICD-10-CM

## 2022-10-28 NOTE — PROGRESS NOTES
Name: Denis Tijerina      : 1975      MRN: [id number]  Encounter Provider: SANGEETA Mack  Encounter Date: 10/28/2022   Encounter department: [address] [place] [address] Road     1  Post-acute sequelae of COVID-19 ([facility])    2  Hypothyroidism due to Hashimoto's thyroiditis  -     TSH, 3rd generation; Future    3  Palpitations  -     Lipid panel; Future    4  Lumbar back pain    5  Lower abdominal pain    6  Class 3 severe obesity with serious comorbidity and body mass index (BMI) of 45 0 to 49 9 in adult, unspecified obesity type ([facility] [address] )    7  Screening for colon cancer  -     Cologuard  #1 Post acute sequelae of COVID-19  Stable - continue on medication and follow-up with pulmonary as needed  #2 Hypothyroidism due to Hashimoto's thyroiditis  Discussed with patient plan to add on TSH for next blood draw for hematology  #3 Palpitations  Stable - continue on medication and follow-up with cardiology annually  #4 Lumbar back pain  Stable - continue with physical activity level and recheck in 6 months  #5 Lower abdominal pain  Discussed with patient that pains may be related to a little IBS or colitis causing intermittent spasms  #6 Class 3 severe obesity with serious commodity and body mass index of 45 to 49 9 in adult  Discussed with patient to continue to watch caloric intake and stay physically active as much as possible  #7 Screening for colon cancer  Discussed with patient all options for colorectal cancer screenings and she is agreeable to perform the Cologuard       Subjective      56 y  o female presenting for a six month follow-up for chronic conditions  She completed the pulmonary rehabilitation and continues to have shortness of breath with certain activities  She reports the [facility] keeps the wheezing controlled  But still wheezes with certain activities  She continues with intermittent palpitations but knows triggers so tries to avoid them   She has her next appointment with hematology in January and has labs ordered for it  She reports that she occasionally will develop intermittent quickly resolving lower abdominal pains  She seems to notice it when her bladder is full or some back pains with the abdominal pains after defecation  Review of Systems   Constitutional: Negative for activity change, appetite change and unexpected weight change  HENT: Negative for dental problem, ear pain, hearing loss, nosebleeds, sneezing, sore throat, tinnitus and trouble swallowing  Eyes: Negative for visual disturbance  Respiratory: Negative for cough, chest tightness, shortness of breath and wheezing  Cardiovascular: Negative for chest pain, palpitations and leg swelling  Gastrointestinal: Positive for abdominal pain  Negative for abdominal distention, constipation, diarrhea and nausea  Endocrine: Negative for polydipsia, polyphagia and polyuria  Genitourinary: Negative  Musculoskeletal: Negative for arthralgias, back pain, myalgias and neck pain  Skin: Negative for color change and rash  Neurological: Negative for dizziness, weakness, light-headedness and headaches  Psychiatric/Behavioral: Negative  Negative for dysphoric mood and sleep disturbance  The patient is not nervous/anxious  Current Outpatient Medications on File Prior to Visit   Medication Sig   • albuterol (PROVENTIL HFA,VENTOLIN HFA) 90 mcg/act inhaler inhale 2 puffs by mouth and INTO THE LUNGS every 6 hours if needed for wheezing   • doxycycline (PERIOSTAT) 20 MG tablet Take 1 tablet (20 mg total) by mouth daily With a full glass of water and food   • fluticasone-vilanterol (Breo Ellipta) 200-25 MCG/INH inhaler Inhale 1 puff daily Rinse mouth after use     • ketoconazole (NIZORAL) 2 % cream Apply topically twice a day when rash is active, apply topically twice a week when not active   • levothyroxine (Synthroid) 75 mcg tablet Take 1 tablet (75 mcg total) by mouth daily in the early morning   • metoprolol tartrate (LOPRESSOR) 50 mg tablet Take 1 tablet (50 mg total) by mouth 2 (two) times a day   • [DISCONTINUED] methocarbamol (ROBAXIN) 500 mg tablet Take 1 tablet (500 mg total) by mouth daily at bedtime as needed for muscle spasms (Patient not taking: Reported on 10/28/2022)   • [DISCONTINUED] methylPREDNISolone 4 MG tablet therapy pack Use as directed on package (Patient not taking: No sig reported)       Objective     /84   Pulse 87   Temp (!) 97 3 °F (36 3 °C)   Resp 17   Ht 5' 4" (1 626 m)   Wt 130 kg (285 lb 8 oz)   LMP 10/03/2022 (Exact Date)   BMI 49 01 kg/m² (Reviewed)    Physical Exam  Vitals reviewed  Constitutional:       General: She is not in acute distress  Appearance: She is well-developed and well-groomed  She is not ill-appearing  HENT:      Head: Normocephalic and atraumatic  Right Ear: External ear normal       Left Ear: External ear normal       Nose: Nose normal       Mouth/Throat:      Mouth: Mucous membranes are moist       Pharynx: Oropharynx is clear  Eyes:      General: Lids are normal       Extraocular Movements: Extraocular movements intact  Conjunctiva/sclera: Conjunctivae normal       Pupils: Pupils are equal, round, and reactive to light  Neck:      Trachea: Trachea and phonation normal    Cardiovascular:      Rate and Rhythm: Normal rate and regular rhythm  Heart sounds: Normal heart sounds  No murmur heard  No friction rub  No gallop  Pulmonary:      Effort: Pulmonary effort is normal       Breath sounds: Normal breath sounds  Abdominal:      General: Bowel sounds are normal  There is no distension  Palpations: Abdomen is soft  Tenderness: There is no abdominal tenderness  There is no guarding or rebound  Musculoskeletal:      Cervical back: Neck supple  Skin:     General: Skin is warm and dry  Capillary Refill: Capillary refill takes less than 2 seconds     Neurological:      General: No focal deficit present  Mental Status: She is alert and oriented to person, place, and time  Psychiatric:         Mood and Affect: Mood normal          Behavior: Behavior normal  Behavior is cooperative  Thought Content:  Thought content normal        SANGEETA Bone

## 2022-12-12 ENCOUNTER — TELEPHONE (OUTPATIENT)
Dept: PULMONOLOGY | Facility: CLINIC | Age: 47
End: 2022-12-12

## 2022-12-12 DIAGNOSIS — J45.40 MODERATE PERSISTENT ASTHMA WITHOUT COMPLICATION: Primary | ICD-10-CM

## 2022-12-12 RX ORDER — FLUTICASONE FUROATE AND VILANTEROL 200; 25 UG/1; UG/1
1 POWDER RESPIRATORY (INHALATION) DAILY
Qty: 180 BLISTER | Refills: 0 | Status: SHIPPED | OUTPATIENT
Start: 2022-12-12 | End: 2023-03-12

## 2022-12-21 ENCOUNTER — TELEMEDICINE (OUTPATIENT)
Dept: FAMILY MEDICINE CLINIC | Facility: CLINIC | Age: 47
End: 2022-12-21

## 2022-12-21 DIAGNOSIS — J02.9 PHARYNGITIS, UNSPECIFIED ETIOLOGY: Primary | ICD-10-CM

## 2022-12-21 RX ORDER — AZITHROMYCIN 250 MG/1
TABLET, FILM COATED ORAL
Qty: 6 TABLET | Refills: 0 | Status: SHIPPED | OUTPATIENT
Start: 2022-12-21 | End: 2022-12-25

## 2022-12-21 NOTE — PROGRESS NOTES
Virtual Regular Visit    Verification of patient location:    Patient is located in the following state in which I hold an active license [place]      Assessment/Plan:    Problem List Items Addressed This Visit    None  Visit Diagnoses     Pharyngitis, unspecified etiology    -  Primary    Relevant Medications    azithromycin (ZITHROMAX) 250 mg tablet               Reason for visit is   Chief Complaint   Patient presents with   • Virtual Regular Visit        Encounter provider Isaias Rosado [facility] ([place], [place])    Provider located at [address] [place] [address] [phone] [facility]      Recent Visits  No visits were found meeting these conditions  Showing recent visits within past 7 days and meeting all other requirements  Today's Visits  Date Type Provider Dept   12/21/22 Telemedicine Maren Conley [facility] 429 today's visits and meeting all other requirements  Future Appointments  No visits were found meeting these conditions  Showing future appointments within next 150 days and meeting all other requirements       The patient was identified by name and date of birth  Donn Broussard was informed that this is a telemedicine visit and that the visit is being conducted through the [employer]  She agrees to proceed     My office door was closed  No one else was in the room  She acknowledged consent and understanding of privacy and security of the video platform  The patient has agreed to participate and understands they can discontinue the visit at any time  Patient is aware this is a billable service  Shuan Bray is a 56 y o  female     56 y  o female presenting with sore throat with pressure from right ear, some nasal congestion and postnasal drip for the past 24 hours  Patient denies fever, chills, headache, generalized body aches, respiratory or GI symptoms  Patient reports that her throat hurts more after she is drank something  Patient reports that her throat feels thick with secretions making it difficult to swallow at times and giving her a raspy voice  Patient denies any dizziness or lightheadedness  Past Medical History:   Diagnosis Date   • Anemia 12/8/2021   • No known health problems        Past Surgical History:   Procedure Laterality Date   • NO PAST SURGERIES         Current Outpatient Medications   Medication Sig Dispense Refill   • azithromycin (ZITHROMAX) 250 mg tablet Take 2  tabs on Day 1 than 1 tab Days 2-5 6 tablet 0   • albuterol (PROVENTIL HFA,VENTOLIN HFA) 90 mcg/act inhaler inhale 2 puffs by mouth and INTO THE LUNGS every 6 hours if needed for wheezing     • fluticasone-vilanterol (Breo Ellipta) 200-25 mcg/actuation inhaler Inhale 1 puff daily Rinse mouth after use  180 blister 0   • ketoconazole (NIZORAL) 2 % cream Apply topically twice a day when rash is active, apply topically twice a week when not active 60 g 11   • levothyroxine (Synthroid) 75 mcg tablet Take 1 tablet (75 mcg total) by mouth daily in the early morning 30 tablet 5   • metoprolol tartrate (LOPRESSOR) 50 mg tablet Take 1 tablet (50 mg total) by mouth 2 (two) times a day 180 tablet 3     No current facility-administered medications for this visit  No Known Allergies    Review of Systems   Constitutional: Negative for chills, fatigue and fever  HENT: Positive for congestion, ear pain, postnasal drip and sore throat  Negative for rhinorrhea, sinus pressure and sinus pain  Respiratory: Negative for cough, chest tightness, shortness of breath and wheezing  Cardiovascular: Negative for chest pain and palpitations  Gastrointestinal: Negative for abdominal distention, abdominal pain, diarrhea and nausea  Musculoskeletal: Negative for myalgias  Neurological: Negative for dizziness, light-headedness and headaches  Psychiatric/Behavioral: Negative  Video Exam    There were no vitals filed for this visit   (Vital signs not performed during visit due to limitations of telemedicine format along with patient's availability to needed equipment)    Physical Exam  Constitutional:       General: She is not in acute distress  Appearance: Normal appearance  She is well-developed and well-groomed  She is not ill-appearing  HENT:      Head: Normocephalic and atraumatic  Right Ear: External ear normal       Left Ear: External ear normal       Nose: Congestion present  Right Sinus: Maxillary sinus tenderness and frontal sinus tenderness present  Left Sinus: Maxillary sinus tenderness and frontal sinus tenderness present  Mouth/Throat:      Lips: Pink  Mouth: Mucous membranes are moist       Pharynx: Oropharyngeal exudate and posterior oropharyngeal erythema present  Eyes:      General: Lids are normal       Extraocular Movements: Extraocular movements intact  Conjunctiva/sclera: Conjunctivae normal       Pupils: Pupils are equal, round, and reactive to light  Neck:      Trachea: Trachea normal    Cardiovascular:      Rate and Rhythm: Normal rate  Pulmonary:      Effort: Pulmonary effort is normal  No respiratory distress  Breath sounds: No wheezing  Musculoskeletal:      Cervical back: Normal range of motion  Neurological:      Mental Status: She is alert and oriented to person, place, and time  Psychiatric:         Mood and Affect: Mood normal          Behavior: Behavior normal  Behavior is cooperative            I spent 12 minutes directly with the patient during this visit

## 2023-01-26 DIAGNOSIS — E06.3 HYPOTHYROIDISM DUE TO HASHIMOTO'S THYROIDITIS: ICD-10-CM

## 2023-01-26 DIAGNOSIS — E03.8 HYPOTHYROIDISM DUE TO HASHIMOTO'S THYROIDITIS: ICD-10-CM

## 2023-01-26 RX ORDER — LEVOTHYROXINE SODIUM 0.07 MG/1
75 TABLET ORAL
Qty: 30 TABLET | Refills: 5 | Status: SHIPPED | OUTPATIENT
Start: 2023-01-26

## 2023-03-03 ENCOUNTER — OFFICE VISIT (OUTPATIENT)
Dept: CARDIOLOGY CLINIC | Facility: CLINIC | Age: 48
End: 2023-03-03

## 2023-03-03 VITALS
HEART RATE: 80 BPM | SYSTOLIC BLOOD PRESSURE: 105 MMHG | DIASTOLIC BLOOD PRESSURE: 82 MMHG | OXYGEN SATURATION: 97 % | TEMPERATURE: 97.5 F | BODY MASS INDEX: 49.34 KG/M2 | HEIGHT: 64 IN | WEIGHT: 289 LBS

## 2023-03-03 DIAGNOSIS — R00.2 PALPITATIONS: Primary | ICD-10-CM

## 2023-03-03 DIAGNOSIS — I49.3 PVC (PREMATURE VENTRICULAR CONTRACTION): ICD-10-CM

## 2023-03-03 NOTE — PROGRESS NOTES
Cardiology Follow Up    Cleveland Franco  [id number]  1975  [address] [facility] [address] [address] [phone]      1  Palpitations  POCT ECG      2  PVC (premature ventricular contraction)  POCT ECG          Discussion/Summary:    Palpitations remain well controlled  Previously with relatively low burden of PVCs on a Holter monitor  Structurally, no abnormalities on the echocardiogram  Doing well with metoprolol  She is on tartrate  She feels palpitations when he gets close to taking her next metoprolol dose  I offered to change her to succinate, but she is comfortable with what she is doing now and will continue with current treatment  Repeat testing is indicated at this time  I indicated that we may repeat echo or Holter in the future depending on how her palpitations are  She will continue to work with her other specialists to identify potential underlying cause of inflammation or joint symptoms  She mentioned that the hematologist thought she may need a baby aspirin  No problem from my standpoint, but does not need it for any cardiac indication  Interval History:    As for her regular follow-up  With regards to palpitations, she is generally doing well  Does have some triggers which include caffeine, alcohol  She is compliant with metoprolol  This seems to help  When she is due for her next dose, she can tell that she is feeling more palpitations  I offered her changing to Toprol-XL but she is comfortable currently  She is continues to work with hematology, rheumatology, primary care physician to identify cause of overall symptoms  She has follow-up blood work to be done for the hematologist  They are contemplating doing a bone marrow biopsy  She has thrombocytosis and low MCV      Otherwise, she is also getting symptoms of abdominal pain which do seem to be related and improving with food intake and some probiotics  Pulmonary standpoint, her breathing has improved after doing pulmonary rehab        Patient Active Problem List   Diagnosis   • Palpitations   • Moderate asthma without complication   • Anemia   • Hashimoto's disease   • Thalassemia   • Hypothyroidism   • Microcytosis   • Family history of thalassemia   • Thrombocytosis   • Leukocytosis   • PVC (premature ventricular contraction)   • Post-acute sequelae of COVID-19 (PASC)   • Class 3 severe obesity with serious comorbidity and body mass index (BMI) of 45 0 to 49 9 in adult [facility])   • Beta thalassemia minor     Past Medical History:   Diagnosis Date   • Anemia 2021   • No known health problems      Social History     Tobacco Use   • Smoking status: Former     Packs/day: 0 25     Years: 15 00     Pack years: 3 75     Types: Cigarettes     Quit date:      Years since quittin 1   • Smokeless tobacco: Never   Vaping Use   • Vaping Use: Never used   Substance Use Topics   • Alcohol use: Not Currently   • Drug use: Never      Family History   Problem Relation Age of Onset   • Cardiomyopathy Mother    • Anxiety disorder Mother    • No Known Problems Father    • Anxiety disorder Brother    • Heart attack Maternal Grandfather    • Thalassemia Maternal Aunt    • Lupus Family      Past Surgical History:   Procedure Laterality Date   • NO PAST SURGERIES         Current Outpatient Medications:   •  albuterol (PROVENTIL HFA,VENTOLIN HFA) 90 mcg/act inhaler, inhale 2 puffs by mouth and INTO THE LUNGS every 6 hours if needed for wheezing, Disp: , Rfl:   •  fluticasone-vilanterol (Breo Ellipta) 200-25 mcg/actuation inhaler, Inhale 1 puff daily Rinse mouth after use , Disp: 180 blister, Rfl: 0  •  ketoconazole (NIZORAL) 2 % cream, Apply topically twice a day when rash is active, apply topically twice a week when not active, Disp: 60 g, Rfl: 11  •  levothyroxine (Synthroid) 75 mcg tablet, Take 1 tablet (75 mcg total) by mouth daily in the early morning, Disp: 30 tablet, Rfl: 5  •  metoprolol tartrate (LOPRESSOR) 50 mg tablet, Take 1 tablet (50 mg total) by mouth 2 (two) times a day, Disp: 180 tablet, Rfl: 3  No Known Allergies    Vitals:    03/03/23 1120   BP: 105/82   BP Location: Left arm   Patient Position: Sitting   Cuff Size: Large   Pulse: 80   Temp: 97 5 °F (36 4 °C)   SpO2: 97%   Weight: 131 kg (289 lb)   Height: 5' 4" (1 626 m)     Vitals:    03/03/23 1120   Weight: 131 kg (289 lb)      Height: 5' 4" (162 6 cm)   Body mass index is 49 61 kg/m²  Physical Exam:  GEN: Wendy Salazar appears well, alert and oriented x 3, pleasant and cooperative   HEENT: pupils equal, round, and reactive to light; extraocular muscles intact  NECK: supple, no carotid bruits   HEART: regular rhythm, normal S1 and S2, no murmurs, clicks, gallops or rubs   LUNGS: clear to auscultation bilaterally; no wheezes, rales, or rhonchi   ABDOMEN: normal bowel sounds, soft, no tenderness, no distention  EXTREMITIES: peripheral pulses normal; no clubbing, cyanosis, or edema  NEURO: no focal findings   SKIN: normal without suspicious lesions on exposed skin        ROS:  Positive for anxiety, palpitations, shortness of breath though improved  Joint pains  Except as noted in HPI, is otherwise reviewed in detail and a 12 point review of systems is negative    ROS reviewed and is unchanged    Labs:  Lab Results   Component Value Date    SODIUM 136 10/18/2022    K 4 3 10/18/2022     10/18/2022    CREATININE 1 04 10/18/2022    BUN 12 10/18/2022    CO2 26 10/18/2022    ALT 43 10/18/2022    AST 23 10/18/2022    GLUF 95 10/18/2022    WBC 13 24 (H) 10/18/2022    HGB 12 6 10/18/2022    HCT 42 1 10/18/2022     (H) 10/18/2022       No results found for: CHOL  Lab Results   Component Value Date    HDL 44 (L) 01/26/2022     Lab Results   Component Value Date    LDLCALC 121 (H) 01/26/2022     Lab Results   Component Value Date    TRIG 140 01/26/2022       Testing:  Holter 1/2021:  IMPRESSION:  Predominantly sinus rhythm, with an average heart rate of 67 beats per minute  Occasional premature ventricular contractions, constituting 1 7% of total beats  Some of the reported symptoms of "chest pinch" and "fluttering" during the monitoring period, correlated with single PVCs  Rare premature atrial contractions  No significant pauses or advanced degree heart block    Echo 1/2021:  IMPRESSIONS:  The study was within normal limits      SUMMARY     LEFT VENTRICLE:  Size was normal   Systolic function was normal  Ejection fraction was estimated to be 60 %  There were no regional wall motion abnormalities  Wall thickness was normal   There was no evidence of concentric hypertrophy      TRICUSPID VALVE:  There was trace regurgitation  EKG:   Sinus rhythm  85 bpm  Poor R wave progression  Low voltage in precordial leads

## 2023-03-07 LAB — COLOGUARD RESULT REPORTABLE: NEGATIVE

## 2023-03-09 ENCOUNTER — TELEPHONE (OUTPATIENT)
Dept: HEMATOLOGY ONCOLOGY | Facility: CLINIC | Age: 48
End: 2023-03-09

## 2023-03-09 NOTE — TELEPHONE ENCOUNTER
Appointment Cancellation or Reschedule     Person calling in Patient   If someone other than patient calling, are they listed on the communication consent form? N/A   Provider Eva Corrigan, [address]    Office Visit Date and Time 5/3/23 @ 10am   Office Visit Location [facility]   Did patient want to reschedule their office appointment? If so, when was it scheduled to? Yes 5/3/23 @ 1:30pm   Did you have STAR scheduled for this appointment? no   Do you need STAR set up for your new appointment? If yes, please send to "PATIENT RIDESHARE" pool for STAR rescheduling no   Is this patient calling to reschedule an infusion appointment? no   When is their next infusion appointment? na   Is this patient a Chemo patient? No   Reason for Cancellation or Reschedule Patient needed a later time for visit   Was No show policy reviewed with patient if patient canceling within 24 hours? yes     If the patient is cancelling an appointment and needs their STAR Transport cancelled, please route to [facility] [address]  If the patient is a treatment patient, please route this to the office nurse  If the patient is not on treatment, please route to the Clerical pool based on location  If the patient is a surgical oncology patient, please route to surg/onc clinical pool  Route message as high priority if same day cancellation

## 2023-03-09 NOTE — TELEPHONE ENCOUNTER
L/M to R/S appt for 4/28 with Rocael Ramirezlakeshia Rodney will not be at [facility]  on that day  Rocael Roselia is only at [facility]  Tuesday and Wednesday  She will now be in [place] on Monday, Thursday and Friday  If pt needs a day at [facility]  that she will not be here, you can schedule w another PA   Left Hopeline # to R/S

## 2023-03-09 NOTE — TELEPHONE ENCOUNTER
Appointment Cancellation or Reschedule     Person calling in Patient   If someone other than patient calling, are they listed on the communication consent form? N/A   Provider Diamond Denis [address]   Office Visit Date and Time  4/28/23 10:30AM   Office Visit Location [facility]   Did patient want to reschedule their office appointment? If so, when was it scheduled to? Yes, 5/3/23 10:00AM   Did you have STAR scheduled for this appointment? No   Do you need STAR set up for your new appointment? If yes, please send to "PATIENT RIDESHARE" pool for STAR rescheduling No   Is this patient calling to reschedule an infusion appointment? No   When is their next infusion appointment? N/A   Is this patient a Chemo patient? No   Reason for Cancellation or Reschedule Provider   Was No show policy reviewed with patient if patient canceling within 24 hours? Yes     If the patient is cancelling an appointment and needs their STAR Transport cancelled, please route to [facility] [address]  If the patient is a treatment patient, please route this to the office nurse  If the patient is not on treatment, please route to the Clerical pool based on location  If the patient is a surgical oncology patient, please route to surg/onc clinical pool  Route message as high priority if same day cancellation

## 2023-03-13 ENCOUNTER — OFFICE VISIT (OUTPATIENT)
Dept: PULMONOLOGY | Facility: CLINIC | Age: 48
End: 2023-03-13

## 2023-03-13 VITALS
DIASTOLIC BLOOD PRESSURE: 80 MMHG | HEART RATE: 75 BPM | WEIGHT: 287 LBS | HEIGHT: 64 IN | TEMPERATURE: 97.5 F | BODY MASS INDEX: 49 KG/M2 | OXYGEN SATURATION: 97 % | SYSTOLIC BLOOD PRESSURE: 122 MMHG

## 2023-03-13 DIAGNOSIS — E66.01 CLASS 3 SEVERE OBESITY WITH SERIOUS COMORBIDITY AND BODY MASS INDEX (BMI) OF 45.0 TO 49.9 IN ADULT, UNSPECIFIED OBESITY TYPE (HCC): ICD-10-CM

## 2023-03-13 DIAGNOSIS — J45.40 MODERATE PERSISTENT ASTHMA WITHOUT COMPLICATION: Primary | ICD-10-CM

## 2023-03-13 NOTE — PROGRESS NOTES
Pulmonary Follow Up Note  Doreen Jiménez 53 y o  female MRN: [id number]  3/13/2023      HPI:    Patient states that has been well controlled since using Breo regularly  She states that she is required albuterol as a rescue inhaler frequently since her last visit  She had a significant improvement with physical therapy  Exercise Tolerance: Fair  Continues to feel significantly short of breath after 1 flight of stairs       Meds:  Breo 200 daily  Albuterol as needed, rarely    ROS:  Constitutional: - Fatigue, - chills, - fever, - weight change  HEENT: - rhinorrhea, - sneezing, - sore throat  Respiratory: - cough, + exertional shortness of breath, - wheezing  Cardiovascular: - chest pain,  -palpitations, - leg swelling  Gastrointestinal: - abdominal pain, - constipation, - diarrhea, - nausea, - vomiting  Endocrine: - cold intolerance, - heat intolerance  Genitourinary: - dysuria  Musculoskeletal: - arthralgias  Skin:- rash, - wound  Allergic/Immunologic: - allergies  Neurological: - dizziness, - numbness        Vitals: Blood pressure 122/80, pulse 75, temperature 97 5 °F (36 4 °C), height 5' 4" (1 626 m), weight 130 kg (287 lb), SpO2 97 %, not currently breastfeeding , Body mass index is 49 26 kg/m²      Physical Exam:  GEN  NAD  HEENT  ncat, non icteric, MM moist  NECK  supple, no JVD, no LAD  CV  +s1s2, no mrg, RRR  PULM  CTA BL, no wrr  ABD  soft, nt, obese, + BS  EXT  no cyanosis, no clubbing  NEURO  Aox3, no focal weakness    Imaging and other studies:   I personally viewed and interpreted the following imaging studies:  Chest x-ray 1/17/2021 shows no gross intraparenchymal or pleural abnormalities    Pulmonary function testing:   PFTs 8/19/2022 shows normal lung function    Assessment:  Moderate persistent asthma  Morbid obesity  PASC-improved    Plan:  · Continue Breo 200 daily  · Continue albuterol as needed  · Patient counseled on staying up-to-date on all pulmonary vaccinations given severity of asthma  · Patient will call with any deterioration in pulmonary symptoms  Return visit in 1 year  On next visit we will evaluate symptoms if any, compliance with Breo, albuterol requirement    Giovanni VIKI Maddox    [facility]  Answers for HPI/ROS submitted by the patient on 3/11/2023  Do you have shortness of breath?: Yes  Chronicity: recurrent  When did you first notice your symptoms?: more than 1 year ago  How often do your symptoms occur?: intermittently  Since you first noticed this problem, how has it changed?: waxing and waning  Have you had a change in appetite?: No  Do you have chest pain?: No  Do you have shortness of breath that occurs with effort or exertion?: Yes  Do you have ear congestion?: No  Do you have ear pain?: No  Do you have a fever?: No  Do you have headaches?: No  Do you have heartburn?: No  Do you have fatigue?: No  Do you have muscle pain?: No  Do you have nasal congestion?: No  Do you have shortness of breath when lying flat?: Yes  Do you have shortness of breath when you wake up?: No  Do you have post-nasal drip?: No  Do you have a runny nose?: No  Do you have sneezing?: No  Do you have a sore throat?: No  Do you have sweats?: No  Do you have trouble swallowing?: No  Have you experienced weight loss?: No  Which of the following makes your symptoms worse?: emotional stress, strenuous activity  Which of the following makes your symptoms better?: change in position, OTC inhaler, steroid inhaler

## 2023-04-21 ENCOUNTER — APPOINTMENT (OUTPATIENT)
Dept: LAB | Facility: CLINIC | Age: 48
End: 2023-04-21

## 2023-04-21 DIAGNOSIS — E06.3 HYPOTHYROIDISM DUE TO HASHIMOTO'S THYROIDITIS: ICD-10-CM

## 2023-04-21 DIAGNOSIS — E03.8 HYPOTHYROIDISM DUE TO HASHIMOTO'S THYROIDITIS: ICD-10-CM

## 2023-04-21 DIAGNOSIS — R00.2 PALPITATIONS: ICD-10-CM

## 2023-04-21 LAB
CHOLEST SERPL-MCNC: 175 MG/DL
HDLC SERPL-MCNC: 43 MG/DL
LDLC SERPL CALC-MCNC: 109 MG/DL (ref 0–100)
NONHDLC SERPL-MCNC: 132 MG/DL
TRIGL SERPL-MCNC: 114 MG/DL
TSH SERPL DL<=0.05 MIU/L-ACNC: 4.38 UIU/ML (ref 0.45–4.5)

## 2023-05-03 ENCOUNTER — OFFICE VISIT (OUTPATIENT)
Dept: HEMATOLOGY ONCOLOGY | Facility: CLINIC | Age: 48
End: 2023-05-03

## 2023-05-03 ENCOUNTER — APPOINTMENT (OUTPATIENT)
Dept: LAB | Facility: CLINIC | Age: 48
End: 2023-05-03

## 2023-05-03 ENCOUNTER — HOSPITAL ENCOUNTER (OUTPATIENT)
Dept: ULTRASOUND IMAGING | Facility: HOSPITAL | Age: 48
Discharge: HOME | End: 2023-05-03

## 2023-05-03 VITALS
BODY MASS INDEX: 49.17 KG/M2 | DIASTOLIC BLOOD PRESSURE: 90 MMHG | SYSTOLIC BLOOD PRESSURE: 130 MMHG | TEMPERATURE: 98.1 F | WEIGHT: 288 LBS | HEIGHT: 64 IN | HEART RATE: 70 BPM | OXYGEN SATURATION: 97 % | RESPIRATION RATE: 17 BRPM

## 2023-05-03 DIAGNOSIS — R10.9 RIGHT FLANK PAIN: ICD-10-CM

## 2023-05-03 DIAGNOSIS — D72.829 LEUKOCYTOSIS, UNSPECIFIED TYPE: Primary | ICD-10-CM

## 2023-05-03 DIAGNOSIS — R82.998 DARK URINE: ICD-10-CM

## 2023-05-03 DIAGNOSIS — R82.90 CLOUDY URINE: ICD-10-CM

## 2023-05-03 DIAGNOSIS — D75.839 THROMBOCYTOSIS: ICD-10-CM

## 2023-05-03 LAB
BACTERIA UR QL AUTO: ABNORMAL /HPF
BILIRUB UR QL STRIP: NEGATIVE
CLARITY UR: CLEAR
COLOR UR: COLORLESS
GLUCOSE UR STRIP-MCNC: NEGATIVE MG/DL
HGB UR QL STRIP.AUTO: ABNORMAL
KETONES UR STRIP-MCNC: NEGATIVE MG/DL
LEUKOCYTE ESTERASE UR QL STRIP: NEGATIVE
NITRITE UR QL STRIP: NEGATIVE
NON-SQ EPI CELLS URNS QL MICRO: ABNORMAL /HPF
PH UR STRIP.AUTO: 5.5 [PH]
PROT UR STRIP-MCNC: NEGATIVE MG/DL
RBC #/AREA URNS AUTO: ABNORMAL /HPF
SP GR UR STRIP.AUTO: 1 (ref 1–1.03)
UROBILINOGEN UR STRIP-ACNC: <2 MG/DL
WBC #/AREA URNS AUTO: ABNORMAL /HPF

## 2023-05-03 NOTE — PROGRESS NOTES
[id number] [facility]  [facility]OLOGY [facility] SPECIALISTS [place] [id number]  Hematology Ambulatory Follow-Up  Jarvis, 1975, [id number]  5/3/2023    Assessment/Plan:  1  Leukocytosis, unspecified type  2  Thrombocytosis  Ms Gm Padilla is a 72-year-old female seen in follow-up for thrombocytosis and leukocytosis  This is due to chronic inflammation with chronically elevated sed rate and CRP  Her counts remained stable and are at baseline  She does not have symptoms associated with this  She knows the importance of staying hydrated and continue to follow with her PCP and other specialist to control the underlying comorbidities causing this inflammation  We will continue to monitor her labs with a follow-up in 6 months with repeat blood work at that time  She knows to call the office with any new or worsening symptoms for sooner evaluation     - CBC and differential; Future  - Comprehensive metabolic panel; Future  - Peripheral Smear; Future    3  Right flank pain  4  Dark urine  5  Cloudy urine  She has been having ongoing pain from her right flank to her right mid abdomen that feels like squeezing sensation  She is also having urinary symptoms including urgency, dark urine, cloudy, with odor  This is a new problem  Nothing has make it better or worse  She has tried increasing her hydration which has not changed the appearance of her urine  She has undergone right upper quadrant ultrasound which did not find a source for this pain  She had similar pain in the past that was thought to be due to colitis but this is in a different location and slightly different in characteristics  I explained that we will check for UTI/pyelonephritis with an ultrasound as well as urine analysis and culture  - US kidney and bladder; Future  - UA (URINE) with reflex to Scope; Future  - Urine culture; Future      The patient is scheduled for follow-up in approximately 6 months  Patient voiced agreement and understanding to the above  Patient knows to call the Hematology/Oncology office with any questions and concerns regarding the above  Barrier(s) to care: None  The patient is able to self care   ------------------------------------------------------------------------------------------------------    Chief Complaint   Patient presents with    Follow-up       History of present illness:   Semaj Sherwood is a 52-year-old female who was seen in consultation in February 2022 for macrocytosis, thrombocytosis and leukocytosis  Rowdy Romo has been having symptoms since COVID in December of 2020 and following with multiple specialists  Rowdy Romo has a significant family history of thalassemia      09/16/2021:  Hemoglobin 12 7, MCV 69, WBC 14 16, platelets 959, ANC 89 2  01/27/2021:  Hemoglobin 12 5, MCV 71, WBC 10 15, platelets 510, ANC 7 58  05/03/2021:  Hemoglobin 12 8 MCV 72, WBC 9 80, platelets 312, differential normal  07/19/2021:  Hemoglobin 13 4, MCV 71, WBC 9 54, platelets 980  46/68/0227:  Hemoglobin 13 5, MCV 72, WBC 12 40, platelets 437   2/34/0715:  KWVWLRBRPA 13 1, MCV 72, platelets 619, WBC 07 08, ferritin 255, iron saturation 25%, TIBC 355, serum iron 88  ODILIA, RF: Negative  BCR/ABL, leukemia lymphoma flow cytometry: Negative  JAK2, CALR, MPL: Negative  Peripheral smear:  Mild elevation basophils  Sed rate: 65  C reactive protein: 41 2  Hgb Fractionation Cascade: Hgb a 95%, A2 Quant 5%, consistent with beta-Thalassemia Minor     10/18/2022:  Hemoglobin 12 6, MCV 72, platelets 182, WBC 42 48              Monocytes 13%, atypical lymphocytes 1%              Sed rate 52, CRP 27 5  4/21/2023: Hemoglobin 13 5, MCV 71, platelets 834, WBC 59 40      Interval history: Overall she is still feeling okay  She is still has some dyspnea on exertion when she does a flight of steps but otherwise her breathing is well controlled with her inhalers    She has been having intermittent more constant right-sided pain wrapping around from her flank area to her mid abdomen for the last 5 days  She states that it feels like a squeezing type pain it is not sharp or dull  She cannot reproduce the pain  It goes away on its own without any interventions  Nothing makes it better or worse  She has noted some dark cloudy urine more recently with an odor  She also feels that she has more urgency to go and does not go as much as she feels like she needs to go  She denies any other urinary symptoms  Review of Systems   Constitutional: Negative for activity change, appetite change, fatigue (improved, rare) and fever  HENT: Negative for trouble swallowing and voice change  Eyes: Negative for photophobia and visual disturbance  Respiratory: Negative for cough, chest tightness, shortness of breath (improved) and wheezing  Cardiovascular: Positive for palpitations  Negative for chest pain and leg swelling  Gastrointestinal: Negative for abdominal pain, blood in stool, constipation, diarrhea, nausea and vomiting  Endocrine: Negative for cold intolerance and heat intolerance  Genitourinary: Positive for flank pain and urgency  Negative for dysuria, hematuria and menstrual problem  Musculoskeletal: Positive for arthralgias (hands and lower back)  Negative for joint swelling and myalgias  Skin: Negative for pallor and rash  Neurological: Negative for dizziness, weakness, light-headedness and headaches  Hematological: Negative for adenopathy  Does not bruise/bleed easily  Psychiatric/Behavioral: Negative for confusion and sleep disturbance         Patient Active Problem List   Diagnosis    Palpitations    Moderate asthma without complication    Anemia    Hashimoto's disease    Thalassemia    Hypothyroidism    Microcytosis    Family history of thalassemia    Thrombocytosis    Leukocytosis    PVC (premature ventricular contraction)    Post-acute sequelae of COVID-19 (PASC)    Class 3 severe obesity with serious comorbidity and body mass index (BMI) of 45 0 to 49 9 in adult ([facility] [address] )    Beta thalassemia minor       Past Medical History:   Diagnosis Date    Anemia 2021    No known health problems        Past Surgical History:   Procedure Laterality Date    NO PAST SURGERIES         Family History   Problem Relation Age of Onset    Cardiomyopathy Mother     Anxiety disorder Mother     No Known Problems Father     Anxiety disorder Brother     Heart attack Maternal Grandfather     Thalassemia Maternal Aunt     Lupus Family        Social History     Socioeconomic History    Marital status: Single     Spouse name: Not on file    Number of children: Not on file    Years of education: Not on file    Highest education level: Not on file   Occupational History    Not on file   Tobacco Use    Smoking status: Former     Packs/day: 0 25     Years: 15 00     Pack years: 3 75     Types: Cigarettes     Quit date:      Years since quittin 3    Smokeless tobacco: Never   Vaping Use    Vaping Use: Never used   Substance and Sexual Activity    Alcohol use: Not Currently    Drug use: Never    Sexual activity: Yes     Partners: Male     Birth control/protection: Condom Male, Rhythm, None   Other Topics Concern    Not on file   Social History Narrative    Not on file     Social Determinants of Health     Financial Resource Strain: Not on file   Food Insecurity: Not on file   Transportation Needs: Not on file   Physical Activity: Not on file   Stress: Not on file   Social Connections: Not on file   Intimate Partner Violence: Not on file   Housing Stability: Not on file         Current Outpatient Medications:     albuterol (PROVENTIL HFA,VENTOLIN HFA) 90 mcg/act inhaler, inhale 2 puffs by mouth and INTO THE LUNGS every 6 hours if needed for wheezing, Disp: , Rfl:     fluticasone-vilanterol (Breo Ellipta) 200-25 mcg/actuation inhaler, Inhale 1 puff daily Rinse mouth after use , Disp: 180 blister, Rfl: "0    levothyroxine (Synthroid) 75 mcg tablet, Take 1 tablet (75 mcg total) by mouth daily in the early morning, Disp: 30 tablet, Rfl: 5    metoprolol tartrate (LOPRESSOR) 50 mg tablet, Take 1 tablet (50 mg total) by mouth 2 (two) times a day, Disp: 180 tablet, Rfl: 3    ketoconazole (NIZORAL) 2 % cream, Apply topically twice a day when rash is active, apply topically twice a week when not active (Patient not taking: Reported on 5/3/2023), Disp: 60 g, Rfl: 11    No Known Allergies    Objective:  /90 (BP Location: Left arm, Patient Position: Sitting, Cuff Size: Adult)   Pulse 70   Temp 98 1 °F (36 7 °C)   Resp 17   Ht 5' 4\" (1 626 m)   Wt 131 kg (288 lb)   SpO2 97%   BMI 49 44 kg/m²    Physical Exam  Constitutional:       General: She is not in acute distress  Appearance: Normal appearance  She is not ill-appearing  Eyes:      Extraocular Movements: Extraocular movements intact  Conjunctiva/sclera: Conjunctivae normal    Cardiovascular:      Rate and Rhythm: Normal rate and regular rhythm  Pulses: Normal pulses  Heart sounds: Normal heart sounds  Pulmonary:      Effort: Pulmonary effort is normal  No respiratory distress  Breath sounds: Normal breath sounds  No wheezing or rhonchi  Abdominal:      General: Bowel sounds are normal  There is no distension  Palpations: Abdomen is soft  Tenderness: There is no abdominal tenderness  Musculoskeletal:      Cervical back: Neck supple  Right lower leg: No edema  Left lower leg: No edema  Lymphadenopathy:      Cervical: No cervical adenopathy  Skin:     General: Skin is warm and dry  Capillary Refill: Capillary refill takes less than 2 seconds  Coloration: Skin is not pale  Findings: No bruising  Neurological:      General: No focal deficit present  Mental Status: She is alert and oriented to person, place, and time  Mental status is at baseline  Motor: No weakness        Gait: " Gait normal    Psychiatric:         Mood and Affect: Mood normal          Behavior: Behavior normal          Thought Content: Thought content normal          Judgment: Judgment normal          Result Review  Labs:  Appointment on 04/21/2023   Component Date Value Ref Range Status    TSH 3RD GENERATON 04/21/2023 4 380  0 450 - 4 500 uIU/mL Final    The recommended reference ranges for TSH during pregnancy are as follows:   First trimester 0 1 to 2 5 uIU/mL   Second trimester  0 2 to 3 0 uIU/mL   Third trimester 0 3 to 3 0 uIU/m    Note: Normal ranges may not apply to patients who are transgender, non-binary, or whose legal sex, sex at birth, and gender identity differ  Adult TSH (3rd generation) reference range follows the recommended guidelines of the American Thyroid Association, January, 2020   Cholesterol 04/21/2023 175  See Comment mg/dL Final    Cholesterol:         Pediatric <18 Years        Desirable          <170 mg/dL      Borderline High    170-199 mg/dL      High               >=200 mg/dL        Adult >=18 Years            Desirable         <200 mg/dL      Borderline High   200-239 mg/dL      High              >239 mg/dL      Triglycerides 04/21/2023 114  See Comment mg/dL Final    Triglyceride:     0-9Y            <75mg/dL     10Y-17Y         <90 mg/dL       >=18Y     Normal          <150 mg/dL     Borderline High 150-199 mg/dL     High            200-499 mg/dL        Very High       >499 mg/dL    Specimen collection should occur prior to N-Acetylcysteine or Metamizole administration due to the potential for falsely depressed results   HDL, Direct 04/21/2023 43 (L)  >=50 mg/dL Final    Specimen collection should occur prior to Metamizole administration due to the potential for falsley depressed results      LDL Calculated 04/21/2023 109 (H)  0 - 100 mg/dL Final    LDL Cholesterol:     Optimal           <100 mg/dl     Near Optimal      100-129 mg/dl     Above Optimal       Borderline High 130-159 mg/dl       High            160-189 mg/dl       Very High       >189 mg/dl         This screening LDL is a calculated result  It does not have the accuracy of the Direct Measured LDL in the monitoring of patients with hyperlipidemia and/or statin therapy  Direct Measure LDL (TYD512) must be ordered separately in these patients   Non-HDL-Chol (CHOL-HDL) 04/21/2023 132  mg/dl Final       Imaging:   No relevant imaging to review     Please note: This report has been generated by a voice recognition software system  Therefore there may be syntax, spelling, and/or grammatical errors  Please call if you have any questions

## 2023-05-05 ENCOUNTER — OFFICE VISIT (OUTPATIENT)
Dept: FAMILY MEDICINE CLINIC | Facility: CLINIC | Age: 48
End: 2023-05-05

## 2023-05-05 VITALS
TEMPERATURE: 97.8 F | WEIGHT: 288.5 LBS | HEIGHT: 64 IN | SYSTOLIC BLOOD PRESSURE: 132 MMHG | DIASTOLIC BLOOD PRESSURE: 88 MMHG | BODY MASS INDEX: 49.25 KG/M2 | OXYGEN SATURATION: 99 % | HEART RATE: 78 BPM

## 2023-05-05 DIAGNOSIS — K52.9 CHRONIC COLITIS: ICD-10-CM

## 2023-05-05 DIAGNOSIS — D56.3 BETA THALASSEMIA MINOR: ICD-10-CM

## 2023-05-05 DIAGNOSIS — E06.3 HYPOTHYROIDISM DUE TO HASHIMOTO'S THYROIDITIS: ICD-10-CM

## 2023-05-05 DIAGNOSIS — Z12.31 ENCOUNTER FOR SCREENING MAMMOGRAM FOR MALIGNANT NEOPLASM OF BREAST: ICD-10-CM

## 2023-05-05 DIAGNOSIS — E66.01 CLASS 3 SEVERE OBESITY WITH SERIOUS COMORBIDITY AND BODY MASS INDEX (BMI) OF 45.0 TO 49.9 IN ADULT, UNSPECIFIED OBESITY TYPE (HCC): ICD-10-CM

## 2023-05-05 DIAGNOSIS — J45.40 MODERATE PERSISTENT ASTHMA WITHOUT COMPLICATION: ICD-10-CM

## 2023-05-05 DIAGNOSIS — Z00.00 ANNUAL PHYSICAL EXAM: Primary | ICD-10-CM

## 2023-05-05 DIAGNOSIS — D72.829 LEUKOCYTOSIS, UNSPECIFIED TYPE: ICD-10-CM

## 2023-05-05 DIAGNOSIS — E03.8 HYPOTHYROIDISM DUE TO HASHIMOTO'S THYROIDITIS: ICD-10-CM

## 2023-05-05 PROBLEM — D56.9 THALASSEMIA: Status: RESOLVED | Noted: 2021-12-08 | Resolved: 2023-05-05

## 2023-05-05 PROBLEM — U09.9 POST-ACUTE SEQUELAE OF COVID-19 (PASC): Status: RESOLVED | Noted: 2022-06-30 | Resolved: 2023-05-05

## 2023-05-05 LAB — BACTERIA UR CULT: NORMAL

## 2023-05-05 RX ORDER — DICYCLOMINE HYDROCHLORIDE 10 MG/1
10 CAPSULE ORAL
Qty: 16 CAPSULE | Refills: 0 | Status: SHIPPED | OUTPATIENT
Start: 2023-05-05

## 2023-05-05 NOTE — PROGRESS NOTES
[facility]    NAME: Raman Wright  AGE: 53 y o  SEX: female  : 1975     DATE: 2023     Assessment and Plan:     Problem List Items Addressed This Visit        Endocrine    Hypothyroidism       Respiratory    Moderate asthma without complication       Other    Leukocytosis    Class 3 severe obesity with serious comorbidity and body mass index (BMI) of 45 0 to 49 9 in adult ([facility] [address] )    Beta thalassemia minor   Other Visit Diagnoses     Annual physical exam    -  Primary    Chronic colitis        Relevant Medications    dicyclomine (BENTYL) 10 mg capsule    Encounter for screening mammogram for malignant neoplasm of breast        Relevant Orders    Mammo screening bilateral w 3d & cad        #1 Annual physical exam  #2 Chronic colitis  Discussed with patient plan to try use of dicyclomine to manage spasms  Discussed with patient possible need to refer to gastroenterology for further evalaution  #3 Hypothyroidism due to Hashimoto's thyroiditis  Last TSH was within normal levels -plan to continue on levothyroxine 75 mg daily and recheck TSH in one year or sooner if needed  #4 Beta thalassemia minor  Patient is being followed by hematology with next appointment scheduled for 6 months with last labs performed on 2023  #5 Leukocytosis, unspecified type  Patient is being followed by hematology with next appointment scheduled for 6 months with last labs performed on 2023 - patient's inflammatory markers, platelets and white blood count remain elevated  #6 Moderate persistent asthma without complication  Patient is being followed by pulmonology with last visit on 2023 - dyspnea on exertion improving with physical therapy and use of daily inhaler     #7 Class 3 severe obesity with serious comorbidity and BMI 45 0-49 9 in adult [facility])  Patient aware of need to mange weight to decrease risk of chronic medical conditions  #8 Encounter for mammogram for malignant neoplasm of breast  Patient instructed to obtain mammogram to maintain health maintenance  Patient instructed to return in one year or sooner if needed  Patient encouraged to call office for problems or concerns in the interim    Immunizations and preventive care screenings were discussed with patient today  Appropriate education was printed on patient's after visit summary  Counseling:  Dental Health: discussed importance of regular tooth brushing, flossing, and dental visits  Exercise: the importance of regular exercise/physical activity was discussed  Recommend exercise 3-5 times per week for at least 30 minutes  Return in about 1 year (around 5/5/2024)  Chief Complaint:     Chief Complaint   Patient presents with    Physical Exam    review labs       History of Present Illness:     Adult Annual Physical   Patient here for a comprehensive physical exam and a routine 6 month follow-up for palpitations (PVC) and hypothyroidism  The patient reports that she was at her hematology appointment on 05/03/2023  She had developed right flank pain a few days prior to the visit so she was sent for an ultrasound of the kidney and bladder along with a urine anlyasis with reflex to microscopic and culture  The ultrasound was normal but the urine exhibited large amount of blood  Patient reports that she was in the middle of her menstrual cycle when the urine was collected  She reports that the flank pain radiates into her lower right quadrant but has improved since having a large bowel movement yesterday  Patient has a history of abdominal pains related to constipation so pains can be possible related to irritable bowel syndrome  Discussed with patient that possible need to be referred to gastroenterology for further evaluation  She did recent perform a Cologuard test with a negative result   Patient reports that her mother has been diagnosed with gastroparesis so that worries her that that might be the etiology of her abdominal issues  Discussed with patient plan to treat with colonic spasms with Bentyl over the weekend to see if pain resolves  Discussed with patient need for cervical cancer screening and lower quadrant pains could be related to ovarian cyst, so possible need for ultrasound of pelvis needed  Patient wants to try the medication first and if pain pagan snot resolve will discuss more imaging and/or referrals  Discussed need for mammogram and order placed for patient to schedule and obtain  Diet and Physical Activity  Diet/Nutrition: well balanced diet, limited junk food and consuming 3-5 servings of fruits/vegetables daily  Exercise: no formal exercise  Depression Screening  PHQ-2/9 Depression Screening    Little interest or pleasure in doing things: 0 - not at all  Feeling down, depressed, or hopeless: 0 - not at all  PHQ-2 Score: 0  PHQ-2 Interpretation: Negative depression screen       General Health  Sleep: sleeps well and gets 7-8 hours of sleep on average  Hearing: normal - bilateral   Vision: no vision problems  Dental: regular dental visits and brushes teeth twice daily  /GYN Health  Patient is: perimenopausal  Last menstrual period: 05/02/2023  Contraceptive method: barrier methods  Review of Systems:     Review of Systems   Constitutional: Negative  HENT: Negative  Respiratory: Negative  Cardiovascular: Negative  Gastrointestinal: Positive for abdominal pain, constipation and diarrhea  Negative for abdominal distention, nausea and vomiting  Genitourinary: Negative  Musculoskeletal: Negative  Skin: Negative  Neurological: Negative  Psychiatric/Behavioral: Negative         Past Medical History:     Past Medical History:   Diagnosis Date    Anemia 12/8/2021    No known health problems       Past Surgical History:     Past Surgical History:   Procedure Laterality Date    NO PAST SURGERIES Social History:     Social History     Socioeconomic History    Marital status: Single     Spouse name: None    Number of children: None    Years of education: None    Highest education level: None   Occupational History    None   Tobacco Use    Smoking status: Former     Packs/day: 0 25     Years: 15 00     Pack years: 3 75     Types: Cigarettes     Quit date: 2018     Years since quittin 3    Smokeless tobacco: Never   Vaping Use    Vaping Use: Never used   Substance and Sexual Activity    Alcohol use: Not Currently    Drug use: Never    Sexual activity: Yes     Partners: Male     Birth control/protection: Condom Male, Rhythm, None   Other Topics Concern    None   Social History Narrative    None     Social Determinants of Health     Financial Resource Strain: Not on file   Food Insecurity: Not on file   Transportation Needs: Not on file   Physical Activity: Not on file   Stress: Not on file   Social Connections: Not on file   Intimate Partner Violence: Not on file   Housing Stability: Not on file      Family History:     Family History   Problem Relation Age of Onset    Cardiomyopathy Mother     Anxiety disorder Mother     No Known Problems Father     Anxiety disorder Brother     Heart attack Maternal Grandfather     Thalassemia Maternal Aunt     Lupus Family       Current Medications:     Current Outpatient Medications   Medication Sig Dispense Refill    albuterol (PROVENTIL HFA,VENTOLIN HFA) 90 mcg/act inhaler inhale 2 puffs by mouth and INTO THE LUNGS every 6 hours if needed for wheezing      dicyclomine (BENTYL) 10 mg capsule Take 1 capsule (10 mg total) by mouth 3 (three) times a day before meals 16 capsule 0    fluticasone-vilanterol (Breo Ellipta) 200-25 mcg/actuation inhaler Inhale 1 puff daily Rinse mouth after use   180 blister 0    levothyroxine (Synthroid) 75 mcg tablet Take 1 tablet (75 mcg total) by mouth daily in the early morning 30 tablet 5    metoprolol tartrate "(LOPRESSOR) 50 mg tablet Take 1 tablet (50 mg total) by mouth 2 (two) times a day 180 tablet 3     No current facility-administered medications for this visit  Allergies:     No Known Allergies   Physical Exam:     /88   Pulse 78   Temp 97 8 °F (36 6 °C)   Ht 5' 4\" (1 626 m)   Wt 131 kg (288 lb 8 oz)   LMP 05/02/2023 (Exact Date)   SpO2 99%   BMI 49 52 kg/m²  (Reviewed)    Physical Exam  Vitals reviewed  Constitutional:       General: She is not in acute distress  Appearance: She is not ill-appearing  HENT:      Head: Normocephalic and atraumatic  Right Ear: External ear normal       Left Ear: External ear normal       Nose: Nose normal       Mouth/Throat:      Mouth: Mucous membranes are moist    Eyes:      Extraocular Movements: Extraocular movements intact  Conjunctiva/sclera: Conjunctivae normal       Pupils: Pupils are equal, round, and reactive to light  Cardiovascular:      Rate and Rhythm: Normal rate and regular rhythm  Pulses:           Radial pulses are 2+ on the right side and 2+ on the left side  Dorsalis pedis pulses are 2+ on the right side and 2+ on the left side  Posterior tibial pulses are 2+ on the right side and 2+ on the left side  Heart sounds: Normal heart sounds  Pulmonary:      Effort: Pulmonary effort is normal       Breath sounds: Normal breath sounds  Abdominal:      General: Abdomen is flat  Bowel sounds are normal  There is no distension  Palpations: Abdomen is soft  Tenderness: There is abdominal tenderness in the right lower quadrant  There is no right CVA tenderness, left CVA tenderness, guarding or rebound  Negative signs include Rovsing's sign, McBurney's sign, psoas sign and obturator sign  Musculoskeletal:      Cervical back: Neck supple  Right lower leg: No edema  Left lower leg: No edema  Skin:     General: Skin is warm and dry        Capillary Refill: Capillary refill takes less than 2 " seconds  Neurological:      General: No focal deficit present  Mental Status: She is alert and oriented to person, place, and time     Psychiatric:         Mood and Affect: Mood normal          Behavior: Behavior normal           Valentino Reilly, [address]

## 2023-05-07 DIAGNOSIS — J45.40 MODERATE PERSISTENT ASTHMA WITHOUT COMPLICATION: ICD-10-CM

## 2023-06-15 ENCOUNTER — HOSPITAL ENCOUNTER (OUTPATIENT)
Dept: RADIOLOGY | Facility: MEDICAL CENTER | Age: 48
Discharge: HOME | End: 2023-06-15
Payer: MEDICARE

## 2023-06-15 VITALS — WEIGHT: 288 LBS | HEIGHT: 64 IN | BODY MASS INDEX: 49.17 KG/M2

## 2023-06-15 DIAGNOSIS — Z12.31 ENCOUNTER FOR SCREENING MAMMOGRAM FOR MALIGNANT NEOPLASM OF BREAST: ICD-10-CM

## 2023-06-15 PROCEDURE — 77063 BREAST TOMOSYNTHESIS BI: CPT

## 2023-06-15 PROCEDURE — 77067 SCR MAMMO BI INCL CAD: CPT

## 2023-08-01 DIAGNOSIS — E03.8 HYPOTHYROIDISM DUE TO HASHIMOTO'S THYROIDITIS: ICD-10-CM

## 2023-08-01 DIAGNOSIS — E06.3 HYPOTHYROIDISM DUE TO HASHIMOTO'S THYROIDITIS: ICD-10-CM

## 2023-08-01 RX ORDER — LEVOTHYROXINE SODIUM 0.07 MG/1
75 TABLET ORAL
Qty: 30 TABLET | Refills: 5 | Status: SHIPPED | OUTPATIENT
Start: 2023-08-01

## 2023-10-02 ENCOUNTER — TELEPHONE (OUTPATIENT)
Dept: HEMATOLOGY ONCOLOGY | Facility: CLINIC | Age: 48
End: 2023-10-02

## 2023-10-02 DIAGNOSIS — I49.3 FREQUENT PVCS: ICD-10-CM

## 2023-10-02 DIAGNOSIS — R00.2 PALPITATIONS: ICD-10-CM

## 2023-10-02 RX ORDER — METOPROLOL TARTRATE 50 MG/1
50 TABLET, FILM COATED ORAL 2 TIMES DAILY
Qty: 180 TABLET | Refills: 1 | Status: SHIPPED | OUTPATIENT
Start: 2023-10-02

## 2023-10-02 NOTE — TELEPHONE ENCOUNTER
Reason for call:   [x] Refill   [] Prior Auth  [] Other:     Office:   [x] PCP/Provider - Jaya   Speciality/Provider -     Medication:   Metoprolol 50 mg, 1 bid, 90      Pharm[facility]: [facility]    Does the patient have enough for 3 days?    [x] Yes   [] No - Send as HP to POD

## 2023-10-02 NOTE — TELEPHONE ENCOUNTER
Appointment Confirmation   Who are you speaking with? Patient   If it is not the patient, are they listed on an active communication consent form? N/A   Which provider is the appointment scheduled with? SANGEETA Fall   When is the appointment scheduled? Please list date and time 10/24/23 @ 2   At which location is the appointment scheduled to take place? [facility]   Did caller verbalize understanding of appointment details?  Yes

## 2023-10-06 ENCOUNTER — TELEPHONE (OUTPATIENT)
Dept: HEMATOLOGY ONCOLOGY | Facility: CLINIC | Age: 48
End: 2023-10-06

## 2023-10-06 NOTE — TELEPHONE ENCOUNTER
CARMEN  DR kris SEGOVIA   Who are you speaking with? Patient   If it is not the patient, are they listed on an active communication consent form? N/A   Is this a CARMEN or DR kris SEGOVIA CARMEN   Which provider is patient currently scheduled or established with? SANGEETA Prado   What is the original appointment date and time? 10/24/23 2PM   At which location is the appointment scheduled to take place? [place]   Which provider is the patient transitioning care to? Dr. Princess Morales   What is the new appointment date and time? 12/04/23 2:20PM   At which location is the new appointment scheduled to take place? [place]   What is the reason for this change?  Provider

## 2023-11-27 ENCOUNTER — APPOINTMENT (OUTPATIENT)
Dept: LAB | Facility: CLINIC | Age: 48
End: 2023-11-27
Payer: MEDICARE

## 2023-11-27 DIAGNOSIS — D72.829 LEUKOCYTOSIS, UNSPECIFIED TYPE: ICD-10-CM

## 2023-11-27 DIAGNOSIS — D75.839 THROMBOCYTOSIS: ICD-10-CM

## 2023-11-27 LAB
ALBUMIN SERPL BCP-MCNC: 3.9 G/DL (ref 3.5–5)
ALP SERPL-CCNC: 67 U/L (ref 34–104)
ALT SERPL W P-5'-P-CCNC: 22 U/L (ref 7–52)
ANION GAP SERPL CALCULATED.3IONS-SCNC: 9 MMOL/L
ANISOCYTOSIS BLD QL SMEAR: PRESENT
AST SERPL W P-5'-P-CCNC: 17 U/L (ref 13–39)
BILIRUB SERPL-MCNC: 0.72 MG/DL (ref 0.2–1)
BUN SERPL-MCNC: 11 MG/DL (ref 5–25)
CALCIUM SERPL-MCNC: 10.2 MG/DL (ref 8.4–10.2)
CHLORIDE SERPL-SCNC: 101 MMOL/L (ref 96–108)
CO2 SERPL-SCNC: 25 MMOL/L (ref 21–32)
CREAT SERPL-MCNC: 0.8 MG/DL (ref 0.6–1.3)
DACRYOCYTES BLD QL SMEAR: PRESENT
ERYTHROCYTE [DISTWIDTH] IN BLOOD BY AUTOMATED COUNT: 16.7 % (ref 11.6–15.1)
GFR SERPL CREATININE-BSD FRML MDRD: 88 ML/MIN/1.73SQ M
GLUCOSE P FAST SERPL-MCNC: 88 MG/DL (ref 65–99)
HCT VFR BLD AUTO: 44.1 % (ref 34.8–46.1)
HGB BLD-MCNC: 13 G/DL (ref 11.5–15.4)
IMM EOSINOPHIL NFR BLD MANUAL: 3 % (ref 0–6)
LYMPHOCYTES NFR BLD: 31 % (ref 14–44)
MCH RBC QN AUTO: 20.8 PG (ref 26.8–34.3)
MCHC RBC AUTO-ENTMCNC: 29.5 G/DL (ref 31.4–37.4)
MCV RBC AUTO: 71 FL (ref 82–98)
MICROCYTES BLD QL AUTO: PRESENT
MONOCYTES NFR BLD AUTO: 6 % (ref 4–12)
NEUTS SEG NFR BLD AUTO: 56 % (ref 45–77)
NRBC BLD AUTO-RTO: 0 /100 WBCS
PLATELET # BLD AUTO: 464 THOUSANDS/UL (ref 149–390)
PLATELET BLD QL SMEAR: ABNORMAL
PMV BLD AUTO: 9.5 FL (ref 8.9–12.7)
POIKILOCYTOSIS BLD QL SMEAR: PRESENT
POTASSIUM SERPL-SCNC: 4.4 MMOL/L (ref 3.5–5.3)
PROT SERPL-MCNC: 6.9 G/DL (ref 6.4–8.4)
RBC # BLD AUTO: 6.24 MILLION/UL (ref 3.81–5.12)
RBC MORPH BLD: PRESENT
SODIUM SERPL-SCNC: 135 MMOL/L (ref 135–147)
TOTAL CELLS COUNTED SPEC: 100
VARIANT LYMPHS BLD QL SMEAR: 4 % (ref 0–0)
WBC # BLD AUTO: 12.05 THOUSAND/UL (ref 4.31–10.16)

## 2023-11-27 PROCEDURE — 85007 BL SMEAR W/DIFF WBC COUNT: CPT

## 2023-11-27 PROCEDURE — 36415 COLL VENOUS BLD VENIPUNCTURE: CPT

## 2023-11-27 PROCEDURE — 80053 COMPREHEN METABOLIC PANEL: CPT

## 2023-12-02 NOTE — PROGRESS NOTES
Matty Melchor  1975  [facility] [id number]    Chief Complaint   Patient presents with    Follow-up     Assessment/plan:   1. Leukocytosis  2. Thrombocytosis  3. Thalassemia minor  4. Chronic colitis  5. Elevated BMI      Eddie Araujo is a 71-year-old female with past medical history significant for hypothyroidism and thalassemia. She was previously following with hematology for leukocytosis/thrombocytosis. This was thought to be due to chronic inflammation due to chronically elevated sed rate and CRP. Prior workup with flow cytometry, BCR/ABL was negative. Workup for underlying myeloproliferative disorder with JAK2, CALR, MPL was negative. Her counts remain mildly elevated but stable and at her baseline. She continues to remain asymptomatic. At this juncture, recommend continued monitoring. She will follow-up in the office in 6 months with CBC prior. Patient aware to contact the office in the interim. History of present illness:   Eddie Araujo is a 71-year-old female with past medical history significant for hypothyroidism and thalassemia. She was previously following with SANGEETA Fall for microcytosis, thrombocytosis and leukocytosis. She was last evaluated in the office on 5/3/2023. She presents today for transfer of care/follow-up visit. Pt states she is doing well. Has intermittent joint pains. Notes fatigue worsened around her menstrual cycle. Denies any fever or weight loss. Intermittent abd pain sometimes associated with certain foods. Has hx of chronic colitis. Review of systems:   Review of Systems   Constitutional:  Positive for fatigue. Negative for chills and fever. Eyes:  Negative for pain and visual disturbance. Respiratory:  Negative for cough and shortness of breath. Cardiovascular:  Negative for chest pain and palpitations.    Gastrointestinal: Negative for abdominal pain and vomiting. Genitourinary:  Negative for dysuria and hematuria. Musculoskeletal:  Positive for arthralgias. Negative for back pain. Skin:  Negative for color change and rash. Neurological:  Negative for seizures and syncope. All other systems reviewed and are negative.     Patient Active Problem List   Diagnosis    Palpitations    Moderate asthma without complication    Anemia    Hypothyroidism    Microcytosis    Family history of thalassemia    Thrombocytosis    Leukocytosis    PVC (premature ventricular contraction)    Class 3 severe obesity with serious comorbidity and body mass index (BMI) of 45.0 to 49.9 in adult [facility])    Beta thalassemia minor     Past Medical History:   Diagnosis Date    Anemia 2021    No known health problems      Past Surgical History:   Procedure Laterality Date    NO PAST SURGERIES       Family History   Problem Relation Age of Onset    Cardiomyopathy Mother     Anxiety disorder Mother     No Known Problems Father     No Known Problems Maternal Grandmother     Heart attack Maternal Grandfather     No Known Problems Paternal Grandmother     No Known Problems Paternal Grandfather     Anxiety disorder Brother     Thalassemia Maternal Aunt     Lupus Family     No Known Problems Son      Social History     Socioeconomic History    Marital status: Single     Spouse name: Not on file    Number of children: Not on file    Years of education: Not on file    Highest education level: Not on file   Occupational History    Not on file   Tobacco Use    Smoking status: Former     Packs/day: 0.25     Years: 15.00     Total pack years: 3.75     Types: Cigarettes     Quit date: 2018     Years since quittin.9    Smokeless tobacco: Never   Vaping Use    Vaping Use: Never used   Substance and Sexual Activity    Alcohol use: Not Currently    Drug use: Never    Sexual activity: Yes     Partners: Male     Birth control/protection: Condom Male, Rhythm, None   Other Topics Concern    Not on file   Social History Narrative    Not on file     Social Determinants of Health     Financial Resource Strain: Not on file   Food Insecurity: Not on file   Transportation Needs: Not on file   Physical Activity: Not on file   Stress: Not on file   Social Connections: Not on file   Intimate Partner Violence: Not on file   Housing Stability: Not on file       Current Outpatient Medications:     albuterol (PROVENTIL HFA,VENTOLIN HFA) 90 mcg/act inhaler, inhale 2 puffs by mouth and INTO THE LUNGS every 6 hours if needed for wheezing, Disp: , Rfl:     Breo Ellipta 200-25 MCG/ACT inhaler, inhale 1 puff by mouth and INTO THE LUNGS once daily RINSE MOUTH AND SPIT AFTER EACH USE, Disp: 180 blister, Rfl: 2    dicyclomine (BENTYL) 10 mg capsule, Take 1 capsule (10 mg total) by mouth 3 (three) times a day before meals, Disp: 16 capsule, Rfl: 0    levothyroxine (Synthroid) 75 mcg tablet, Take 1 tablet (75 mcg total) by mouth daily in the early morning, Disp: 30 tablet, Rfl: 5    metoprolol tartrate (LOPRESSOR) 50 mg tablet, Take 1 tablet (50 mg total) by mouth 2 (two) times a day, Disp: 180 tablet, Rfl: 1  No Known Allergies    Vitals:    12/04/23 1428   BP: 128/84   Pulse: 86   Resp: 18   Temp: 97.8 °F (36.6 °C)   SpO2: 98%     Wt Readings from Last 3 Encounters:   12/04/23 130 kg (286 lb)   06/15/23 131 kg (288 lb)   05/05/23 131 kg (288 lb 8 oz)     Physical Exam  Vitals reviewed. Constitutional:       General: She is not in acute distress. Appearance: Normal appearance. HENT:      Head: Normocephalic and atraumatic. Mouth/Throat:      Mouth: Mucous membranes are moist.   Eyes:      Extraocular Movements: Extraocular movements intact. Conjunctiva/sclera: Conjunctivae normal.   Cardiovascular:      Rate and Rhythm: Normal rate. Pulmonary:      Effort: Pulmonary effort is normal. No respiratory distress. Breath sounds: No wheezing, rhonchi or rales.    Abdominal:      General: Abdomen is flat. There is no distension. Palpations: Abdomen is soft. Musculoskeletal:      Cervical back: Normal range of motion and neck supple. Right lower leg: No edema. Left lower leg: No edema. Skin:     General: Skin is warm. Coloration: Skin is not pale. Neurological:      Mental Status: She is alert and oriented to person, place, and time. Mental status is at baseline. Cranial Nerves: No cranial nerve deficit. Psychiatric:         Thought Content: Thought content normal.         Labs:  09/16/2021:  Hemoglobin 12.7, MCV 69, WBC 14.16, platelets 374, ANC 55.0  01/27/2021:  Hemoglobin 12.5, MCV 71, WBC 10.15, platelets 133, ANC 1.37  05/03/2021:  Hemoglobin 12.8 MCV 72, WBC 9.80, platelets 109, differential normal  07/19/2021:  Hemoglobin 13.4, MCV 71, WBC 9.54, platelets 781  37/00/4855:  Hemoglobin 13.5, MCV 72, WBC 12.40, platelets 482   4/12/4060:  Hemoglobin 13.1, MCV 72, platelets 987, WBC 78.26, ferritin 255, iron saturation 25%, TIBC 355, serum iron 88  ODILIA, RF: Negative  BCR/ABL, leukemia lymphoma flow cytometry: Negative  JAK2, CALR, MPL: Negative  Peripheral smear:  Mild elevation basophils  Sed rate: 65  C reactive protein: 41.2  Hgb Fractionation Cascade: Hgb a 95%, A2 Quant 5%, consistent with beta-Thalassemia Minor     10/18/2022:  Hemoglobin 12.6, MCV 72, platelets 111, WBC 26.72              Monocytes 13%, atypical lymphocytes 1%              Sed rate 52, CRP 27.5  4/21/2023: Hemoglobin 13.5, MCV 71, platelets 140, WBC 87.83    11/27/2023: WBC: 12.05, H/H: 13/44.1, MCV: 71, platelets: 924. Orders Placed This Encounter   Procedures    CBC and differential     Return in about 6 months (around 6/4/2024) for Office Visit, Labs - See Treatment Plan.

## 2023-12-04 ENCOUNTER — OFFICE VISIT (OUTPATIENT)
Dept: HEMATOLOGY ONCOLOGY | Facility: CLINIC | Age: 48
End: 2023-12-04
Payer: MEDICARE

## 2023-12-04 VITALS
OXYGEN SATURATION: 98 % | DIASTOLIC BLOOD PRESSURE: 84 MMHG | SYSTOLIC BLOOD PRESSURE: 128 MMHG | BODY MASS INDEX: 48.83 KG/M2 | HEIGHT: 64 IN | HEART RATE: 86 BPM | WEIGHT: 286 LBS | RESPIRATION RATE: 18 BRPM | TEMPERATURE: 97.8 F

## 2023-12-04 DIAGNOSIS — D75.839 THROMBOCYTOSIS: ICD-10-CM

## 2023-12-04 DIAGNOSIS — D72.829 LEUKOCYTOSIS, UNSPECIFIED TYPE: Primary | ICD-10-CM

## 2023-12-04 DIAGNOSIS — D56.3 BETA THALASSEMIA MINOR: ICD-10-CM

## 2023-12-04 PROCEDURE — 99214 OFFICE O/P EST MOD 30 MIN: CPT | Performed by: INTERNAL MEDICINE

## 2023-12-13 ENCOUNTER — TELEPHONE (OUTPATIENT)
Age: 48
End: 2023-12-13

## 2023-12-13 DIAGNOSIS — U07.1 COVID-19: Primary | ICD-10-CM

## 2023-12-13 RX ORDER — METHYLPREDNISOLONE 4 MG/1
TABLET ORAL
Qty: 21 EACH | Refills: 0 | Status: SHIPPED | OUTPATIENT
Start: 2023-12-13

## 2023-12-13 RX ORDER — NIRMATRELVIR AND RITONAVIR 300-100 MG
3 KIT ORAL 2 TIMES DAILY
Qty: 30 TABLET | Refills: 0 | Status: SHIPPED | OUTPATIENT
Start: 2023-12-13 | End: 2023-12-18

## 2023-12-13 NOTE — TELEPHONE ENCOUNTER
Spoke with patient. She is having severe sore throat and head congestion. Advised patient to use ibuprofen for now. Can you send something in to help treat symptoms?

## 2023-12-13 NOTE — TELEPHONE ENCOUNTER
Pt was calling bc she tested positive for Covid, pt is scheduled for a virtual visit for 12/15. Pt would like to know if Dr. Christian Moreno can give her a recommendation for over the counter medication she can be taking for the throat pain she is experiencing until she comes in for the day of her visit. Pt stated that it is hard for her to swallow throat is swollen and red please advise with the pt, thank you.

## 2023-12-14 ENCOUNTER — TELEMEDICINE (OUTPATIENT)
Dept: FAMILY MEDICINE CLINIC | Facility: CLINIC | Age: 48
End: 2023-12-14
Payer: MEDICARE

## 2023-12-14 VITALS — BODY MASS INDEX: 47.8 KG/M2 | WEIGHT: 280 LBS | TEMPERATURE: 98.7 F | HEIGHT: 64 IN

## 2023-12-14 DIAGNOSIS — U07.1 COVID-19: ICD-10-CM

## 2023-12-14 DIAGNOSIS — J02.9 PHARYNGITIS, UNSPECIFIED ETIOLOGY: Primary | ICD-10-CM

## 2023-12-14 PROCEDURE — 99213 OFFICE O/P EST LOW 20 MIN: CPT | Performed by: NURSE PRACTITIONER

## 2023-12-14 RX ORDER — AMOXICILLIN 875 MG/1
875 TABLET, COATED ORAL 2 TIMES DAILY
Qty: 14 TABLET | Refills: 0 | Status: SHIPPED | OUTPATIENT
Start: 2023-12-14 | End: 2023-12-21

## 2023-12-14 NOTE — PROGRESS NOTES
COVID-19 Outpatient Progress Note    Assessment/Plan:    Problem List Items Addressed This Visit    None  Visit Diagnoses     Pharyngitis, unspecified etiology    -  Primary    Relevant Medications    amoxicillin (AMOXIL) 875 mg tablet    COVID-19             Disposition:     Discussed symptom directed medication options with patient. Discussed vitamin D, vitamin C, and/or zinc supplementation with patient. Patient performed a home COVID-19 test on 12/10/2023 with positive results. Patient meets criteria for Paxlovid and they have been counseled appropriately regarding risks, benefits, side effects, and alternative treatment options. After discussion, patient agrees to treatment. Possible side effects of Paxlovid? Possible side effects of Paxlovid are:  - Liver Problems. Notify us right away if you start to experience loss of appetite, yellowing of your skin and the whites of eyes (jaundice), dark-colored urine, pale colored stools and itchy skin, stomach area (abdominal) pain. - Resistance to HIV Medicines. If you have untreated HIV infection, Paxlovid may lead to some HIV medicines not working as well in the future. - Other possible side effects include: altered sense of taste, diarrhea, high blood pressure, or muscle aches. I have spent a total time of 14 minutes on the day of the encounter for this patient including discussing diagnostic results, discussing prognosis, risks and benefits of treatment options, instructions for management, patient and family education, importance of treatment compliance, risk factor reductions, impressions, documenting in the medical record, reviewing/ordering tests, medicine, procedures and obtaining or reviewing history. Patient was prescribed the antiviral on 12/14/2023 prior to virtual visit as not to miss the timeframe to prescribe due to office unavailable appointment time.        Encounter provider: SANGEETA Martel     Provider located at: [facility] [address] [place], [place] [address]  [facility] [id number]     Recent Visits  Date Type Provider Dept   12/14/23 Telemedicine Thierno Charli, [address] recent visits within past 7 days and meeting all other requirements  Future Appointments  No visits were found meeting these conditions. Showing future appointments within next 150 days and meeting all other requirements     This virtual check-in was done via [facility] and patient was informed that this is a secure, HIPAA-compliant platform. She agrees to proceed. Patient agrees to participate in a virtual check in via telephone or video visit instead of presenting to the office to address urgent/immediate medical needs. Patient is aware this is a billable service. She acknowledged consent and understanding of privacy and security of the video platform. The patient has agreed to participate and understands they can discontinue the visit at any time. After connecting through [facility], the patient was identified by name and date of birth. Margy Godfrey was informed that this was a telemedicine visit and that the exam was being conducted confidentially over secure lines. My office door was closed. No one else was in the room. Margy Godfrey acknowledged consent and understanding of privacy and security of the telemedicine visit. I informed the patient that I have reviewed her record in Epic and presented the opportunity for her to ask any questions regarding the visit today. The patient agreed to participate. Verification of patient location:  Patient is located in the following state in which I hold an active license: [place]    Subjective:   Margy Godfrey is a 53 y.o. female who is concerned about COVID-19. Patient's symptoms include fatigue, nasal congestion, sore throat, cough and headache.  Patient denies fever, chills, malaise, rhinorrhea, anosmia, loss of taste, shortness of breath, chest tightness, abdominal pain, nausea, vomiting, diarrhea and myalgias. - Date of symptom onset: 12/9/2023      COVID-19 vaccination status: Fully vaccinated with booster    Exposure:   Contact with a person who is under investigation (PUI) for or who is positive for COVID-19 within the last 14 days?: No    Hospitalized recently for fever and/or lower respiratory symptoms?: No      Currently a healthcare worker that is involved in direct patient care?: No      Works in a special setting where the risk of COVID-19 transmission may be high? (this may include long-term care, correctional and jail facilities; homeless shelters; assisted-living facilities and group homes.): No      Resident in a special setting where the risk of COVID-19 transmission may be high? (this may include long-term care, correctional and jail facilities; homeless shelters; assisted-living facilities and group homes.): No      Recommended to patient self isolate for 5 days from beginning of symptoms and than wear a mask around other s for additional 5 days. Lab Results   Component Value Date    SARSCOV2 Detected (A) 12/08/2020       Review of Systems   Constitutional:  Positive for fatigue. Negative for chills and fever. HENT:  Positive for congestion and sore throat. Negative for rhinorrhea. Respiratory:  Positive for cough. Negative for chest tightness and shortness of breath. Cardiovascular: Negative. Gastrointestinal:  Negative for abdominal pain, diarrhea, nausea and vomiting. Musculoskeletal:  Negative for myalgias. Skin: Negative. Neurological:  Positive for headaches. Negative for dizziness and light-headedness. Hematological:  Positive for adenopathy. Psychiatric/Behavioral: Negative.        Current Outpatient Medications on File Prior to Visit   Medication Sig   • albuterol (PROVENTIL HFA,VENTOLIN HFA) 90 mcg/act inhaler inhale 2 puffs by mouth and INTO THE LUNGS every 6 hours if needed for wheezing   • Breo Ellipta 200-25 MCG/ACT inhaler inhale 1 puff by mouth and INTO THE LUNGS once daily RINSE MOUTH AND SPIT AFTER EACH USE   • dicyclomine (BENTYL) 10 mg capsule Take 1 capsule (10 mg total) by mouth 3 (three) times a day before meals   • levothyroxine (Synthroid) 75 mcg tablet Take 1 tablet (75 mcg total) by mouth daily in the early morning   • methylPREDNISolone 4 MG tablet therapy pack Use as directed on package   • metoprolol tartrate (LOPRESSOR) 50 mg tablet Take 1 tablet (50 mg total) by mouth 2 (two) times a day   • nirmatrelvir & ritonavir (Paxlovid, 300/100,) tablet therapy pack Take 3 tablets by mouth 2 (two) times a day for 5 days Take 2 nirmatrelvir tablets + 1 ritonavir tablet together per dose (Patient not taking: Reported on 12/14/2023)       Objective:    Temp 98.7 °F (37.1 °C)   Ht 5' 4" (1.626 m)   Wt 127 kg (280 lb)   LMP 11/27/2023 (Approximate)   BMI 48.06 kg/m²        Physical Exam  Constitutional:       General: She is not in acute distress. Appearance: She is not ill-appearing. HENT:      Head: Normocephalic and atraumatic. Right Ear: External ear normal.      Left Ear: External ear normal.      Nose: Congestion present. Mouth/Throat:      Mouth: Mucous membranes are moist.      Pharynx: Oropharyngeal exudate and posterior oropharyngeal erythema present. Eyes:      Extraocular Movements: Extraocular movements intact. Conjunctiva/sclera: Conjunctivae normal.      Pupils: Pupils are equal, round, and reactive to light. Cardiovascular:      Rate and Rhythm: Normal rate and regular rhythm. Pulmonary:      Effort: Pulmonary effort is normal. No respiratory distress. Breath sounds: No wheezing. Chest:      Chest wall: No tenderness. Skin:     Coloration: Skin is not pale. Neurological:      Mental Status: She is alert and oriented to person, place, and time.    Psychiatric:         Mood and Affect: Mood normal.         Behavior: Behavior normal.       SANGEETA Dodson

## 2023-12-15 ENCOUNTER — TELEPHONE (OUTPATIENT)
Dept: DERMATOLOGY | Facility: CLINIC | Age: 48
End: 2023-12-15

## 2023-12-15 NOTE — TELEPHONE ENCOUNTER
Pt returning Kizzy's call    Spoke with Carolyne Dewey, confirmed moving pt into new appt time 2/20 at 8:50am

## 2023-12-15 NOTE — TELEPHONE ENCOUNTER
Left a voicemail for [facility] to reschedule appt for 3/12 for a appt in February     Please offer a appt in February then send me a message so I can schedule

## 2024-01-29 DIAGNOSIS — E06.3 HYPOTHYROIDISM DUE TO HASHIMOTO'S THYROIDITIS: ICD-10-CM

## 2024-01-29 DIAGNOSIS — E03.8 HYPOTHYROIDISM DUE TO HASHIMOTO'S THYROIDITIS: ICD-10-CM

## 2024-01-29 RX ORDER — LEVOTHYROXINE SODIUM 0.07 MG/1
75 TABLET ORAL
Qty: 30 TABLET | Refills: 5 | Status: SHIPPED | OUTPATIENT
Start: 2024-01-29

## 2024-01-29 NOTE — TELEPHONE ENCOUNTER
Reason for call:   [x] Refill   [] Prior Auth  [] Other:     Office:   [x] PCP/Provider Jason Lake   [] Specialty/Provider -     Medication: Levothyroxine     Dose/Frequency: 75mcg - daily     Quantity: 90    Pharmacy: [facility] #[id number]    Does the patient have enough for 3 days?   [x] Yes   [] No - Send as HP to POD

## 2024-02-20 ENCOUNTER — OFFICE VISIT (OUTPATIENT)
Dept: DERMATOLOGY | Facility: CLINIC | Age: 49
End: 2024-02-20
Payer: MEDICARE

## 2024-02-20 VITALS — WEIGHT: 285 LBS | BODY MASS INDEX: 48.65 KG/M2 | TEMPERATURE: 97.3 F | HEIGHT: 64 IN

## 2024-02-20 DIAGNOSIS — B07.9 VERRUCA VULGARIS: ICD-10-CM

## 2024-02-20 DIAGNOSIS — L21.9 SEBORRHEIC DERMATITIS: Primary | ICD-10-CM

## 2024-02-20 PROCEDURE — 99214 OFFICE O/P EST MOD 30 MIN: CPT | Performed by: DERMATOLOGY

## 2024-02-20 RX ORDER — KETOCONAZOLE 20 MG/G
CREAM TOPICAL
Qty: 60 G | Refills: 11 | Status: SHIPPED | OUTPATIENT
Start: 2024-02-20

## 2024-02-20 NOTE — PATIENT INSTRUCTIONS
"FOLLOW UP SEBORRHEIC DERMATITIS, LESS LIKELY ROSACEA     Assessment and Plan:  Based on a thorough discussion of this condition and the management approach to it (including a comprehensive discussion of the known risks, side effects and potential benefits of treatment), the patient (family) agrees to implement the following specific plan:  Recommend applying plain Vaseline multiple times a day to your face.   Recommend that you continue Ketoconazole 2% Cream: Apply topically twice a day when active and twice a week as maintenance. Prescription sent to your pharmacy.  - Recommend sensitive skin care regimen  - detergent free of dyes and perfumes (example, free and clear). Try washing clothes with extra rinse cycle.  - Short lukewarm showers  - White dove bar soap  - avoid aerosols and fragrances in the home (candles, plug ins, perfume, air freshener, etc)       Seborrheic Dermatitis   Seborrheic dermatitis is a common, chronic or relapsing form of eczema/dermatitis that mainly affects the sebaceous, gland-rich regions of the scalp, face, and trunk.  There are infantile and adult forms of seborrhoeic dermatitis. It is sometimes associated with psoriasis and, in that clinical scenario, may be referred to as \"sebo-psoriasis.\"  Seborrheic dermatitis is also known as \"seborrheic eczema.\"  Dandruff (also called \"pityriasis capitis\") is an uninflamed form of seborrhoeic dermatitis. Dandruff presents as bran-like scaly patches scattered within hair-bearing areas of the scalp.  In an infant, this condition may be referred to as \"cradle cap.\"  The cause of seborrheic dermatitis is not completely understood. It is associated with proliferation of various species of the skin commensal Malassezia, in its yeast (non-pathogenic) form. Its metabolites (such as the fatty acids oleic acid, malssezin, and indole-3-carbaldehyde) may cause an inflammatory reaction. Differences in skin barrier lipid content and function may account for " "individual presentations.    Infantile Seborrheic Dermatitis  Infantile seborrheic dermatitis affects babies under the age of 3 months and usually resolves by 6-12 months of age.  Infantile seborrheic dermatitis causes \"cradle cap\" (diffuse, greasy scaling on scalp). The rash may spread to affect armpit and groin folds (a type of \"napkin dermatitis\").  There may be associated salmon-pink colored patches that may flake or peel.  The rash in this case is usually not especially itchy, so the baby often appears undisturbed by the rash, even when more generalized.    Adult Seborrheic Dermatitis  Adult seborrheic dermatitis tends to begin in late adolescence; prevalence is greatest in young adults and in the elderly. It is more common in males than in females.    The following factors are sometimes associated with severe adult seborrheic dermatitis:  Oily skin  Familial tendency to seborrhoeic dermatitis or a family history of psoriasis  Immunosuppression: organ transplant recipient, human immunodeficiency virus (HIV) infection and patients with lymphoma  Neurological and psychiatric diseases: Parkinson disease, tardive dyskinesia, depression, epilepsy, facial nerve palsy, spinal cord injury and congenital disorders such as Down syndrome  Treatment for psoriasis with psoralen and ultraviolet A (PUVA) therapy  Lack of sleep  Stressful events.    In adults, seborrheic dermatitis may typically affect the scalp, face (creases around the nose, behind ears, within eyebrows) and upper trunk. Typical clinical features include:  Winter flares, improving in summer following sun exposure  Minimal itch most of the time  Combination oily and dry mid-facial skin  Ill-defined localized scaly patches or diffuse scale in the scalp  Blepharitis; scaly red eyelid margins  [place]-pink, thin, scaly, and ill-defined plaques in skin folds on both sides of the face  Petal or ring-shaped flaky patches on hair-line and on anterior chest  Rash in " "armpits, under the breasts, in the groin folds and genital creases  Superficial folliculitis (inflamed hair follicles) on cheeks and upper trunk    Seborrheic dermatitis is diagnosed by its clinical appearance and behavior. Skin biopsy may be helpful but is rarely necessary to make this diagnosis.     VERRUCA VULGARIS (\"Common Warts\")    Assessment and Plan:  Based on a thorough discussion of this condition and the management approach to it (including a comprehensive discussion of the known risks, side effects and potential benefits of treatment), the patient (family) agrees to implement the following specific plan:  Benign, assurance provided.  Patient deferred cryotherapy treatment today.     Verruca Vulgaris  A verruca is a common growth of the skin caused by infection by human papilloma virus (HPV). There are many strains of the virus that cause different types of warts on the body. The virus infects the most superficial layers of the skin, causing increased production of skin cells and thickening. Warts can be spread through direct contact with infected skin and may spread to other parts of the body if scratched or picked.     A verruca is more commonly called a \"wart.\" Warts are particularly common in school-aged children but can arise at any age. Patients who have a history of eczema are especially prone due to impaired skin barrier. Those taking immunosuppressive drugs or with HIV infections may experience prolonged symptoms despite treatment.     Warts generally have a rough surface with a tiny black dot sometimes observed in the middle of each scaly spot. They can range in size from a small bump to large scaly growths.  Common warts are often found on the backs of fingers or toes, around the nails, and on the knees. Plantar warts can grow inwardly on the soles of the feet causing pain.    There are many possible ways to treat warts and sometimes several different treatments are needed to get the warts to " go away completely. There is no single perfect treatment for warts, and successful treatment can take many months.     In-office treatments usually require multiple visits, and include:  Cryotherapy. a cold spray with liquid nitrogen will destroy the infected cells but may lead to discomfort and blistering. It may also leave a permanent white maribell or scar.      Electrosurgery (curettage and cautery) can be used for large resistant warts which involves shaving the growth down and burning the base. It is performed under local anesthesia and may leave a permanent scar    Candida (“yeast”) antigen injections. These are extracts of the common yeast (Candida) that cannot cause an infection. The medication is injected into/under the wart. It is thought to stimulate the immune system to recognize the wart virus and attack it. Multiple injections are often needed about one month apart.    There are also several at-home wart treatments:    Soak the warts in warm water for 5 minutes every night followed by gentle filing with a nail file or pumice stone.    Topical salicylic acid or similar compounds work by removing the dead surface skin cells  Apply the medicine directly to the wart, wait for it to dry completely, then cover with duct tape overnight   Repeat until the wart is gone, which can take 2-4 months  Do not use on the face or groin area   If the wart paint makes the skin sore, stop treatment until the discomfort has settled, then recommence as above.  Take care to keep the chemical off normal skin.    Podophyllin is a cytotoxic agent used in some products and must not be used in pregnancy or women considering pregnancy.    Some prescription medications include   Topical retinoids (adapalene, tretinoin, tazarotene), 5-fluorouracil (Efudex) or imiquimod (Aldara) creams are sometimes used to treat flat warts or warts on the face and other sensitive anatomical areas. They are usually applied directly to the warts once a  day for 2-4 months and can be irritating.  These treatments should only be used as directed by your health care provider.  Systemic treatment with oral cimetidine (Tagamet) may help boost the immune system against the wart virus in patients, some of the time.  Initiation of cimetidine therapy should ONLY be done under the supervision of your health care provider, who can discuss possible side effects and drug-to-drug interactions of this specific treatment.

## 2024-02-20 NOTE — PROGRESS NOTES
"[facility] Note     Patient Name: Elissa Leal  Encounter Date: 2/20/2024     Have you been cared for by a [facility] Dermatologist in the last 3 years and, if so, which description applies to you?    Yes.  I have been here within the last 3 years, and my medical history has NOT changed since that time.  I am FEMALE/of child-bearing potential.    REVIEW OF SYSTEMS:  Have you recently had or currently have any of the following? No changes in my recent health.   PAST MEDICAL HISTORY:  Have you personally ever had or currently have any of the following?  If \"YES,\" then please provide more detail. No changes in my medical history.   HISTORY OF IMMUNOSUPPRESSION: Do you have a history of any of the following:  Systemic Immunosuppression such as Diabetes, Biologic or Immunotherapy, Chemotherapy, Organ Transplantation, Bone Marrow Transplantation?  Yes     Answering \"YES\" requires the addition of the dotphrase \"IMMUNOSUPPRESSED\" as the first diagnosis of the patient's visit.   FAMILY HISTORY:  Any \"first degree relatives\" (parent, brother, sister, or child) with the following?    No changes in my family's known health.   PATIENT EXPERIENCE:    Do you want the Dermatologist to perform a COMPLETE skin exam today including a clinical examination under the \"bra and underwear\" areas?  NO  If necessary, do we have your permission to call and leave a detailed message on your Preferred Phone number that includes your specific medical information?  Yes      No Known Allergies   Current Outpatient Medications:     albuterol (PROVENTIL HFA,VENTOLIN HFA) 90 mcg/act inhaler, inhale 2 puffs by mouth and INTO THE LUNGS every 6 hours if needed for wheezing, Disp: , Rfl:     Breo Ellipta 200-25 MCG/ACT inhaler, inhale 1 puff by mouth and INTO THE LUNGS once daily RINSE MOUTH AND SPIT AFTER EACH USE, Disp: 180 blister, Rfl: 2    dicyclomine (BENTYL) 10 mg capsule, Take 1 capsule (10 mg total) by mouth 3 (three) times a " day before meals, Disp: 16 capsule, Rfl: 0    levothyroxine (Synthroid) 75 mcg tablet, Take 1 tablet (75 mcg total) by mouth daily in the early morning, Disp: 30 tablet, Rfl: 5    methylPREDNISolone 4 MG tablet therapy pack, Use as directed on package, Disp: 21 each, Rfl: 0    metoprolol tartrate (LOPRESSOR) 50 mg tablet, Take 1 tablet (50 mg total) by mouth 2 (two) times a day, Disp: 180 tablet, Rfl: 1          Whom besides the patient is providing clinical information about today's encounter?   NO ADDITIONAL HISTORIAN (patient alone provided history)    Physical Exam and Assessment/Plan by Diagnosis:      FOLLOW UP SEBORRHEIC DERMATITIS, LESS LIKELY ROSACEA     Physical Exam:  Anatomic Location Affected:  Around nose, around mouth  Morphological Description:  Dry pink plaques  Pertinent Positives:  Pertinent Negatives:    Additional History of Present Condition:  Previously prescribed Ketoconazole 2% Cream which patient reports is very drying. Worse in cold and hot weather.     Assessment and Plan:  Based on a thorough discussion of this condition and the management approach to it (including a comprehensive discussion of the known risks, side effects and potential benefits of treatment), the patient (family) agrees to implement the following specific plan:  Recommend applying plain Vaseline multiple times a day to your face. Apply Vaseline after Ketoconazole Cream.   Recommend that you continue Ketoconazole 2% Cream: Apply topically twice a day when active and twice a week as maintenance. Prescription sent to your pharmacy.  - Recommend sensitive skin care regimen  - detergent free of dyes and perfumes (example, free and clear). Try washing clothes with extra rinse cycle.  - Short lukewarm showers  - White dove bar soap  - avoid aerosols and fragrances in the home (candles, plug ins, perfume, air freshener, etc)       Seborrheic Dermatitis   Seborrheic dermatitis is a common, chronic or relapsing form of  "eczema/dermatitis that mainly affects the sebaceous, gland-rich regions of the scalp, face, and trunk.  There are infantile and adult forms of seborrhoeic dermatitis. It is sometimes associated with psoriasis and, in that clinical scenario, may be referred to as \"sebo-psoriasis.\"  Seborrheic dermatitis is also known as \"seborrheic eczema.\"  Dandruff (also called \"pityriasis capitis\") is an uninflamed form of seborrhoeic dermatitis. Dandruff presents as bran-like scaly patches scattered within hair-bearing areas of the scalp.  In an infant, this condition may be referred to as \"cradle cap.\"  The cause of seborrheic dermatitis is not completely understood. It is associated with proliferation of various species of the skin commensal Malassezia, in its yeast (non-pathogenic) form. Its metabolites (such as the fatty acids oleic acid, malssezin, and indole-3-carbaldehyde) may cause an inflammatory reaction. Differences in skin barrier lipid content and function may account for individual presentations.    Infantile Seborrheic Dermatitis  Infantile seborrheic dermatitis affects babies under the age of 3 months and usually resolves by 6-12 months of age.  Infantile seborrheic dermatitis causes \"cradle cap\" (diffuse, greasy scaling on scalp). The rash may spread to affect armpit and groin folds (a type of \"napkin dermatitis\").  There may be associated salmon-pink colored patches that may flake or peel.  The rash in this case is usually not especially itchy, so the baby often appears undisturbed by the rash, even when more generalized.    Adult Seborrheic Dermatitis  Adult seborrheic dermatitis tends to begin in late adolescence; prevalence is greatest in young adults and in the elderly. It is more common in males than in females.    The following factors are sometimes associated with severe adult seborrheic dermatitis:  Oily skin  Familial tendency to seborrhoeic dermatitis or a family history of " "psoriasis  Immunosuppression: organ transplant recipient, human immunodeficiency virus (HIV) infection and patients with lymphoma  Neurological and psychiatric diseases: Parkinson disease, tardive dyskinesia, depression, epilepsy, facial nerve palsy, spinal cord injury and congenital disorders such as Down syndrome  Treatment for psoriasis with psoralen and ultraviolet A (PUVA) therapy  Lack of sleep  Stressful events.    In adults, seborrheic dermatitis may typically affect the scalp, face (creases around the nose, behind ears, within eyebrows) and upper trunk. Typical clinical features include:  Winter flares, improving in summer following sun exposure  Minimal itch most of the time  Combination oily and dry mid-facial skin  Ill-defined localized scaly patches or diffuse scale in the scalp  Blepharitis; scaly red eyelid margins  [place]-pink, thin, scaly, and ill-defined plaques in skin folds on both sides of the face  Petal or ring-shaped flaky patches on hair-line and on anterior chest  Rash in armpits, under the breasts, in the groin folds and genital creases  Superficial folliculitis (inflamed hair follicles) on cheeks and upper trunk    Seborrheic dermatitis is diagnosed by its clinical appearance and behavior. Skin biopsy may be helpful but is rarely necessary to make this diagnosis.     VERRUCA VULGARIS (\"Common Warts\")    Physical Exam:  Anatomic Location Affected:  Chest  Morphological Description:  Verrucous papule  Pertinent Positives:  Pertinent Negatives:    Additional History of Present Condition:  Found one xam    Assessment and Plan:  Based on a thorough discussion of this condition and the management approach to it (including a comprehensive discussion of the known risks, side effects and potential benefits of treatment), the patient (family) agrees to implement the following specific plan:  Benign, assurance provided.  Patient deferred cryotherapy treatment today.     Verruca Vulgaris  A verruca " "is a common growth of the skin caused by infection by human papilloma virus (HPV). There are many strains of the virus that cause different types of warts on the body. The virus infects the most superficial layers of the skin, causing increased production of skin cells and thickening. Warts can be spread through direct contact with infected skin and may spread to other parts of the body if scratched or picked.     A verruca is more commonly called a \"wart.\" Warts are particularly common in school-aged children but can arise at any age. Patients who have a history of eczema are especially prone due to impaired skin barrier. Those taking immunosuppressive drugs or with HIV infections may experience prolonged symptoms despite treatment.     Warts generally have a rough surface with a tiny black dot sometimes observed in the middle of each scaly spot. They can range in size from a small bump to large scaly growths.  Common warts are often found on the backs of fingers or toes, around the nails, and on the knees. Plantar warts can grow inwardly on the soles of the feet causing pain.    There are many possible ways to treat warts and sometimes several different treatments are needed to get the warts to go away completely. There is no single perfect treatment for warts, and successful treatment can take many months.     In-office treatments usually require multiple visits, and include:  Cryotherapy. a cold spray with liquid nitrogen will destroy the infected cells but may lead to discomfort and blistering. It may also leave a permanent white maribell or scar.      Electrosurgery (curettage and cautery) can be used for large resistant warts which involves shaving the growth down and burning the base. It is performed under local anesthesia and may leave a permanent scar    Candida (“yeast”) antigen injections. These are extracts of the common yeast (Candida) that cannot cause an infection. The medication is injected into/under " the wart. It is thought to stimulate the immune system to recognize the wart virus and attack it. Multiple injections are often needed about one month apart.    There are also several at-home wart treatments:    Soak the warts in warm water for 5 minutes every night followed by gentle filing with a nail file or pumice stone.    Topical salicylic acid or similar compounds work by removing the dead surface skin cells  Apply the medicine directly to the wart, wait for it to dry completely, then cover with duct tape overnight   Repeat until the wart is gone, which can take 2-4 months  Do not use on the face or groin area   If the wart paint makes the skin sore, stop treatment until the discomfort has settled, then recommence as above.  Take care to keep the chemical off normal skin.    Podophyllin is a cytotoxic agent used in some products and must not be used in pregnancy or women considering pregnancy.    Some prescription medications include   Topical retinoids (adapalene, tretinoin, tazarotene), 5-fluorouracil (Efudex) or imiquimod (Aldara) creams are sometimes used to treat flat warts or warts on the face and other sensitive anatomical areas. They are usually applied directly to the warts once a day for 2-4 months and can be irritating.  These treatments should only be used as directed by your health care provider.  Systemic treatment with oral cimetidine (Tagamet) may help boost the immune system against the wart virus in patients, some of the time.  Initiation of cimetidine therapy should ONLY be done under the supervision of your health care provider, who can discuss possible side effects and drug-to-drug interactions of this specific treatment.              Scribe Attestation      I,:  Malia Noe am acting as a scribe while in the presence of the attending physician.:       I,:  Norma Mujica MD personally performed the services described in this documentation    as scribed in my presence.:

## 2024-03-05 ENCOUNTER — OFFICE VISIT (OUTPATIENT)
Dept: FAMILY MEDICINE CLINIC | Facility: CLINIC | Age: 49
End: 2024-03-05
Payer: MEDICARE

## 2024-03-05 VITALS
WEIGHT: 291 LBS | OXYGEN SATURATION: 98 % | BODY MASS INDEX: 49.68 KG/M2 | HEIGHT: 64 IN | TEMPERATURE: 96.9 F | HEART RATE: 80 BPM | SYSTOLIC BLOOD PRESSURE: 120 MMHG | DIASTOLIC BLOOD PRESSURE: 80 MMHG

## 2024-03-05 DIAGNOSIS — E66.01 CLASS 3 SEVERE OBESITY DUE TO EXCESS CALORIES WITH SERIOUS COMORBIDITY AND BODY MASS INDEX (BMI) OF 45.0 TO 49.9 IN ADULT (HCC): ICD-10-CM

## 2024-03-05 DIAGNOSIS — Z76.89 ENCOUNTER FOR WEIGHT MANAGEMENT: Primary | ICD-10-CM

## 2024-03-05 PROCEDURE — 99214 OFFICE O/P EST MOD 30 MIN: CPT | Performed by: NURSE PRACTITIONER

## 2024-03-05 RX ORDER — TIRZEPATIDE 2.5 MG/.5ML
2.5 INJECTION, SOLUTION SUBCUTANEOUS WEEKLY
Qty: 2 ML | Refills: 0 | Status: CANCELLED | OUTPATIENT
Start: 2024-03-05 | End: 2024-04-02

## 2024-03-05 NOTE — PROGRESS NOTES
Assessment/Plan:     Diagnoses and all orders for this visit:    Encounter for weight management  -     Semaglutide-Weight Management (WEGOVY) 0.25 MG/0.5ML; Inject 0.5 mL (0.25 mg total) under the skin once a week for 4 doses    Class 3 severe obesity due to excess calories with serious comorbidity and body mass index (BMI) of 45.0 to 49.9 in adult ([facility])  -     Semaglutide-Weight Management (WEGOVY) 0.25 MG/0.5ML; Inject 0.5 mL (0.25 mg total) under the skin once a week for 4 doses        #1 Encounter for weight management  Discussed in patient possible referral to Medical Weight Management program  Discussed with patient plan to treat with Wegovy 0.25 mg weekly  #2 Class 3 severe obesity due to excess calories with serious comorbidity and body mass index of 45.0-49.9 in adult ([facility])  Discussed with patient plan to treat with Wegovy 0.25 mg weekly  Patient instructed to call if no improvement in 72 hours or symptoms worsen    Subjective:      Patient ID: Elissa Leal is a 49 y.o. female.    49 y.o.female presenting to discuss weight management options. She reports that she has been trying to loss weight for over one year with losing and gaining some 10 pounds. She is eating a low carbohydrate, low fat and a less than 1600 calorie diet but once she stares off low calorie diet she regains jarrett lost. She has increased her physical activity level but does have issues with increased palpations and dyspnea on exertion. She also has noticed that her knee pains are worsening with the increasing weight gains.      Wt Readings from Last 6 Encounters:   03/05/24 132 kg (291 lb)   02/20/24 129 kg (285 lb)   12/14/23 127 kg (280 lb)   12/04/23 130 kg (286 lb)   06/15/23 131 kg (288 lb)   05/05/23 131 kg (288 lb 8 oz)         The following portions of the patient's history were reviewed and updated as appropriate: allergies, current medications, past family history, past medical history, past social history, past surgical  "history, and problem list.    Review of Systems   Constitutional:  Positive for fatigue. Negative for chills and fever.   Respiratory:  Positive for chest tightness, shortness of breath and wheezing. Negative for cough and stridor.    Cardiovascular:  Positive for palpitations. Negative for chest pain and leg swelling.   Gastrointestinal: Negative.    Musculoskeletal: Negative.    Neurological: Negative.    Psychiatric/Behavioral: Negative.         Objective:    /80 (BP Location: Left arm, Patient Position: Sitting, Cuff Size: Large)   Pulse 80   Temp (!) 96.9 °F (36.1 °C)   Ht 5' 4\" (1.626 m)   Wt 132 kg (291 lb)   SpO2 98%   BMI 49.95 kg/m² (Reviewed)       Physical Exam  Vitals reviewed.   Constitutional:       General: She is not in acute distress.     Appearance: She is well-developed and well-groomed. She is not ill-appearing.   HENT:      Head: Normocephalic and atraumatic.   Eyes:      General: Lids are normal.      Extraocular Movements: Extraocular movements intact.      Conjunctiva/sclera: Conjunctivae normal.      Pupils: Pupils are equal, round, and reactive to light.   Neck:      Trachea: Trachea and phonation normal.   Cardiovascular:      Rate and Rhythm: Normal rate and regular rhythm.      Heart sounds: Normal heart sounds.   Pulmonary:      Effort: Pulmonary effort is normal.      Breath sounds: Normal breath sounds.   Abdominal:      General: Abdomen is flat. Bowel sounds are normal. There is no distension.      Palpations: Abdomen is soft.      Tenderness: There is no abdominal tenderness.   Musculoskeletal:      Cervical back: Neck supple.   Lymphadenopathy:      Cervical: No cervical adenopathy.   Skin:     General: Skin is warm and dry.      Capillary Refill: Capillary refill takes less than 2 seconds.   Neurological:      Mental Status: She is alert and oriented to person, place, and time.   Psychiatric:         Mood and Affect: Mood normal.         Behavior: Behavior normal. " Behavior is cooperative.

## 2024-03-06 ENCOUNTER — TELEPHONE (OUTPATIENT)
Age: 49
End: 2024-03-06

## 2024-03-06 NOTE — TELEPHONE ENCOUNTER
PA for Wegovy 0.25 MG/0.5ML       Submitted via    [][employer]-KEY [id number]  []Surescripts-Case ID #   []Faxed to plan   []Other website   []Phone call Case ID #     Office notes sent, clinical questions answered. Awaiting determination    Turnaround time for your insurance to make a decision on your Prior Authorization can take 7-21 business days.

## 2024-03-07 NOTE — TELEPHONE ENCOUNTER
PA for Wegovy 0.25 MG/0.5ML Approved     Date(s) approved 3/6/2024 - 9/6/2024        Patient advised by [x] CallMinert Message                      [] Phone call       Pharmacy advised by [x]Fax                                     []Phone call    Approval letter scanned into Media Yes

## 2024-03-24 DIAGNOSIS — R00.2 PALPITATIONS: ICD-10-CM

## 2024-03-24 DIAGNOSIS — I49.3 FREQUENT PVCS: ICD-10-CM

## 2024-03-24 RX ORDER — METOPROLOL TARTRATE 50 MG/1
50 TABLET, FILM COATED ORAL 2 TIMES DAILY
Qty: 180 TABLET | Refills: 1 | Status: SHIPPED | OUTPATIENT
Start: 2024-03-24

## 2024-04-09 ENCOUNTER — TELEPHONE (OUTPATIENT)
Age: 49
End: 2024-04-09

## 2024-04-09 DIAGNOSIS — Z76.89 ENCOUNTER FOR WEIGHT MANAGEMENT: Primary | ICD-10-CM

## 2024-04-09 DIAGNOSIS — E66.01 CLASS 3 SEVERE OBESITY DUE TO EXCESS CALORIES WITH SERIOUS COMORBIDITY AND BODY MASS INDEX (BMI) OF 45.0 TO 49.9 IN ADULT (HCC): ICD-10-CM

## 2024-04-09 NOTE — TELEPHONE ENCOUNTER
Pt. Called in requested to know how much longer will she have to wait for Gretchen to get back to her with an alternative, since Wegovy isn't available. Pt. Also stated she sent Gretchen a message via [employer] sometime last week and Gretchen still hasn't responded.    Pt. Stated she doesn't want  to continue playing phone tag just wants a resolution or response to the message she sent Gretchen.    Kindly Please advise

## 2024-04-19 ENCOUNTER — TELEPHONE (OUTPATIENT)
Age: 49
End: 2024-04-19

## 2024-04-19 NOTE — TELEPHONE ENCOUNTER
Soumya devi needed for the Naltrexone-buPROPion HCl ER  per patient. Please notify patient when complete.Wegovy she can not find in stock.

## 2024-04-22 DIAGNOSIS — J45.40 MODERATE PERSISTENT ASTHMA WITHOUT COMPLICATION: ICD-10-CM

## 2024-04-22 NOTE — TELEPHONE ENCOUNTER
Eli called in to let us know she does NOT need prior-authorization any longer for the Naltrexone-buPROPion HCl ER     She was able to pickup her Wegovey prescription and she has this in hand.     Please CANCEL any prior auth that has been started for Naltrexone.     Thank you!

## 2024-04-23 ENCOUNTER — TELEPHONE (OUTPATIENT)
Age: 49
End: 2024-04-23

## 2024-04-23 RX ORDER — FLUTICASONE FUROATE AND VILANTEROL 200; 25 UG/1; UG/1
1 POWDER RESPIRATORY (INHALATION) DAILY
Qty: 60 BLISTER | Refills: 0 | Status: SHIPPED | OUTPATIENT
Start: 2024-04-23 | End: 2024-04-24 | Stop reason: SDUPTHER

## 2024-04-23 NOTE — TELEPHONE ENCOUNTER
PA for BREO    Submitted via    [x]CMM-KEY [id number]  []Surescripts-Case ID #   []Faxed to plan   []Other website   []Phone call Case ID #     Office notes sent, clinical questions answered. Awaiting determination    Turnaround time for your insurance to make a decision on your Prior Authorization can take 7-21 business days.

## 2024-04-24 ENCOUNTER — OFFICE VISIT (OUTPATIENT)
Dept: PULMONOLOGY | Facility: CLINIC | Age: 49
End: 2024-04-24
Payer: MEDICARE

## 2024-04-24 VITALS
HEIGHT: 64 IN | DIASTOLIC BLOOD PRESSURE: 82 MMHG | WEIGHT: 286 LBS | HEART RATE: 89 BPM | SYSTOLIC BLOOD PRESSURE: 124 MMHG | TEMPERATURE: 97.6 F | BODY MASS INDEX: 48.83 KG/M2 | OXYGEN SATURATION: 96 %

## 2024-04-24 DIAGNOSIS — J45.40 MODERATE PERSISTENT ASTHMA WITHOUT COMPLICATION: Primary | ICD-10-CM

## 2024-04-24 PROCEDURE — 99214 OFFICE O/P EST MOD 30 MIN: CPT | Performed by: INTERNAL MEDICINE

## 2024-04-24 RX ORDER — FLUTICASONE FUROATE AND VILANTEROL 200; 25 UG/1; UG/1
1 POWDER RESPIRATORY (INHALATION) DAILY
Qty: 60 BLISTER | Refills: 11 | Status: SHIPPED | OUTPATIENT
Start: 2024-04-24 | End: 2025-04-19

## 2024-04-24 RX ORDER — ALBUTEROL SULFATE 90 UG/1
1 AEROSOL, METERED RESPIRATORY (INHALATION) EVERY 4 HOURS PRN
Qty: 8.5 G | Refills: 4 | Status: SHIPPED | OUTPATIENT
Start: 2024-04-24 | End: 2024-05-24

## 2024-04-24 NOTE — PROGRESS NOTES
"Pulmonary Follow Up Note  Elissa Leal 49 y.o. female MRN: [id number]  4/24/2024      HPI:    Patient continues to have mild shortness of breath with exertional activity such as walking up stairs and when briskly walking.  No fevers or chills.  No recent pulmonary illness.  Continues to use Breo on a daily basis.  Rarely requires albuterol.    Exercise Tolerance: Fair.  Experiences shortness of breath with exertional activities.       Meds:  Breo 200 daily  Albuterol as needed, rarely    ROS:  Constitutional: - Fatigue, - chills, - fever, - weight change.   HEENT: - rhinorrhea, - sneezing, - sore throat.    Respiratory: - cough, + exertional shortness of breath, - wheezing.    Cardiovascular: - chest pain,  -palpitations, - leg swelling.   Gastrointestinal: - abdominal pain, - constipation, - diarrhea, - nausea, - vomiting.   Endocrine: - cold intolerance, - heat intolerance.   Genitourinary: - dysuria.   Musculoskeletal: - arthralgias.   Skin:- rash, - wound.   Allergic/Immunologic: - allergies  Neurological: - dizziness, - numbness        Vitals: Blood pressure 124/82, pulse 89, temperature 97.6 °F (36.4 °C), temperature source Tympanic, height 5' 4\" (1.626 m), weight 130 kg (286 lb), SpO2 96%, not currently breastfeeding., Body mass index is 49.09 kg/m².    Physical Exam:  GEN  NAD  HEENT  ncat, non icteric, MM moist  NECK  supple, no JVD, no LAD  CV  +s1s2, no mrg, RRR  PULM  CTA BL, no wrr  ABD  soft, nt, + obese, + BS  EXT + lower extremity pitting edema, no cyanosis, no clubbing  NEURO  Aox3, no focal weakness    Imaging and other studies:   I personally viewed and interpreted the following imaging studies:  Chest x-ray 1/17/2021 shows no gross intraparenchymal or pleural abnormalities    Pulmonary function testing:   PFTs 8/19/2022 are normal      Assessment:  Asthma, moderate persistent  Morbid obesity    Plan:  Continue Breo 200 daily.  Renewed for 1 year.  Continue albuterol as needed.  Renewed for 1 " "year.  Patient counseled on the importance of weight loss and management of asthma.  She has changed her diet consistently and states that she will start a GLP-1 agonist starting this week.  Repeat PFTs in 5 months    Return visit in 6 months  On next visit we will evaluate symptoms if any, albuterol requirement, PFT results    Note: Portions of the record may have been created with voice recognition software. Occasional wrong word or \"sound a like\" substitutions may have occurred due to the inherent limitations of voice recognition software. Read the chart carefully and recognize, using context, where substitutions have occurred.     Giovanni Landeros M.D.  [facility]    Answers submitted by the patient for this visit:  Pulmonology Questionnaire (Submitted on 4/23/2024)  Chief Complaint: Primary symptoms  Do you have chest tightness?: Yes  Do you have shortness of breath?: Yes  Do you have wheezing?: Yes  Chronicity: chronic  When did you first notice your symptoms?: more than 1 year ago  How often do your symptoms occur?: daily  Since you first noticed this problem, how has it changed?: waxing and waning  Have you had a change in appetite?: No  Do you have chest pain?: No  Do you have shortness of breath that occurs with effort or exertion?: Yes  Do you have ear congestion?: No  Do you have ear pain?: No  Do you have a fever?: No  Do you have headaches?: No  Do you have heartburn?: No  Do you have fatigue?: No  Do you have muscle pain?: No  Do you have nasal congestion?: No  Do you have shortness of breath when lying flat?: Yes  Do you have shortness of breath when you wake up?: No  Do you have post-nasal drip?: No  Do you have a runny nose?: No  Do you have sneezing?: No  Do you have a sore throat?: No  Do you have sweats?: No  Do you have trouble swallowing?: No  Have you experienced weight loss?: No  Which of the following makes your symptoms worse?: change in weather, " climbing stairs, exercise, strenuous activity  Which of the following makes your symptoms better?: change in position, steroid inhaler

## 2024-04-24 NOTE — LETTER
"April 24, 2024       No Recipients    Patient: Elissa Leal   YOB: 1975   Date of Visit: 4/24/2024       Dear Dr. Lake:    Thank you for referring Elissa Leal to me for evaluation. Below are my notes for this consultation.    If you have questions, please do not hesitate to call me. I look forward to following your patient along with you.         Sincerely,        Giovanni Landeros MD        CC:   No Recipients    Giovanni Landeros MD  4/24/2024  9:55 AM  Incomplete  Pulmonary Follow Up Note  Elissa Leal 49 y.o. female MRN: [id number]  4/24/2024      HPI:    ***    Exercise Tolerance: Able to ambulate *** blocks, and able to climb *** flights of stairs       Meds:  Breo 200 daily  Albuterol as needed    ROS:  ***  Constitutional: - Fatigue, - chills, - fever, - weight change.   HEENT: - rhinorrhea, - sneezing, - sore throat.    Respiratory: - cough, - shortness of breath, - wheezing.    Cardiovascular: - chest pain,  -palpitations, - leg swelling.   Gastrointestinal: - abdominal pain, - constipation, - diarrhea, - nausea, - vomiting.   Endocrine: - cold intolerance, - heat intolerance.   Genitourinary: - dysuria.   Musculoskeletal: - arthralgias.   Skin:- rash, - wound.   Allergic/Immunologic: - allergies  Neurological: - dizziness, - numbness        Vitals: Blood pressure 124/82, pulse 89, temperature 97.6 °F (36.4 °C), temperature source Tympanic, height 5' 4\" (1.626 m), weight 130 kg (286 lb), SpO2 96%, not currently breastfeeding., Body mass index is 49.09 kg/m².    Physical Exam:  GEN *** NAD  HEENT *** ncat, non icteric, MM moist  NECK *** supple, no JVD, no LAD  CV *** +s1s2, no mrg, RRR  PULM *** CTA BL, no wrr  ABD *** soft, ntnd, + BS  EXT *** no edema, no cyanosis, no clubbing  NEURO *** Aox3, no focal weakness    Imaging and other studies:   I personally viewed and interpreted the following imaging studies:  Chest x-ray 1/17/2021 shows no gross intraparenchymal or pleural " "abnormalities    Pulmonary function testing:   PFTs 8/19/2022 are normal      Assessment:  Asthma, moderate persistent  Morbid obesity    Plan:  ***    Return visit in ***  On next visit we will evaluate ***    Note: Portions of the record may have been created with voice recognition software. Occasional wrong word or \"sound a like\" substitutions may have occurred due to the inherent limitations of voice recognition software. Read the chart carefully and recognize, using context, where substitutions have occurred.     Giovanni Landeros M.D.  [facility]    " - - -

## 2024-04-24 NOTE — TELEPHONE ENCOUNTER
PA for BREO  Approved   Date(s) approved UNTIL 04/24/2025        Patient advised by [x] MyChart Message                      [] Phone call       Pharmacy advised by [x]Fax                                     []Phone call    Approval letter scanned into Media No ONCE FAX IS RECEIVED IT WILL BE SCANNED INTO MEDIA

## 2024-04-25 ENCOUNTER — OFFICE VISIT (OUTPATIENT)
Dept: CARDIOLOGY CLINIC | Facility: CLINIC | Age: 49
End: 2024-04-25
Payer: MEDICARE

## 2024-04-25 VITALS
BODY MASS INDEX: 48.93 KG/M2 | SYSTOLIC BLOOD PRESSURE: 116 MMHG | WEIGHT: 286.6 LBS | DIASTOLIC BLOOD PRESSURE: 88 MMHG | HEART RATE: 73 BPM | HEIGHT: 64 IN | OXYGEN SATURATION: 98 % | TEMPERATURE: 97.8 F

## 2024-04-25 DIAGNOSIS — R00.2 PALPITATIONS: ICD-10-CM

## 2024-04-25 DIAGNOSIS — I49.3 PVC (PREMATURE VENTRICULAR CONTRACTION): Primary | ICD-10-CM

## 2024-04-25 PROCEDURE — 99214 OFFICE O/P EST MOD 30 MIN: CPT | Performed by: INTERNAL MEDICINE

## 2024-04-25 PROCEDURE — 93000 ELECTROCARDIOGRAM COMPLETE: CPT | Performed by: INTERNAL MEDICINE

## 2024-04-25 RX ORDER — SEMAGLUTIDE 0.25 MG/.5ML
INJECTION, SOLUTION SUBCUTANEOUS
COMMUNITY
Start: 2024-04-20 | End: 2024-04-29 | Stop reason: DRUGHIGH

## 2024-04-25 NOTE — PROGRESS NOTES
Cardiology Follow Up    Elissa De Guzmanhr  [id number]  1975  [facility] [place] [address] [facility] [address] [phone]      1. PVC (premature ventricular contraction)  POCT ECG      2. Palpitations  POCT ECG          Discussion/Summary:    PVCs which are symptomatic. She is on metoprolol and this seems to be controlling it well. Once again discussed changing from tartrate to succinate for better 24-hour coverage. I would still use this twice daily. She remains comfortable with how she is feeling and does not want to make too many changes since she just started on Wegovy, as well. Continue current regimen.    I see no reason still to repeat either Holter monitor or an echo.    Blood work was done in November. Reviewed with her. No changes needed.      Interval History:  Returns for follow-up for her palpitations.    They remain overall stable. As we discussed at a previous visit, when it is time for her next metoprolol dose, she feels more palpitations. She is still not interested in switching from tartrate to succinate.    She has started on Wegovy. Still her first week, but already lost a few pounds.    Follow-up pulmonary function test to be done.    Denies any chest pain or shortness of breath currently      Patient Active Problem List   Diagnosis    Palpitations    Moderate asthma without complication    Anemia    Hypothyroidism    Microcytosis    Family history of thalassemia    Thrombocytosis    Leukocytosis    PVC (premature ventricular contraction)    Class 3 severe obesity with serious comorbidity and body mass index (BMI) of 45.0 to 49.9 in adult (HCC)    Beta thalassemia minor     Past Medical History:   Diagnosis Date    Anemia 12/08/2021    Asthma Jume 2021    Inhaler    Clotting disorder (HCC) Feb. 2021    On beta blocker    Eczema     GERD (gastroesophageal reflux disease) November 2020    Certain foods cause a problem     Headache(784.0)     No known health problems     Obesity     Visual impairment 2020    New glasses for reading close up     Social History     Tobacco Use    Smoking status: Former     Current packs/day: 0.00     Average packs/day: 0.3 packs/day for 15.0 years (3.8 ttl pk-yrs)     Types: Cigarettes     Start date:      Quit date:      Years since quittin.3     Passive exposure: Past    Smokeless tobacco: Never   Vaping Use    Vaping status: Never Used   Substance Use Topics    Alcohol use: Not Currently    Drug use: Never      Family History   Problem Relation Age of Onset    Cardiomyopathy Mother     Anxiety disorder Mother     Hypertension Mother     Autoimmune disease Mother     Hypertension Father     No Known Problems Maternal Grandmother     Heart attack Maternal Grandfather     No Known Problems Paternal Grandmother     No Known Problems Paternal Grandfather     Anxiety disorder Brother     Thalassemia Maternal Aunt     Lupus Family     No Known Problems Son      Past Surgical History:   Procedure Laterality Date    NO PAST SURGERIES         Current Outpatient Medications:     albuterol (PROVENTIL HFA,VENTOLIN HFA) 90 mcg/act inhaler, Inhale 1 puff every 4 (four) hours as needed for wheezing, Disp: 8.5 g, Rfl: 4    dicyclomine (BENTYL) 10 mg capsule, Take 1 capsule (10 mg total) by mouth 3 (three) times a day before meals, Disp: 16 capsule, Rfl: 0    fluticasone-vilanterol (Breo Ellipta) 200-25 mcg/actuation inhaler, Inhale 1 puff daily Rinse mouth after use., Disp: 60 blister, Rfl: 11    ketoconazole (NIZORAL) 2 % cream, Apply topically twice a day when rash is active, apply topically twice a week when not active, Disp: 60 g, Rfl: 11    levothyroxine (Synthroid) 75 mcg tablet, Take 1 tablet (75 mcg total) by mouth daily in the early morning, Disp: 30 tablet, Rfl: 5    metoprolol tartrate (LOPRESSOR) 50 mg tablet, take 1 tablet by mouth twice a day, Disp: 180 tablet, Rfl: 1     "Naltrexone-buPROPion HCl ER 8-90 MG TB12, Take 1 tablet by mouth in the morning, Disp: 90 tablet, Rfl: 0    Wegovy 0.25 MG/0.5ML, INJECT 0.25MG SUBCUTANEOUSLY (UNDER THE SKIN) ONCE WEEKLY, Disp: , Rfl:   No Known Allergies    Vitals:    04/25/24 1314   BP: 116/88   BP Location: Left arm   Patient Position: Sitting   Cuff Size: Adult   Pulse: 73   Temp: 97.8 °F (36.6 °C)   SpO2: 98%   Weight: 130 kg (286 lb 9.6 oz)   Height: 5' 4\" (1.626 m)     Vitals:    04/25/24 1314   Weight: 130 kg (286 lb 9.6 oz)      Height: 5' 4\" (162.6 cm)   Body mass index is 49.19 kg/m².    Physical Exam:  GEN: Elissa Leal appears well, alert and oriented x 3, pleasant and cooperative   HEENT: pupils equal, round, and reactive to light; extraocular muscles intact  NECK: supple, no carotid bruits   HEART: regular rhythm, normal S1 and S2, no murmurs, clicks, gallops or rubs   LUNGS: clear to auscultation bilaterally; no wheezes, rales, or rhonchi   ABDOMEN: normal bowel sounds, soft, no tenderness, no distention  EXTREMITIES: peripheral pulses normal; no clubbing, cyanosis, or edema  NEURO: no focal findings   SKIN: normal without suspicious lesions on exposed skin    ROS:  Positive for anxiety, palpitations, shortness of breath though improved. Joint pains.  Except as noted in HPI, is otherwise reviewed in detail and a 12 point review of systems is negative.  ROS reviewed and is unchanged    Labs:  Lab Results   Component Value Date    SODIUM 135 11/27/2023    K 4.4 11/27/2023     11/27/2023    CREATININE 0.80 11/27/2023    BUN 11 11/27/2023    CO2 25 11/27/2023    ALT 22 11/27/2023    AST 17 11/27/2023    GLUF 88 11/27/2023    WBC 12.05 (H) 11/27/2023    HGB 13.0 11/27/2023    HCT 44.1 11/27/2023     (H) 11/27/2023     No results found for: \"CHOL\"  Lab Results   Component Value Date    HDL 43 (L) 04/21/2023    HDL 44 (L) 01/26/2022     Lab Results   Component Value Date    LDLCALC 109 (H) 04/21/2023    LDLCALC 121 (H) " "01/26/2022     Lab Results   Component Value Date    TRIG 114 04/21/2023    TRIG 140 01/26/2022     Testing:  Holter 1/2021:  IMPRESSION:  Predominantly sinus rhythm, with an average heart rate of 67 beats per minute  Occasional premature ventricular contractions, constituting 1.7% of total beats  Some of the reported symptoms of \"chest pinch\" and \"fluttering\" during the monitoring period, correlated with single PVCs  Rare premature atrial contractions  No significant pauses or advanced degree heart block    Echo 1/2021:  IMPRESSIONS:  The study was within normal limits.     SUMMARY     LEFT VENTRICLE:  Size was normal.  Systolic function was normal. Ejection fraction was estimated to be 60 %.  There were no regional wall motion abnormalities.  Wall thickness was normal.  There was no evidence of concentric hypertrophy.     TRICUSPID VALVE:  There was trace regurgitation.    EKG:  Sinus rhythm. 73 bpm. Poor R wave progression. No change compared to prior.  "

## 2024-04-26 ENCOUNTER — CLINICAL SUPPORT (OUTPATIENT)
Dept: FAMILY MEDICINE CLINIC | Facility: CLINIC | Age: 49
End: 2024-04-26

## 2024-04-26 DIAGNOSIS — E66.01 CLASS 3 SEVERE OBESITY DUE TO EXCESS CALORIES WITH SERIOUS COMORBIDITY AND BODY MASS INDEX (BMI) OF 45.0 TO 49.9 IN ADULT (HCC): Primary | ICD-10-CM

## 2024-04-26 NOTE — PROGRESS NOTES
Wegovy teaching, she did not have success with first injection. She self administered med in office with teaching. Successful administration. Pt was pulling injection out too quickly when it was not completely finished.

## 2024-04-29 DIAGNOSIS — Z76.89 ENCOUNTER FOR WEIGHT MANAGEMENT: ICD-10-CM

## 2024-04-29 DIAGNOSIS — E66.01 CLASS 3 SEVERE OBESITY DUE TO EXCESS CALORIES WITH SERIOUS COMORBIDITY AND BODY MASS INDEX (BMI) OF 45.0 TO 49.9 IN ADULT (HCC): Primary | ICD-10-CM

## 2024-05-02 ENCOUNTER — OFFICE VISIT (OUTPATIENT)
Dept: DERMATOLOGY | Facility: CLINIC | Age: 49
End: 2024-05-02

## 2024-05-02 VITALS — HEIGHT: 64 IN | WEIGHT: 282 LBS | BODY MASS INDEX: 48.14 KG/M2 | TEMPERATURE: 97.7 F

## 2024-05-02 DIAGNOSIS — L21.9 SEBORRHEIC DERMATITIS: ICD-10-CM

## 2024-05-02 DIAGNOSIS — L65.9 HAIR LOSS: Primary | ICD-10-CM

## 2024-05-02 DIAGNOSIS — D48.9 NEOPLASM OF UNCERTAIN BEHAVIOR: ICD-10-CM

## 2024-05-02 RX ORDER — TACROLIMUS 1 MG/G
OINTMENT TOPICAL
Qty: 100 G | Refills: 3 | Status: SHIPPED | OUTPATIENT
Start: 2024-05-02

## 2024-05-02 RX ORDER — KETOCONAZOLE 20 MG/ML
SHAMPOO TOPICAL
Qty: 120 ML | Refills: 6 | Status: SHIPPED | OUTPATIENT
Start: 2024-05-02

## 2024-05-02 NOTE — PROGRESS NOTES
"[facility] Note     Patient Name: Elissa Leal  Encounter Date: 5/2/24     Have you been cared for by a [facility]ologist in the last 3 years and, if so, which description applies to you?    Yes.  I have been here within the last 3 years, and my medical history has NOT changed since that time.  I am FEMALE/of child-bearing potential.    REVIEW OF SYSTEMS:  Have you recently had or currently have any of the following? No changes in my recent health.   PAST MEDICAL HISTORY:  Have you personally ever had or currently have any of the following?  If \"YES,\" then please provide more detail. No changes in my medical history.   HISTORY OF IMMUNOSUPPRESSION: Do you have a history of any of the following:  Systemic Immunosuppression such as Diabetes, Biologic or Immunotherapy, Chemotherapy, Organ Transplantation, Bone Marrow Transplantation?  No     Answering \"YES\" requires the addition of the dotphrase \"IMMUNOSUPPRESSED\" as the first diagnosis of the patient's visit.   FAMILY HISTORY:  Any \"first degree relatives\" (parent, brother, sister, or child) with the following?    No changes in my family's known health.   PATIENT EXPERIENCE:    Do you want the Dermatologist to perform a COMPLETE skin exam today including a clinical examination under the \"bra and underwear\" areas?  NO  If necessary, do we have your permission to call and leave a detailed message on your Preferred Phone number that includes your specific medical information?  Yes      No Known Allergies   Current Outpatient Medications:     albuterol (PROVENTIL HFA,VENTOLIN HFA) 90 mcg/act inhaler, Inhale 1 puff every 4 (four) hours as needed for wheezing, Disp: 8.5 g, Rfl: 4    dicyclomine (BENTYL) 10 mg capsule, Take 1 capsule (10 mg total) by mouth 3 (three) times a day before meals, Disp: 16 capsule, Rfl: 0    fluticasone-vilanterol (Breo Ellipta) 200-25 mcg/actuation inhaler, Inhale 1 puff daily Rinse mouth after use., Disp: 60 blister, " Rfl: 11    ketoconazole (NIZORAL) 2 % cream, Apply topically twice a day when rash is active, apply topically twice a week when not active, Disp: 60 g, Rfl: 11    levothyroxine (Synthroid) 75 mcg tablet, Take 1 tablet (75 mcg total) by mouth daily in the early morning, Disp: 30 tablet, Rfl: 5    metoprolol tartrate (LOPRESSOR) 50 mg tablet, take 1 tablet by mouth twice a day, Disp: 180 tablet, Rfl: 1    Naltrexone-buPROPion HCl ER 8-90 MG TB12, Take 1 tablet by mouth in the morning, Disp: 90 tablet, Rfl: 0    Semaglutide-Weight Management (WEGOVY) 0.5 MG/0.5ML, Inject 0.5 mL (0.5 mg total) under the skin once a week for 4 doses, Disp: 2 mL, Rfl: 0          Whom besides the patient is providing clinical information about today's encounter?   NO ADDITIONAL HISTORIAN (patient alone provided history)    Physical Exam and Assessment/Plan by Diagnosis:          NEOPLASM OF UNCERTAIN BEHAVIOR    Physical Exam:  (Anatomic Location); (Size and Morphological Description); (Differential Diagnosis):  A: Right cheek, 0.4 cm scaly eroded papule, ddx: SCC    Pertinent Positives: patient gets dandruff in scalp  Pertinent Negatives: no personal or family history of skin cancer    Additional History of Present Condition:  Present for about 8 weeks. No history of skin cancer. Started as a small dot then became ulcerated. Not growing. Dry and flaky. Patient has had some pain when the lesion was flaking. Doesn't get worse in the sun.           Plan:  Biopsy today - will call with results       PROCEDURES PERFORMED TODAY ASSOCIATED WITH THIS CONDITION:          TANGENTIAL BIOPSY  PROCEDURE TANGENTIAL (SHAVE) BIOPSY NOTE:    Performing Physician:   Anatomic Location; Clinical Description with size (cm); Pre-Op Diagnosis:   A: Right cheek, 0.4 cm scaly eroded papule, ddx: SCC  Post-op diagnosis: Same     Local anesthesia: 3:1 1% xylocaine with epi and 1-100,000 buffered     Topical anesthesia: None    Hemostasis: Aluminum chloride  "      After obtaining informed consent  at which time there was a discussion about the purpose of biopsy  and low risks of infection and bleeding.  The area was prepped and draped in the usual fashion. Anesthesia was obtained with 1% lidocaine with epinephrine. A shave biopsy to an appropriate sampling depth was obtained by Shave (Dermablade or 15 blade) The resulting wound was covered with surgical ointment and bandaged appropriately.     The patient tolerated the procedure well without complications and was without signs of functional compromise.      Specimen has been sent for review by Dermatopathology.    Standard post-procedure care has been explained and has been included in written form within the patient's copy of Informed Consent.    INFORMED CONSENT DISCUSSION AND POST-OPERATIVE INSTRUCTIONS FOR PATIENT    I.  RATIONALE FOR PROCEDURE  I understand that a skin biopsy allows the Dermatologist to test a lesion or rash under the microscope to obtain a diagnosis.  It usually involves numbing the area with numbing medication and removing a small piece of skin; sometimes the area will be closed with sutures. In this specific procedure, sutures are not usually needed.  If any sutures are placed, then they are usually need to be removed in 2 weeks or less.    I understand that my Dermatologist recommends that a skin \"shave\" biopsy be performed today.  A local anesthetic, similar to the kind that a dentist uses when filling a cavity, will be injected with a very small needle into the skin area to be sampled.  The injected skin and tissue underneath \"will go to sleep” and become numb so no pain should be felt afterwards.  An instrument shaped like a tiny \"razor blade\" (shave biopsy instrument) will be used to cut a small piece of tissue and skin from the area so that a sample of tissue can be taken and examined more closely under the microscope.  A slight amount of bleeding will occur, but it will be stopped with " "direct pressure and a pressure bandage and any other appropriate methods.  I understands that a scar will form where the wound was created.  Surgical ointment will be applied to help protect the wound.  Sutures are not usually needed.    II.  RISKS AND POTENTIAL COMPLICATIONS   I understand the risks and potential complications of a skin biopsy include but are not limited to the following:  Bleeding  Infection  Pain  Scar/keloid  Skin discoloration  Incomplete Removal  Recurrence  Nerve Damage/Numbness/Loss of Function  Allergic Reaction to Anesthesia  Biopsies are diagnostic procedures and based on findings additional treatment or evaluation may be required  Loss or destruction of specimen resulting in no additional findings    My Dermatologist has explained to me the nature of the condition, the nature of the procedure, and the benefits to be reasonably expected compared with alternative approaches.  My Dermatologist has discussed the likelihood of major risks or complications of this procedure including the specific risks listed above, such as bleeding, infection, and scarring/keloid.  I understand that a scar is expected after this procedure.  I understand that my physician cannot predict if the scar will form a \"keloid,\" which extends beyond the borders of the wound that is created.  A keloid is a thick, painful, and bumpy scar.  A keloid can be difficult to treat, as it does not always respond well to therapy, which includes injecting cortisone directly into the keloid every few weeks.  While this usually reduces the pain and size of the scar, it does not eliminate it.      I understand that photographs may be taken before and after the procedure.  These will be maintained as part of the medical providers confidential records and may not be made available to me.  I further authorize the medical provider to use the photographs for teaching purposes or to illustrate scientific papers, books, or lectures if in " "his/her judgment, medical research, education, or science may benefit from its use.    I have had an opportunity to fully inquire about the risks and benefits of this procedure and its alternatives.   I have been given ample time and opportunity to ask questions and to seek a second opinion if I wished to do so.  I acknowledge that there have specifically been no guarantees as to the cosmetic results from the procedure.  I am aware that with any procedure there is always the possibility of an unexpected complication.    III. POST-PROCEDURAL CARE (WHAT YOU WILL NEED TO DO \"AFTER THE BIOPSY\" TO OPTIMIZE HEALING)    Keep the area clean and dry.  Try NOT to remove the bandage or get it wet for the first 24 hours.    Gently clean the area and apply surgical ointment (such as Vaseline petrolatum ointment, which is available \"over the counter\" and not a prescription) to the biopsy site for up to 2 weeks straight.  This acts to protect the wound from the outside world.      Sutures are not usually placed in this procedure.  If any sutures were placed, return for suture removal as instructed (generally 1 week for the face, 2 weeks for the body).      Take Acetaminophen (Tylenol) for discomfort, if no contraindications.  Ibuprofen or aspirin could make bleeding worse.    Call our office immediately for signs of infection: fever, chills, increased redness, warmth, tenderness, discomfort/pain, or pus or foul smell coming from the wound.    WHAT TO DO IF THERE IS ANY BLEEDING?  If a small amount of bleeding is noticed, place a clean cloth over the area and apply firm pressure for ten minutes.  Check the wound after 10 minutes of direct pressure.  If bleeding persists, try one more time for an additional 10 minutes of direct pressure on the area.  If the bleeding becomes heavier or does not stop after the second attempt, or if you have any other questions about this procedure, then please call your [facility] D[facility]atologist by " calling [phone] (SKIN).     I hereby acknowledge that I have reviewed and verified the site with my Dermatologist and have requested and authorized my Dermatologist to proceed with the procedure.         Medical Complexity:    UNDIAGNOSED NEW PROBLEM WITH UNCERTAIN DIAGNOSIS.  A condition included in the differential diagnosis represents a high risk of morbidity without treatment.       SEBORRHEIC DERMATITIS    Physical Exam:  Anatomic Location: scalp and face  Morphologic Description:  Erythematous plaques with greasy scale  Pertinent Positives:  Pertinent Negatives:    Additional History of Present Condition:  Itchiness to scalp. Patient has noticed some hair loss. Patient is on thyroid medication and she feels her hair has been dry and coming out easily since taking that medication for 2 years.    Plan:  Ketoconazole 2% shampoo: Apply to damp facial skin and scalp once daily. Let sit for 5 minutes before rinsing out. Can use regular shampoo/face wash after using.  Protopic ointment: apply to inflamed face and scalp once daily until inflammation calms. Can repeat treatment.    Medical Complexity:    CHRONIC ILLNESS (expected duration of >1 year):  EXACERBATION, PROGRESSION, OR SIDE EFFECTS OF TREATMENT.  Acutely worsening, poorly controlled, or progressing.  Intent is to control progression and requires additional supportive care or attention to treatment for side effects but not at level of consideration of hospital level of care.     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 Patient has thyroid issues. Some family history of lupus.    Assessment and Plan:  Based on a thorough discussion of this condition and the management  approach to it (including a comprehensive discussion of the known risks, side effects and potential benefits of treatment), the patient (family) agrees to implement the following specific plan:  PROCEDURE NOTE:  ALOPECIA PUNCH BIOPSY PROTOCOL  (2 SEPARATE PROCEDURES PERFORMED AND 2 SEPARATE SPECIMENS SUBMITTED)      Performing Physician:     Anatomic Location; Clinical Description with size (cm); Pre-Op Diagnosis:     SPECIMEN B (ALOPECIA; VERTICAL SECTIONING):  frontal scalp, vertical sectioning, pink scarring alopecia with lost follicular ostia but no perifollicular scale, ddx: lichen planoplaris    SPECIMEN C (ALOPECIA; HORIZONTAL SECTIONING):  frontal scalp, horizontal sectioning, pink scarring alopecia with lost follicular ostia but no perifollicular scale, ddx: lichen planoplaris     Anesthesia: 3:1 1% xylocaine with epi and 1-100,000 buffered      Topical anesthesia: None    Indications: To indicate diagnosis and management plan.    Procedure Details:   Patient informed of the risks (including bleeding,scaring and infection) and benefits of the procedure explained. Verbal and written informed consent obtained. The area was prepped and draped in the usual fashion. Anesthesia was obtained with 1% lidocaine with epinephrine. The skin was then stretched perpendicular to the skin tension lines and a punch biopsy to an appropriate sampling depth was obtained with a 4 mm punch with a forceps and iris scissors.     Hemostasis:  Obtained with 4-0 Prolene x 2 sutures.     Complications:  None    Specimen has been sent for review by Dermatopathology using Alopecia Protocol.    Plan:  1. Instructed to keep the wound dry and covered for 24-48h and clean thereafter.  2. Warning signs of infection were reviewed.    3. Recommended that the patient use acetaminophen as needed for pain  4. Sutures if any should be removed in 10-14 days      Standard post-procedure care has been explained and has been included in written  form within the patient's copy of Informed Consent.      Scribe Attestation      I,:  Margareth Coughlin am acting as a scribe while in the presence of the attending physician.:       I,:  Yanick Chin MD personally performed the services described in this documentation    as scribed in my presence.:

## 2024-05-02 NOTE — PATIENT INSTRUCTIONS
"NEOPLASM OF UNCERTAIN BEHAVIOR  Right cheek  III. POST-PROCEDURAL CARE (WHAT YOU WILL NEED TO DO \"AFTER THE BIOPSY\" TO OPTIMIZE HEALING)    Keep the area clean and dry.  Try NOT to remove the bandage or get it wet for the first 24 hours.    Gently clean the area and apply surgical ointment (such as Vaseline petrolatum ointment, which is available \"over the counter\" and not a prescription) to the biopsy site for up to 2 weeks straight.  This acts to protect the wound from the outside world.      Sutures are not usually placed in this procedure.  If any sutures were placed, return for suture removal as instructed (generally 1 week for the face, 2 weeks for the body).      Take Acetaminophen (Tylenol) for discomfort, if no contraindications.  Ibuprofen or aspirin could make bleeding worse.    Call our office immediately for signs of infection: fever, chills, increased redness, warmth, tenderness, discomfort/pain, or pus or foul smell coming from the wound.    WHAT TO DO IF THERE IS ANY BLEEDING?  If a small amount of bleeding is noticed, place a clean cloth over the area and apply firm pressure for ten minutes.  Check the wound after 10 minutes of direct pressure.  If bleeding persists, try one more time for an additional 10 minutes of direct pressure on the area.  If the bleeding becomes heavier or does not stop after the second attempt, or if you have any other questions about this procedure, then please call your [facility]'s Dermatologist by calling [phone] (SKIN).     SEBORRHEIC DERMATITIS    Physical Exam:  Anatomic Location: scalp and face  Plan:  Ketoconazole 2% shampoo: Apply to damp facial skin and scalp once daily. Let sit for 5 minutes before rinsing out. Can use regular shampoo/face wash after using.  Protopic ointment: apply to inflamed face and scalp once daily until inflammation calms. Can repeat treatment.    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 Instructed to keep the wound dry and covered for 24-48h and clean thereafter.  2. Warning signs of infection were reviewed.    3. Recommended that the patient use acetaminophen as needed for pain  4. Sutures if any should be removed in 10-14 days

## 2024-05-07 ENCOUNTER — OFFICE VISIT (OUTPATIENT)
Dept: FAMILY MEDICINE CLINIC | Facility: CLINIC | Age: 49
End: 2024-05-07
Payer: MEDICARE

## 2024-05-07 VITALS
OXYGEN SATURATION: 98 % | SYSTOLIC BLOOD PRESSURE: 120 MMHG | BODY MASS INDEX: 48.32 KG/M2 | DIASTOLIC BLOOD PRESSURE: 82 MMHG | WEIGHT: 283 LBS | TEMPERATURE: 96.7 F | HEART RATE: 82 BPM | HEIGHT: 64 IN

## 2024-05-07 DIAGNOSIS — E06.3 HYPOTHYROIDISM DUE TO HASHIMOTO'S THYROIDITIS: ICD-10-CM

## 2024-05-07 DIAGNOSIS — E66.01 CLASS 3 SEVERE OBESITY DUE TO EXCESS CALORIES WITH SERIOUS COMORBIDITY AND BODY MASS INDEX (BMI) OF 45.0 TO 49.9 IN ADULT (HCC): ICD-10-CM

## 2024-05-07 DIAGNOSIS — E03.8 HYPOTHYROIDISM DUE TO HASHIMOTO'S THYROIDITIS: ICD-10-CM

## 2024-05-07 DIAGNOSIS — Z00.00 ANNUAL PHYSICAL EXAM: Primary | ICD-10-CM

## 2024-05-07 DIAGNOSIS — Z12.31 ENCOUNTER FOR SCREENING MAMMOGRAM FOR MALIGNANT NEOPLASM OF BREAST: ICD-10-CM

## 2024-05-07 PROCEDURE — 99396 PREV VISIT EST AGE 40-64: CPT | Performed by: NURSE PRACTITIONER

## 2024-05-07 NOTE — PROGRESS NOTES
ADULT ANNUAL PHYSICAL  [facility]    NAME: Elissa Leal  AGE: 49 y.o. SEX: female  : 1975     DATE: 2024     Assessment and Plan:     Problem List Items Addressed This Visit        Endocrine    Hypothyroidism    Relevant Orders    TSH, 3rd generation with Free T4 reflex       Other    Class 3 severe obesity with serious comorbidity and body mass index (BMI) of 45.0 to 49.9 in adult (HCC)   Other Visit Diagnoses     Annual physical exam    -  Primary    Relevant Orders    Lipid panel    Comprehensive metabolic panel    Encounter for screening mammogram for malignant neoplasm of breast        Relevant Orders    Mammo screening bilateral w 3d & cad        #1 Annual physical exam  Discussed with patient plan to have her obtain routine labs when obtaining CBC for hematology  #2 Hypothyroidism due to Hashimoto's thyroiditis  Discussed with patient plan to obtain a TSH with T4 with next lab draw  #3 Class 3 severe obesity due to excess calories with serious comorbidity and body mass index of 45.0-49.9 in adult (HCC)  Patient has found Wegovy at [facility]'s pharmacy so started on 0.25 mg dose last month and will start 0.5 dose in one week.   She reports that the medication is helping control appetite and metabolism  #4 Encounter for screening mammogram for malignant neoplasm of breast  Discussed with patient need to schedule mammogram for after 2024 so order given for her to schedule  Patient instructed to return in 3 months for weight management check or sooner if needed  Patient encouraged to call office for problems or concerns in the interim      Immunizations and preventive care screenings were discussed with patient today. Appropriate education was printed on patient's after visit summary.    Counseling:  Alcohol/drug use: discussed moderation in alcohol intake, the recommendations for healthy alcohol use, and avoidance of illicit drug  use.  Dental Health: discussed importance of regular tooth brushing, flossing, and dental visits.  Exercise: the importance of regular exercise/physical activity was discussed. Recommend exercise 3-5 times per week for at least 30 minutes.       Depression Screening and Follow-up Plan: Patient was screened for depression during today's encounter. They screened negative with a PHQ-2 score of 0.      Return in about 3 months (around 8/7/2024), or Weight managemnt F/U.     Chief Complaint:     Chief Complaint   Patient presents with   • Annual Exam     Was recently had 2 biopsies from derm.       History of Present Illness:     Adult Annual Physical   Patient here for a comprehensive physical exam. The patient reports that she recently went to dermatology for a skin lesion that developed on her right upper check. She had a punch biopsy performed and dermatologist thinks it is a carcinoma but is waiting for biopsy results. If biopsy positive than dermatologist plan to perform a Mohs procedure. Patient also had a scalp biopsy performed for possible scarring alopecia due to her hair loss issues. She reports that she is doing well in her inhalers for moderate persistent asthma without complication. She saw pulmonary medicine on 04/24/2024 and is going back for a pulmonary PFT with six minute walk on 09/24/2024. She continues to follow with cardiology annually for palpitations/PVC with last office visit occurring on 04/25/2024. She has an appointment with hematology on 06/05/2024 for follow-up on leukocytosis. She was able to obtain first two dosages of Wegovy so started that and has lost 10 pounds in the last 6 months.     Diet and Physical Activity  Diet/Nutrition: well balanced diet, limited junk food, low calorie diet, low fat diet, low carb diet, and consuming 3-5 servings of fruits/vegetables daily.   Exercise: moderate cardiovascular exercise, 3-4 times a week on average, and 30-60 minutes on average.      Depression  Screening  PHQ-2/9 Depression Screening    Little interest or pleasure in doing things: 0 - not at all  Feeling down, depressed, or hopeless: 0 - not at all  PHQ-2 Score: 0  PHQ-2 Interpretation: Negative depression screen       General Health  Sleep: sleeps well and gets 7-8 hours of sleep on average.   Hearing: normal - bilateral.  Vision: no vision problems.   Dental: regular dental visits and brushes teeth twice daily.       /GYN Health  Follows with gynecology? no   Patient is: perimenopausal  Last menstrual period: unknown  Contraceptive method: barrier methods.    Advanced Care Planning  Do you have an advanced directive? no  Do you have a durable medical power of ? no  ACP document given to the patient? no     Review of Systems:     Review of Systems   Constitutional:  Negative for activity change, appetite change and unexpected weight change.   HENT:  Negative for congestion, dental problem, ear pain, hearing loss, nosebleeds, sneezing, sore throat, tinnitus and trouble swallowing.    Eyes:  Negative for visual disturbance.   Respiratory:  Negative for cough, chest tightness, shortness of breath and wheezing.    Cardiovascular:  Negative for chest pain, palpitations and leg swelling.   Gastrointestinal:  Negative for abdominal distention, abdominal pain, constipation, diarrhea and nausea.   Endocrine: Negative for polydipsia, polyphagia and polyuria.   Genitourinary: Negative.    Musculoskeletal:  Negative for arthralgias, back pain, myalgias and neck pain.   Skin:  Positive for wound. Negative for color change and rash.   Allergic/Immunologic: Negative for environmental allergies.   Neurological:  Negative for dizziness, weakness, light-headedness and headaches.   Hematological: Negative.    Psychiatric/Behavioral: Negative.  Negative for dysphoric mood and sleep disturbance. The patient is not nervous/anxious.    All other systems reviewed and are negative.     Past Medical History:     Past  Medical History:   Diagnosis Date   • Anemia 2021   • Asthma Jume     Inhaler   • Clotting disorder (HCC) 2021    On beta blocker   • Eczema    • GERD (gastroesophageal reflux disease) 2020    Certain foods cause a problem   • Headache(784.0)    • No known health problems    • Obesity    • Visual impairment 2020    New glasses for reading close up      Past Surgical History:     Past Surgical History:   Procedure Laterality Date   • NO PAST SURGERIES        Social History:     Social History     Socioeconomic History   • Marital status: Single     Spouse name: None   • Number of children: None   • Years of education: None   • Highest education level: None   Occupational History   • None   Tobacco Use   • Smoking status: Former     Current packs/day: 0.00     Average packs/day: 0.3 packs/day for 15.0 years (3.8 ttl pk-yrs)     Types: Cigarettes     Start date:      Quit date: 2018     Years since quittin.3     Passive exposure: Past   • Smokeless tobacco: Never   Vaping Use   • Vaping status: Never Used   Substance and Sexual Activity   • Alcohol use: Not Currently   • Drug use: Never   • Sexual activity: Yes     Partners: Male     Birth control/protection: Condom Male, Rhythm, None   Other Topics Concern   • None   Social History Narrative   • None     Social Determinants of Health     Financial Resource Strain: Not on file   Food Insecurity: Not on file   Transportation Needs: Not on file   Physical Activity: Not on file   Stress: Not on file   Social Connections: Not on file   Intimate Partner Violence: Not on file   Housing Stability: Not on file      Family History:     Family History   Problem Relation Age of Onset   • Cardiomyopathy Mother    • Anxiety disorder Mother    • Hypertension Mother    • Autoimmune disease Mother    • Hypertension Father    • No Known Problems Maternal Grandmother    • Heart attack Maternal Grandfather    • No Known Problems Paternal  "Grandmother    • No Known Problems Paternal Grandfather    • Anxiety disorder Brother    • Thalassemia Maternal Aunt    • Lupus Family    • No Known Problems Son       Current Medications:     Current Outpatient Medications   Medication Sig Dispense Refill   • albuterol (PROVENTIL HFA,VENTOLIN HFA) 90 mcg/act inhaler Inhale 1 puff every 4 (four) hours as needed for wheezing 8.5 g 4   • dicyclomine (BENTYL) 10 mg capsule Take 1 capsule (10 mg total) by mouth 3 (three) times a day before meals 16 capsule 0   • fluticasone-vilanterol (Breo Ellipta) 200-25 mcg/actuation inhaler Inhale 1 puff daily Rinse mouth after use. 60 blister 11   • ketoconazole (NIZORAL) 2 % cream Apply topically twice a day when rash is active, apply topically twice a week when not active 60 g 11   • ketoconazole (NIZORAL) 2 % shampoo Apply to damp facial skin and scalp once daily. Let sit for 5 minutes before rinsing out. Can use regular shampoo/face wash after using. 120 mL 6   • levothyroxine (Synthroid) 75 mcg tablet Take 1 tablet (75 mcg total) by mouth daily in the early morning 30 tablet 5   • metoprolol tartrate (LOPRESSOR) 50 mg tablet take 1 tablet by mouth twice a day 180 tablet 1   • Semaglutide-Weight Management (WEGOVY) 0.5 MG/0.5ML Inject 0.5 mL (0.5 mg total) under the skin once a week for 4 doses 2 mL 0   • tacrolimus (PROTOPIC) 0.1 % ointment apply to inflamed face and scalp once daily until inflammation calms. Can repeat treatment. 100 g 3     No current facility-administered medications for this visit.      Allergies:     No Known Allergies   Physical Exam:     /82 (BP Location: Left arm, Patient Position: Sitting, Cuff Size: Large)   Pulse 82   Temp (!) 96.7 °F (35.9 °C)   Ht 5' 4\" (1.626 m)   Wt 128 kg (283 lb)   SpO2 98%   BMI 48.58 kg/m² (Reviewed)    Physical Exam  Vitals reviewed.   Constitutional:       General: She is not in acute distress.     Appearance: Normal appearance. She is well-developed and " well-groomed. She is not ill-appearing.   HENT:      Head: Normocephalic and atraumatic.      Right Ear: Tympanic membrane and external ear normal.      Left Ear: External ear normal.      Nose: Nose normal.      Mouth/Throat:      Lips: Pink.      Mouth: Mucous membranes are moist.      Pharynx: Oropharynx is clear.   Eyes:      General: Lids are normal.      Extraocular Movements: Extraocular movements intact.      Conjunctiva/sclera: Conjunctivae normal.      Pupils: Pupils are equal, round, and reactive to light.   Neck:      Thyroid: No thyromegaly.      Trachea: Trachea normal.   Cardiovascular:      Rate and Rhythm: Normal rate and regular rhythm.      Pulses:           Radial pulses are 2+ on the right side and 2+ on the left side.        Dorsalis pedis pulses are 2+ on the right side and 2+ on the left side.        Posterior tibial pulses are 2+ on the right side and 2+ on the left side.      Heart sounds: Normal heart sounds. No murmur heard.  Pulmonary:      Effort: Pulmonary effort is normal.      Breath sounds: Normal breath sounds.   Abdominal:      General: Abdomen is flat. Bowel sounds are normal. There is no distension.      Palpations: Abdomen is soft.      Tenderness: There is no abdominal tenderness.   Musculoskeletal:      Cervical back: Neck supple.      Right lower leg: No edema.      Left lower leg: No edema.   Skin:     General: Skin is warm and dry.      Capillary Refill: Capillary refill takes less than 2 seconds.          Neurological:      Mental Status: She is alert and oriented to person, place, and time.      Motor: Motor function is intact.   Psychiatric:         Mood and Affect: Mood normal.         Behavior: Behavior normal. Behavior is cooperative.          SANGEETA Chacon  [facility]    Prior Authorization Clinical Questions for Weight Management Pharmacotherapy     Does the patient have a contraindication to medication prescribed for weight  management? no  Does the patient have a diagnosis of obesity, confirmed by a BMI greater than or equal to 30 kg/m^2? yes  Does the patient have a BMI of greater than or equal to 27 kg/m^2 with at least one weight-related comorbidity/risk factor/complication (e.g. diabetes, dyslipidemia, coronary artery disease)? yes  Weight-related comorbidity/ risk factor: type 2 diabetes  Has the patient been on a weight loss regimen of low-calorie diet, increased physical activity, and lifestyle modifications for a minimum of 6 months? yes  Is the medication a controlled substance? no  If Yes, has the PDMP been checked and verified?         Baseline weight 291 lbs  Most recent weight 283 lbs

## 2024-05-14 ENCOUNTER — CLINICAL SUPPORT (OUTPATIENT)
Dept: DERMATOLOGY | Facility: CLINIC | Age: 49
End: 2024-05-14

## 2024-05-14 DIAGNOSIS — Z48.02 ENCOUNTER FOR REMOVAL OF SUTURES: Primary | ICD-10-CM

## 2024-05-14 PROCEDURE — RECHECK

## 2024-05-14 NOTE — PROGRESS NOTES
Suture removal    Date/Time: 5/14/2024 11:15 AM    Performed by: Gretta Ireland MA  Authorized by: Yanick Chin MD  Universal Protocol:  Consent: Verbal consent obtained.  Consent given by: patient  Timeout called at: 5/14/2024 11:14 AM.  Patient understanding: patient states understanding of the procedure being performed  Patient consent: the patient's understanding of the procedure matches consent given  Procedure consent: procedure consent matches procedure scheduled  Patient identity confirmed: verbally with patient      Patient location:  Clinic  Location:     Anesthesia laterality: frontal scalp.  Procedure details:     Tools used:  Suture removal kit    Wound appearance:  No sign(s) of infection    Sutures removed: 2.  Post-procedure details:     Post-removal:  No dressing applied    Patient tolerance of procedure:  Tolerated well, no immediate complications

## 2024-05-17 ENCOUNTER — TELEPHONE (OUTPATIENT)
Age: 49
End: 2024-05-17

## 2024-05-17 DIAGNOSIS — E66.01 CLASS 3 SEVERE OBESITY DUE TO EXCESS CALORIES WITH SERIOUS COMORBIDITY AND BODY MASS INDEX (BMI) OF 45.0 TO 49.9 IN ADULT (HCC): Primary | ICD-10-CM

## 2024-05-17 DIAGNOSIS — Z76.89 ENCOUNTER FOR WEIGHT MANAGEMENT: ICD-10-CM

## 2024-05-17 DIAGNOSIS — E66.01 CLASS 3 SEVERE OBESITY DUE TO EXCESS CALORIES WITH SERIOUS COMORBIDITY AND BODY MASS INDEX (BMI) OF 45.0 TO 49.9 IN ADULT (HCC): ICD-10-CM

## 2024-05-17 RX ORDER — SEMAGLUTIDE 0.25 MG/.5ML
INJECTION, SOLUTION SUBCUTANEOUS
Qty: 2 ML | Refills: 0 | Status: SHIPPED | OUTPATIENT
Start: 2024-05-17 | End: 2024-05-22 | Stop reason: DRUGHIGH

## 2024-05-17 NOTE — TELEPHONE ENCOUNTER
Pt called, has not received a call about her 2 biopsies that were done on 5/02.    Please reach out to patient about biopsies

## 2024-05-22 ENCOUNTER — TELEPHONE (OUTPATIENT)
Age: 49
End: 2024-05-22

## 2024-05-22 DIAGNOSIS — E66.01 CLASS 3 SEVERE OBESITY DUE TO EXCESS CALORIES WITH SERIOUS COMORBIDITY AND BODY MASS INDEX (BMI) OF 45.0 TO 49.9 IN ADULT (HCC): Primary | ICD-10-CM

## 2024-05-22 DIAGNOSIS — L65.9 ALOPECIA: Primary | ICD-10-CM

## 2024-05-22 PROCEDURE — 88305 TISSUE EXAM BY PATHOLOGIST: CPT | Performed by: DERMATOLOGY

## 2024-05-22 PROCEDURE — 88313 SPECIAL STAINS GROUP 2: CPT | Performed by: DERMATOLOGY

## 2024-05-22 PROCEDURE — 88312 SPECIAL STAINS GROUP 1: CPT | Performed by: DERMATOLOGY

## 2024-05-22 RX ORDER — CLOBETASOL PROPIONATE 0.46 MG/ML
SOLUTION TOPICAL 2 TIMES DAILY
Qty: 50 ML | Refills: 6 | Status: SHIPPED | OUTPATIENT
Start: 2024-05-22

## 2024-05-22 RX ORDER — SEMAGLUTIDE 1 MG/.5ML
1 INJECTION, SOLUTION SUBCUTANEOUS WEEKLY
Qty: 2 ML | Refills: 0 | Status: SHIPPED | OUTPATIENT
Start: 2024-05-22 | End: 2024-06-13

## 2024-05-22 NOTE — TELEPHONE ENCOUNTER
Patient was a calling to the request the next step up of her dosage of the Wegovy. She is not sure if a Prior Auth is needed.     So, she wanted to call to get the ball rounding on the prior auth process.     Can this be started for the patient?    Also, patient may call back after she speaks to her insurance. She is trying to see if they approved each dosage one by one or if they covered her for a few increase in doses.     When approved, patient would like it sen to to the updated pharmacy. It is updated in her chart    Flor Goldstein

## 2024-05-23 ENCOUNTER — TELEPHONE (OUTPATIENT)
Dept: FAMILY MEDICINE CLINIC | Facility: CLINIC | Age: 49
End: 2024-05-23

## 2024-05-23 DIAGNOSIS — K08.89 PAIN, DENTAL: Primary | ICD-10-CM

## 2024-05-23 RX ORDER — AMOXICILLIN 875 MG/1
875 TABLET, COATED ORAL 2 TIMES DAILY
Qty: 14 TABLET | Refills: 0 | Status: SHIPPED | OUTPATIENT
Start: 2024-05-23 | End: 2024-05-30

## 2024-05-23 RX ORDER — KETOROLAC TROMETHAMINE 10 MG/1
10 TABLET, FILM COATED ORAL EVERY 6 HOURS PRN
Qty: 30 TABLET | Refills: 0 | Status: SHIPPED | OUTPATIENT
Start: 2024-05-23

## 2024-05-23 NOTE — TELEPHONE ENCOUNTER
Pt called in stating that two days ago her tooth was starting to bother her and then the pain subsided. Last night the pain became unbearable.  She woke up this morning to a super swollen cheek, excruciating pain, and difficulty speaking due to pain. She states there is no abscess and has had problems with this tooth in the past and her dentist isn't able to get her in until late next week.    Pt is requesting antibiotic sent in to her pharmacy so she can get through the holiday weekend.

## 2024-05-28 ENCOUNTER — APPOINTMENT (OUTPATIENT)
Dept: LAB | Facility: CLINIC | Age: 49
End: 2024-05-28
Payer: MEDICARE

## 2024-05-28 DIAGNOSIS — E03.8 HYPOTHYROIDISM DUE TO HASHIMOTO'S THYROIDITIS: ICD-10-CM

## 2024-05-28 DIAGNOSIS — E06.3 HYPOTHYROIDISM DUE TO HASHIMOTO'S THYROIDITIS: ICD-10-CM

## 2024-05-28 DIAGNOSIS — D56.3 BETA THALASSEMIA MINOR: ICD-10-CM

## 2024-05-28 DIAGNOSIS — D72.829 LEUKOCYTOSIS, UNSPECIFIED TYPE: ICD-10-CM

## 2024-05-28 DIAGNOSIS — Z00.00 ANNUAL PHYSICAL EXAM: ICD-10-CM

## 2024-05-28 DIAGNOSIS — D75.839 THROMBOCYTOSIS: ICD-10-CM

## 2024-05-28 LAB
ALBUMIN SERPL BCP-MCNC: 4.1 G/DL (ref 3.5–5)
ALP SERPL-CCNC: 54 U/L (ref 34–104)
ALT SERPL W P-5'-P-CCNC: 33 U/L (ref 7–52)
ANION GAP SERPL CALCULATED.3IONS-SCNC: 9 MMOL/L (ref 4–13)
AST SERPL W P-5'-P-CCNC: 30 U/L (ref 13–39)
BASOPHILS # BLD AUTO: 0.11 THOUSANDS/ÂΜL (ref 0–0.1)
BASOPHILS NFR BLD AUTO: 1 % (ref 0–1)
BILIRUB SERPL-MCNC: 0.49 MG/DL (ref 0.2–1)
BUN SERPL-MCNC: 12 MG/DL (ref 5–25)
CALCIUM SERPL-MCNC: 9.7 MG/DL (ref 8.4–10.2)
CHLORIDE SERPL-SCNC: 106 MMOL/L (ref 96–108)
CHOLEST SERPL-MCNC: 159 MG/DL
CO2 SERPL-SCNC: 25 MMOL/L (ref 21–32)
CREAT SERPL-MCNC: 0.89 MG/DL (ref 0.6–1.3)
EOSINOPHIL # BLD AUTO: 0.18 THOUSAND/ÂΜL (ref 0–0.61)
EOSINOPHIL NFR BLD AUTO: 2 % (ref 0–6)
ERYTHROCYTE [DISTWIDTH] IN BLOOD BY AUTOMATED COUNT: 17.7 % (ref 11.6–15.1)
GFR SERPL CREATININE-BSD FRML MDRD: 76 ML/MIN/1.73SQ M
GLUCOSE P FAST SERPL-MCNC: 93 MG/DL (ref 65–99)
HCT VFR BLD AUTO: 45.6 % (ref 34.8–46.1)
HDLC SERPL-MCNC: 48 MG/DL
HGB BLD-MCNC: 13.8 G/DL (ref 11.5–15.4)
IMM GRANULOCYTES # BLD AUTO: 0.04 THOUSAND/UL (ref 0–0.2)
IMM GRANULOCYTES NFR BLD AUTO: 0 % (ref 0–2)
LDLC SERPL CALC-MCNC: 95 MG/DL (ref 0–100)
LYMPHOCYTES # BLD AUTO: 3.09 THOUSANDS/ÂΜL (ref 0.6–4.47)
LYMPHOCYTES NFR BLD AUTO: 34 % (ref 14–44)
MCH RBC QN AUTO: 21.2 PG (ref 26.8–34.3)
MCHC RBC AUTO-ENTMCNC: 30.3 G/DL (ref 31.4–37.4)
MCV RBC AUTO: 70 FL (ref 82–98)
MONOCYTES # BLD AUTO: 0.59 THOUSAND/ÂΜL (ref 0.17–1.22)
MONOCYTES NFR BLD AUTO: 7 % (ref 4–12)
NEUTROPHILS # BLD AUTO: 5.08 THOUSANDS/ÂΜL (ref 1.85–7.62)
NEUTS SEG NFR BLD AUTO: 56 % (ref 43–75)
NONHDLC SERPL-MCNC: 111 MG/DL
NRBC BLD AUTO-RTO: 0 /100 WBCS
PLATELET # BLD AUTO: 429 THOUSANDS/UL (ref 149–390)
PMV BLD AUTO: 9.9 FL (ref 8.9–12.7)
POTASSIUM SERPL-SCNC: 4.1 MMOL/L (ref 3.5–5.3)
PROT SERPL-MCNC: 7.1 G/DL (ref 6.4–8.4)
RBC # BLD AUTO: 6.5 MILLION/UL (ref 3.81–5.12)
SODIUM SERPL-SCNC: 140 MMOL/L (ref 135–147)
TRIGL SERPL-MCNC: 79 MG/DL
TSH SERPL DL<=0.05 MIU/L-ACNC: 3.21 UIU/ML (ref 0.45–4.5)
WBC # BLD AUTO: 9.09 THOUSAND/UL (ref 4.31–10.16)

## 2024-05-28 PROCEDURE — 80061 LIPID PANEL: CPT

## 2024-05-28 PROCEDURE — 36415 COLL VENOUS BLD VENIPUNCTURE: CPT

## 2024-05-28 PROCEDURE — 80053 COMPREHEN METABOLIC PANEL: CPT

## 2024-05-28 PROCEDURE — 84443 ASSAY THYROID STIM HORMONE: CPT

## 2024-05-28 PROCEDURE — 85025 COMPLETE CBC W/AUTO DIFF WBC: CPT

## 2024-05-29 ENCOUNTER — TELEPHONE (OUTPATIENT)
Dept: HEMATOLOGY ONCOLOGY | Facility: CLINIC | Age: 49
End: 2024-05-29

## 2024-05-29 NOTE — TELEPHONE ENCOUNTER
Called Pt to reschedule her appt W Dr العلي on 6/5 , she is now in [place]. I now scheduled her w Dr Negro on 6/24 at 4 pm . Left [place] # if she needs to reschedule

## 2024-06-18 ENCOUNTER — NURSE TRIAGE (OUTPATIENT)
Age: 49
End: 2024-06-18

## 2024-06-18 ENCOUNTER — HOSPITAL ENCOUNTER (EMERGENCY)
Facility: HOSPITAL | Age: 49
Discharge: HOME | End: 2024-06-18
Attending: EMERGENCY MEDICINE
Payer: MEDICARE

## 2024-06-18 ENCOUNTER — APPOINTMENT (EMERGENCY)
Dept: CT IMAGING | Facility: HOSPITAL | Age: 49
End: 2024-06-18
Payer: MEDICARE

## 2024-06-18 VITALS
SYSTOLIC BLOOD PRESSURE: 118 MMHG | TEMPERATURE: 97.9 F | OXYGEN SATURATION: 94 % | HEART RATE: 71 BPM | RESPIRATION RATE: 20 BRPM | DIASTOLIC BLOOD PRESSURE: 71 MMHG

## 2024-06-18 DIAGNOSIS — K76.0 FATTY LIVER: ICD-10-CM

## 2024-06-18 DIAGNOSIS — R19.7 NAUSEA VOMITING AND DIARRHEA: Primary | ICD-10-CM

## 2024-06-18 DIAGNOSIS — N83.201 RIGHT OVARIAN CYST: ICD-10-CM

## 2024-06-18 DIAGNOSIS — R11.2 NAUSEA VOMITING AND DIARRHEA: Primary | ICD-10-CM

## 2024-06-18 DIAGNOSIS — E66.01 CLASS 3 SEVERE OBESITY DUE TO EXCESS CALORIES WITH SERIOUS COMORBIDITY AND BODY MASS INDEX (BMI) OF 45.0 TO 49.9 IN ADULT (HCC): Primary | ICD-10-CM

## 2024-06-18 LAB
ALBUMIN SERPL BCG-MCNC: 4.5 G/DL (ref 3.5–5)
ALP SERPL-CCNC: 55 U/L (ref 34–104)
ALT SERPL W P-5'-P-CCNC: 23 U/L (ref 7–52)
ANION GAP SERPL CALCULATED.3IONS-SCNC: 13 MMOL/L (ref 4–13)
AST SERPL W P-5'-P-CCNC: 26 U/L (ref 13–39)
ATRIAL RATE: 90 BPM
BACTERIA UR QL AUTO: ABNORMAL /HPF
BASOPHILS # BLD AUTO: 0.03 THOUSANDS/ÂΜL (ref 0–0.1)
BASOPHILS NFR BLD AUTO: 0 % (ref 0–1)
BILIRUB SERPL-MCNC: 1.18 MG/DL (ref 0.2–1)
BILIRUB UR QL STRIP: NEGATIVE
BUN SERPL-MCNC: 14 MG/DL (ref 5–25)
CALCIUM SERPL-MCNC: 10.5 MG/DL (ref 8.4–10.2)
CHLORIDE SERPL-SCNC: 105 MMOL/L (ref 96–108)
CLARITY UR: CLEAR
CO2 SERPL-SCNC: 17 MMOL/L (ref 21–32)
COLOR UR: ABNORMAL
CREAT SERPL-MCNC: 0.91 MG/DL (ref 0.6–1.3)
EOSINOPHIL # BLD AUTO: 0.17 THOUSAND/ÂΜL (ref 0–0.61)
EOSINOPHIL NFR BLD AUTO: 1 % (ref 0–6)
ERYTHROCYTE [DISTWIDTH] IN BLOOD BY AUTOMATED COUNT: 18.1 % (ref 11.6–15.1)
GFR SERPL CREATININE-BSD FRML MDRD: 74 ML/MIN/1.73SQ M
GLUCOSE SERPL-MCNC: 102 MG/DL (ref 65–140)
GLUCOSE SERPL-MCNC: 118 MG/DL (ref 65–140)
GLUCOSE UR STRIP-MCNC: NEGATIVE MG/DL
HCG SERPL QL: NEGATIVE
HCT VFR BLD AUTO: 47.3 % (ref 34.8–46.1)
HGB BLD-MCNC: 15.1 G/DL (ref 11.5–15.4)
HGB UR QL STRIP.AUTO: NEGATIVE
IMM GRANULOCYTES # BLD AUTO: 0.06 THOUSAND/UL (ref 0–0.2)
IMM GRANULOCYTES NFR BLD AUTO: 0 % (ref 0–2)
KETONES UR STRIP-MCNC: ABNORMAL MG/DL
LEUKOCYTE ESTERASE UR QL STRIP: NEGATIVE
LIPASE SERPL-CCNC: 19 U/L (ref 11–82)
LYMPHOCYTES # BLD AUTO: 3.38 THOUSANDS/ÂΜL (ref 0.6–4.47)
LYMPHOCYTES NFR BLD AUTO: 20 % (ref 14–44)
MCH RBC QN AUTO: 21.7 PG (ref 26.8–34.3)
MCHC RBC AUTO-ENTMCNC: 31.9 G/DL (ref 31.4–37.4)
MCV RBC AUTO: 68 FL (ref 82–98)
MONOCYTES # BLD AUTO: 1.19 THOUSAND/ÂΜL (ref 0.17–1.22)
MONOCYTES NFR BLD AUTO: 7 % (ref 4–12)
MUCOUS THREADS UR QL AUTO: ABNORMAL
NEUTROPHILS # BLD AUTO: 12.4 THOUSANDS/ÂΜL (ref 1.85–7.62)
NEUTS SEG NFR BLD AUTO: 72 % (ref 43–75)
NITRITE UR QL STRIP: NEGATIVE
NON-SQ EPI CELLS URNS QL MICRO: ABNORMAL /HPF
NRBC BLD AUTO-RTO: 0 /100 WBCS
P AXIS: 80 DEGREES
PH UR STRIP.AUTO: 6 [PH]
PLATELET # BLD AUTO: 475 THOUSANDS/UL (ref 149–390)
PMV BLD AUTO: 9.5 FL (ref 8.9–12.7)
POTASSIUM SERPL-SCNC: 4.5 MMOL/L (ref 3.5–5.3)
PR INTERVAL: 144 MS
PROT SERPL-MCNC: 8.3 G/DL (ref 6.4–8.4)
PROT UR STRIP-MCNC: ABNORMAL MG/DL
QRS AXIS: -59 DEGREES
QRSD INTERVAL: 88 MS
QT INTERVAL: 378 MS
QTC INTERVAL: 462 MS
RBC # BLD AUTO: 6.96 MILLION/UL (ref 3.81–5.12)
RBC #/AREA URNS AUTO: ABNORMAL /HPF
SODIUM SERPL-SCNC: 135 MMOL/L (ref 135–147)
SP GR UR STRIP.AUTO: >=1.05 (ref 1–1.03)
T WAVE AXIS: 17 DEGREES
UROBILINOGEN UR STRIP-ACNC: <2 MG/DL
VENTRICULAR RATE: 90 BPM
WBC # BLD AUTO: 17.23 THOUSAND/UL (ref 4.31–10.16)
WBC #/AREA URNS AUTO: ABNORMAL /HPF

## 2024-06-18 PROCEDURE — 74177 CT ABD & PELVIS W/CONTRAST: CPT

## 2024-06-18 PROCEDURE — 85025 COMPLETE CBC W/AUTO DIFF WBC: CPT | Performed by: EMERGENCY MEDICINE

## 2024-06-18 PROCEDURE — 96365 THER/PROPH/DIAG IV INF INIT: CPT

## 2024-06-18 PROCEDURE — C9113 INJ PANTOPRAZOLE SODIUM, VIA: HCPCS

## 2024-06-18 PROCEDURE — 96366 THER/PROPH/DIAG IV INF ADDON: CPT

## 2024-06-18 PROCEDURE — 82948 REAGENT STRIP/BLOOD GLUCOSE: CPT

## 2024-06-18 PROCEDURE — 93005 ELECTROCARDIOGRAM TRACING: CPT

## 2024-06-18 PROCEDURE — 84703 CHORIONIC GONADOTROPIN ASSAY: CPT

## 2024-06-18 PROCEDURE — 99284 EMERGENCY DEPT VISIT MOD MDM: CPT | Performed by: EMERGENCY MEDICINE

## 2024-06-18 PROCEDURE — 96375 TX/PRO/DX INJ NEW DRUG ADDON: CPT

## 2024-06-18 PROCEDURE — 80053 COMPREHEN METABOLIC PANEL: CPT | Performed by: EMERGENCY MEDICINE

## 2024-06-18 PROCEDURE — 99284 EMERGENCY DEPT VISIT MOD MDM: CPT

## 2024-06-18 PROCEDURE — 83690 ASSAY OF LIPASE: CPT | Performed by: EMERGENCY MEDICINE

## 2024-06-18 PROCEDURE — 36415 COLL VENOUS BLD VENIPUNCTURE: CPT

## 2024-06-18 PROCEDURE — 81001 URINALYSIS AUTO W/SCOPE: CPT

## 2024-06-18 PROCEDURE — 93010 ELECTROCARDIOGRAM REPORT: CPT | Performed by: INTERNAL MEDICINE

## 2024-06-18 RX ORDER — METOCLOPRAMIDE HYDROCHLORIDE 5 MG/ML
10 INJECTION INTRAMUSCULAR; INTRAVENOUS ONCE
Status: COMPLETED | OUTPATIENT
Start: 2024-06-18 | End: 2024-06-18

## 2024-06-18 RX ORDER — METOCLOPRAMIDE 10 MG/1
10 TABLET ORAL EVERY 6 HOURS
Qty: 20 TABLET | Refills: 0 | Status: SHIPPED | OUTPATIENT
Start: 2024-06-18 | End: 2024-06-24

## 2024-06-18 RX ORDER — MAGNESIUM HYDROXIDE/ALUMINUM HYDROXICE/SIMETHICONE 120; 1200; 1200 MG/30ML; MG/30ML; MG/30ML
30 SUSPENSION ORAL ONCE
Status: COMPLETED | OUTPATIENT
Start: 2024-06-18 | End: 2024-06-18

## 2024-06-18 RX ORDER — PANTOPRAZOLE SODIUM 40 MG/10ML
40 INJECTION, POWDER, LYOPHILIZED, FOR SOLUTION INTRAVENOUS ONCE
Status: COMPLETED | OUTPATIENT
Start: 2024-06-18 | End: 2024-06-18

## 2024-06-18 RX ORDER — ONDANSETRON 2 MG/ML
4 INJECTION INTRAMUSCULAR; INTRAVENOUS ONCE
Status: COMPLETED | OUTPATIENT
Start: 2024-06-18 | End: 2024-06-18

## 2024-06-18 RX ORDER — FAMOTIDINE 10 MG/ML
20 INJECTION, SOLUTION INTRAVENOUS ONCE
Status: COMPLETED | OUTPATIENT
Start: 2024-06-18 | End: 2024-06-18

## 2024-06-18 RX ORDER — ONDANSETRON 4 MG/1
4 TABLET, ORALLY DISINTEGRATING ORAL ONCE
Status: COMPLETED | OUTPATIENT
Start: 2024-06-18 | End: 2024-06-18

## 2024-06-18 RX ORDER — SODIUM CHLORIDE, SODIUM GLUCONATE, SODIUM ACETATE, POTASSIUM CHLORIDE, MAGNESIUM CHLORIDE, SODIUM PHOSPHATE, DIBASIC, AND POTASSIUM PHOSPHATE .53; .5; .37; .037; .03; .012; .00082 G/100ML; G/100ML; G/100ML; G/100ML; G/100ML; G/100ML; G/100ML
1000 INJECTION, SOLUTION INTRAVENOUS ONCE
Status: COMPLETED | OUTPATIENT
Start: 2024-06-18 | End: 2024-06-18

## 2024-06-18 RX ORDER — SEMAGLUTIDE 0.25 MG/.5ML
INJECTION, SOLUTION SUBCUTANEOUS
Qty: 2 ML | Refills: 0 | Status: SHIPPED | OUTPATIENT
Start: 2024-06-18 | End: 2024-06-19 | Stop reason: DRUGHIGH

## 2024-06-18 RX ADMIN — ALUMINUM HYDROXIDE, MAGNESIUM HYDROXIDE, DIMETHICONE 30 ML: 200; 200; 20 LIQUID ORAL at 18:38

## 2024-06-18 RX ADMIN — METOCLOPRAMIDE HYDROCHLORIDE 10 MG: 5 INJECTION INTRAMUSCULAR; INTRAVENOUS at 18:39

## 2024-06-18 RX ADMIN — PANTOPRAZOLE SODIUM 40 MG: 40 INJECTION, POWDER, FOR SOLUTION INTRAVENOUS at 17:43

## 2024-06-18 RX ADMIN — ONDANSETRON 4 MG: 2 INJECTION INTRAMUSCULAR; INTRAVENOUS at 17:42

## 2024-06-18 RX ADMIN — ONDANSETRON 4 MG: 4 TABLET, ORALLY DISINTEGRATING ORAL at 15:53

## 2024-06-18 RX ADMIN — SODIUM CHLORIDE, SODIUM GLUCONATE, SODIUM ACETATE, POTASSIUM CHLORIDE, MAGNESIUM CHLORIDE, SODIUM PHOSPHATE, DIBASIC, AND POTASSIUM PHOSPHATE 1000 ML: .53; .5; .37; .037; .03; .012; .00082 INJECTION, SOLUTION INTRAVENOUS at 17:41

## 2024-06-18 RX ADMIN — IOHEXOL 100 ML: 350 INJECTION, SOLUTION INTRAVENOUS at 18:11

## 2024-06-18 RX ADMIN — FAMOTIDINE 20 MG: 10 INJECTION INTRAVENOUS at 19:50

## 2024-06-18 NOTE — DISCHARGE INSTRUCTIONS
For any nausea or vomiting, can take Reglan 10 mg every 6 hours  Return to ED if any worsening symptoms  Stay hydrated, follow-up with your primary care doctor  Follow-up with OB/GYN regarding your right ovarian cyst

## 2024-06-18 NOTE — Clinical Note
Elissa Leal was seen and treated in our emergency department on 6/18/2024.                Diagnosis:     Elissa  .    She may return on this date: 06/21/2024    Can return sooner if feeling okay     If you have any questions or concerns, please don't hesitate to call.      Trang Elaine MD    ______________________________           _______________          _______________  Hospital Representative                              Date                                Time

## 2024-06-18 NOTE — ED PROVIDER NOTES
History  Chief Complaint   Patient presents with    Abdominal Pain     Pt reports epigastric pain, N/V, diarrhea since last night. States she recently increased her dose of Weygovy on Friday.      HPI  (Eli Leal) Elissa Leal is a 49 y.o. female     They presented to the emergency department on June 18, 2024.   Chief Complaint   Patient presents with    Abdominal Pain     Pt reports epigastric pain, N/V, diarrhea since last night. States she recently increased her dose of Weygovy on Friday.    .    The patient states that Friday after taking the medication she developed epigastric abdominal pain that she describes as a burning sensation.  Patient states that pain has been progressively getting worse until she ate dinner last night.  Patient states after doing that the pain got significantly worse and then developed nausea vomiting diarrhea.  No blood in emesis and diarrhea. Patient denies fever, chills, CP, SOB, dysuria, hematuria, or any other complaint at this time.       Prior to Admission Medications   Prescriptions Last Dose Informant Patient Reported? Taking?   Semaglutide-Weight Management (Wegovy) 0.25 MG/0.5ML   No No   Sig: Inject 0.25 mg under the skin weekly   clobetasol (TEMOVATE) 0.05 % external solution   No No   Sig: Apply topically 2 (two) times a day To scalp, keep from dripping down face or into eyes   dicyclomine (BENTYL) 10 mg capsule   No No   Sig: Take 1 capsule (10 mg total) by mouth 3 (three) times a day before meals   fluticasone-vilanterol (Breo Ellipta) 200-25 mcg/actuation inhaler   No No   Sig: Inhale 1 puff daily Rinse mouth after use.   ketoconazole (NIZORAL) 2 % cream   No No   Sig: Apply topically twice a day when rash is active, apply topically twice a week when not active   ketoconazole (NIZORAL) 2 % shampoo   No No   Sig: Apply to damp facial skin and scalp once daily. Let sit for 5 minutes before rinsing out. Can use regular shampoo/face wash after using.   ketorolac  (TORADOL) 10 mg tablet   No No   Sig: Take 1 tablet (10 mg total) by mouth every 6 (six) hours as needed for moderate pain or severe pain   levothyroxine (Synthroid) 75 mcg tablet   No No   Sig: Take 1 tablet (75 mcg total) by mouth daily in the early morning   metoprolol tartrate (LOPRESSOR) 50 mg tablet   No No   Sig: take 1 tablet by mouth twice a day   tacrolimus (PROTOPIC) 0.1 % ointment   No No   Sig: apply to inflamed face and scalp once daily until inflammation calms. Can repeat treatment.      Facility-Administered Medications: None       Past Medical History:   Diagnosis Date    Anemia 12/08/2021    Asthma Jume 2021    Inhaler    Clotting disorder (HCC) Feb. 2021    On beta blocker    Eczema     GERD (gastroesophageal reflux disease) November 2020    Certain foods cause a problem    Headache(784.0)     No known health problems     Obesity     Visual impairment September 2020    New glasses for reading close up       Past Surgical History:   Procedure Laterality Date    NO PAST SURGERIES         Family History   Problem Relation Age of Onset    Cardiomyopathy Mother     Anxiety disorder Mother     Hypertension Mother     Autoimmune disease Mother     Hypertension Father     No Known Problems Maternal Grandmother     Heart attack Maternal Grandfather     No Known Problems Paternal Grandmother     No Known Problems Paternal Grandfather     Anxiety disorder Brother     Thalassemia Maternal Aunt     Lupus Family     No Known Problems Son      I have reviewed and agree with the history as documented.    E-Cigarette/Vaping    E-Cigarette Use Never User      E-Cigarette/Vaping Substances    Nicotine No     THC No     CBD No     Flavoring No     Other No     Unknown No      Social History     Tobacco Use    Smoking status: Former     Current packs/day: 0.00     Average packs/day: 0.3 packs/day for 15.0 years (3.8 ttl pk-yrs)     Types: Cigarettes     Start date: 2003     Quit date: 2018     Years since quitting:  6.4     Passive exposure: Past    Smokeless tobacco: Never   Vaping Use    Vaping status: Never Used   Substance Use Topics    Alcohol use: Not Currently    Drug use: Never        Review of Systems   Constitutional:  Negative for chills and fever.   HENT:  Negative for ear pain and sore throat.    Eyes:  Negative for pain and visual disturbance.   Respiratory:  Negative for cough and shortness of breath.    Cardiovascular:  Negative for chest pain and palpitations.   Gastrointestinal:  Positive for abdominal pain, diarrhea, nausea and vomiting. Negative for blood in stool and constipation.        No blood in emesis   Genitourinary:  Negative for dysuria and hematuria.   Musculoskeletal:  Negative for arthralgias and back pain.   Skin:  Negative for color change and rash.   Neurological:  Negative for seizures and syncope.   All other systems reviewed and are negative.      Physical Exam  ED Triage Vitals [06/18/24 1543]   Temperature Pulse Respirations Blood Pressure SpO2   97.9 °F (36.6 °C) 83 20 118/71 99 %      Temp Source Heart Rate Source Patient Position - Orthostatic VS BP Location FiO2 (%)   Oral Monitor Sitting Left arm --      Pain Score       --             Orthostatic Vital Signs  Vitals:    06/18/24 1543 06/18/24 1800   BP: 118/71    Pulse: 83 71   Patient Position - Orthostatic VS: Sitting        Physical Exam  Vitals and nursing note reviewed.   Constitutional:       General: She is in acute distress (d/t pain and nausea).      Appearance: She is well-developed. She is ill-appearing (appears to feel unwell). She is not toxic-appearing.   HENT:      Head: Normocephalic and atraumatic.      Mouth/Throat:      Mouth: Mucous membranes are dry.   Eyes:      Conjunctiva/sclera: Conjunctivae normal.   Cardiovascular:      Rate and Rhythm: Normal rate and regular rhythm.      Heart sounds: No murmur heard.  Pulmonary:      Effort: Pulmonary effort is normal. No respiratory distress.      Breath sounds: Normal  breath sounds.   Abdominal:      Palpations: Abdomen is soft.      Tenderness: There is abdominal tenderness in the epigastric area and left upper quadrant. There is no guarding or rebound.   Musculoskeletal:         General: No swelling.      Cervical back: Neck supple.   Skin:     General: Skin is warm and dry.      Capillary Refill: Capillary refill takes less than 2 seconds.   Neurological:      Mental Status: She is alert.   Psychiatric:         Mood and Affect: Mood normal.         ED Medications  Medications   lactated ringers bolus 1,000 mL (0 mL Intravenous Hold 6/18/24 1842)   Famotidine (PF) (PEPCID) injection 20 mg (has no administration in time range)   ondansetron (ZOFRAN-ODT) dispersible tablet 4 mg (4 mg Oral Given 6/18/24 1553)   ondansetron (ZOFRAN) injection 4 mg (4 mg Intravenous Given 6/18/24 1742)   pantoprazole (PROTONIX) injection 40 mg (40 mg Intravenous Given 6/18/24 1743)   multi-electrolyte (ISOLYTE-S PH 7.4 equivalent) IV solution 1,000 mL (1,000 mL Intravenous New Bag 6/18/24 1741)   iohexol (OMNIPAQUE) 350 MG/ML injection (MULTI-DOSE) 100 mL (100 mL Intravenous Given 6/18/24 1811)   metoclopramide (REGLAN) injection 10 mg (10 mg Intravenous Given 6/18/24 1839)   aluminum-magnesium hydroxide-simethicone (MAALOX) oral suspension 30 mL (30 mL Oral Given 6/18/24 1838)       Diagnostic Studies  Results Reviewed       Procedure Component Value Units Date/Time    hCG, qualitative pregnancy [[id number]]  (Normal) Collected: 06/18/24 1552    Lab Status: Final result Specimen: Blood from Arm, Left Updated: 06/18/24 1802     Preg, Serum Negative    UA w Reflex to Microscopic w Reflex to Culture [[id number]]     Lab Status: No result Specimen: Urine     Comprehensive metabolic panel [[id number]]  (Abnormal) Collected: 06/18/24 1552    Lab Status: Final result Specimen: Blood from Arm, Left Updated: 06/18/24 1627     Sodium 135 mmol/L      Potassium 4.5 mmol/L      Chloride 105 mmol/L      CO2 17  mmol/L      ANION GAP 13 mmol/L      BUN 14 mg/dL      Creatinine 0.91 mg/dL      Glucose 102 mg/dL      Calcium 10.5 mg/dL      AST 26 U/L      ALT 23 U/L      Alkaline Phosphatase 55 U/L      Total Protein 8.3 g/dL      Albumin 4.5 g/dL      Total Bilirubin 1.18 mg/dL      eGFR 74 ml/min/1.73sq m     Narrative:      National Kid[employer] Foundation guidelines for Chronic Kidney Disease (CKD):     Stage 1 with normal or high GFR (GFR > 90 mL/min/1.73 square meters)    Stage 2 Mild CKD (GFR = 60-89 mL/min/1.73 square meters)    Stage 3A Moderate CKD (GFR = 45-59 mL/min/1.73 square meters)    Stage 3B Moderate CKD (GFR = 30-44 mL/min/1.73 square meters)    Stage 4 Severe CKD (GFR = 15-29 mL/min/1.73 square meters)    Stage 5 End Stage CKD (GFR <15 mL/min/1.73 square meters)  Note: GFR calculation is accurate only with a steady state creatinine    Lipase [[id number]]  (Normal) Collected: 06/18/24 1552    Lab Status: Final result Specimen: Blood from Arm, Left Updated: 06/18/24 1627     Lipase 19 u/L     CBC and differential [[id number]]  (Abnormal) Collected: 06/18/24 1552    Lab Status: Final result Specimen: Blood from Arm, Left Updated: 06/18/24 1609     WBC 17.23 Thousand/uL      RBC 6.96 Million/uL      Hemoglobin 15.1 g/dL      Hematocrit 47.3 %      MCV 68 fL      MCH 21.7 pg      MCHC 31.9 g/dL      RDW 18.1 %      MPV 9.5 fL      Platelets 475 Thousands/uL      nRBC 0 /100 WBCs      Segmented % 72 %      Immature Grans % 0 %      Lymphocytes % 20 %      Monocytes % 7 %      Eosinophils Relative 1 %      Basophils Relative 0 %      Absolute Neutrophils 12.40 Thousands/µL      Absolute Immature Grans 0.06 Thousand/uL      Absolute Lymphocytes 3.38 Thousands/µL      Absolute Monocytes 1.19 Thousand/µL      Eosinophils Absolute 0.17 Thousand/µL      Basophils Absolute 0.03 Thousands/µL     Fingerstick Glucose (POCT) [[id number]]  (Normal) Collected: 06/18/24 1551    Lab Status: Final result Specimen: Blood  Updated: 06/18/24 1551     POC Glucose 118 mg/dl                    CT abdomen pelvis with contrast    (Results Pending)         Procedures  ECG 12 Lead Documentation Only    Date/Time: 6/18/2024 5:14 PM    Performed by: Kendy Rolle DO  Authorized by: Kendy Rolle DO    Patient location:  ED  Previous ECG:     Previous ECG:  Compared to current  Interpretation:     Interpretation: normal    Rate:     ECG rate:  90    ECG rate assessment: normal    Rhythm:     Rhythm: sinus rhythm    Ectopy:     Ectopy: none    QRS:     QRS axis:  Left    QRS intervals:  Normal  Conduction:     Conduction: normal    ST segments:     ST segments:  Normal  T waves:     T waves: normal    Comments:      Poor R wave progression in anterior leads (could be d/t lead placement)        ED Course  ED Course as of 06/18/24 1900   Tue Jun 18, 2024   1658 CBC and differential(!)  Leukocytosis.  Elevated platelets.  Elevated red blood cells.  Consistent with dehydration.  White count can be  elevated due to vomiting.   1658 Comprehensive metabolic panel(!)  Decreased bicarb 17.  Most likely due to diarrhea.  Otherwise electrolytes within normal limits.  Slightly elevated calcium.  Slightly elevated T. bili.   1658 LIPASE: 19   1658 POC Glucose: 118   1806 PREGNANCY, SERUM: Negative                                       Medical Decision Making  48 yo F presented to ED for abd pain. Associated symptoms: NVD. Exam findings: appears to feel unwell, acute distress d/t symptoms, epigastric and LUQ abd tenderness.  Differentials diagnoses considered: Reaction from a Wegovy (gastroparesis) versus pancreatitis versus gastritis versus cholecystitis versus gastroenteritis. Patient was given protonix for epigastric pain. Also given Zofran for NV and IVF for dehydration. On re-examination, patient had some improvement, however returning. Reglan ordered.  See ED course for more details on lab interpretation, CT imaging pending.  Based on  these results and H&P, could potentially due to Wegovy versus gastritis versus gastroparesis versus gastroenteritis. Results and clinical impressions were discussed with patient and family. They expressed understanding. Plan: Signout to team still in the emergency department pending CT and urine results.. This plan was also discussed with patient, who was agreeable with this plan. Patient was given the opportunity to ask questions in ED. All questions and concerns were addressed in ED.     Amount and/or Complexity of Data Reviewed  Labs: ordered. Decision-making details documented in ED Course.  Radiology: ordered.    Risk  OTC drugs.  Prescription drug management.          Disposition  Final diagnoses:   Nausea vomiting and diarrhea     Time reflects when diagnosis was documented in both MDM as applicable and the Disposition within this note       Time User Action Codes Description Comment    6/18/2024  6:59 PM Kendy Rolle Add [R11.2,  R19.7] Nausea vomiting and diarrhea           ED Disposition       None          Follow-up Information       Follow up With Specialties Details Why Contact Info Additional Information    [facility], Nurse Practitioner Call in 1 day  [address] [place] [address]  [address]  [facility] [address] [phone]       [facility] Emergency Department Emergency Medicine Go to  As needed, If symptoms worsen [facility] [id number]  [phone] Former[facility] Emergency Department, [address] [place] [address], [id number]            Patient's Medications   Discharge Prescriptions    No medications on file     No discharge procedures on file.    PDMP Review       None             ED Provider  Attending physically available and evaluated Elissa Leal. I managed the patient along with the ED Attending.    Electronically Signed by           Kendy Rolle DO  06/18/24 1875

## 2024-06-18 NOTE — TELEPHONE ENCOUNTER
"Pt called in stating that she took the first injection of increase dose of wegovy last Friday. Since then she has progressively developed side effects. Pt states she is having stomach pain (burning), belching, mild vomiting and diarrhea, chills and bloating. Pt has been trying to eat and drink small amounts throughout the day. Pt has tried taking pepto bismol and acid reducers. Please advise.     Reason for Disposition   Caller has URGENT medicine question about med that PCP or specialist prescribed and triager unable to answer question    Answer Assessment - Initial Assessment Questions  1. NAME of MEDICATION: \"What medicine are you calling about?\"     Wegovy  2. QUESTION: \"What is your question?\" (e.g., medication refill, side effect)      Side  3. PRESCRIBING HCP: \"Who prescribed it?\" Reason: if prescribed by specialist, call should be referred to that group.      PCP  4. SYMPTOMS: \"Do you have any symptoms?\"      Stomach pain (burning), Vomited, diarrhea, chills, bloated, bletching  5. SEVERITY: If symptoms are present, ask \"Are they mild, moderate or severe?\"      Moderate  6. PREGNANCY:  \"Is there any chance that you are pregnant?\" \"When was your last menstrual period?\"      NA    Protocols used: Medication Question Call-ADULT-[place]    "

## 2024-06-18 NOTE — ED NOTES
Pt unable to provider urine sample at this time. Fluids started at this time     Rocio Boyle RN  06/18/24 [id number]

## 2024-06-18 NOTE — ED PROCEDURE NOTE
Procedure  POC Biliary US    Date/Time: 6/18/2024 7:52 PM    Performed by: Tiffanie Lopez DO  Authorized by: Tiffanie Lopez DO    Patient location:  ED  Performed by:  Resident  Procedure details:     Exam Type:  Educational    Indications: upper right quadrant abdominal pain      Views obtained: gallbladder (transverse and longitudinal) and liver      Image quality: non-diagnostic      Image availability:  Images available in PACS  Findings:     Common bile duct:  Unable to visualize    Gallbladder wall:  Normal    Pericholecystic fluid: not identified      Polyps: not identified      Mass: not identified    POC AAA US    Date/Time: 6/18/2024 7:53 PM    Performed by: Tiffanie Lopez DO  Authorized by: Tiffanie Lopez DO    Patient location:  ED  Performing Provider:  Resident  Procedure details:     Exam Type:  Educational    Indications: abdominal pain      Views Obtained:  Transverse proximal and transverse distal view    Image quality: non-diagnostic      Image availability:  Images available in PACS  Findings:     Abdominal Aorta Findings: normal    Interpretation:     Aortic ultrasound impression: aorta normal                     Tiffanie Lopez DO  06/18/24 1954

## 2024-06-19 ENCOUNTER — TELEMEDICINE (OUTPATIENT)
Dept: FAMILY MEDICINE CLINIC | Facility: CLINIC | Age: 49
End: 2024-06-19
Payer: MEDICARE

## 2024-06-19 VITALS — TEMPERATURE: 98.2 F | BODY MASS INDEX: 45.07 KG/M2 | HEIGHT: 64 IN | WEIGHT: 264 LBS

## 2024-06-19 DIAGNOSIS — R10.84 GENERALIZED ABDOMINAL PAIN: Primary | ICD-10-CM

## 2024-06-19 DIAGNOSIS — E66.01 CLASS 3 SEVERE OBESITY DUE TO EXCESS CALORIES WITH SERIOUS COMORBIDITY AND BODY MASS INDEX (BMI) OF 45.0 TO 49.9 IN ADULT (HCC): ICD-10-CM

## 2024-06-19 PROCEDURE — 99213 OFFICE O/P EST LOW 20 MIN: CPT | Performed by: NURSE PRACTITIONER

## 2024-06-19 RX ORDER — SEMAGLUTIDE 0.5 MG/.5ML
INJECTION, SOLUTION SUBCUTANEOUS
Qty: 4 ML | Refills: 0 | Status: SHIPPED | OUTPATIENT
Start: 2024-06-19 | End: 2024-08-14

## 2024-06-19 RX ORDER — ALBUTEROL SULFATE 90 UG/1
AEROSOL, METERED RESPIRATORY (INHALATION)
COMMUNITY
Start: 2024-05-26

## 2024-06-22 NOTE — PROGRESS NOTES
HEMATOLOGY / ONCOLOGY CLINIC FOLLOW UP NOTE    Patient Elissa Leal  MRN: [id number]  : 1975  Date of Encounter 2024      Referring Provider: CARMEN العلي 2023    Reason for Encounter: follow up Leukocytosis /Thrombocytosis            Assessment / Plan:      Leukocytosis    To date, negative workup    Labs 2024 with WBC 9.1    2024 were 17.2 but had acute event with N/V     Repeat in 6 months        Thrombocytosis    Plts normal count at 429 2024 and 475 2024    Follow       Thalassemia minor    Noted; MCV 68       Chronic colitis      Noted    Rheumatologic    Had Rheum workup for autoimmune disorder; CRP and ESR both elevated    Had recent biopsies cheek/scalp with inflammatory/granulomatous reaction     Please see attached complete consult report from the [facility].      For specimen A, the consultant favors a granulomatous reaction to a ruptured cyst or follicle.      For specimen B, they favor inflammatory associated with sites of follicular rupture secondary to folliculitis or trauma and do not favor the granulomatous reaction associated with rubella virus vaccination as this typically occurs near or at the site of vaccination.      For specimen C, the diagnosis was not specific and the differential diagnosis includes changes associated with an allergic contact dermatitis or seborrheic dermatitis. In this specimen they state the  etiology of the potential focal scarring is unclear however the increased number of miniaturized follicles raise the possibility of female pattern alopecia.       Elevated BMI    Noted  Weight 265 pounds on Wegovy     Follow up     6 months       .        HPI  Alexsandra Radha     Eli Leal is a 48-year-old female with past medical history significant for hypothyroidism and thalassemia.  She was previously following with hematology for leukocytosis/thrombocytosis.  This was thought to be due to chronic inflammation due  to chronically elevated sed rate and CRP.  Prior workup with flow cytometry, BCR/ABL was negative.  Workup for underlying myeloproliferative disorder with JAK2, CALR, MPL was negative.      Her counts remain mildly elevated but stable and at her baseline.  She continues to remain asymptomatic.  At this juncture, recommend continued monitoring.  She will follow-up in the office in 6 months with CBC prior.  Patient aware to contact the office in the interim    Dr العلي Dec 2023    Eli Leal is a 48-year-old female with past medical history significant for hypothyroidism and thalassemia.  She was previously following with SANGEETA Floyd for microcytosis, thrombocytosis and leukocytosis.  She was last evaluated in the office on 5/3/2023.  She presents today for transfer of care/follow-up visit.     Pt states she is doing well. Has intermittent joint pains. Notes fatigue worsened around her menstrual cycle.   Denies any fever or weight loss. Intermittent abd pain sometimes associated with certain foods. Has hx of chronic colitis      Interval History: 6/24/2024    Now 49 year old with the above issues with thalassemia, leukocytosis/thrombocytosis with no etiology other than reactive.  She continues to complain of joint issues hands/feet.  She has had a Rheum workup in the past which was negative.  All testing for underlying MPN including CML were negative.  She had a recent ER admission for N/V with en elevated WBC 17.2 6/18/2024; was 9.1 on 5/28/2024 with a normal differential.  She had recent skin biopsies read at [facility] with  granulomatous reaction right cheek/scalp.  She states that all her issues started 2 years ago post COVID infection       REVIEW OF SYSTEMS:    As above   Please note that a 14-point review of systems was performed to include Constitutional, HEENT, Respiratory, CVS, GI, , Musculoskeletal, Integumentary, Neurologic, Rheumatologic, Endocrinologic, Psychiatric, Lymphatic, and  Hematologic/Oncologic systems were reviewed and are negative unless otherwise stated in HPI. Positive and negative findings pertinent to this evaluation are incorporated into the history of present illness.      ECOG PS: 0    PROBLEM LIST:  Patient Active Problem List   Diagnosis    Palpitations    Moderate asthma without complication    Anemia    Hypothyroidism    Microcytosis    Family history of thalassemia    Thrombocytosis    Leukocytosis    PVC (premature ventricular contraction)    Class 3 severe obesity with serious comorbidity and body mass index (BMI) of 45.0 to 49.9 in adult (HCC)    Beta thalassemia minor       Past Medical History:   has a past medical history of Anemia (12/08/2021), Asthma (Jume 2021), Clotting disorder ([facility]) (Feb. 2021), Eczema, GERD (gastroesophageal reflux disease) (November 2020), Headache(784.0), No known health problems, Obesity, and Visual impairment (September 2020).    PAST SURGICAL HISTORY:   has a past surgical history that includes No past surgeries.    CURRENT MEDICATIONS  Current Outpatient Medications   Medication Sig Dispense Refill    albuterol (PROVENTIL HFA,VENTOLIN HFA) 90 mcg/act inhaler inhale 1 puff by mouth and INTO THE LUNGS every 4 hours if needed for wheezing      clobetasol (TEMOVATE) 0.05 % external solution Apply topically 2 (two) times a day To scalp, keep from dripping down face or into eyes 50 mL 6    dicyclomine (BENTYL) 10 mg capsule Take 1 capsule (10 mg total) by mouth 3 (three) times a day before meals 16 capsule 0    fluticasone-vilanterol (Breo Ellipta) 200-25 mcg/actuation inhaler Inhale 1 puff daily Rinse mouth after use. 60 blister 11    ketoconazole (NIZORAL) 2 % cream Apply topically twice a day when rash is active, apply topically twice a week when not active 60 g 11    ketoconazole (NIZORAL) 2 % shampoo Apply to damp facial skin and scalp once daily. Let sit for 5 minutes before rinsing out. Can use regular shampoo/face wash after using.  120 mL 6    ketorolac (TORADOL) 10 mg tablet Take 1 tablet (10 mg total) by mouth every 6 (six) hours as needed for moderate pain or severe pain 30 tablet 0    levothyroxine (Synthroid) 75 mcg tablet Take 1 tablet (75 mcg total) by mouth daily in the early morning 30 tablet 5    metoclopramide (Reglan) 10 mg tablet Take 1 tablet (10 mg total) by mouth every 6 (six) hours for 5 days 20 tablet 0    metoprolol tartrate (LOPRESSOR) 50 mg tablet take 1 tablet by mouth twice a day 180 tablet 1    Semaglutide-Weight Management (Wegovy) 0.5 MG/0.5ML Inject 0.5 mg under the skin weekly 4 mL 0    tacrolimus (PROTOPIC) 0.1 % ointment apply to inflamed face and scalp once daily until inflammation calms. Can repeat treatment. 100 g 3     No current facility-administered medications for this visit.     [unfilled]    SOCIAL HISTORY:   reports that she quit smoking about 6 years ago. Her smoking use included cigarettes. She started smoking about 21 years ago. She has a 3.8 pack-year smoking history. She has been exposed to tobacco smoke. She has never used smokeless tobacco. She reports that she does not currently use alcohol. She reports that she does not use drugs.     FAMILY HISTORY:  family history includes Anxiety disorder in her brother and mother; Autoimmune disease in her mother; Cardiomyopathy in her mother; Heart attack in her maternal grandfather; Hypertension in her father and mother; Lupus in her family; No Known Problems in her maternal grandmother, paternal grandfather, paternal grandmother, and son; Thalassemia in her maternal aunt.     ALLERGIES:  has No Known Allergies.      Physical Exam:  Vital Signs:   Visit Vitals  LMP 05/28/2024 (Approximate)   OB Status Having periods   Smoking Status Former     There is no height or weight on file to calculate BMI.  There is no height or weight on file to calculate BSA.    GEN: Alert, awake oriented x3, in no acute distress  HEENT- No pallor, icterus, cyanosis, no oral  mucosal lesions,   LAD - no palpable cervical, clavicle, axillary, inguinal LAD  Heart- normal S1 S2, regular rate and rhythm, No murmur, rubs.   Lungs- clear breathing sound bilateral.   Abdomen- soft, Non tender, bowel sounds present  Extremities- No cyanosis, clubbing, edema  Neuro- No focal neurological deficit    Labs:  Lab Results   Component Value Date    WBC 17.23 (H) 06/18/2024    HGB 15.1 06/18/2024    HCT 47.3 (H) 06/18/2024    MCV 68 (L) 06/18/2024     (H) 06/18/2024     Lab Results   Component Value Date    SODIUM 135 06/18/2024    K 4.5 06/18/2024     06/18/2024    CO2 17 (L) 06/18/2024    AGAP 13 06/18/2024    BUN 14 06/18/2024    CREATININE 0.91 06/18/2024    GLUC 102 06/18/2024    GLUF 93 05/28/2024    CALCIUM 10.5 (H) 06/18/2024    AST 26 06/18/2024    ALT 23 06/18/2024    ALKPHOS 55 06/18/2024    TP 8.3 06/18/2024    TBILI 1.18 (H) 06/18/2024    EGFR 74 06/18/2024      Latest Reference Range & Units 06/18/24 15:52   Sodium 135 - 147 mmol/L 135   Potassium 3.5 - 5.3 mmol/L 4.5   Chloride 96 - 108 mmol/L 105   Carbon Dioxide 21 - 32 mmol/L 17 (L)   ANION GAP 4 - 13 mmol/L 13   BUN 5 - 25 mg/dL 14   Creatinine 0.60 - 1.30 mg/dL 0.91   GLUCOSE 65 - 140 mg/dL 102   Calcium 8.4 - 10.2 mg/dL 10.5 (H)   AST 13 - 39 U/L 26   ALT 7 - 52 U/L 23   ALK PHOS 34 - 104 U/L 55   Total Protein 6.4 - 8.4 g/dL 8.3   Albumin 3.5 - 5.0 g/dL 4.5   Total Bilirubin 0.20 - 1.00 mg/dL 1.18 (H)   GFR, Calculated ml/min/1.73sq m 74   LIPASE 11 - 82 u/L 19   WBC 4.31 - 10.16 Thousand/uL 17.23 (H)   RBC 3.81 - 5.12 Million/uL 6.96 (H)   Hemoglobin 11.5 - 15.4 g/dL 15.1   Hematocrit 34.8 - 46.1 % 47.3 (H)   MCV 82 - 98 fL 68 (L)   MCH 26.8 - 34.3 pg 21.7 (L)   MCHC 31.4 - 37.4 g/dL 31.9   RDW 11.6 - 15.1 % 18.1 (H)   Platelet Count 149 - 390 Thousands/uL 475 (H)   MPV 8.9 - 12.7 fL 9.5   nRBC /100 WBCs 0   Segmented % 43 - 75 % 72   Lymphocytes % 14 - 44 % 20   Monocytes % 4 - 12 % 7   Eosinophils % 0 - 6 % 1    Basophils % 0 - 1 % 0   Immature Grans % 0 - 2 % 0   Absolute Immature Grans 0.00 - 0.20 Thousand/uL 0.06   Absolute Neutrophils 1.85 - 7.62 Thousands/µL 12.40 (H)   Absolute Lymphocytes 0.60 - 4.47 Thousands/µL 3.38   Absolute Monocytes 0.17 - 1.22 Thousand/µL 1.19   Absolute Eosinophils 0.00 - 0.61 Thousand/µL 0.17   Absolute Basophils 0.00 - 0.10 Thousands/µL 0.03   (L): Data is abnormally low  (H): Data is abnormally high      I spent 40 minutes on chart review, face to face counseling time, coordination of care and documentation.    Ingrid Negro MD PhD

## 2024-06-24 ENCOUNTER — OFFICE VISIT (OUTPATIENT)
Dept: HEMATOLOGY ONCOLOGY | Facility: CLINIC | Age: 49
End: 2024-06-24
Payer: MEDICARE

## 2024-06-24 VITALS
HEART RATE: 89 BPM | OXYGEN SATURATION: 98 % | SYSTOLIC BLOOD PRESSURE: 122 MMHG | WEIGHT: 265 LBS | BODY MASS INDEX: 45.24 KG/M2 | HEIGHT: 64 IN | DIASTOLIC BLOOD PRESSURE: 84 MMHG

## 2024-06-24 DIAGNOSIS — D72.829 LEUKOCYTOSIS, UNSPECIFIED TYPE: Primary | ICD-10-CM

## 2024-06-24 PROCEDURE — 99215 OFFICE O/P EST HI 40 MIN: CPT | Performed by: INTERNAL MEDICINE

## 2024-06-24 NOTE — ED ATTENDING ATTESTATION
6/18/2024  IEvan DO, saw and evaluated the patient. I have discussed the patient with the resident/non-physician practitioner and agree with the resident's/non-physician practitioner's findings, Plan of Care, and MDM as documented in the resident's/non-physician practitioner's note, except where noted. All available labs and Radiology studies were reviewed.  I was present for key portions of any procedure(s) performed by the resident/non-physician practitioner and I was immediately available to provide assistance.       At this point I agree with the current assessment done in the Emergency Department.  I have conducted an independent evaluation of this patient a history and physical is as follows:    ED Course         Critical Care Time  Procedures

## 2024-06-25 ENCOUNTER — TELEMEDICINE (OUTPATIENT)
Dept: FAMILY MEDICINE CLINIC | Facility: CLINIC | Age: 49
End: 2024-06-25

## 2024-06-25 DIAGNOSIS — K31.84 NONDIABETIC GASTROPARESIS: Primary | ICD-10-CM

## 2024-06-25 NOTE — PROGRESS NOTES
Virtual Regular Visit    Verification of patient location:    Patient is located at Home in the following state in which I hold an active license [place]      Assessment/Plan:    Problem List Items Addressed This Visit    None  Visit Diagnoses     Nondiabetic gastroparesis    -  Primary               Reason for visit is   Chief Complaint   Patient presents with   • Medication Problem     Pt being seen for possible side effects from Wegovy    • Virtual Regular Visit        Encounter provider SANGEETA Chacon      Recent Visits  Date Type Provider Dept   06/19/24 Telemedicine SANGEETA Chacon [facility] [place]   Showing recent visits within past 7 days and meeting all other requirements  Today's Visits  Date Type Provider Dept   06/25/24 Telemedicine SANGEETA Chacon [facility] [place]   Showing today's visits and meeting all other requirements  Future Appointments  No visits were found meeting these conditions.  Showing future appointments within next 150 days and meeting all other requirements       The patient was identified by name and date of birth. Elissa Leal was informed that this is a telemedicine visit and that the visit is being conducted through the Epic Embedded platform. She agrees to proceed..  My office door was closed. No one else was in the room.  She acknowledged consent and understanding of privacy and security of the video platform. The patient has agreed to participate and understands they can discontinue the visit at any time.    Patient is aware this is a billable service.     Subjective  Elissa Leal is a 49 y.o. female     49 y.o.female presenting with continuing reaction to Wegovy. Patient was started on Wegovy 03/05/2024 at 0.25 mg weekly than progressed to 0.5 mg weekly for April 2024. In May 2024 the dose was increased to 1 mg weekly and she started to develop some change in appetite, change in bowel habits and abdominal pain. She was seen in the emergency room on  06/18/2024 for severe epigastric pain, nausea, vomiting, and diarrhea. Her Wegovy dose was decreased and abdominal issues continued. Discussed with patient that she most likely has a gastroparesis related to Wegovy so plan to stop the medication for 2 weeks than reassess.          Past Medical History:   Diagnosis Date   • Anemia 12/08/2021   • Asthma Jume 2021    Inhaler   • Clotting disorder (HCC) Feb. 2021    On beta blocker   • Eczema    • GERD (gastroesophageal reflux disease) November 2020    Certain foods cause a problem   • Headache(784.0)    • No known health problems    • Obesity    • Visual impairment September 2020    New glasses for reading close up       Past Surgical History:   Procedure Laterality Date   • NO PAST SURGERIES         Current Outpatient Medications   Medication Sig Dispense Refill   • albuterol (PROVENTIL HFA,VENTOLIN HFA) 90 mcg/act inhaler inhale 1 puff by mouth and INTO THE LUNGS every 4 hours if needed for wheezing     • clobetasol (TEMOVATE) 0.05 % external solution Apply topically 2 (two) times a day To scalp, keep from dripping down face or into eyes 50 mL 6   • dicyclomine (BENTYL) 10 mg capsule Take 1 capsule (10 mg total) by mouth 3 (three) times a day before meals 16 capsule 0   • fluticasone-vilanterol (Breo Ellipta) 200-25 mcg/actuation inhaler Inhale 1 puff daily Rinse mouth after use. 60 blister 11   • ketoconazole (NIZORAL) 2 % cream Apply topically twice a day when rash is active, apply topically twice a week when not active 60 g 11   • ketoconazole (NIZORAL) 2 % shampoo Apply to damp facial skin and scalp once daily. Let sit for 5 minutes before rinsing out. Can use regular shampoo/face wash after using. 120 mL 6   • ketorolac (TORADOL) 10 mg tablet Take 1 tablet (10 mg total) by mouth every 6 (six) hours as needed for moderate pain or severe pain 30 tablet 0   • levothyroxine (Synthroid) 75 mcg tablet Take 1 tablet (75 mcg total) by mouth daily in the early morning 30  tablet 5   • metoprolol tartrate (LOPRESSOR) 50 mg tablet take 1 tablet by mouth twice a day 180 tablet 1   • Semaglutide-Weight Management (Wegovy) 0.5 MG/0.5ML Inject 0.5 mg under the skin weekly 4 mL 0   • tacrolimus (PROTOPIC) 0.1 % ointment apply to inflamed face and scalp once daily until inflammation calms. Can repeat treatment. 100 g 3     No current facility-administered medications for this visit.        No Known Allergies    Review of Systems   Constitutional:  Positive for activity change, appetite change and fatigue. Negative for chills and fever.   HENT: Negative.     Respiratory: Negative.     Cardiovascular: Negative.    Gastrointestinal:  Positive for abdominal pain, diarrhea and nausea. Negative for abdominal distention.   Genitourinary: Negative.    Musculoskeletal:  Positive for myalgias.   Neurological: Negative.    Psychiatric/Behavioral: Negative.       Video Exam    Vitals:       Physical Exam  Constitutional:       General: She is in acute distress.      Appearance: Normal appearance. She is well-developed and well-groomed. She is ill-appearing.   HENT:      Head: Normocephalic and atraumatic.      Right Ear: External ear normal.      Left Ear: External ear normal.      Nose: Nose normal.      Mouth/Throat:      Lips: Pink.   Eyes:      General: Lids are normal.      Extraocular Movements: Extraocular movements intact.      Conjunctiva/sclera: Conjunctivae normal.      Pupils: Pupils are equal, round, and reactive to light.   Neck:      Trachea: Trachea and phonation normal.   Cardiovascular:      Rate and Rhythm: Normal rate and regular rhythm.   Pulmonary:      Effort: Pulmonary effort is normal.   Abdominal:      General: There is no distension.      Palpations: Abdomen is soft.      Tenderness: There is abdominal tenderness.   Musculoskeletal:      Cervical back: No rigidity. No pain with movement. Normal range of motion.   Neurological:      Mental Status: She is alert and oriented to  person, place, and time.   Psychiatric:         Mood and Affect: Mood normal.         Behavior: Behavior normal. Behavior is cooperative.          Visit Time  Total Visit Duration: 14

## 2024-07-10 NOTE — PROGRESS NOTES
Virtual Regular Visit    Verification of patient location:    Patient is located at Home in the following state in which I hold an active license [place]      Assessment/Plan:    Problem List Items Addressed This Visit        Other    Class 3 severe obesity with serious comorbidity and body mass index (BMI) of 45.0 to 49.9 in adult (HCC)    Relevant Medications    Semaglutide-Weight Management (Wegovy) 0.5 MG/0.5ML   Other Visit Diagnoses     Generalized abdominal pain    -  Primary        Discussed with patient plan to decrease Wegovy dosing back to 0.5 mg weekly for next 2 months  Discussed with patient recommendation to continue using Reglan as needed  Patient instructed to call if no improvement in 72 hours or symptoms worsen       Reason for visit is   Chief Complaint   Patient presents with   • Medication Reaction     Wegovy    • Virtual Regular Visit        Encounter provider SANGEETA Chacon      Recent Visits  No visits were found meeting these conditions.  Showing recent visits within past 7 days and meeting all other requirements  Today's Visits  Date Type Provider Dept   06/19/24 Telemedicine SANGEETA Chacon [facility]   Showing today's visits and meeting all other requirements  Future Appointments  No visits were found meeting these conditions.  Showing future appointments within next 150 days and meeting all other requirements       The patient was identified by name and date of birth. Elissa Leal was informed that this is a telemedicine visit and that the visit is being conducted through the Epic Embedded platform. She agrees to proceed..  My office door was closed. No one else was in the room.  She acknowledged consent and understanding of privacy and security of the video platform. The patient has agreed to participate and understands they can discontinue the visit at any time.    Patient is aware this is a billable service.     Subjective  Elissa Leal is a 49 y.o. female     49  y.o.female presenting with generalized abdominal pain, nausea and vomiting for the past 4 days. Patient reports that she increased her Wegovy dose to 1 mg weekly on Friday 06/14/2024 than two days later started with nausea and vomiting that progressed to severe abdominal pain. Patient reports that the pain got so bed she went to the [facility] emergency room yesterday. She was given IV Zofran and sent home with prescription for Reglan. She states that she is feeling better today.         Past Medical History:   Diagnosis Date   • Anemia 12/08/2021   • Asthma Jume 2021    Inhaler   • Clotting disorder (HCC) Feb. 2021    On beta blocker   • Eczema    • GERD (gastroesophageal reflux disease) November 2020    Certain foods cause a problem   • Headache(784.0)    • No known health problems    • Obesity    • Visual impairment September 2020    New glasses for reading close up       Past Surgical History:   Procedure Laterality Date   • NO PAST SURGERIES         Current Outpatient Medications   Medication Sig Dispense Refill   • albuterol (PROVENTIL HFA,VENTOLIN HFA) 90 mcg/act inhaler inhale 1 puff by mouth and INTO THE LUNGS every 4 hours if needed for wheezing     • clobetasol (TEMOVATE) 0.05 % external solution Apply topically 2 (two) times a day To scalp, keep from dripping down face or into eyes 50 mL 6   • dicyclomine (BENTYL) 10 mg capsule Take 1 capsule (10 mg total) by mouth 3 (three) times a day before meals 16 capsule 0   • fluticasone-vilanterol (Breo Ellipta) 200-25 mcg/actuation inhaler Inhale 1 puff daily Rinse mouth after use. 60 blister 11   • ketoconazole (NIZORAL) 2 % cream Apply topically twice a day when rash is active, apply topically twice a week when not active 60 g 11   • ketoconazole (NIZORAL) 2 % shampoo Apply to damp facial skin and scalp once daily. Let sit for 5 minutes before rinsing out. Can use regular shampoo/face wash after using. 120 mL 6   • ketorolac (TORADOL) 10 mg tablet Take  "1 tablet (10 mg total) by mouth every 6 (six) hours as needed for moderate pain or severe pain 30 tablet 0   • levothyroxine (Synthroid) 75 mcg tablet Take 1 tablet (75 mcg total) by mouth daily in the early morning 30 tablet 5   • metoclopramide (Reglan) 10 mg tablet Take 1 tablet (10 mg total) by mouth every 6 (six) hours for 5 days 20 tablet 0   • metoprolol tartrate (LOPRESSOR) 50 mg tablet take 1 tablet by mouth twice a day 180 tablet 1   • Semaglutide-Weight Management (Wegovy) 0.5 MG/0.5ML Inject 0.5 mg under the skin weekly 4 mL 0   • tacrolimus (PROTOPIC) 0.1 % ointment apply to inflamed face and scalp once daily until inflammation calms. Can repeat treatment. 100 g 3     No current facility-administered medications for this visit.        No Known Allergies    Review of Systems   Constitutional:  Negative for chills, fatigue and fever.   HENT: Negative.     Respiratory: Negative.     Cardiovascular: Negative.    Gastrointestinal:  Positive for abdominal pain, diarrhea, nausea and vomiting.   Genitourinary: Negative.    Musculoskeletal: Negative.    Neurological: Negative.    Psychiatric/Behavioral: Negative.       Video Exam    Vitals:    06/19/24 1147   Temp: 98.2 °F (36.8 °C)   Weight: 120 kg (264 lb)   Height: 5' 4\" (1.626 m)       Physical Exam  Constitutional:       General: She is not in acute distress.     Appearance: Normal appearance. She is well-developed and well-groomed. She is not ill-appearing.   HENT:      Head: Normocephalic and atraumatic.      Right Ear: External ear normal.      Left Ear: External ear normal.      Nose: Congestion present.      Right Sinus: Maxillary sinus tenderness and frontal sinus tenderness present.      Left Sinus: Maxillary sinus tenderness and frontal sinus tenderness present.      Mouth/Throat:      Lips: Pink.      Mouth: Mucous membranes are moist.      Pharynx: Oropharynx is clear.   Eyes:      General: Lids are normal.      Extraocular Movements: Extraocular " Medical Necessity Information: It is in your best interest to select a reason for this procedure from the list below. All of these items fulfill various CMS LCD requirements except lesion extends to a margin. movements intact.      Conjunctiva/sclera: Conjunctivae normal.      Pupils: Pupils are equal, round, and reactive to light.   Neck:      Trachea: Trachea normal.   Cardiovascular:      Rate and Rhythm: Normal rate and regular rhythm.   Pulmonary:      Effort: Pulmonary effort is normal. No respiratory distress.   Abdominal:      General: Abdomen is flat. There is no distension.      Palpations: Abdomen is soft.      Tenderness: There is no abdominal tenderness.   Musculoskeletal:      Cervical back: Normal range of motion.   Neurological:      Mental Status: She is alert and oriented to person, place, and time.   Psychiatric:         Mood and Affect: Mood normal.         Behavior: Behavior normal. Behavior is cooperative.          Visit Time  Total Visit Duration: 14         Include Z78.9 (Other Specified Conditions Influencing Health Status) As An Associated Diagnosis?: No Lab: 0 Size Of Lesion In Cm: 2.5 X Size Of Lesion In Cm (Optional): 1.6 Anesthesia Volume In Cc: 5 Excision Method: Elliptical Saucerization Depth: dermis and superficial adipose tissue Repair Type: Intermediate Intermediate / Complex Repair - Final Wound Length In Cm: 3.1 Suturegard Retention Suture: 2-0 Nylon Retention Suture Bite Size: 3 mm Length To Time In Minutes Device Was In Place: 10 Number Of Hemigard Strips Per Side: 1 Undermining Type: Entire Wound Debridement Text: The wound edges were debrided prior to proceeding with the closure to facilitate wound healing. Helical Rim Text: The closure involved the helical rim. Vermilion Border Text: The closure involved the vermilion border. Nostril Rim Text: The closure involved the nostril rim. Retention Suture Text: Retention sutures were placed to support the closure and prevent dehiscence. Epidermal Closure Graft Donor Site (Optional): simple interrupted Suture Removal: 9 days Detail Level: Detailed Excision Depth: deep subcutaneous fat Scalpel Size: 15 blade Anesthesia Type: 1% lidocaine with epinephrine and a 1:10 solution of 8.4% sodium bicarbonate Additional Anesthesia Volume In Cc: 6 Hemostasis: Electrocautery Estimated Blood Loss (Cc): minimal Anesthesia Type: 1% lidocaine with 1:100,000 epinephrine and a 1:10 solution of 8.4% sodium bicarbonate Deep Sutures: 4-0 Monocryl Number Of Deep Sutures (Optional): 3 Epidermal Sutures: 4-0 Prolene Wound Care: Mupirocin Dressing: pressure dressing Suturegard Intro: Intraoperative tissue expansion was performed, utilizing the SUTUREGARD device, in order to reduce wound tension. Suturegard Body: The suture ends were repeatedly re-tightened and re-clamped to achieve the desired tissue expansion. Hemigard Intro: Due to skin fragility and wound tension, it was decided to use HEMIGARD adhesive retention suture devices to permit a linear closure. The skin was cleaned and dried for a 6cm distance away from the wound. Excessive hair, if present, was removed to allow for adhesion. Hemigard Postcare Instructions: The HEMIGARD strips are to remain completely dry for at least 5-7 days. Graft Donor Site Bandage (Optional-Leave Blank If You Don't Want In Note): Steri-strips and a pressure bandage were applied to the donor site. Complex Repair Preamble Text (Leave Blank If You Do Not Want): Extensive wide undermining was performed. Intermediate Repair Preamble Text (Leave Blank If You Do Not Want): Undermining was performed with blunt dissection. Fusiform Excision Additional Text (Leave Blank If You Do Not Want): The margin was drawn around the clinically apparent lesion.  A fusiform shape was then drawn on the skin incorporating the lesion and margins.  Incisions were then made along these lines to the appropriate tissue plane and the lesion was extirpated. Eliptical Excision Additional Text (Leave Blank If You Do Not Want): The margin was drawn around the clinically apparent lesion.  An elliptical shape was then drawn on the skin incorporating the lesion and margins.  Incisions were then made along these lines to the appropriate tissue plane and the lesion was extirpated. Saucerization Excision Additional Text (Leave Blank If You Do Not Want): The margin was drawn around the clinically apparent lesion.  Incisions were then made along these lines, in a tangential fashion, to the appropriate tissue plane and the lesion was extirpated. Slit Excision Additional Text (Leave Blank If You Do Not Want): A linear line was drawn on the skin overlying the lesion. An incision was made slowly until the lesion was visualized.  Once visualized, the lesion was removed with blunt dissection. Excisional Biopsy Additional Text (Leave Blank If You Do Not Want): The margin was drawn around the clinically apparent lesion. An elliptical shape was then drawn on the skin incorporating the lesion and margins.  Incisions were then made along these lines to the appropriate tissue plane and the lesion was extirpated. Perilesional Excision Additional Text (Leave Blank If You Do Not Want): The margin was drawn around the clinically apparent lesion. Incisions were then made along these lines to the appropriate tissue plane and the lesion was extirpated. Repair Performed By Another Provider Text (Leave Blank If You Do Not Want): After the tissue was excised the defect was repaired by another provider. No Repair - Repaired With Adjacent Surgical Defect Text (Leave Blank If You Do Not Want): After the excision the defect was repaired concurrently with another surgical defect which was in close approximation. Adjacent Tissue Transfer Text: The defect edges were debeveled with a #15 scalpel blade.  Given the location of the defect and the proximity to free margins an adjacent tissue transfer was deemed most appropriate.  Using a sterile surgical marker, an appropriate flap was drawn incorporating the defect and placing the expected incisions within the relaxed skin tension lines where possible.    The area thus outlined was incised deep to adipose tissue with a #15 scalpel blade.  The skin margins were undermined to an appropriate distance in all directions utilizing iris scissors. Advancement Flap (Single) Text: The defect edges were debeveled with a #15 scalpel blade.  Given the location of the defect and the proximity to free margins a single advancement flap was deemed most appropriate.  Using a sterile surgical marker, an appropriate advancement flap was drawn incorporating the defect and placing the expected incisions within the relaxed skin tension lines where possible.    The area thus outlined was incised deep to adipose tissue with a #15 scalpel blade.  The skin margins were undermined to an appropriate distance in all directions utilizing iris scissors. Advancement Flap (Double) Text: The defect edges were debeveled with a #15 scalpel blade.  Given the location of the defect and the proximity to free margins a double advancement flap was deemed most appropriate.  Using a sterile surgical marker, the appropriate advancement flaps were drawn incorporating the defect and placing the expected incisions within the relaxed skin tension lines where possible.    The area thus outlined was incised deep to adipose tissue with a #15 scalpel blade.  The skin margins were undermined to an appropriate distance in all directions utilizing iris scissors. Burow's Advancement Flap Text: The defect edges were debeveled with a #15 scalpel blade.  Given the location of the defect and the proximity to free margins a Burow's advancement flap was deemed most appropriate.  Using a sterile surgical marker, the appropriate advancement flap was drawn incorporating the defect and placing the expected incisions within the relaxed skin tension lines where possible.    The area thus outlined was incised deep to adipose tissue with a #15 scalpel blade.  The skin margins were undermined to an appropriate distance in all directions utilizing iris scissors. Chonodrocutaneous Helical Advancement Flap Text: The defect edges were debeveled with a #15 scalpel blade.  Given the location of the defect and the proximity to free margins a chondrocutaneous helical advancement flap was deemed most appropriate.  Using a sterile surgical marker, the appropriate advancement flap was drawn incorporating the defect and placing the expected incisions within the relaxed skin tension lines where possible.    The area thus outlined was incised deep to adipose tissue with a #15 scalpel blade.  The skin margins were undermined to an appropriate distance in all directions utilizing iris scissors. Crescentic Advancement Flap Text: The defect edges were debeveled with a #15 scalpel blade.  Given the location of the defect and the proximity to free margins a crescentic advancement flap was deemed most appropriate.  Using a sterile surgical marker, the appropriate advancement flap was drawn incorporating the defect and placing the expected incisions within the relaxed skin tension lines where possible.    The area thus outlined was incised deep to adipose tissue with a #15 scalpel blade.  The skin margins were undermined to an appropriate distance in all directions utilizing iris scissors. A-T Advancement Flap Text: The defect edges were debeveled with a #15 scalpel blade.  Given the location of the defect, shape of the defect and the proximity to free margins an A-T advancement flap was deemed most appropriate.  Using a sterile surgical marker, an appropriate advancement flap was drawn incorporating the defect and placing the expected incisions within the relaxed skin tension lines where possible.    The area thus outlined was incised deep to adipose tissue with a #15 scalpel blade.  The skin margins were undermined to an appropriate distance in all directions utilizing iris scissors. O-T Advancement Flap Text: The defect edges were debeveled with a #15 scalpel blade.  Given the location of the defect, shape of the defect and the proximity to free margins an O-T advancement flap was deemed most appropriate.  Using a sterile surgical marker, an appropriate advancement flap was drawn incorporating the defect and placing the expected incisions within the relaxed skin tension lines where possible.    The area thus outlined was incised deep to adipose tissue with a #15 scalpel blade.  The skin margins were undermined to an appropriate distance in all directions utilizing iris scissors. O-L Flap Text: The defect edges were debeveled with a #15 scalpel blade.  Given the location of the defect, shape of the defect and the proximity to free margins an O-L flap was deemed most appropriate.  Using a sterile surgical marker, an appropriate advancement flap was drawn incorporating the defect and placing the expected incisions within the relaxed skin tension lines where possible.    The area thus outlined was incised deep to adipose tissue with a #15 scalpel blade.  The skin margins were undermined to an appropriate distance in all directions utilizing iris scissors. O-Z Flap Text: The defect edges were debeveled with a #15 scalpel blade.  Given the location of the defect, shape of the defect and the proximity to free margins an O-Z flap was deemed most appropriate.  Using a sterile surgical marker, an appropriate transposition flap was drawn incorporating the defect and placing the expected incisions within the relaxed skin tension lines where possible. The area thus outlined was incised deep to adipose tissue with a #15 scalpel blade.  The skin margins were undermined to an appropriate distance in all directions utilizing iris scissors. Double O-Z Flap Text: The defect edges were debeveled with a #15 scalpel blade.  Given the location of the defect, shape of the defect and the proximity to free margins a Double O-Z flap was deemed most appropriate.  Using a sterile surgical marker, an appropriate transposition flap was drawn incorporating the defect and placing the expected incisions within the relaxed skin tension lines where possible. The area thus outlined was incised deep to adipose tissue with a #15 scalpel blade.  The skin margins were undermined to an appropriate distance in all directions utilizing iris scissors. V-Y Flap Text: The defect edges were debeveled with a #15 scalpel blade.  Given the location of the defect, shape of the defect and the proximity to free margins a V-Y flap was deemed most appropriate.  Using a sterile surgical marker, an appropriate advancement flap was drawn incorporating the defect and placing the expected incisions within the relaxed skin tension lines where possible.    The area thus outlined was incised deep to adipose tissue with a #15 scalpel blade.  The skin margins were undermined to an appropriate distance in all directions utilizing iris scissors. Mercedes Flap Text: The defect edges were debeveled with a #15 scalpel blade.  Given the location of the defect, shape of the defect and the proximity to free margins a Mercedes flap was deemed most appropriate.  Using a sterile surgical marker, an appropriate advancement flap was drawn incorporating the defect and placing the expected incisions within the relaxed skin tension lines where possible. The area thus outlined was incised deep to adipose tissue with a #15 scalpel blade.  The skin margins were undermined to an appropriate distance in all directions utilizing iris scissors. Modified Advancement Flap Text: The defect edges were debeveled with a #15 scalpel blade.  Given the location of the defect, shape of the defect and the proximity to free margins a modified advancement flap was deemed most appropriate.  Using a sterile surgical marker, an appropriate advancement flap was drawn incorporating the defect and placing the expected incisions within the relaxed skin tension lines where possible.    The area thus outlined was incised deep to adipose tissue with a #15 scalpel blade.  The skin margins were undermined to an appropriate distance in all directions utilizing iris scissors. Mucosal Advancement Flap Text: Given the location of the defect, shape of the defect and the proximity to free margins a mucosal advancement flap was deemed most appropriate. Incisions were made with a 15 blade scalpel in the appropriate fashion along the cutaneous vermillion border and the mucosal lip. The remaining actinically damaged mucosal tissue was excised.  The mucosal advancement flap was then elevated to the gingival sulcus with care taken to preserve the neurovascular structures and advanced into the primary defect. Care was taken to ensure that precise realignment of the vermilion border was achieved. Hatchet Flap Text: The defect edges were debeveled with a #15 scalpel blade.  Given the location of the defect, shape of the defect and the proximity to free margins a hatchet flap was deemed most appropriate.  Using a sterile surgical marker, an appropriate hatchet flap was drawn incorporating the defect and placing the expected incisions within the relaxed skin tension lines where possible.    The area thus outlined was incised deep to adipose tissue with a #15 scalpel blade.  The skin margins were undermined to an appropriate distance in all directions utilizing iris scissors. Rotation Flap Text: The defect edges were debeveled with a #15 scalpel blade.  Given the location of the defect, shape of the defect and the proximity to free margins a rotation flap was deemed most appropriate.  Using a sterile surgical marker, an appropriate rotation flap was drawn incorporating the defect and placing the expected incisions within the relaxed skin tension lines where possible.    The area thus outlined was incised deep to adipose tissue with a #15 scalpel blade.  The skin margins were undermined to an appropriate distance in all directions utilizing iris scissors. Bilateral Rotation Flap Text: The defect edges were debeveled with a #15 scalpel blade. Given the location of the defect, shape of the defect and the proximity to free margins a bilateral rotation flap was deemed most appropriate. Using a sterile surgical marker, an appropriate rotation flap was drawn incorporating the defect and placing the expected incisions within the relaxed skin tension lines where possible. The area thus outlined was incised deep to adipose tissue with a #15 scalpel blade. The skin margins were undermined to an appropriate distance in all directions utilizing iris scissors. Following this, the designed flap was carried over into the primary defect and sutured into place. Spiral Flap Text: The defect edges were debeveled with a #15 scalpel blade.  Given the location of the defect, shape of the defect and the proximity to free margins a spiral flap was deemed most appropriate.  Using a sterile surgical marker, an appropriate rotation flap was drawn incorporating the defect and placing the expected incisions within the relaxed skin tension lines where possible. The area thus outlined was incised deep to adipose tissue with a #15 scalpel blade.  The skin margins were undermined to an appropriate distance in all directions utilizing iris scissors. Staged Advancement Flap Text: The defect edges were debeveled with a #15 scalpel blade.  Given the location of the defect, shape of the defect and the proximity to free margins a staged advancement flap was deemed most appropriate.  Using a sterile surgical marker, an appropriate advancement flap was drawn incorporating the defect and placing the expected incisions within the relaxed skin tension lines where possible. The area thus outlined was incised deep to adipose tissue with a #15 scalpel blade.  The skin margins were undermined to an appropriate distance in all directions utilizing iris scissors. Star Wedge Flap Text: The defect edges were debeveled with a #15 scalpel blade.  Given the location of the defect, shape of the defect and the proximity to free margins a star wedge flap was deemed most appropriate.  Using a sterile surgical marker, an appropriate rotation flap was drawn incorporating the defect and placing the expected incisions within the relaxed skin tension lines where possible. The area thus outlined was incised deep to adipose tissue with a #15 scalpel blade.  The skin margins were undermined to an appropriate distance in all directions utilizing iris scissors. Transposition Flap Text: The defect edges were debeveled with a #15 scalpel blade.  Given the location of the defect and the proximity to free margins a transposition flap was deemed most appropriate.  Using a sterile surgical marker, an appropriate transposition flap was drawn incorporating the defect.    The area thus outlined was incised deep to adipose tissue with a #15 scalpel blade.  The skin margins were undermined to an appropriate distance in all directions utilizing iris scissors. Muscle Hinge Flap Text: The defect edges were debeveled with a #15 scalpel blade.  Given the size, depth and location of the defect and the proximity to free margins a muscle hinge flap was deemed most appropriate.  Using a sterile surgical marker, an appropriate hinge flap was drawn incorporating the defect. The area thus outlined was incised with a #15 scalpel blade.  The skin margins were undermined to an appropriate distance in all directions utilizing iris scissors. Mustarde Flap Text: The defect edges were debeveled with a #15 scalpel blade.  Given the size, depth and location of the defect and the proximity to free margins a Mustarde flap was deemed most appropriate.  Using a sterile surgical marker, an appropriate flap was drawn incorporating the defect. The area thus outlined was incised with a #15 scalpel blade.  The skin margins were undermined to an appropriate distance in all directions utilizing iris scissors. Nasal Turnover Hinge Flap Text: The defect edges were debeveled with a #15 scalpel blade.  Given the size, depth, location of the defect and the defect being full thickness a nasal turnover hinge flap was deemed most appropriate.  Using a sterile surgical marker, an appropriate hinge flap was drawn incorporating the defect. The area thus outlined was incised with a #15 scalpel blade. The flap was designed to recreate the nasal mucosal lining and the alar rim. The skin margins were undermined to an appropriate distance in all directions utilizing iris scissors. Nasalis-Muscle-Based Myocutaneous Island Pedicle Flap Text: Using a #15 blade, an incision was made around the donor flap to the level of the nasalis muscle. Wide lateral undermining was then performed in both the subcutaneous plane above the nasalis muscle, and in a submuscular plane just above periosteum. This allowed the formation of a free nasalis muscle axial pedicle (based on the angular artery) which was still attached to the actual cutaneous flap, increasing its mobility and vascular viability. Hemostasis was obtained with pinpoint electrocoagulation. The flap was mobilized into position and the pivotal anchor points positioned and stabilized with buried interrupted sutures. Subcutaneous and dermal tissues were closed in a multilayered fashion with sutures. Tissue redundancies were excised, and the epidermal edges were apposed without significant tension and sutured with sutures. Nasalis Myocutaneous Flap Text: Using a #15 blade, an incision was made around the donor flap to the level of the nasalis muscle. Wide lateral undermining was then performed in both the subcutaneous plane above the nasalis muscle, and in a submuscular plane just above periosteum. This allowed the formation of a free nasalis muscle axial pedicle which was still attached to the actual cutaneous flap, increasing its mobility and vascular viability. Hemostasis was obtained with pinpoint electrocoagulation. The flap was mobilized into position and the pivotal anchor points positioned and stabilized with buried interrupted sutures. Subcutaneous and dermal tissues were closed in a multilayered fashion with sutures. Tissue redundancies were excised, and the epidermal edges were apposed without significant tension and sutured with sutures. Nasolabial Transposition Flap Text: The defect edges were debeveled with a #15 scalpel blade.  Given the size, depth and location of the defect and the proximity to free margins a nasolabial transposition flap was deemed most appropriate. Using a sterile surgical marker, an appropriate flap was drawn incorporating the defect. The area thus outlined was incised with a #15 scalpel blade. The skin margins were undermined to an appropriate distance in all directions utilizing iris scissors. Following this, the designed flap was carried into the primary defect and sutured into place. Orbicularis Oris Muscle Flap Text: The defect edges were debeveled with a #15 scalpel blade.  Given that the defect affected the competency of the oral sphincter an orbicularis oris muscle flap was deemed most appropriate to restore this competency and normal muscle function.  Using a sterile surgical marker, an appropriate flap was drawn incorporating the defect. The area thus outlined was incised with a #15 scalpel blade. Melolabial Transposition Flap Text: The defect edges were debeveled with a #15 scalpel blade.  Given the location of the defect and the proximity to free margins a melolabial flap was deemed most appropriate.  Using a sterile surgical marker, an appropriate melolabial transposition flap was drawn incorporating the defect.    The area thus outlined was incised deep to adipose tissue with a #15 scalpel blade.  The skin margins were undermined to an appropriate distance in all directions utilizing iris scissors. Rectangular Flap Text: The defect edges were debeveled with a #15 scalpel blade. Given the location of the defect and the proximity to free margins a rectangular flap was deemed most appropriate. Using a sterile surgical marker, an appropriate rectangular flap was drawn incorporating the defect. The area thus outlined was incised deep to adipose tissue with a #15 scalpel blade. The skin margins were undermined to an appropriate distance in all directions utilizing iris scissors. Following this, the designed flap was carried over into the primary defect and sutured into place. Rhombic Flap Text: The defect edges were debeveled with a #15 scalpel blade.  Given the location of the defect and the proximity to free margins a rhombic flap was deemed most appropriate.  Using a sterile surgical marker, an appropriate rhombic flap was drawn incorporating the defect.    The area thus outlined was incised deep to adipose tissue with a #15 scalpel blade.  The skin margins were undermined to an appropriate distance in all directions utilizing iris scissors. Rhomboid Transposition Flap Text: The defect edges were debeveled with a #15 scalpel blade.  Given the location of the defect and the proximity to free margins a rhomboid transposition flap was deemed most appropriate.  Using a sterile surgical marker, an appropriate rhomboid flap was drawn incorporating the defect.    The area thus outlined was incised deep to adipose tissue with a #15 scalpel blade.  The skin margins were undermined to an appropriate distance in all directions utilizing iris scissors. Bi-Rhombic Flap Text: The defect edges were debeveled with a #15 scalpel blade.  Given the location of the defect and the proximity to free margins a bi-rhombic flap was deemed most appropriate.  Using a sterile surgical marker, an appropriate rhombic flap was drawn incorporating the defect. The area thus outlined was incised deep to adipose tissue with a #15 scalpel blade.  The skin margins were undermined to an appropriate distance in all directions utilizing iris scissors. Helical Rim Advancement Flap Text: The defect edges were debeveled with a #15 blade scalpel.  Given the location of the defect and the proximity to free margins (helical rim) a double helical rim advancement flap was deemed most appropriate.  Using a sterile surgical marker, the appropriate advancement flaps were drawn incorporating the defect and placing the expected incisions between the helical rim and antihelix where possible.  The area thus outlined was incised through and through with a #15 scalpel blade.  With a skin hook and iris scissors, the flaps were gently and sharply undermined and freed up. Bilateral Helical Rim Advancement Flap Text: The defect edges were debeveled with a #15 blade scalpel.  Given the location of the defect and the proximity to free margins (helical rim) a bilateral helical rim advancement flap was deemed most appropriate.  Using a sterile surgical marker, the appropriate advancement flaps were drawn incorporating the defect and placing the expected incisions between the helical rim and antihelix where possible.  The area thus outlined was incised through and through with a #15 scalpel blade.  With a skin hook and iris scissors, the flaps were gently and sharply undermined and freed up. Ear Star Wedge Flap Text: The defect edges were debeveled with a #15 blade scalpel.  Given the location of the defect and the proximity to free margins (helical rim) an ear star wedge flap was deemed most appropriate.  Using a sterile surgical marker, the appropriate flap was drawn incorporating the defect and placing the expected incisions between the helical rim and antihelix where possible.  The area thus outlined was incised through and through with a #15 scalpel blade. Banner Transposition Flap Text: The defect edges were debeveled with a #15 scalpel blade.  Given the location of the defect and the proximity to free margins a Banner transposition flap was deemed most appropriate.  Using a sterile surgical marker, an appropriate flap drawn around the defect. The area thus outlined was incised deep to adipose tissue with a #15 scalpel blade.  The skin margins were undermined to an appropriate distance in all directions utilizing iris scissors. Bilobed Flap Text: The defect edges were debeveled with a #15 scalpel blade.  Given the location of the defect and the proximity to free margins a bilobe flap was deemed most appropriate.  Using a sterile surgical marker, an appropriate bilobe flap drawn around the defect.    The area thus outlined was incised deep to adipose tissue with a #15 scalpel blade.  The skin margins were undermined to an appropriate distance in all directions utilizing iris scissors. Bilobed Transposition Flap Text: The defect edges were debeveled with a #15 scalpel blade.  Given the location of the defect and the proximity to free margins a bilobed transposition flap was deemed most appropriate.  Using a sterile surgical marker, an appropriate bilobe flap drawn around the defect.    The area thus outlined was incised deep to adipose tissue with a #15 scalpel blade.  The skin margins were undermined to an appropriate distance in all directions utilizing iris scissors. Trilobed Flap Text: The defect edges were debeveled with a #15 scalpel blade.  Given the location of the defect and the proximity to free margins a trilobed flap was deemed most appropriate.  Using a sterile surgical marker, an appropriate trilobed flap drawn around the defect.    The area thus outlined was incised deep to adipose tissue with a #15 scalpel blade.  The skin margins were undermined to an appropriate distance in all directions utilizing iris scissors. Dorsal Nasal Flap Text: The defect edges were debeveled with a #15 scalpel blade.  Given the location of the defect and the proximity to free margins a dorsal nasal flap was deemed most appropriate.  Using a sterile surgical marker, an appropriate dorsal nasal flap was drawn around the defect.    The area thus outlined was incised deep to adipose tissue with a #15 scalpel blade.  The skin margins were undermined to an appropriate distance in all directions utilizing iris scissors. Island Pedicle Flap Text: The defect edges were debeveled with a #15 scalpel blade.  Given the location of the defect, shape of the defect and the proximity to free margins an island pedicle advancement flap was deemed most appropriate.  Using a sterile surgical marker, an appropriate advancement flap was drawn incorporating the defect, outlining the appropriate donor tissue and placing the expected incisions within the relaxed skin tension lines where possible.    The area thus outlined was incised deep to adipose tissue with a #15 scalpel blade.  The skin margins were undermined to an appropriate distance in all directions around the primary defect and laterally outward around the island pedicle utilizing iris scissors.  There was minimal undermining beneath the pedicle flap. Island Pedicle Flap With Canthal Suspension Text: The defect edges were debeveled with a #15 scalpel blade.  Given the location of the defect, shape of the defect and the proximity to free margins an island pedicle advancement flap was deemed most appropriate.  Using a sterile surgical marker, an appropriate advancement flap was drawn incorporating the defect, outlining the appropriate donor tissue and placing the expected incisions within the relaxed skin tension lines where possible. The area thus outlined was incised deep to adipose tissue with a #15 scalpel blade.  The skin margins were undermined to an appropriate distance in all directions around the primary defect and laterally outward around the island pedicle utilizing iris scissors.  There was minimal undermining beneath the pedicle flap. A suspension suture was placed in the canthal tendon to prevent tension and prevent ectropion. Alar Island Pedicle Flap Text: The defect edges were debeveled with a #15 scalpel blade.  Given the location of the defect, shape of the defect and the proximity to the alar rim an island pedicle advancement flap was deemed most appropriate.  Using a sterile surgical marker, an appropriate advancement flap was drawn incorporating the defect, outlining the appropriate donor tissue and placing the expected incisions within the nasal ala running parallel to the alar rim. The area thus outlined was incised with a #15 scalpel blade.  The skin margins were undermined minimally to an appropriate distance in all directions around the primary defect and laterally outward around the island pedicle utilizing iris scissors.  There was minimal undermining beneath the pedicle flap. Double Island Pedicle Flap Text: The defect edges were debeveled with a #15 scalpel blade.  Given the location of the defect, shape of the defect and the proximity to free margins a double island pedicle advancement flap was deemed most appropriate.  Using a sterile surgical marker, an appropriate advancement flap was drawn incorporating the defect, outlining the appropriate donor tissue and placing the expected incisions within the relaxed skin tension lines where possible.    The area thus outlined was incised deep to adipose tissue with a #15 scalpel blade.  The skin margins were undermined to an appropriate distance in all directions around the primary defect and laterally outward around the island pedicle utilizing iris scissors.  There was minimal undermining beneath the pedicle flap. Island Pedicle Flap-Requiring Vessel Identification Text: The defect edges were debeveled with a #15 scalpel blade.  Given the location of the defect, shape of the defect and the proximity to free margins an island pedicle advancement flap was deemed most appropriate.  Using a sterile surgical marker, an appropriate advancement flap was drawn, based on the axial vessel mentioned above, incorporating the defect, outlining the appropriate donor tissue and placing the expected incisions within the relaxed skin tension lines where possible.    The area thus outlined was incised deep to adipose tissue with a #15 scalpel blade.  The skin margins were undermined to an appropriate distance in all directions around the primary defect and laterally outward around the island pedicle utilizing iris scissors.  There was minimal undermining beneath the pedicle flap. Keystone Flap Text: The defect edges were debeveled with a #15 scalpel blade.  Given the location of the defect, shape of the defect a keystone flap was deemed most appropriate.  Using a sterile surgical marker, an appropriate keystone flap was drawn incorporating the defect, outlining the appropriate donor tissue and placing the expected incisions within the relaxed skin tension lines where possible. The area thus outlined was incised deep to adipose tissue with a #15 scalpel blade.  The skin margins were undermined to an appropriate distance in all directions around the primary defect and laterally outward around the flap utilizing iris scissors. O-T Plasty Text: The defect edges were debeveled with a #15 scalpel blade.  Given the location of the defect, shape of the defect and the proximity to free margins an O-T plasty was deemed most appropriate.  Using a sterile surgical marker, an appropriate O-T plasty was drawn incorporating the defect and placing the expected incisions within the relaxed skin tension lines where possible.    The area thus outlined was incised deep to adipose tissue with a #15 scalpel blade.  The skin margins were undermined to an appropriate distance in all directions utilizing iris scissors. O-Z Plasty Text: The defect edges were debeveled with a #15 scalpel blade.  Given the location of the defect, shape of the defect and the proximity to free margins an O-Z plasty (double transposition flap) was deemed most appropriate.  Using a sterile surgical marker, the appropriate transposition flaps were drawn incorporating the defect and placing the expected incisions within the relaxed skin tension lines where possible.    The area thus outlined was incised deep to adipose tissue with a #15 scalpel blade.  The skin margins were undermined to an appropriate distance in all directions utilizing iris scissors.  Hemostasis was achieved with electrocautery.  The flaps were then transposed into place, one clockwise and the other counterclockwise, and anchored with interrupted buried subcutaneous sutures. Double O-Z Plasty Text: The defect edges were debeveled with a #15 scalpel blade.  Given the location of the defect, shape of the defect and the proximity to free margins a Double O-Z plasty (double transposition flap) was deemed most appropriate.  Using a sterile surgical marker, the appropriate transposition flaps were drawn incorporating the defect and placing the expected incisions within the relaxed skin tension lines where possible. The area thus outlined was incised deep to adipose tissue with a #15 scalpel blade.  The skin margins were undermined to an appropriate distance in all directions utilizing iris scissors.  Hemostasis was achieved with electrocautery.  The flaps were then transposed into place, one clockwise and the other counterclockwise, and anchored with interrupted buried subcutaneous sutures. V-Y Plasty Text: The defect edges were debeveled with a #15 scalpel blade.  Given the location of the defect, shape of the defect and the proximity to free margins an V-Y advancement flap was deemed most appropriate.  Using a sterile surgical marker, an appropriate advancement flap was drawn incorporating the defect and placing the expected incisions within the relaxed skin tension lines where possible.    The area thus outlined was incised deep to adipose tissue with a #15 scalpel blade.  The skin margins were undermined to an appropriate distance in all directions utilizing iris scissors. H Plasty Text: Given the location of the defect, shape of the defect and the proximity to free margins a H-plasty was deemed most appropriate for repair.  Using a sterile surgical marker, the appropriate advancement arms of the H-plasty were drawn incorporating the defect and placing the expected incisions within the relaxed skin tension lines where possible. The area thus outlined was incised deep to adipose tissue with a #15 scalpel blade. The skin margins were undermined to an appropriate distance in all directions utilizing iris scissors.  The opposing advancement arms were then advanced into place in opposite direction and anchored with interrupted buried subcutaneous sutures. W Plasty Text: The lesion was extirpated to the level of the fat with a #15 scalpel blade.  Given the location of the defect, shape of the defect and the proximity to free margins a W-plasty was deemed most appropriate for repair.  Using a sterile surgical marker, the appropriate transposition arms of the W-plasty were drawn incorporating the defect and placing the expected incisions within the relaxed skin tension lines where possible.    The area thus outlined was incised deep to adipose tissue with a #15 scalpel blade.  The skin margins were undermined to an appropriate distance in all directions utilizing iris scissors.  The opposing transposition arms were then transposed into place in opposite direction and anchored with interrupted buried subcutaneous sutures. Z Plasty Text: The lesion was extirpated to the level of the fat with a #15 scalpel blade.  Given the location of the defect, shape of the defect and the proximity to free margins a Z-plasty was deemed most appropriate for repair.  Using a sterile surgical marker, the appropriate transposition arms of the Z-plasty were drawn incorporating the defect and placing the expected incisions within the relaxed skin tension lines where possible.    The area thus outlined was incised deep to adipose tissue with a #15 scalpel blade.  The skin margins were undermined to an appropriate distance in all directions utilizing iris scissors.  The opposing transposition arms were then transposed into place in opposite direction and anchored with interrupted buried subcutaneous sutures. Double Z Plasty Text: The lesion was extirpated to the level of the fat with a #15 scalpel blade. Given the location of the defect, shape of the defect and the proximity to free margins a double Z-plasty was deemed most appropriate for repair. Using a sterile surgical marker, the appropriate transposition arms of the double Z-plasty were drawn incorporating the defect and placing the expected incisions within the relaxed skin tension lines where possible. The area thus outlined was incised deep to adipose tissue with a #15 scalpel blade. The skin margins were undermined to an appropriate distance in all directions utilizing iris scissors. The opposing transposition arms were then transposed and carried over into place in opposite direction and anchored with interrupted buried subcutaneous sutures. Zygomaticofacial Flap Text: Given the location of the defect, shape of the defect and the proximity to free margins a zygomaticofacial flap was deemed most appropriate for repair.  Using a sterile surgical marker, the appropriate flap was drawn incorporating the defect and placing the expected incisions within the relaxed skin tension lines where possible. The area thus outlined was incised deep to adipose tissue with a #15 scalpel blade with preservation of a vascular pedicle.  The skin margins were undermined to an appropriate distance in all directions utilizing iris scissors.  The flap was then placed into the defect and anchored with interrupted buried subcutaneous sutures. Cheek Interpolation Flap Text: A decision was made to reconstruct the defect utilizing an interpolation axial flap and a staged reconstruction.  A telfa template was made of the defect.  This telfa template was then used to outline the Cheek Interpolation flap.  The donor area for the pedicle flap was then injected with anesthesia.  The flap was excised through the skin and subcutaneous tissue down to the layer of the underlying musculature.  The interpolation flap was carefully excised within this deep plane to maintain its blood supply.  The edges of the donor site were undermined.   The donor site was closed in a primary fashion.  The pedicle was then rotated into position and sutured.  Once the tube was sutured into place, adequate blood supply was confirmed with blanching and refill.  The pedicle was then wrapped with xeroform gauze and dressed appropriately with a telfa and gauze bandage to ensure continued blood supply and protect the attached pedicle. Cheek-To-Nose Interpolation Flap Text: A decision was made to reconstruct the defect utilizing an interpolation axial flap and a staged reconstruction.  A telfa template was made of the defect.  This telfa template was then used to outline the Cheek-To-Nose Interpolation flap.  The donor area for the pedicle flap was then injected with anesthesia.  The flap was excised through the skin and subcutaneous tissue down to the layer of the underlying musculature.  The interpolation flap was carefully excised within this deep plane to maintain its blood supply.  The edges of the donor site were undermined.   The donor site was closed in a primary fashion.  The pedicle was then rotated into position and sutured.  Once the tube was sutured into place, adequate blood supply was confirmed with blanching and refill.  The pedicle was then wrapped with xeroform gauze and dressed appropriately with a telfa and gauze bandage to ensure continued blood supply and protect the attached pedicle. Interpolation Flap Text: A decision was made to reconstruct the defect utilizing an interpolation axial flap and a staged reconstruction.  A telfa template was made of the defect.  This telfa template was then used to outline the interpolation flap.  The donor area for the pedicle flap was then injected with anesthesia.  The flap was excised through the skin and subcutaneous tissue down to the layer of the underlying musculature.  The interpolation flap was carefully excised within this deep plane to maintain its blood supply.  The edges of the donor site were undermined.   The donor site was closed in a primary fashion.  The pedicle was then rotated into position and sutured.  Once the tube was sutured into place, adequate blood supply was confirmed with blanching and refill.  The pedicle was then wrapped with xeroform gauze and dressed appropriately with a telfa and gauze bandage to ensure continued blood supply and protect the attached pedicle. Melolabial Interpolation Flap Text: A decision was made to reconstruct the defect utilizing an interpolation axial flap and a staged reconstruction.  A telfa template was made of the defect.  This telfa template was then used to outline the melolabial interpolation flap.  The donor area for the pedicle flap was then injected with anesthesia.  The flap was excised through the skin and subcutaneous tissue down to the layer of the underlying musculature.  The pedicle flap was carefully excised within this deep plane to maintain its blood supply.  The edges of the donor site were undermined.   The donor site was closed in a primary fashion.  The pedicle was then rotated into position and sutured.  Once the tube was sutured into place, adequate blood supply was confirmed with blanching and refill.  The pedicle was then wrapped with xeroform gauze and dressed appropriately with a telfa and gauze bandage to ensure continued blood supply and protect the attached pedicle. Mastoid Interpolation Flap Text: A decision was made to reconstruct the defect utilizing an interpolation axial flap and a staged reconstruction.  A telfa template was made of the defect.  This telfa template was then used to outline the mastoid interpolation flap.  The donor area for the pedicle flap was then injected with anesthesia.  The flap was excised through the skin and subcutaneous tissue down to the layer of the underlying musculature.  The pedicle flap was carefully excised within this deep plane to maintain its blood supply.  The edges of the donor site were undermined.   The donor site was closed in a primary fashion.  The pedicle was then rotated into position and sutured.  Once the tube was sutured into place, adequate blood supply was confirmed with blanching and refill.  The pedicle was then wrapped with xeroform gauze and dressed appropriately with a telfa and gauze bandage to ensure continued blood supply and protect the attached pedicle. Posterior Auricular Interpolation Flap Text: A decision was made to reconstruct the defect utilizing an interpolation axial flap and a staged reconstruction.  A telfa template was made of the defect.  This telfa template was then used to outline the posterior auricular interpolation flap.  The donor area for the pedicle flap was then injected with anesthesia.  The flap was excised through the skin and subcutaneous tissue down to the layer of the underlying musculature.  The pedicle flap was carefully excised within this deep plane to maintain its blood supply.  The edges of the donor site were undermined.   The donor site was closed in a primary fashion.  The pedicle was then rotated into position and sutured.  Once the tube was sutured into place, adequate blood supply was confirmed with blanching and refill.  The pedicle was then wrapped with xeroform gauze and dressed appropriately with a telfa and gauze bandage to ensure continued blood supply and protect the attached pedicle. Paramedian Forehead Flap Text: A decision was made to reconstruct the defect utilizing an interpolation axial flap and a staged reconstruction.  A telfa template was made of the defect.  This telfa template was then used to outline the paramedian forehead pedicle flap.  The donor area for the pedicle flap was then injected with anesthesia.  The flap was excised through the skin and subcutaneous tissue down to the layer of the underlying musculature.  The pedicle flap was carefully excised within this deep plane to maintain its blood supply.  The edges of the donor site were undermined.   The donor site was closed in a primary fashion.  The pedicle was then rotated into position and sutured.  Once the tube was sutured into place, adequate blood supply was confirmed with blanching and refill.  The pedicle was then wrapped with xeroform gauze and dressed appropriately with a telfa and gauze bandage to ensure continued blood supply and protect the attached pedicle. Lip Wedge Excision Repair Text: Given the location of the defect and the proximity to free margins a full thickness wedge repair was deemed most appropriate.  Using a sterile surgical marker, the appropriate repair was drawn incorporating the defect and placing the expected incisions perpendicular to the vermilion border.  The vermilion border was also meticulously outlined to ensure appropriate reapproximation during the repair.  The area thus outlined was incised through and through with a #15 scalpel blade.  The muscularis and dermis were reaproximated with deep sutures following hemostasis. Care was taken to realign the vermilion border before proceeding with the superficial closure.  Once the vermilion was realigned the superfical and mucosal closure was finished. Ftsg Text: The defect edges were debeveled with a #15 scalpel blade.  Given the location of the defect, shape of the defect and the proximity to free margins a full thickness skin graft was deemed most appropriate.  Using a sterile surgical marker, the primary defect shape was transferred to the donor site. The area thus outlined was incised deep to adipose tissue with a #15 scalpel blade.  The harvested graft was then trimmed of adipose tissue until only dermis and epidermis was left.  The skin margins of the secondary defect were undermined to an appropriate distance in all directions utilizing iris scissors.  The secondary defect was closed with interrupted buried subcutaneous sutures.  The skin edges were then re-apposed with running  sutures.  The skin graft was then placed in the primary defect and oriented appropriately. Split-Thickness Skin Graft Text: The defect edges were debeveled with a #15 scalpel blade.  Given the location of the defect, shape of the defect and the proximity to free margins a split thickness skin graft was deemed most appropriate.  Using a sterile surgical marker, the primary defect shape was transferred to the donor site. The split thickness graft was then harvested.  The skin graft was then placed in the primary defect and oriented appropriately. Pinch Graft Text: The defect edges were debeveled with a #15 scalpel blade. Given the location of the defect, shape of the defect and the proximity to free margins a pinch graft was deemed most appropriate. Using a sterile surgical marker, the primary defect shape was transferred to the donor site. The area thus outlined was incised deep to adipose tissue with a #15 scalpel blade.  The harvested graft was then trimmed of adipose tissue until only dermis and epidermis was left. The skin margins of the secondary defect were undermined to an appropriate distance in all directions utilizing iris scissors.  The secondary defect was closed with interrupted buried subcutaneous sutures.  The skin edges were then re-apposed with running  sutures.  The skin graft was then placed in the primary defect and oriented appropriately. Burow's Graft Text: The defect edges were debeveled with a #15 scalpel blade.  Given the location of the defect, shape of the defect, the proximity to free margins and the presence of a standing cone deformity a Burow's skin graft was deemed most appropriate. The standing cone was removed and this tissue was then trimmed to the shape of the primary defect. The adipose tissue was also removed until only dermis and epidermis were left.  The skin margins of the secondary defect were undermined to an appropriate distance in all directions utilizing iris scissors.  The secondary defect was closed with interrupted buried subcutaneous sutures.  The skin edges were then re-apposed with running  sutures.  The skin graft was then placed in the primary defect and oriented appropriately. Cartilage Graft Text: The defect edges were debeveled with a #15 scalpel blade.  Given the location of the defect, shape of the defect, the fact the defect involved a full thickness cartilage defect a cartilage graft was deemed most appropriate.  An appropriate donor site was identified, cleansed, and anesthetized. The cartilage graft was then harvested and transferred to the recipient site, oriented appropriately and then sutured into place.  The secondary defect was then repaired using a primary closure. Composite Graft Text: The defect edges were debeveled with a #15 scalpel blade.  Given the location of the defect, shape of the defect, the proximity to free margins and the fact the defect was full thickness a composite graft was deemed most appropriate.  The defect was outline and then transferred to the donor site.  A full thickness graft was then excised from the donor site. The graft was then placed in the primary defect, oriented appropriately and then sutured into place.  The secondary defect was then repaired using a primary closure. Epidermal Autograft Text: The defect edges were debeveled with a #15 scalpel blade.  Given the location of the defect, shape of the defect and the proximity to free margins an epidermal autograft was deemed most appropriate.  Using a sterile surgical marker, the primary defect shape was transferred to the donor site. The epidermal graft was then harvested.  The skin graft was then placed in the primary defect and oriented appropriately. Dermal Autograft Text: The defect edges were debeveled with a #15 scalpel blade.  Given the location of the defect, shape of the defect and the proximity to free margins a dermal autograft was deemed most appropriate.  Using a sterile surgical marker, the primary defect shape was transferred to the donor site. The area thus outlined was incised deep to adipose tissue with a #15 scalpel blade.  The harvested graft was then trimmed of adipose and epidermal tissue until only dermis was left.  The skin graft was then placed in the primary defect and oriented appropriately. Skin Substitute Text: The defect edges were debeveled with a #15 scalpel blade.  Given the location of the defect, shape of the defect and the proximity to free margins a skin substitute graft was deemed most appropriate.  The graft material was trimmed to fit the size of the defect. The graft was then placed in the primary defect and oriented appropriately. Tissue Cultured Epidermal Autograft Text: The defect edges were debeveled with a #15 scalpel blade.  Given the location of the defect, shape of the defect and the proximity to free margins a tissue cultured epidermal autograft was deemed most appropriate.  The graft was then trimmed to fit the size of the defect.  The graft was then placed in the primary defect and oriented appropriately. Xenograft Text: The defect edges were debeveled with a #15 scalpel blade.  Given the location of the defect, shape of the defect and the proximity to free margins a xenograft was deemed most appropriate.  The graft was then trimmed to fit the size of the defect.  The graft was then placed in the primary defect and oriented appropriately. Purse String (Intermediate) Text: Given the location of the defect and the characteristics of the surrounding skin a purse string intermediate closure was deemed most appropriate.  Undermining was performed circumferentially around the surgical defect.  A purse string suture was then placed and tightened. Purse String (Simple) Text: Given the location of the defect and the characteristics of the surrounding skin a purse string simple closure was deemed most appropriate.  Undermining was performed circumferentially around the surgical defect.  A purse string suture was then placed and tightened. Complex Repair And Single Advancement Flap Text: The defect edges were debeveled with a #15 scalpel blade.  The primary defect was closed partially with a complex linear closure.  Given the location of the remaining defect, shape of the defect and the proximity to free margins a single advancement flap was deemed most appropriate for complete closure of the defect.  Using a sterile surgical marker, an appropriate advancement flap was drawn incorporating the defect and placing the expected incisions within the relaxed skin tension lines where possible.    The area thus outlined was incised deep to adipose tissue with a #15 scalpel blade.  The skin margins were undermined to an appropriate distance in all directions utilizing iris scissors. Complex Repair And Double Advancement Flap Text: The defect edges were debeveled with a #15 scalpel blade.  The primary defect was closed partially with a complex linear closure.  Given the location of the remaining defect, shape of the defect and the proximity to free margins a double advancement flap was deemed most appropriate for complete closure of the defect.  Using a sterile surgical marker, an appropriate advancement flap was drawn incorporating the defect and placing the expected incisions within the relaxed skin tension lines where possible.    The area thus outlined was incised deep to adipose tissue with a #15 scalpel blade.  The skin margins were undermined to an appropriate distance in all directions utilizing iris scissors. Complex Repair And Modified Advancement Flap Text: The defect edges were debeveled with a #15 scalpel blade.  The primary defect was closed partially with a complex linear closure.  Given the location of the remaining defect, shape of the defect and the proximity to free margins a modified advancement flap was deemed most appropriate for complete closure of the defect.  Using a sterile surgical marker, an appropriate advancement flap was drawn incorporating the defect and placing the expected incisions within the relaxed skin tension lines where possible.    The area thus outlined was incised deep to adipose tissue with a #15 scalpel blade.  The skin margins were undermined to an appropriate distance in all directions utilizing iris scissors. Complex Repair And A-T Advancement Flap Text: The defect edges were debeveled with a #15 scalpel blade.  The primary defect was closed partially with a complex linear closure.  Given the location of the remaining defect, shape of the defect and the proximity to free margins an A-T advancement flap was deemed most appropriate for complete closure of the defect.  Using a sterile surgical marker, an appropriate advancement flap was drawn incorporating the defect and placing the expected incisions within the relaxed skin tension lines where possible.    The area thus outlined was incised deep to adipose tissue with a #15 scalpel blade.  The skin margins were undermined to an appropriate distance in all directions utilizing iris scissors. Complex Repair And O-T Advancement Flap Text: The defect edges were debeveled with a #15 scalpel blade.  The primary defect was closed partially with a complex linear closure.  Given the location of the remaining defect, shape of the defect and the proximity to free margins an O-T advancement flap was deemed most appropriate for complete closure of the defect.  Using a sterile surgical marker, an appropriate advancement flap was drawn incorporating the defect and placing the expected incisions within the relaxed skin tension lines where possible.    The area thus outlined was incised deep to adipose tissue with a #15 scalpel blade.  The skin margins were undermined to an appropriate distance in all directions utilizing iris scissors. Complex Repair And O-L Flap Text: The defect edges were debeveled with a #15 scalpel blade.  The primary defect was closed partially with a complex linear closure.  Given the location of the remaining defect, shape of the defect and the proximity to free margins an O-L flap was deemed most appropriate for complete closure of the defect.  Using a sterile surgical marker, an appropriate flap was drawn incorporating the defect and placing the expected incisions within the relaxed skin tension lines where possible.    The area thus outlined was incised deep to adipose tissue with a #15 scalpel blade.  The skin margins were undermined to an appropriate distance in all directions utilizing iris scissors. Complex Repair And Bilobe Flap Text: The defect edges were debeveled with a #15 scalpel blade.  The primary defect was closed partially with a complex linear closure.  Given the location of the remaining defect, shape of the defect and the proximity to free margins a bilobe flap was deemed most appropriate for complete closure of the defect.  Using a sterile surgical marker, an appropriate advancement flap was drawn incorporating the defect and placing the expected incisions within the relaxed skin tension lines where possible.    The area thus outlined was incised deep to adipose tissue with a #15 scalpel blade.  The skin margins were undermined to an appropriate distance in all directions utilizing iris scissors. Complex Repair And Melolabial Flap Text: The defect edges were debeveled with a #15 scalpel blade.  The primary defect was closed partially with a complex linear closure.  Given the location of the remaining defect, shape of the defect and the proximity to free margins a melolabial flap was deemed most appropriate for complete closure of the defect.  Using a sterile surgical marker, an appropriate advancement flap was drawn incorporating the defect and placing the expected incisions within the relaxed skin tension lines where possible.    The area thus outlined was incised deep to adipose tissue with a #15 scalpel blade.  The skin margins were undermined to an appropriate distance in all directions utilizing iris scissors. Complex Repair And Rotation Flap Text: The defect edges were debeveled with a #15 scalpel blade.  The primary defect was closed partially with a complex linear closure.  Given the location of the remaining defect, shape of the defect and the proximity to free margins a rotation flap was deemed most appropriate for complete closure of the defect.  Using a sterile surgical marker, an appropriate advancement flap was drawn incorporating the defect and placing the expected incisions within the relaxed skin tension lines where possible.    The area thus outlined was incised deep to adipose tissue with a #15 scalpel blade.  The skin margins were undermined to an appropriate distance in all directions utilizing iris scissors. Complex Repair And Rhombic Flap Text: The defect edges were debeveled with a #15 scalpel blade.  The primary defect was closed partially with a complex linear closure.  Given the location of the remaining defect, shape of the defect and the proximity to free margins a rhombic flap was deemed most appropriate for complete closure of the defect.  Using a sterile surgical marker, an appropriate advancement flap was drawn incorporating the defect and placing the expected incisions within the relaxed skin tension lines where possible.    The area thus outlined was incised deep to adipose tissue with a #15 scalpel blade.  The skin margins were undermined to an appropriate distance in all directions utilizing iris scissors. Complex Repair And Transposition Flap Text: The defect edges were debeveled with a #15 scalpel blade.  The primary defect was closed partially with a complex linear closure.  Given the location of the remaining defect, shape of the defect and the proximity to free margins a transposition flap was deemed most appropriate for complete closure of the defect.  Using a sterile surgical marker, an appropriate advancement flap was drawn incorporating the defect and placing the expected incisions within the relaxed skin tension lines where possible.    The area thus outlined was incised deep to adipose tissue with a #15 scalpel blade.  The skin margins were undermined to an appropriate distance in all directions utilizing iris scissors. Complex Repair And V-Y Plasty Text: The defect edges were debeveled with a #15 scalpel blade.  The primary defect was closed partially with a complex linear closure.  Given the location of the remaining defect, shape of the defect and the proximity to free margins a V-Y plasty was deemed most appropriate for complete closure of the defect.  Using a sterile surgical marker, an appropriate advancement flap was drawn incorporating the defect and placing the expected incisions within the relaxed skin tension lines where possible.    The area thus outlined was incised deep to adipose tissue with a #15 scalpel blade.  The skin margins were undermined to an appropriate distance in all directions utilizing iris scissors. Complex Repair And M Plasty Text: The defect edges were debeveled with a #15 scalpel blade.  The primary defect was closed partially with a complex linear closure.  Given the location of the remaining defect, shape of the defect and the proximity to free margins an M plasty was deemed most appropriate for complete closure of the defect.  Using a sterile surgical marker, an appropriate advancement flap was drawn incorporating the defect and placing the expected incisions within the relaxed skin tension lines where possible.    The area thus outlined was incised deep to adipose tissue with a #15 scalpel blade.  The skin margins were undermined to an appropriate distance in all directions utilizing iris scissors. Complex Repair And Double M Plasty Text: The defect edges were debeveled with a #15 scalpel blade.  The primary defect was closed partially with a complex linear closure.  Given the location of the remaining defect, shape of the defect and the proximity to free margins a double M plasty was deemed most appropriate for complete closure of the defect.  Using a sterile surgical marker, an appropriate advancement flap was drawn incorporating the defect and placing the expected incisions within the relaxed skin tension lines where possible.    The area thus outlined was incised deep to adipose tissue with a #15 scalpel blade.  The skin margins were undermined to an appropriate distance in all directions utilizing iris scissors. Complex Repair And W Plasty Text: The defect edges were debeveled with a #15 scalpel blade.  The primary defect was closed partially with a complex linear closure.  Given the location of the remaining defect, shape of the defect and the proximity to free margins a W plasty was deemed most appropriate for complete closure of the defect.  Using a sterile surgical marker, an appropriate advancement flap was drawn incorporating the defect and placing the expected incisions within the relaxed skin tension lines where possible.    The area thus outlined was incised deep to adipose tissue with a #15 scalpel blade.  The skin margins were undermined to an appropriate distance in all directions utilizing iris scissors. Complex Repair And Z Plasty Text: The defect edges were debeveled with a #15 scalpel blade.  The primary defect was closed partially with a complex linear closure.  Given the location of the remaining defect, shape of the defect and the proximity to free margins a Z plasty was deemed most appropriate for complete closure of the defect.  Using a sterile surgical marker, an appropriate advancement flap was drawn incorporating the defect and placing the expected incisions within the relaxed skin tension lines where possible.    The area thus outlined was incised deep to adipose tissue with a #15 scalpel blade.  The skin margins were undermined to an appropriate distance in all directions utilizing iris scissors. Complex Repair And Dorsal Nasal Flap Text: The defect edges were debeveled with a #15 scalpel blade.  The primary defect was closed partially with a complex linear closure.  Given the location of the remaining defect, shape of the defect and the proximity to free margins a dorsal nasal flap was deemed most appropriate for complete closure of the defect.  Using a sterile surgical marker, an appropriate flap was drawn incorporating the defect and placing the expected incisions within the relaxed skin tension lines where possible.    The area thus outlined was incised deep to adipose tissue with a #15 scalpel blade.  The skin margins were undermined to an appropriate distance in all directions utilizing iris scissors. Complex Repair And Ftsg Text: The defect edges were debeveled with a #15 scalpel blade.  The primary defect was closed partially with a complex linear closure.  Given the location of the defect, shape of the defect and the proximity to free margins a full thickness skin graft was deemed most appropriate to repair the remaining defect.  The graft was trimmed to fit the size of the remaining defect.  The graft was then placed in the primary defect, oriented appropriately, and sutured into place. Complex Repair And Burow's Graft Text: The defect edges were debeveled with a #15 scalpel blade.  The primary defect was closed partially with a complex linear closure.  Given the location of the defect, shape of the defect, the proximity to free margins and the presence of a standing cone deformity a Burow's graft was deemed most appropriate to repair the remaining defect.  The graft was trimmed to fit the size of the remaining defect.  The graft was then placed in the primary defect, oriented appropriately, and sutured into place. Complex Repair And Split-Thickness Skin Graft Text: The defect edges were debeveled with a #15 scalpel blade.  The primary defect was closed partially with a complex linear closure.  Given the location of the defect, shape of the defect and the proximity to free margins a split thickness skin graft was deemed most appropriate to repair the remaining defect.  The graft was trimmed to fit the size of the remaining defect.  The graft was then placed in the primary defect, oriented appropriately, and sutured into place. Complex Repair And Epidermal Autograft Text: The defect edges were debeveled with a #15 scalpel blade.  The primary defect was closed partially with a complex linear closure.  Given the location of the defect, shape of the defect and the proximity to free margins an epidermal autograft was deemed most appropriate to repair the remaining defect.  The graft was trimmed to fit the size of the remaining defect.  The graft was then placed in the primary defect, oriented appropriately, and sutured into place. Complex Repair And Dermal Autograft Text: The defect edges were debeveled with a #15 scalpel blade.  The primary defect was closed partially with a complex linear closure.  Given the location of the defect, shape of the defect and the proximity to free margins an dermal autograft was deemed most appropriate to repair the remaining defect.  The graft was trimmed to fit the size of the remaining defect.  The graft was then placed in the primary defect, oriented appropriately, and sutured into place. Complex Repair And Tissue Cultured Epidermal Autograft Text: The defect edges were debeveled with a #15 scalpel blade.  The primary defect was closed partially with a complex linear closure.  Given the location of the defect, shape of the defect and the proximity to free margins an tissue cultured epidermal autograft was deemed most appropriate to repair the remaining defect.  The graft was trimmed to fit the size of the remaining defect.  The graft was then placed in the primary defect, oriented appropriately, and sutured into place. Complex Repair And Xenograft Text: The defect edges were debeveled with a #15 scalpel blade.  The primary defect was closed partially with a complex linear closure.  Given the location of the defect, shape of the defect and the proximity to free margins a xenograft was deemed most appropriate to repair the remaining defect.  The graft was trimmed to fit the size of the remaining defect.  The graft was then placed in the primary defect, oriented appropriately, and sutured into place. Complex Repair And Skin Substitute Graft Text: The defect edges were debeveled with a #15 scalpel blade.  The primary defect was closed partially with a complex linear closure.  Given the location of the remaining defect, shape of the defect and the proximity to free margins a skin substitute graft was deemed most appropriate to repair the remaining defect.  The graft was trimmed to fit the size of the remaining defect.  The graft was then placed in the primary defect, oriented appropriately, and sutured into place. Render Path Notes In Note?: yes Path Notes (To The Dermatopathologist): Clinically pilar cyst scalp. Consent was obtained from the patient. The risks and benefits to therapy were discussed in detail. Specifically, the risks of infection, scarring, bleeding, prolonged wound healing, incomplete removal, allergy to anesthesia, nerve injury and recurrence were addressed. Prior to the procedure, the treatment site was clearly identified and confirmed by the patient. All components of Universal Protocol/PAUSE Rule completed. Post-Care Instructions: I reviewed with the patient in detail post-care instructions. Patient is not to engage in any heavy lifting, exercise, or swimming for the next 14 days. Should the patient develop any fevers, chills, bleeding, severe pain patient will contact the office immediately. Home Suture Removal Text: Patient was provided a home suture removal kit and will remove their sutures at home.  If they have any questions or difficulties they will call the office. Where Do You Want The Question To Include Opioid Counseling Located?: Case Summary Tab Billing Type: Third-Party Bill Information: Selecting Yes will display possible errors in your note based on the variables you have selected. This validation is only offered as a suggestion for you. PLEASE NOTE THAT THE VALIDATION TEXT WILL BE REMOVED WHEN YOU FINALIZE YOUR NOTE. IF YOU WANT TO FAX A PRELIMINARY NOTE YOU WILL NEED TO TOGGLE THIS TO 'NO' IF YOU DO NOT WANT IT IN YOUR FAXED NOTE.

## 2024-07-11 ENCOUNTER — OFFICE VISIT (OUTPATIENT)
Dept: FAMILY MEDICINE CLINIC | Facility: CLINIC | Age: 49
End: 2024-07-11

## 2024-07-11 VITALS
DIASTOLIC BLOOD PRESSURE: 82 MMHG | HEIGHT: 64 IN | HEART RATE: 77 BPM | BODY MASS INDEX: 45.24 KG/M2 | WEIGHT: 265 LBS | TEMPERATURE: 97.3 F | SYSTOLIC BLOOD PRESSURE: 118 MMHG | OXYGEN SATURATION: 98 %

## 2024-07-11 DIAGNOSIS — E66.01 CLASS 3 SEVERE OBESITY DUE TO EXCESS CALORIES WITH SERIOUS COMORBIDITY AND BODY MASS INDEX (BMI) OF 45.0 TO 49.9 IN ADULT (HCC): ICD-10-CM

## 2024-07-11 RX ORDER — SEMAGLUTIDE 0.5 MG/.5ML
INJECTION, SOLUTION SUBCUTANEOUS
Qty: 4 ML | Refills: 1 | Status: SHIPPED | OUTPATIENT
Start: 2024-07-11 | End: 2024-09-05

## 2024-07-11 NOTE — PROGRESS NOTES
Ambulatory Visit  Name: Elissa Leal      : 1975      MRN: [id number]  Encounter Provider: SANGEETA Chacon  Encounter Date: 2024   Encounter department: [facility]    Assessment & Plan   1. Class 3 severe obesity due to excess calories with serious comorbidity and body mass index (BMI) of 45.0 to 49.9 in adult (HCC)  -     Semaglutide-Weight Management (Wegovy) 0.5 MG/0.5ML; Inject 0.5 mg under the skin weekly    Depression Screening and Follow-up Plan: Patient was screened for depression during today's encounter. They screened negative with a PHQ-2 score of 0.      Discussed with patient plan to restart Wegovy at 0.5 mg weekly and stay there for an extended period of time  Patient instructed to call if no improvement in 72 hours or symptoms worsen      History of Present Illness     49 y.o.female presenting to discuss restarting Weovy weekly injections for weight loss.  Patient was doing well on the medication until she went up to the 1.7 mg weekly dose and developed a gastroparesis.  Patient attempted to return to the 0.5 mg dose but continued to have the nausea and abdominal pains.  Patient stopped the medication and she is starting to return to her previous state.  Patient does not want to lose her weight loss so is interested in restarting the medication but at the dose that was the most successful without causing adverse effects.        Review of Systems   Constitutional: Negative.    Respiratory: Negative.     Cardiovascular: Negative.    Gastrointestinal:  Positive for nausea. Negative for abdominal distention, abdominal pain and diarrhea.   Genitourinary: Negative.    Musculoskeletal: Negative.    Skin: Negative.    Neurological: Negative.    Psychiatric/Behavioral: Negative.       Past Medical History:   Diagnosis Date   • Anemia 2021   • Asthma Jume     Inhaler   • Clotting disorder (HCC) 2021    On beta blocker   • Eczema    • GERD  (gastroesophageal reflux disease) 2020    Certain foods cause a problem   • Headache(784.0)    • No known health problems    • Obesity    • Visual impairment 2020    New glasses for reading close up     Past Surgical History:   Procedure Laterality Date   • NO PAST SURGERIES       Family History   Problem Relation Age of Onset   • Cardiomyopathy Mother    • Anxiety disorder Mother    • Hypertension Mother    • Autoimmune disease Mother    • Hypertension Father    • No Known Problems Maternal Grandmother    • Heart attack Maternal Grandfather    • No Known Problems Paternal Grandmother    • No Known Problems Paternal Grandfather    • Anxiety disorder Brother    • Thalassemia Maternal Aunt    • Lupus Family    • No Known Problems Son      Social History     Tobacco Use   • Smoking status: Former     Current packs/day: 0.00     Average packs/day: 0.3 packs/day for 15.0 years (3.8 ttl pk-yrs)     Types: Cigarettes     Start date:      Quit date:      Years since quittin.5     Passive exposure: Past   • Smokeless tobacco: Never   Vaping Use   • Vaping status: Never Used   Substance and Sexual Activity   • Alcohol use: Not Currently   • Drug use: Never   • Sexual activity: Yes     Partners: Male     Birth control/protection: Condom Male, Rhythm, None     Current Outpatient Medications on File Prior to Visit   Medication Sig   • albuterol (PROVENTIL HFA,VENTOLIN HFA) 90 mcg/act inhaler inhale 1 puff by mouth and INTO THE LUNGS every 4 hours if needed for wheezing   • clobetasol (TEMOVATE) 0.05 % external solution Apply topically 2 (two) times a day To scalp, keep from dripping down face or into eyes   • dicyclomine (BENTYL) 10 mg capsule Take 1 capsule (10 mg total) by mouth 3 (three) times a day before meals   • fluticasone-vilanterol (Breo Ellipta) 200-25 mcg/actuation inhaler Inhale 1 puff daily Rinse mouth after use.   • ketoconazole (NIZORAL) 2 % cream Apply topically twice a day when  "rash is active, apply topically twice a week when not active   • ketoconazole (NIZORAL) 2 % shampoo Apply to damp facial skin and scalp once daily. Let sit for 5 minutes before rinsing out. Can use regular shampoo/face wash after using.   • ketorolac (TORADOL) 10 mg tablet Take 1 tablet (10 mg total) by mouth every 6 (six) hours as needed for moderate pain or severe pain   • levothyroxine (Synthroid) 75 mcg tablet Take 1 tablet (75 mcg total) by mouth daily in the early morning   • metoprolol tartrate (LOPRESSOR) 50 mg tablet take 1 tablet by mouth twice a day   • tacrolimus (PROTOPIC) 0.1 % ointment apply to inflamed face and scalp once daily until inflammation calms. Can repeat treatment.     No Known Allergies  Immunization History   Administered Date(s) Administered   • COVID-19 MODERNA VACC 0.5 ML IM 02/16/2021, 03/16/2021, 03/23/2021, 08/24/2021   • COVID-19 Moderna Vac BIVALENT 12 Yr+ IM 0.5 ML 09/15/2022     Objective     /82 (BP Location: Right arm, Patient Position: Sitting, Cuff Size: Large)   Pulse 77   Temp (!) 97.3 °F (36.3 °C)   Ht 5' 4\" (1.626 m)   Wt 120 kg (265 lb)   LMP 06/24/2024 (Approximate)   SpO2 98%   BMI 45.49 kg/m² (Reviewed)    Physical Exam  Vitals reviewed.   Constitutional:       General: She is not in acute distress.     Appearance: She is not ill-appearing.   HENT:      Head: Normocephalic and atraumatic.      Right Ear: External ear normal.      Left Ear: External ear normal.      Nose: Nose normal.      Mouth/Throat:      Mouth: Mucous membranes are moist.      Pharynx: Oropharynx is clear.   Eyes:      Extraocular Movements: Extraocular movements intact.      Conjunctiva/sclera: Conjunctivae normal.      Pupils: Pupils are equal, round, and reactive to light.   Cardiovascular:      Rate and Rhythm: Normal rate and regular rhythm.      Heart sounds: Normal heart sounds.   Pulmonary:      Effort: Pulmonary effort is normal.      Breath sounds: Normal breath sounds. "   Abdominal:      General: Abdomen is flat. Bowel sounds are normal. There is no distension.      Palpations: Abdomen is soft.      Tenderness: There is no abdominal tenderness.   Musculoskeletal:      Cervical back: Neck supple.   Skin:     General: Skin is warm and dry.      Capillary Refill: Capillary refill takes less than 2 seconds.   Neurological:      Mental Status: She is alert and oriented to person, place, and time.   Psychiatric:         Mood and Affect: Mood normal.         Behavior: Behavior normal.

## 2024-07-12 DIAGNOSIS — R11.0 NAUSEA: Primary | ICD-10-CM

## 2024-07-12 RX ORDER — METOCLOPRAMIDE 10 MG/1
10 TABLET ORAL 4 TIMES DAILY PRN
Qty: 40 TABLET | Refills: 1 | Status: SHIPPED | OUTPATIENT
Start: 2024-07-12

## 2024-08-05 DIAGNOSIS — E06.3 HYPOTHYROIDISM DUE TO HASHIMOTO'S THYROIDITIS: ICD-10-CM

## 2024-08-05 DIAGNOSIS — E03.8 HYPOTHYROIDISM DUE TO HASHIMOTO'S THYROIDITIS: ICD-10-CM

## 2024-08-05 RX ORDER — LEVOTHYROXINE SODIUM 0.07 MG/1
75 TABLET ORAL
Qty: 30 TABLET | Refills: 5 | Status: SHIPPED | OUTPATIENT
Start: 2024-08-05

## 2024-08-20 DIAGNOSIS — E66.01 CLASS 3 SEVERE OBESITY DUE TO EXCESS CALORIES WITH SERIOUS COMORBIDITY AND BODY MASS INDEX (BMI) OF 45.0 TO 49.9 IN ADULT (HCC): ICD-10-CM

## 2024-08-20 RX ORDER — SEMAGLUTIDE 0.5 MG/.5ML
INJECTION, SOLUTION SUBCUTANEOUS
Qty: 2 ML | Refills: 1 | Status: SHIPPED | OUTPATIENT
Start: 2024-08-20

## 2024-08-26 ENCOUNTER — OFFICE VISIT (OUTPATIENT)
Dept: FAMILY MEDICINE CLINIC | Facility: CLINIC | Age: 49
End: 2024-08-26
Payer: MEDICARE

## 2024-08-26 VITALS
WEIGHT: 252.3 LBS | OXYGEN SATURATION: 99 % | DIASTOLIC BLOOD PRESSURE: 78 MMHG | BODY MASS INDEX: 43.07 KG/M2 | SYSTOLIC BLOOD PRESSURE: 120 MMHG | HEART RATE: 73 BPM | TEMPERATURE: 97.3 F | HEIGHT: 64 IN

## 2024-08-26 DIAGNOSIS — E66.01 CLASS 3 SEVERE OBESITY DUE TO EXCESS CALORIES WITH SERIOUS COMORBIDITY AND BODY MASS INDEX (BMI) OF 45.0 TO 49.9 IN ADULT (HCC): Primary | ICD-10-CM

## 2024-08-26 PROCEDURE — 99213 OFFICE O/P EST LOW 20 MIN: CPT | Performed by: NURSE PRACTITIONER

## 2024-08-26 NOTE — PROGRESS NOTES
Ambulatory Visit  Name: Elissa Leal      : 1975      MRN: [id number]  Encounter Provider: SANGEETA Chacon  Encounter Date: 2024   Encounter department: [facility]    Assessment & Plan   1. Class 3 severe obesity due to excess calories with serious comorbidity and body mass index (BMI) of 45.0 to 49.9 in adult (HCC)    Discussed with patient plan to to continue her on the 0.5 mg weekly of Wegovy because tolerating that dose and continue to loss weight.  Patient instructed to call if no improvement in 72 hours or symptoms worsen       History of Present Illness     49 y.o.female presenting to discuss plan moving forward with weight loss and medication. Patient had increased her dose of Wegovy to 1 mg weekly and started having major side effects. The medication was held for one month than restarted back at the 0.5 mg dose. She continues to loss the weight and reports having no adverse effects of the dose.         Review of Systems   Constitutional: Negative.    Respiratory: Negative.     Cardiovascular: Negative.    Gastrointestinal: Negative.    Musculoskeletal: Negative.    Neurological: Negative.    Psychiatric/Behavioral: Negative.       Past Medical History:   Diagnosis Date   • Anemia 2021   • Asthma Jume     Inhaler   • Clotting disorder (HCC) 2021    On beta blocker   • Eczema    • GERD (gastroesophageal reflux disease) 2020    Certain foods cause a problem   • Headache(784.0)    • No known health problems    • Obesity    • Visual impairment 2020    New glasses for reading close up     Past Surgical History:   Procedure Laterality Date   • NO PAST SURGERIES       Family History   Problem Relation Age of Onset   • Cardiomyopathy Mother    • Anxiety disorder Mother    • Hypertension Mother    • Autoimmune disease Mother    • Hypertension Father    • No Known Problems Maternal Grandmother    • Heart attack Maternal Grandfather    • No  Known Problems Paternal Grandmother    • No Known Problems Paternal Grandfather    • Anxiety disorder Brother    • Thalassemia Maternal Aunt    • Lupus Family    • No Known Problems Son      Social History     Tobacco Use   • Smoking status: Former     Current packs/day: 0.00     Average packs/day: 0.3 packs/day for 15.0 years (3.8 ttl pk-yrs)     Types: Cigarettes     Start date:      Quit date: 2018     Years since quittin.6     Passive exposure: Past   • Smokeless tobacco: Never   Vaping Use   • Vaping status: Never Used   Substance and Sexual Activity   • Alcohol use: Not Currently   • Drug use: Never   • Sexual activity: Yes     Partners: Male     Birth control/protection: Condom Male, Rhythm, None     Current Outpatient Medications on File Prior to Visit   Medication Sig   • albuterol (PROVENTIL HFA,VENTOLIN HFA) 90 mcg/act inhaler inhale 1 puff by mouth and INTO THE LUNGS every 4 hours if needed for wheezing   • clobetasol (TEMOVATE) 0.05 % external solution Apply topically 2 (two) times a day To scalp, keep from dripping down face or into eyes   • dicyclomine (BENTYL) 10 mg capsule Take 1 capsule (10 mg total) by mouth 3 (three) times a day before meals   • fluticasone-vilanterol (Breo Ellipta) 200-25 mcg/actuation inhaler Inhale 1 puff daily Rinse mouth after use.   • ketoconazole (NIZORAL) 2 % cream Apply topically twice a day when rash is active, apply topically twice a week when not active   • ketoconazole (NIZORAL) 2 % shampoo Apply to damp facial skin and scalp once daily. Let sit for 5 minutes before rinsing out. Can use regular shampoo/face wash after using.   • levothyroxine (Synthroid) 75 mcg tablet Take 1 tablet (75 mcg total) by mouth daily in the early morning   • metoclopramide (Reglan) 10 mg tablet Take 1 tablet (10 mg total) by mouth 4 (four) times a day as needed (nausea)   • metoprolol tartrate (LOPRESSOR) 50 mg tablet take 1 tablet by mouth twice a day   • Semaglutide-Weight  "Management (Wegovy) 0.5 MG/0.5ML INJECT 0.5MG SUBCUTANEOUSLY (UNDER THE SKIN) ONCE WEEKLY   • tacrolimus (PROTOPIC) 0.1 % ointment apply to inflamed face and scalp once daily until inflammation calms. Can repeat treatment.   • [DISCONTINUED] ketorolac (TORADOL) 10 mg tablet Take 1 tablet (10 mg total) by mouth every 6 (six) hours as needed for moderate pain or severe pain (Patient not taking: Reported on 8/26/2024)     No Known Allergies  Immunization History   Administered Date(s) Administered   • COVID-19 MODERNA VACC 0.5 ML IM 02/16/2021, 03/16/2021, 03/23/2021, 08/24/2021   • COVID-19 Moderna Vac BIVALENT 12 Yr+ IM 0.5 ML 09/15/2022     Objective     /78 (BP Location: Left arm, Patient Position: Sitting, Cuff Size: Large)   Pulse 73   Temp (!) 97.3 °F (36.3 °C)   Ht 5' 4\" (1.626 m)   Wt 114 kg (252 lb 4.8 oz)   SpO2 99%   BMI 43.31 kg/m²     Physical Exam  Vitals reviewed.   Constitutional:       General: She is not in acute distress.     Appearance: She is not ill-appearing.   HENT:      Head: Normocephalic and atraumatic.      Right Ear: External ear normal.      Left Ear: External ear normal.   Eyes:      Extraocular Movements: Extraocular movements intact.      Conjunctiva/sclera: Conjunctivae normal.      Pupils: Pupils are equal, round, and reactive to light.   Cardiovascular:      Rate and Rhythm: Normal rate and regular rhythm.      Heart sounds: Normal heart sounds.   Pulmonary:      Effort: Pulmonary effort is normal.      Breath sounds: Normal breath sounds.   Abdominal:      General: Abdomen is flat. Bowel sounds are normal. There is no distension.      Palpations: Abdomen is soft.      Tenderness: There is no abdominal tenderness.   Skin:     General: Skin is warm and dry.      Capillary Refill: Capillary refill takes less than 2 seconds.   Neurological:      Mental Status: She is alert and oriented to person, place, and time.   Psychiatric:         Mood and Affect: Mood normal.         " Behavior: Behavior normal.

## 2024-09-03 ENCOUNTER — OFFICE VISIT (OUTPATIENT)
Dept: DERMATOLOGY | Facility: CLINIC | Age: 49
End: 2024-09-03
Payer: MEDICARE

## 2024-09-03 VITALS — WEIGHT: 249 LBS | BODY MASS INDEX: 42.74 KG/M2 | TEMPERATURE: 98 F

## 2024-09-03 DIAGNOSIS — L65.8 FEMALE PATTERN HAIR LOSS: ICD-10-CM

## 2024-09-03 DIAGNOSIS — L21.9 SEBORRHEIC DERMATITIS: Primary | ICD-10-CM

## 2024-09-03 DIAGNOSIS — L64.9 ANDROGENETIC ALOPECIA: ICD-10-CM

## 2024-09-03 PROCEDURE — 99213 OFFICE O/P EST LOW 20 MIN: CPT | Performed by: DERMATOLOGY

## 2024-09-03 NOTE — PROGRESS NOTES
"[facility] Dermatology Clinic Note     Patient Name: Elissa Leal  Encounter Date: 9/3/24     Have you been cared for by a [facility] Dermatologist in the last 3 years and, if so, which description applies to you?    Yes.  I have been here within the last 3 years, and my medical history has NOT changed since that time.  I am FEMALE/of child-bearing potential.    REVIEW OF SYSTEMS:  Have you recently had or currently have any of the following? No changes in my recent health.   PAST MEDICAL HISTORY:  Have you personally ever had or currently have any of the following?  If \"YES,\" then please provide more detail. No changes in my medical history.   HISTORY OF IMMUNOSUPPRESSION: Do you have a history of any of the following:  Systemic Immunosuppression such as Diabetes, Biologic or Immunotherapy, Chemotherapy, Organ Transplantation, Bone Marrow Transplantation or Prednisone?  No     Answering \"YES\" requires the addition of the dotphrase \"IMMUNOSUPPRESSED\" as the first diagnosis of the patient's visit.   FAMILY HISTORY:  Any \"first degree relatives\" (parent, brother, sister, or child) with the following?    No changes in my family's known health.   PATIENT EXPERIENCE:    Do you want the Dermatologist to perform a COMPLETE skin exam today including a clinical examination under the \"bra and underwear\" areas?  NO  If necessary, do we have your permission to call and leave a detailed message on your Preferred Phone number that includes your specific medical information?  Yes      No Known Allergies   Current Outpatient Medications:     albuterol (PROVENTIL HFA,VENTOLIN HFA) 90 mcg/act inhaler, inhale 1 puff by mouth and INTO THE LUNGS every 4 hours if needed for wheezing, Disp: , Rfl:     clobetasol (TEMOVATE) 0.05 % external solution, Apply topically 2 (two) times a day To scalp, keep from dripping down face or into eyes, Disp: 50 mL, Rfl: 6    dicyclomine (BENTYL) 10 mg capsule, Take 1 capsule (10 mg total) by mouth 3 " (three) times a day before meals, Disp: 16 capsule, Rfl: 0    fluticasone-vilanterol (Breo Ellipta) 200-25 mcg/actuation inhaler, Inhale 1 puff daily Rinse mouth after use., Disp: 60 blister, Rfl: 11    ketoconazole (NIZORAL) 2 % cream, Apply topically twice a day when rash is active, apply topically twice a week when not active, Disp: 60 g, Rfl: 11    ketoconazole (NIZORAL) 2 % shampoo, Apply to damp facial skin and scalp once daily. Let sit for 5 minutes before rinsing out. Can use regular shampoo/face wash after using., Disp: 120 mL, Rfl: 6    levothyroxine (Synthroid) 75 mcg tablet, Take 1 tablet (75 mcg total) by mouth daily in the early morning, Disp: 30 tablet, Rfl: 5    metoclopramide (Reglan) 10 mg tablet, Take 1 tablet (10 mg total) by mouth 4 (four) times a day as needed (nausea), Disp: 40 tablet, Rfl: 1    metoprolol tartrate (LOPRESSOR) 50 mg tablet, take 1 tablet by mouth twice a day, Disp: 180 tablet, Rfl: 1    Semaglutide-Weight Management (Wegovy) 0.5 MG/0.5ML, INJECT 0.5MG SUBCUTANEOUSLY (UNDER THE SKIN) ONCE WEEKLY, Disp: 2 mL, Rfl: 1    tacrolimus (PROTOPIC) 0.1 % ointment, apply to inflamed face and scalp once daily until inflammation calms. Can repeat treatment., Disp: 100 g, Rfl: 3          Whom besides the patient is providing clinical information about today's encounter?   NO ADDITIONAL HISTORIAN (patient alone provided history)    Physical Exam and Assessment/Plan by Diagnosis:      SEBORRHEIC DERMATITIS    Physical Exam:  Anatomic Location Affected:  frontal scalp  Morphological Description:  dry pink plaque  Pertinent Positives:  Pertinent Negatives:    Additional History of Present Condition:  Ketoconazole 2% shampoo twice weekly, patient notes that when starts itching really bad applies clobetasol solution when flares    Assessment and Plan:  Based on a thorough discussion of this condition and the management approach to it (including a comprehensive discussion of the known risks, side  "effects and potential benefits of treatment), the patient (family) agrees to implement the following specific plan:  Continue washing with the Ketoconazole 2% shampoo  Continue with apply the clobetasol solution but increase applications       Seborrheic Dermatitis   Seborrheic dermatitis is a common, chronic or relapsing form of eczema/dermatitis that mainly affects the sebaceous, gland-rich regions of the scalp, face, and trunk.  There are infantile and adult forms of seborrhoeic dermatitis. It is sometimes associated with psoriasis and, in that clinical scenario, may be referred to as \"sebo-psoriasis.\"  Seborrheic dermatitis is also known as \"seborrheic eczema.\"  Dandruff (also called \"pityriasis capitis\") is an uninflamed form of seborrhoeic dermatitis. Dandruff presents as bran-like scaly patches scattered within hair-bearing areas of the scalp.  In an infant, this condition may be referred to as \"cradle cap.\"  The cause of seborrheic dermatitis is not completely understood. It is associated with proliferation of various species of the skin commensal Malassezia, in its yeast (non-pathogenic) form. Its metabolites (such as the fatty acids oleic acid, malssezin, and indole-3-carbaldehyde) may cause an inflammatory reaction. Differences in skin barrier lipid content and function may account for individual presentations.    Infantile Seborrheic Dermatitis  Infantile seborrheic dermatitis affects babies under the age of 3 months and usually resolves by 6-12 months of age.  Infantile seborrheic dermatitis causes \"cradle cap\" (diffuse, greasy scaling on scalp). The rash may spread to affect armpit and groin folds (a type of \"napkin dermatitis\").  There may be associated salmon-pink colored patches that may flake or peel.  The rash in this case is usually not especially itchy, so the baby often appears undisturbed by the rash, even when more generalized.    Adult Seborrheic Dermatitis  Adult seborrheic dermatitis tends " to begin in late adolescence; prevalence is greatest in young adults and in the elderly. It is more common in males than in females.    The following factors are sometimes associated with severe adult seborrheic dermatitis:  Oily skin  Familial tendency to seborrhoeic dermatitis or a family history of psoriasis  Immunosuppression: organ transplant recipient, human immunodeficiency virus (HIV) infection and patients with lymphoma  Neurological and psychiatric diseases: Parkinson disease, tardive dyskinesia, depression, epilepsy, facial nerve palsy, spinal cord injury and congenital disorders such as Down syndrome  Treatment for psoriasis with psoralen and ultraviolet A (PUVA) therapy  Lack of sleep  Stressful events.    In adults, seborrheic dermatitis may typically affect the scalp, face (creases around the nose, behind ears, within eyebrows) and upper trunk. Typical clinical features include:  Winter flares, improving in summer following sun exposure  Minimal itch most of the time  Combination oily and dry mid-facial skin  Ill-defined localized scaly patches or diffuse scale in the scalp  Blepharitis; scaly red eyelid margins  [place]-pink, thin, scaly, and ill-defined plaques in skin folds on both sides of the face  Petal or ring-shaped flaky patches on hair-line and on anterior chest  Rash in armpits, under the breasts, in the groin folds and genital creases  Superficial folliculitis (inflamed hair follicles) on cheeks and upper trunk    Seborrheic dermatitis is diagnosed by its clinical appearance and behavior. Skin biopsy may be helpful but is rarely necessary to make this diagnosis.     ANDROGENETIC ALOPECIA OR FEMALE/MALE PATTERN HAIR LOSS possible component of traction alopecia    Physical Exam:  Anatomic Location Affected:  scalp  Morphological Description:  thinning, increased variability  Pertinent Positives:  Pertinent Negatives:    Assessment and Plan:  Based on a thorough discussion of this condition  and the management approach to it (including a comprehensive discussion of the known risks, side effects and potential benefits of treatment), the patient (family) agrees to implement the following specific plan:  Discussed treatment options, such as:  over the counter rogaine (minoxidil) 5% foam. Use one cap full a day on dry hair, part hair and apply directly to scalp. Do not do too close to bedtime. Need to do this daily. If you stop abruptly, you will lose hair. Can taper off if you don't like it.   Could do laser therapy like laser caps or keating (examples: capillus, laser hair max). Buy over the counter, online.  Cosmetic treatments like platelet rich plasma (PRP) do it monthly for 3 months. Dr. Bentley in our practice does this.  Hair transplant surgery   Hair fibers, put on topically so it looks more full (example topix)  Dicussed oral medications but patient deferred at this time  spironolactone (hormonal blood pressure medication, risks include decreased blood pressure, lightheadedness, abnormal menses, breast tenderness, need to be careful of potassium intake), finasteride/dutasteride (hormonal medication, not recommended if personal or family history of breast/ovarian cancers), and minoxidil oral pills (risk of cardiac side effects such as swelling and fluid around heart)    Plan for OTC  rogain/or generic minoxidil  everyday on the frontal scalp        Scribe Attestation      I,:  Ivone Lane am acting as a scribe while in the presence of the attending physician.:       I,:  Norma Mujica MD personally performed the services described in this documentation    as scribed in my presence.:

## 2024-09-03 NOTE — PATIENT INSTRUCTIONS
"  SEBORRHEIC DERMATITIS    Physical Exam:  Anatomic Location Affected:  frontal scalp  Morphological Description:  dry pink plaque  Pertinent Positives:  Pertinent Negatives:    Additional History of Present Condition:  Ketoconazole 2% shampoo twice weekly, patient notes that when starts itching really bad applies clobetasol solution when flares    Assessment and Plan:  Based on a thorough discussion of this condition and the management approach to it (including a comprehensive discussion of the known risks, side effects and potential benefits of treatment), the patient (family) agrees to implement the following specific plan:  Continue washing with the Ketoconazole 2% shampoo  Continue with apply the clobetasol solution but increase applications       Seborrheic Dermatitis   Seborrheic dermatitis is a common, chronic or relapsing form of eczema/dermatitis that mainly affects the sebaceous, gland-rich regions of the scalp, face, and trunk.  There are infantile and adult forms of seborrhoeic dermatitis. It is sometimes associated with psoriasis and, in that clinical scenario, may be referred to as \"sebo-psoriasis.\"  Seborrheic dermatitis is also known as \"seborrheic eczema.\"  Dandruff (also called \"pityriasis capitis\") is an uninflamed form of seborrhoeic dermatitis. Dandruff presents as bran-like scaly patches scattered within hair-bearing areas of the scalp.  In an infant, this condition may be referred to as \"cradle cap.\"  The cause of seborrheic dermatitis is not completely understood. It is associated with proliferation of various species of the skin commensal Malassezia, in its yeast (non-pathogenic) form. Its metabolites (such as the fatty acids oleic acid, malssezin, and indole-3-carbaldehyde) may cause an inflammatory reaction. Differences in skin barrier lipid content and function may account for individual presentations.    Infantile Seborrheic Dermatitis  Infantile seborrheic dermatitis affects babies " "under the age of 3 months and usually resolves by 6-12 months of age.  Infantile seborrheic dermatitis causes \"cradle cap\" (diffuse, greasy scaling on scalp). The rash may spread to affect armpit and groin folds (a type of \"napkin dermatitis\").  There may be associated salmon-pink colored patches that may flake or peel.  The rash in this case is usually not especially itchy, so the baby often appears undisturbed by the rash, even when more generalized.    Adult Seborrheic Dermatitis  Adult seborrheic dermatitis tends to begin in late adolescence; prevalence is greatest in young adults and in the elderly. It is more common in males than in females.    The following factors are sometimes associated with severe adult seborrheic dermatitis:  Oily skin  Familial tendency to seborrhoeic dermatitis or a family history of psoriasis  Immunosuppression: organ transplant recipient, human immunodeficiency virus (HIV) infection and patients with lymphoma  Neurological and psychiatric diseases: Parkinson disease, tardive dyskinesia, depression, epilepsy, facial nerve palsy, spinal cord injury and congenital disorders such as Down syndrome  Treatment for psoriasis with psoralen and ultraviolet A (PUVA) therapy  Lack of sleep  Stressful events.    In adults, seborrheic dermatitis may typically affect the scalp, face (creases around the nose, behind ears, within eyebrows) and upper trunk. Typical clinical features include:  Winter flares, improving in summer following sun exposure  Minimal itch most of the time  Combination oily and dry mid-facial skin  Ill-defined localized scaly patches or diffuse scale in the scalp  Blepharitis; scaly red eyelid margins  [place]-pink, thin, scaly, and ill-defined plaques in skin folds on both sides of the face  Petal or ring-shaped flaky patches on hair-line and on anterior chest  Rash in armpits, under the breasts, in the groin folds and genital creases  Superficial folliculitis (inflamed hair " follicles) on cheeks and upper trunk    Seborrheic dermatitis is diagnosed by its clinical appearance and behavior. Skin biopsy may be helpful but is rarely necessary to make this diagnosis.     ANDROGENETIC ALOPECIA OR FEMALE/MALE PATTERN HAIR LOSS possible component of traction alopecia    Physical Exam:  Anatomic Location Affected:  scalp  Morphological Description:  thinning, increased variability  Pertinent Positives:  Pertinent Negatives:    Assessment and Plan:  Based on a thorough discussion of this condition and the management approach to it (including a comprehensive discussion of the known risks, side effects and potential benefits of treatment), the patient (family) agrees to implement the following specific plan:  Discussed treatment options, such as:  over the counter rogaine (minoxidil) 5% foam. Use one cap full a day on dry hair, part hair and apply directly to scalp. Do not do too close to bedtime. Need to do this daily. If you stop abruptly, you will lose hair. Can taper off if you don't like it.   Could do laser therapy like laser caps or keating (examples: capillus, laser hair max). Buy over the counter, online.  Cosmetic treatments like platelet rich plasma (PRP) do it monthly for 3 months. Dr. Bentley in our practice does this.  Hair transplant surgery   Hair fibers, put on topically so it looks more full (example topix)  Dicussed oral medications but patient deferred at this time  spironolactone (hormonal blood pressure medication, risks include decreased blood pressure, lightheadedness, abnormal menses, breast tenderness, need to be careful of potassium intake), finasteride/dutasteride (hormonal medication, not recommended if personal or family history of breast/ovarian cancers), and minoxidil oral pills (risk of cardiac side effects such as swelling and fluid around heart)    Plan for OTC  rogain/or generic minoxidil  everyday on the frontal scalp

## 2024-09-12 ENCOUNTER — HOSPITAL ENCOUNTER (OUTPATIENT)
Facility: HOSPITAL | Age: 49
End: 2024-09-12
Payer: MEDICARE

## 2024-09-12 VITALS — BODY MASS INDEX: 42.74 KG/M2 | HEIGHT: 64 IN

## 2024-09-12 DIAGNOSIS — Z12.31 ENCOUNTER FOR SCREENING MAMMOGRAM FOR MALIGNANT NEOPLASM OF BREAST: ICD-10-CM

## 2024-09-12 PROCEDURE — 77067 SCR MAMMO BI INCL CAD: CPT

## 2024-09-12 PROCEDURE — 77063 BREAST TOMOSYNTHESIS BI: CPT

## 2024-09-25 DIAGNOSIS — I49.3 FREQUENT PVCS: ICD-10-CM

## 2024-09-25 DIAGNOSIS — R00.2 PALPITATIONS: ICD-10-CM

## 2024-09-25 RX ORDER — METOPROLOL TARTRATE 50 MG
50 TABLET ORAL 2 TIMES DAILY
Qty: 180 TABLET | Refills: 1 | Status: SHIPPED | OUTPATIENT
Start: 2024-09-25

## 2024-09-28 DIAGNOSIS — E66.813 CLASS 3 SEVERE OBESITY DUE TO EXCESS CALORIES WITH SERIOUS COMORBIDITY AND BODY MASS INDEX (BMI) OF 45.0 TO 49.9 IN ADULT (HCC): ICD-10-CM

## 2024-09-28 DIAGNOSIS — E66.01 CLASS 3 SEVERE OBESITY DUE TO EXCESS CALORIES WITH SERIOUS COMORBIDITY AND BODY MASS INDEX (BMI) OF 45.0 TO 49.9 IN ADULT (HCC): ICD-10-CM

## 2024-09-28 RX ORDER — SEMAGLUTIDE 0.5 MG/.5ML
INJECTION, SOLUTION SUBCUTANEOUS
Qty: 2 ML | Refills: 0 | Status: SHIPPED | OUTPATIENT
Start: 2024-09-28

## 2024-09-30 ENCOUNTER — TELEPHONE (OUTPATIENT)
Age: 49
End: 2024-09-30

## 2024-09-30 DIAGNOSIS — J45.40 MODERATE PERSISTENT ASTHMA WITHOUT COMPLICATION: ICD-10-CM

## 2024-09-30 RX ORDER — FLUTICASONE FUROATE AND VILANTEROL 200; 25 UG/1; UG/1
1 POWDER RESPIRATORY (INHALATION) DAILY
Qty: 60 BLISTER | Refills: 0 | Status: SHIPPED | OUTPATIENT
Start: 2024-09-30 | End: 2024-09-30

## 2024-09-30 RX ORDER — FLUTICASONE FUROATE AND VILANTEROL 200; 25 UG/1; UG/1
1 POWDER RESPIRATORY (INHALATION) DAILY
Qty: 180 BLISTER | Refills: 0 | Status: SHIPPED | OUTPATIENT
Start: 2024-09-30 | End: 2024-12-29

## 2024-09-30 NOTE — TELEPHONE ENCOUNTER
PA for Semaglutide-Weight Management (Wegovy) 0.5 MG/0.5ML SUBMITTED     via    [x]CM-KEY: [id number]  []Surescripts-Case ID #   []Faxed to plan   []Other website   []Phone call Case ID #     Office notes sent, clinical questions answered. Awaiting determination    Turnaround time for your insurance to make a decision on your Prior Authorization can take 7-21 business days.

## 2024-10-01 NOTE — TELEPHONE ENCOUNTER
PA for Semaglutide-Weight Management (Wegovy) 0.5 MG/0.5ML  APPROVED     Date(s) approved 10-1-2024 - 4-1-2025        Patient advised by          []MyChart Message  [x]Phone call   []LMOM  []L/M to call office as no active Communication consent on file  []Unable to leave detailed message as VM not approved on Communication consent       Pharmacy advised by    [x]Fax  []Phone call    Approval letter scanned into Media Yes

## 2024-11-04 DIAGNOSIS — E06.3 HYPOTHYROIDISM DUE TO HASHIMOTO'S THYROIDITIS: ICD-10-CM

## 2024-11-04 RX ORDER — LEVOTHYROXINE SODIUM 75 UG/1
75 TABLET ORAL EVERY MORNING
Qty: 90 TABLET | Refills: 1 | Status: SHIPPED | OUTPATIENT
Start: 2024-11-04

## 2024-11-06 ENCOUNTER — OFFICE VISIT (OUTPATIENT)
Dept: FAMILY MEDICINE CLINIC | Facility: CLINIC | Age: 49
End: 2024-11-06
Payer: MEDICARE

## 2024-11-06 VITALS
HEIGHT: 64 IN | WEIGHT: 249 LBS | HEART RATE: 80 BPM | SYSTOLIC BLOOD PRESSURE: 114 MMHG | OXYGEN SATURATION: 98 % | BODY MASS INDEX: 42.51 KG/M2 | TEMPERATURE: 97.3 F | DIASTOLIC BLOOD PRESSURE: 78 MMHG

## 2024-11-06 DIAGNOSIS — E66.01 CLASS 3 SEVERE OBESITY DUE TO EXCESS CALORIES WITH SERIOUS COMORBIDITY AND BODY MASS INDEX (BMI) OF 45.0 TO 49.9 IN ADULT (HCC): Primary | ICD-10-CM

## 2024-11-06 DIAGNOSIS — E66.813 CLASS 3 SEVERE OBESITY DUE TO EXCESS CALORIES WITH SERIOUS COMORBIDITY AND BODY MASS INDEX (BMI) OF 45.0 TO 49.9 IN ADULT (HCC): Primary | ICD-10-CM

## 2024-11-06 PROCEDURE — 99213 OFFICE O/P EST LOW 20 MIN: CPT | Performed by: NURSE PRACTITIONER

## 2024-11-06 RX ORDER — SEMAGLUTIDE 1 MG/.5ML
INJECTION, SOLUTION SUBCUTANEOUS
Qty: 2 ML | Refills: 0 | Status: SHIPPED | OUTPATIENT
Start: 2024-11-06 | End: 2024-12-06

## 2024-11-06 NOTE — PROGRESS NOTES
Ambulatory Visit  Name: Elissa Leal      : 1975      MRN: [id number]  Encounter Provider: SANGEETA Chacon  Encounter Date: 2024   Encounter department: [facility]    Assessment & Plan  Class 3 severe obesity due to excess calories with serious comorbidity and body mass index (BMI) of 45.0 to 49.9 in adult (HCC)  Prior Authorization Clinical Questions for Weight Management Pharmacotherapy    1. Does the patient have a contrainidcation to medication prescribed for weight management?: No  2. Does the patient have a diagnosis of obesity, confirmed by a BMI greater than or equal to 30 kg/m^2?: Yes  3. Does the patient have a BMI of greater than or equal to 27 kg/m^2 with at least one weight-related comorbidity/risk factor/complication (e.g. diabetes, dyslipidemia, coronary artery disease)?: Yes  4. Weight-related co-morbidities/risk factors: prediabetes, dyslipidemia, asthma/reactive airway disease  5. Has the patient been on a weight loss regimen of low-calorie diet, increased physical activity, and lifestyle modifications for a minimum of 6 months?: Yes  6. Has the patient completed a comprehensive weight loss program (ie, Weight Watchers, Noom, Bariatrics, other jessica on phone)? If so, what?: No  7. Does the patient have a history of type 2 diabetes?: No  8. Has the member tried and failed other weight loss medication within the past 12 months?: No  9. Will the member use requested medication in combination with another GLP agonist or weight loss drug?: No  10. Is the medication a controlled substance?: No  For renewals: Has the patient had a positive outcome with current weight management medication (i.e., change in body weight of at least 4-5% after 12-16 weeks on maximally tolerated dose)?: Yes     Baseline weight (in pounds): 291 lbs  Current weight (in pounds): 249 lbs  Weight loss percentage: -14.43%         Orders:    Semaglutide-Weight Management (Wegovy) 1 MG/0.5ML;  Inject 1 mg under the skin weekly      Depression Screening and Follow-up Plan: Patient was screened for depression during today's encounter. They screened negative with a PHQ-2 score of 0.      History of Present Illness     49 y.o.female presenting to discuss ongoing weight management. She has been on Wegovy since March 20234. She was doing real well with a 40 pound weight loss but than had some stomach issues. She stayed on the 0.5 mg weekly dosing but currently has regained weight and thinks that she needs a different medication or increase in dose.       History obtained from : patient      Review of Systems   Constitutional: Negative.    Respiratory: Negative.     Cardiovascular: Negative.    Gastrointestinal: Negative.         Difficulty with weight gain   Musculoskeletal: Negative.    Neurological: Negative.    Psychiatric/Behavioral: Negative.       Current Outpatient Medications on File Prior to Visit   Medication Sig Dispense Refill    albuterol (PROVENTIL HFA,VENTOLIN HFA) 90 mcg/act inhaler inhale 1 puff by mouth and INTO THE LUNGS every 4 hours if needed for wheezing      clobetasol (TEMOVATE) 0.05 % external solution Apply topically 2 (two) times a day To scalp, keep from dripping down face or into eyes 50 mL 6    dicyclomine (BENTYL) 10 mg capsule Take 1 capsule (10 mg total) by mouth 3 (three) times a day before meals 16 capsule 0    fluticasone-vilanterol (Breo Ellipta) 200-25 mcg/actuation inhaler Inhale 1 puff daily Rinse mouth after use. 180 blister 0    ketoconazole (NIZORAL) 2 % cream Apply topically twice a day when rash is active, apply topically twice a week when not active 60 g 11    ketoconazole (NIZORAL) 2 % shampoo Apply to damp facial skin and scalp once daily. Let sit for 5 minutes before rinsing out. Can use regular shampoo/face wash after using. 120 mL 6    levothyroxine 75 mcg tablet take 1 tablet by mouth daily every morning 90 tablet 1    metoclopramide (Reglan) 10 mg tablet Take  "1 tablet (10 mg total) by mouth 4 (four) times a day as needed (nausea) 40 tablet 1    metoprolol tartrate (LOPRESSOR) 50 mg tablet Take 1 tablet (50 mg total) by mouth 2 (two) times a day 180 tablet 1    tacrolimus (PROTOPIC) 0.1 % ointment apply to inflamed face and scalp once daily until inflammation calms. Can repeat treatment. 100 g 3    [DISCONTINUED] Semaglutide-Weight Management (Wegovy) 0.5 MG/0.5ML INJECT 0.5MG SUBCUTANEOUSLY (UNDER THE SKIN) ONCE WEEKLY 2 mL 0     No current facility-administered medications on file prior to visit.          Objective     /78 (BP Location: Left arm, Patient Position: Sitting, Cuff Size: Large)   Pulse 80   Temp (!) 97.3 °F (36.3 °C)   Ht 5' 4\" (1.626 m)   Wt 113 kg (249 lb)   LMP 10/27/2024 (Approximate)   SpO2 98%   BMI 42.74 kg/m² (Reviewed)    Physical Exam  Vitals reviewed.   Constitutional:       General: She is not in acute distress.     Appearance: She is not ill-appearing.   HENT:      Head: Normocephalic and atraumatic.      Right Ear: External ear normal.      Left Ear: External ear normal.   Eyes:      Extraocular Movements: Extraocular movements intact.      Conjunctiva/sclera: Conjunctivae normal.      Pupils: Pupils are equal, round, and reactive to light.   Cardiovascular:      Rate and Rhythm: Normal rate and regular rhythm.   Pulmonary:      Effort: Pulmonary effort is normal.   Abdominal:      General: Abdomen is flat. Bowel sounds are normal. There is no distension.      Palpations: Abdomen is soft.      Tenderness: There is no abdominal tenderness.   Skin:     General: Skin is warm and dry.      Capillary Refill: Capillary refill takes less than 2 seconds.   Neurological:      Mental Status: She is alert and oriented to person, place, and time.   Psychiatric:         Mood and Affect: Mood normal.         Behavior: Behavior normal.         "

## 2024-11-06 NOTE — ASSESSMENT & PLAN NOTE
Prior Authorization Clinical Questions for Weight Management Pharmacotherapy    1. Does the patient have a contrainidcation to medication prescribed for weight management?: No  2. Does the patient have a diagnosis of obesity, confirmed by a BMI greater than or equal to 30 kg/m^2?: Yes  3. Does the patient have a BMI of greater than or equal to 27 kg/m^2 with at least one weight-related comorbidity/risk factor/complication (e.g. diabetes, dyslipidemia, coronary artery disease)?: Yes  4. Weight-related co-morbidities/risk factors: prediabetes, dyslipidemia, asthma/reactive airway disease  5. Has the patient been on a weight loss regimen of low-calorie diet, increased physical activity, and lifestyle modifications for a minimum of 6 months?: Yes  6. Has the patient completed a comprehensive weight loss program (ie, Weight Watchers, Noom, Bariatrics, other jessica on phone)? If so, what?: No  7. Does the patient have a history of type 2 diabetes?: No  8. Has the member tried and failed other weight loss medication within the past 12 months?: No  9. Will the member use requested medication in combination with another GLP agonist or weight loss drug?: No  10. Is the medication a controlled substance?: No  For renewals: Has the patient had a positive outcome with current weight management medication (i.e., change in body weight of at least 4-5% after 12-16 weeks on maximally tolerated dose)?: Yes     Baseline weight (in pounds): 291 lbs  Current weight (in pounds): 249 lbs  Weight loss percentage: -14.43%         Orders:    Semaglutide-Weight Management (Wegovy) 1 MG/0.5ML; Inject 1 mg under the skin weekly

## 2024-11-15 ENCOUNTER — TELEPHONE (OUTPATIENT)
Age: 49
End: 2024-11-15

## 2024-11-15 NOTE — TELEPHONE ENCOUNTER
PA Wegovy 1 MG/0.5ML SUBMITTED     to [facility] by [facility]     via    [x]CMM-KEY: [id number]  []Surescripts-Case ID #    []Availity-Auth ID #  NDC #    []Faxed to plan   []Other website    []Phone call Case ID #      []PA sent as URGENT    All office notes, labs and other pertaining documents and studies sent. Clinical questions answered. Awaiting determination from insurance company.     Turnaround time for your insurance to make a decision on your Prior Authorization can take 7-21 business days.

## 2024-11-15 NOTE — TELEPHONE ENCOUNTER
PA needed for the Semaglutide-Weight Management (Wegovy) 1 MG/0.5ML        PA was approved but only for the repeated 0.5    Patient was on repeated dose of the 0.5 so insurance is requesting a PA to continue the increased doses moving forward

## 2024-11-18 NOTE — TELEPHONE ENCOUNTER
PA Wegovy 1 MG/0.5ML APPROVED     Date(s) approved 110/01/2024 - 04/01/2025    Case #     Patient advised by          []MyChart Message  [x]Phone call   []LMOM  []L/M to call office as no active Communication consent on file  []Unable to leave detailed message as VM not approved on Communication consent       Pharmacy advised by    [x]Fax  []Phone call

## 2024-12-04 ENCOUNTER — OFFICE VISIT (OUTPATIENT)
Dept: FAMILY MEDICINE CLINIC | Facility: CLINIC | Age: 49
End: 2024-12-04

## 2024-12-04 VITALS
HEIGHT: 64 IN | DIASTOLIC BLOOD PRESSURE: 78 MMHG | SYSTOLIC BLOOD PRESSURE: 118 MMHG | WEIGHT: 251 LBS | HEART RATE: 70 BPM | OXYGEN SATURATION: 98 % | TEMPERATURE: 97.6 F | BODY MASS INDEX: 42.85 KG/M2

## 2024-12-04 DIAGNOSIS — E66.01 CLASS 3 SEVERE OBESITY DUE TO EXCESS CALORIES WITH SERIOUS COMORBIDITY AND BODY MASS INDEX (BMI) OF 45.0 TO 49.9 IN ADULT (HCC): Primary | ICD-10-CM

## 2024-12-04 DIAGNOSIS — E66.813 CLASS 3 SEVERE OBESITY DUE TO EXCESS CALORIES WITH SERIOUS COMORBIDITY AND BODY MASS INDEX (BMI) OF 45.0 TO 49.9 IN ADULT (HCC): Primary | ICD-10-CM

## 2024-12-04 RX ORDER — TIRZEPATIDE 12.5 MG/.5ML
12.5 INJECTION, SOLUTION SUBCUTANEOUS WEEKLY
Qty: 2 ML | Refills: 0 | Status: SHIPPED | OUTPATIENT
Start: 2025-03-26 | End: 2025-04-23

## 2024-12-04 RX ORDER — TIRZEPATIDE 5 MG/.5ML
5 INJECTION, SOLUTION SUBCUTANEOUS WEEKLY
Qty: 2 ML | Refills: 0 | Status: SHIPPED | OUTPATIENT
Start: 2025-01-01 | End: 2025-01-29

## 2024-12-04 RX ORDER — TIRZEPATIDE 7.5 MG/.5ML
7.5 INJECTION, SOLUTION SUBCUTANEOUS WEEKLY
Qty: 2 ML | Refills: 0 | Status: SHIPPED | OUTPATIENT
Start: 2025-01-29 | End: 2025-02-26

## 2024-12-04 RX ORDER — TIRZEPATIDE 2.5 MG/.5ML
2.5 INJECTION, SOLUTION SUBCUTANEOUS WEEKLY
Qty: 2 ML | Refills: 0 | Status: SHIPPED | OUTPATIENT
Start: 2024-12-04

## 2024-12-04 RX ORDER — TIRZEPATIDE 15 MG/.5ML
15 INJECTION, SOLUTION SUBCUTANEOUS WEEKLY
Qty: 6 ML | Refills: 0 | Status: SHIPPED | OUTPATIENT
Start: 2025-04-23

## 2024-12-04 RX ORDER — TIRZEPATIDE 10 MG/.5ML
10 INJECTION, SOLUTION SUBCUTANEOUS WEEKLY
Qty: 2 ML | Refills: 0 | Status: SHIPPED | OUTPATIENT
Start: 2025-02-26 | End: 2025-03-26

## 2024-12-04 NOTE — ASSESSMENT & PLAN NOTE
Prior Authorization Clinical Questions for Weight Management Pharmacotherapy    1. Does the patient have a contrainidcation to medication prescribed for weight management?: No  2. Does the patient have a diagnosis of obesity, confirmed by a BMI greater than or equal to 30 kg/m^2?: Yes  3. Does the patient have a BMI of greater than or equal to 27 kg/m^2 with at least one weight-related comorbidity/risk factor/complication (e.g. diabetes, dyslipidemia, coronary artery disease)?: Yes  5. Has the patient been on a weight loss regimen of low-calorie diet, increased physical activity, and lifestyle modifications for a minimum of 6 months?: Yes  6. Has the patient completed a comprehensive weight loss program (ie, Weight Watchers, Noom, Bariatrics, other jessica on phone)? If so, what?: No  7. Does the patient have a history of type 2 diabetes?: No  8. Has the member tried and failed other weight loss medication within the past 12 months?: Yes   -- Q8 Further explanation: Wegovy   9. Will the member use requested medication in combination with another GLP agonist or weight loss drug?: No  10. Is the medication a controlled substance?: No     Baseline weight (in pounds): 291 lbs  Current weight (in pounds): 251 lbs  Weight loss percentage: -13.75%         Orders:    Zepbound 2.5 MG/0.5ML auto-injector; Inject 0.5 mL (2.5 mg total) under the skin once a week    Zepbound 5 MG/0.5ML auto-injector; Inject 0.5 mL (5 mg total) under the skin once a week for 28 days Do not start before January 1, 2025.    Zepbound 7.5 MG/0.5ML auto-injector; Inject 0.5 mL (7.5 mg total) under the skin once a week for 28 days Do not start before January 29, 2025.    Zepbound 10 MG/0.5ML auto-injector; Inject 0.5 mL (10 mg total) under the skin once a week for 28 days Do not start before February 26, 2025.    Zepbound 12.5 MG/0.5ML auto-injector; Inject 0.5 mL (12.5 mg total) under the skin once a week for 28 days Do not start before March 26, 2025.     Zepbound 15 MG/0.5ML auto-injector; Inject 0.5 mL (15 mg total) under the skin once a week Do not start before April 23, 2025.

## 2024-12-04 NOTE — PROGRESS NOTES
Name: Elissa Leal      : 1975      MRN: [id number]  Encounter Provider: SANGEETA Chacon  Encounter Date: 2024   Encounter department: [facility]  :  Assessment & Plan  Class 3 severe obesity due to excess calories with serious comorbidity and body mass index (BMI) of 45.0 to 49.9 in adult (HCC)  Prior Authorization Clinical Questions for Weight Management Pharmacotherapy    1. Does the patient have a contrainidcation to medication prescribed for weight management?: No  2. Does the patient have a diagnosis of obesity, confirmed by a BMI greater than or equal to 30 kg/m^2?: Yes  3. Does the patient have a BMI of greater than or equal to 27 kg/m^2 with at least one weight-related comorbidity/risk factor/complication (e.g. diabetes, dyslipidemia, coronary artery disease)?: Yes  5. Has the patient been on a weight loss regimen of low-calorie diet, increased physical activity, and lifestyle modifications for a minimum of 6 months?: Yes  6. Has the patient completed a comprehensive weight loss program (ie, Weight Watchers, Noom, Bariatrics, other jessica on phone)? If so, what?: No  7. Does the patient have a history of type 2 diabetes?: No  8. Has the member tried and failed other weight loss medication within the past 12 months?: Yes   -- Q8 Further explanation: Wegovy   9. Will the member use requested medication in combination with another GLP agonist or weight loss drug?: No  10. Is the medication a controlled substance?: No     Baseline weight (in pounds): 291 lbs  Current weight (in pounds): 251 lbs  Weight loss percentage: -13.75%         Orders:    Zepbound 2.5 MG/0.5ML auto-injector; Inject 0.5 mL (2.5 mg total) under the skin once a week    Zepbound 5 MG/0.5ML auto-injector; Inject 0.5 mL (5 mg total) under the skin once a week for 28 days Do not start before 2025.    Zepbound 7.5 MG/0.5ML auto-injector; Inject 0.5 mL (7.5 mg total) under the skin once a week  for 28 days Do not start before January 29, 2025.    Zepbound 10 MG/0.5ML auto-injector; Inject 0.5 mL (10 mg total) under the skin once a week for 28 days Do not start before February 26, 2025.    Zepbound 12.5 MG/0.5ML auto-injector; Inject 0.5 mL (12.5 mg total) under the skin once a week for 28 days Do not start before March 26, 2025.    Zepbound 15 MG/0.5ML auto-injector; Inject 0.5 mL (15 mg total) under the skin once a week Do not start before April 23, 2025.           History of Present Illness     49 y.o.female presenting with desire to discuss weight loss medications. She has been having abdominal pains and constipation issues since starting on wegovy. She did some research and saw that Zepbound had a lower adverse effect occurrence. She would like to switch from Wegovy to Zepbound.       Review of Systems   Constitutional: Negative.    HENT: Negative.     Respiratory: Negative.     Cardiovascular: Negative.    Gastrointestinal:  Positive for abdominal pain and nausea. Negative for abdominal distention.   Genitourinary: Negative.    Musculoskeletal: Negative.    Skin: Negative.    Neurological: Negative.    Psychiatric/Behavioral: Negative.       Current Outpatient Medications on File Prior to Visit   Medication Sig Dispense Refill    albuterol (PROVENTIL HFA,VENTOLIN HFA) 90 mcg/act inhaler inhale 1 puff by mouth and INTO THE LUNGS every 4 hours if needed for wheezing      clobetasol (TEMOVATE) 0.05 % external solution Apply topically 2 (two) times a day To scalp, keep from dripping down face or into eyes 50 mL 6    dicyclomine (BENTYL) 10 mg capsule Take 1 capsule (10 mg total) by mouth 3 (three) times a day before meals 16 capsule 0    fluticasone-vilanterol (Breo Ellipta) 200-25 mcg/actuation inhaler Inhale 1 puff daily Rinse mouth after use. 180 blister 0    ketoconazole (NIZORAL) 2 % cream Apply topically twice a day when rash is active, apply topically twice a week when not active 60 g 11     "ketoconazole (NIZORAL) 2 % shampoo Apply to damp facial skin and scalp once daily. Let sit for 5 minutes before rinsing out. Can use regular shampoo/face wash after using. 120 mL 6    levothyroxine 75 mcg tablet take 1 tablet by mouth daily every morning 90 tablet 1    metoclopramide (Reglan) 10 mg tablet Take 1 tablet (10 mg total) by mouth 4 (four) times a day as needed (nausea) 40 tablet 1    metoprolol tartrate (LOPRESSOR) 50 mg tablet Take 1 tablet (50 mg total) by mouth 2 (two) times a day 180 tablet 1    tacrolimus (PROTOPIC) 0.1 % ointment apply to inflamed face and scalp once daily until inflammation calms. Can repeat treatment. 100 g 3    [DISCONTINUED] Semaglutide-Weight Management (Wegovy) 1 MG/0.5ML Inject 1 mg under the skin weekly (Patient not taking: Reported on 12/4/2024) 2 mL 0     No current facility-administered medications on file prior to visit.         Objective   /78 (BP Location: Left arm, Patient Position: Sitting, Cuff Size: Large)   Pulse 70   Temp 97.6 °F (36.4 °C) (Temporal)   Ht 5' 4\" (1.626 m)   Wt 114 kg (251 lb)   LMP 11/21/2024 (Approximate)   SpO2 98%   BMI 43.08 kg/m² (Reviewed)     Physical Exam  Vitals reviewed.   Constitutional:       General: She is not in acute distress.     Appearance: She is not ill-appearing.   HENT:      Head: Normocephalic and atraumatic.      Right Ear: External ear normal.      Left Ear: External ear normal.   Eyes:      Extraocular Movements: Extraocular movements intact.      Conjunctiva/sclera: Conjunctivae normal.      Pupils: Pupils are equal, round, and reactive to light.   Cardiovascular:      Rate and Rhythm: Normal rate and regular rhythm.      Heart sounds: Normal heart sounds.   Pulmonary:      Effort: Pulmonary effort is normal.      Breath sounds: Normal breath sounds.   Abdominal:      General: Abdomen is flat. Bowel sounds are normal. There is no distension.      Palpations: Abdomen is soft.      Tenderness: There is no " abdominal tenderness.   Skin:     General: Skin is warm and dry.      Capillary Refill: Capillary refill takes less than 2 seconds.   Neurological:      Mental Status: She is alert and oriented to person, place, and time.   Psychiatric:         Mood and Affect: Mood normal.         Behavior: Behavior normal.

## 2024-12-05 ENCOUNTER — TELEPHONE (OUTPATIENT)
Age: 49
End: 2024-12-05

## 2024-12-05 NOTE — TELEPHONE ENCOUNTER
PA for ZEPBOUND 2.5MG SUBMITTED to [facility]    via    [x]CMM-KEY:       [x]PA sent as URGENT    All office notes, labs and other pertaining documents and studies sent. Clinical questions answered. Awaiting determination from insurance company.     Turnaround time for your insurance to make a decision on your Prior Authorization can take 7-21 business days.

## 2024-12-06 NOTE — TELEPHONE ENCOUNTER
Patient called to follow up on prior authorization for her Zepbound. Advised was received and will receive a call when determination is made

## 2024-12-10 NOTE — TELEPHONE ENCOUNTER
PA for ZEPBOUND 2.5MG- APPROVED     Date(s) approved December 6, 2024 to June 6, 2025       Patient advised by          []MyChart Message  [x]Phone call   []LMOM  []L/M to call office as no active Communication consent on file  []Unable to leave detailed message as VM not approved on Communication consent       Pharmacy advised by    [x]Fax  []Phone call    Approval letter scanned into [employer] YES

## 2024-12-30 ENCOUNTER — TELEPHONE (OUTPATIENT)
Dept: HEMATOLOGY ONCOLOGY | Facility: CLINIC | Age: 49
End: 2024-12-30

## 2024-12-30 NOTE — TELEPHONE ENCOUNTER
LM for patient that due to a change in Dr. Negro's schedule the appt. On 1/6/25 has been rescheduled to 2/3/25.

## 2025-01-23 ENCOUNTER — HOSPITAL ENCOUNTER (OUTPATIENT)
Dept: PULMONOLOGY | Facility: HOSPITAL | Age: 50
End: 2025-01-23
Payer: MEDICARE

## 2025-01-23 DIAGNOSIS — J45.40 MODERATE PERSISTENT ASTHMA WITHOUT COMPLICATION: ICD-10-CM

## 2025-01-23 PROCEDURE — 94618 PULMONARY STRESS TESTING: CPT

## 2025-01-23 PROCEDURE — 94618 PULMONARY STRESS TESTING: CPT | Performed by: INTERNAL MEDICINE

## 2025-01-23 PROCEDURE — 94729 DIFFUSING CAPACITY: CPT | Performed by: INTERNAL MEDICINE

## 2025-01-23 PROCEDURE — 94060 EVALUATION OF WHEEZING: CPT

## 2025-01-23 PROCEDURE — 94729 DIFFUSING CAPACITY: CPT

## 2025-01-23 PROCEDURE — 94726 PLETHYSMOGRAPHY LUNG VOLUMES: CPT

## 2025-01-23 PROCEDURE — 94060 EVALUATION OF WHEEZING: CPT | Performed by: INTERNAL MEDICINE

## 2025-01-23 PROCEDURE — 94726 PLETHYSMOGRAPHY LUNG VOLUMES: CPT | Performed by: INTERNAL MEDICINE

## 2025-01-23 RX ORDER — ALBUTEROL SULFATE 0.83 MG/ML
2.5 SOLUTION RESPIRATORY (INHALATION) ONCE
Status: COMPLETED | OUTPATIENT
Start: 2025-01-23 | End: 2025-01-23

## 2025-01-23 RX ADMIN — ALBUTEROL SULFATE 2.5 MG: 2.5 SOLUTION RESPIRATORY (INHALATION) at 09:40

## 2025-01-27 ENCOUNTER — APPOINTMENT (OUTPATIENT)
Dept: LAB | Facility: CLINIC | Age: 50
End: 2025-01-27
Payer: MEDICARE

## 2025-01-27 DIAGNOSIS — D72.829 LEUKOCYTOSIS, UNSPECIFIED TYPE: ICD-10-CM

## 2025-01-27 LAB
ALBUMIN SERPL BCG-MCNC: 4.1 G/DL (ref 3.5–5)
ALP SERPL-CCNC: 56 U/L (ref 34–104)
ALT SERPL W P-5'-P-CCNC: 16 U/L (ref 7–52)
ANION GAP SERPL CALCULATED.3IONS-SCNC: 6 MMOL/L (ref 4–13)
AST SERPL W P-5'-P-CCNC: 15 U/L (ref 13–39)
BASOPHILS # BLD AUTO: 0.08 THOUSANDS/ΜL (ref 0–0.1)
BASOPHILS NFR BLD AUTO: 1 % (ref 0–1)
BILIRUB SERPL-MCNC: 0.72 MG/DL (ref 0.2–1)
BUN SERPL-MCNC: 15 MG/DL (ref 5–25)
CALCIUM SERPL-MCNC: 9.8 MG/DL (ref 8.4–10.2)
CHLORIDE SERPL-SCNC: 105 MMOL/L (ref 96–108)
CO2 SERPL-SCNC: 26 MMOL/L (ref 21–32)
CREAT SERPL-MCNC: 0.76 MG/DL (ref 0.6–1.3)
CRP SERPL QL: 16.2 MG/L
EOSINOPHIL # BLD AUTO: 0.18 THOUSAND/ΜL (ref 0–0.61)
EOSINOPHIL NFR BLD AUTO: 2 % (ref 0–6)
ERYTHROCYTE [DISTWIDTH] IN BLOOD BY AUTOMATED COUNT: 16.5 % (ref 11.6–15.1)
ERYTHROCYTE [SEDIMENTATION RATE] IN BLOOD: 40 MM/HOUR (ref 0–19)
GFR SERPL CREATININE-BSD FRML MDRD: 93 ML/MIN/1.73SQ M
GLUCOSE P FAST SERPL-MCNC: 101 MG/DL (ref 65–99)
HCT VFR BLD AUTO: 44.2 % (ref 34.8–46.1)
HGB BLD-MCNC: 13.5 G/DL (ref 11.5–15.4)
IMM GRANULOCYTES # BLD AUTO: 0.02 THOUSAND/UL (ref 0–0.2)
IMM GRANULOCYTES NFR BLD AUTO: 0 % (ref 0–2)
LDH SERPL-CCNC: 112 U/L (ref 140–271)
LYMPHOCYTES # BLD AUTO: 3.55 THOUSANDS/ΜL (ref 0.6–4.47)
LYMPHOCYTES NFR BLD AUTO: 35 % (ref 14–44)
MAGNESIUM SERPL-MCNC: 2.1 MG/DL (ref 1.9–2.7)
MCH RBC QN AUTO: 21.7 PG (ref 26.8–34.3)
MCHC RBC AUTO-ENTMCNC: 30.5 G/DL (ref 31.4–37.4)
MCV RBC AUTO: 71 FL (ref 82–98)
MONOCYTES # BLD AUTO: 0.69 THOUSAND/ΜL (ref 0.17–1.22)
MONOCYTES NFR BLD AUTO: 7 % (ref 4–12)
NEUTROPHILS # BLD AUTO: 5.64 THOUSANDS/ΜL (ref 1.85–7.62)
NEUTS SEG NFR BLD AUTO: 55 % (ref 43–75)
NRBC BLD AUTO-RTO: 0 /100 WBCS
PLATELET # BLD AUTO: 439 THOUSANDS/UL (ref 149–390)
PMV BLD AUTO: 9.5 FL (ref 8.9–12.7)
POTASSIUM SERPL-SCNC: 4.3 MMOL/L (ref 3.5–5.3)
PROT SERPL-MCNC: 7.3 G/DL (ref 6.4–8.4)
RBC # BLD AUTO: 6.22 MILLION/UL (ref 3.81–5.12)
SODIUM SERPL-SCNC: 137 MMOL/L (ref 135–147)
WBC # BLD AUTO: 10.16 THOUSAND/UL (ref 4.31–10.16)

## 2025-01-27 PROCEDURE — 80053 COMPREHEN METABOLIC PANEL: CPT

## 2025-01-27 PROCEDURE — 83735 ASSAY OF MAGNESIUM: CPT

## 2025-01-27 PROCEDURE — 85025 COMPLETE CBC W/AUTO DIFF WBC: CPT

## 2025-01-27 PROCEDURE — 83615 LACTATE (LD) (LDH) ENZYME: CPT

## 2025-01-27 PROCEDURE — 86140 C-REACTIVE PROTEIN: CPT

## 2025-01-27 PROCEDURE — 85652 RBC SED RATE AUTOMATED: CPT

## 2025-01-27 PROCEDURE — 36415 COLL VENOUS BLD VENIPUNCTURE: CPT

## 2025-01-29 DIAGNOSIS — E66.813 CLASS 3 SEVERE OBESITY DUE TO EXCESS CALORIES WITH SERIOUS COMORBIDITY AND BODY MASS INDEX (BMI) OF 45.0 TO 49.9 IN ADULT (HCC): ICD-10-CM

## 2025-01-29 DIAGNOSIS — E66.01 CLASS 3 SEVERE OBESITY DUE TO EXCESS CALORIES WITH SERIOUS COMORBIDITY AND BODY MASS INDEX (BMI) OF 45.0 TO 49.9 IN ADULT (HCC): ICD-10-CM

## 2025-01-30 RX ORDER — TIRZEPATIDE 5 MG/.5ML
INJECTION, SOLUTION SUBCUTANEOUS
Qty: 2 ML | Refills: 0 | Status: SHIPPED | OUTPATIENT
Start: 2025-01-30

## 2025-02-02 NOTE — ASSESSMENT & PLAN NOTE
To date, negative workup     Labs 5/2024 with WBC 9.1  618/2024 were 17.2 but had acute event with N/V      1/27/2025  WBC 10.16 ANC 5640 Hgb 13.5 plts 439   ESR 40 (was 35, 52) and CRP decreased to 16.2 (was 30, 27.5)    Had negative rheum workup    May have underlying fibromylagia which is a diagnosis of exclusion-has occasional wrist/hand pain    WBC normal at this appointment

## 2025-02-02 NOTE — ASSESSMENT & PLAN NOTE
Noted; MCV 68      MCV 71     Summary/Recommendations:    Stable blood work    Repeat labs in 6 months    Follow up 6 months

## 2025-02-02 NOTE — PROGRESS NOTES
Name: Elissa Leal      : 1975      MRN: [id number]  Encounter Provider: Ingrid Negro MD  Encounter Date: 2/3/2025   Encounter department: [facility]  :  Assessment & Plan  Leukocytosis, unspecified type    To date, negative workup     Labs 2024 with WBC 9.1  618/ were 17.2 but had acute event with N/V      2025  WBC 10.16 ANC 5640 Hgb 13.5 plts 439   ESR 40 (was 35, 52) and CRP decreased to 16.2 (was 30, 27.5)    Had negative rheum workup    May have underlying fibromylagia which is a diagnosis of exclusion-has occasional wrist/hand pain    WBC normal at this appointment   Thrombocytosis    Plts normal count at 429 2024 and 475 2024     Follow      Plts 439 on 2025 WNL  Beta thalassemia minor     Noted; MCV 68      MCV 71     Summary/Recommendations:    Stable blood work    Repeat labs in 6 months    Follow up 6 months       History of Present Illness   No chief complaint on file.    HPI  Alexsandra Garcia      Eli Leal is a 48-year-old female with past medical history significant for hypothyroidism and thalassemia.  She was previously following with hematology for leukocytosis/thrombocytosis.  This was thought to be due to chronic inflammation due to chronically elevated sed rate and CRP.  Prior workup with flow cytometry, BCR/ABL was negative.  Workup for underlying myeloproliferative disorder with JAK2, CALR, MPL was negative.       Her counts remain mildly elevated but stable and at her baseline.  She continues to remain asymptomatic.  At this juncture, recommend continued monitoring.  She will follow-up in the office in 6 months with CBC prior.  Patient aware to contact the office in the interim     Dr العلي Dec 2023     Eli Leal is a 48-year-old female with past medical history significant for hypothyroidism and thalassemia.  She was previously following with SANGEETA Floyd for microcytosis, thrombocytosis and leukocytosis.  She  was last evaluated in the office on 5/3/2023.  She presents today for transfer of care/follow-up visit.     Pt states she is doing well. Has intermittent joint pains. Notes fatigue worsened around her menstrual cycle.   Denies any fever or weight loss. Intermittent abd pain sometimes associated with certain foods. Has hx of chronic colitis        Interval History: 6/24/2024     Now 49 year old with the above issues with thalassemia, leukocytosis/thrombocytosis with no etiology other than reactive.  She continues to complain of joint issues hands/feet.  She has had a Rheum workup in the past which was negative.  All testing for underlying MPN including CML were negative.  She had a recent ER admission for N/V with en elevated WBC 17.2 6/18/2024; was 9.1 on 5/28/2024 with a normal differential.  She had recent skin biopsies read at [facility] with  granulomatous reaction right cheek/scalp.  She states that all her issues started 2 years ago post COVID infection     Interval History 2/3/2025    Continues with joint/hand/feet issues.  Had negative Rheum workup in past.  Labs are stable with normal WBC and plts.  No other active issues/changes      Pertinent Medical History   02/01/25:     Review of Systems   Constitutional:  Positive for fatigue.   HENT: Negative.     Respiratory: Negative.     Cardiovascular: Negative.    Gastrointestinal: Negative.    Genitourinary: Negative.    Musculoskeletal: Negative.    Skin: Negative.    Allergic/Immunologic: Negative.    Neurological: Negative.    Hematological: Negative.    Psychiatric/Behavioral: Negative.             Objective     114/80 weight 255 pounds   Pain Screening:     ECOG   0  Physical Exam  Vitals reviewed.   Constitutional:       Appearance: Normal appearance.   HENT:      Head: Normocephalic.      Nose: Nose normal.   Eyes:      Pupils: Pupils are equal, round, and reactive to light.   Cardiovascular:      Rate and Rhythm: Normal rate.   Pulmonary:      Effort:  Pulmonary effort is normal.   Abdominal:      General: Bowel sounds are normal.   Musculoskeletal:         General: Normal range of motion.      Cervical back: Normal range of motion.   Skin:     General: Skin is warm.   Neurological:      General: No focal deficit present.      Mental Status: She is alert.   Psychiatric:         Mood and Affect: Mood normal.         Labs: I have reviewed the following labs:  Lab Results   Component Value Date/Time    WBC 10.16 01/27/2025 08:44 AM    RBC 6.22 (H) 01/27/2025 08:44 AM    Hemoglobin 13.5 01/27/2025 08:44 AM    Hematocrit 44.2 01/27/2025 08:44 AM    MCV 71 (L) 01/27/2025 08:44 AM    MCH 21.7 (L) 01/27/2025 08:44 AM    RDW 16.5 (H) 01/27/2025 08:44 AM    Platelets 439 (H) 01/27/2025 08:44 AM    Segmented % 55 01/27/2025 08:44 AM    Lymphocytes % 35 01/27/2025 08:44 AM    Monocytes % 7 01/27/2025 08:44 AM    Eosinophils Relative 2 01/27/2025 08:44 AM    Basophils Relative 1 01/27/2025 08:44 AM    Immature Grans % 0 01/27/2025 08:44 AM    Absolute Neutrophils 5.64 01/27/2025 08:44 AM     Lab Results   Component Value Date/Time    Potassium 4.3 01/27/2025 08:44 AM    Chloride 105 01/27/2025 08:44 AM    CO2 26 01/27/2025 08:44 AM    BUN 15 01/27/2025 08:44 AM    Creatinine 0.76 01/27/2025 08:44 AM    Glucose, Fasting 101 (H) 01/27/2025 08:44 AM    Calcium 9.8 01/27/2025 08:44 AM    AST 15 01/27/2025 08:44 AM    ALT 16 01/27/2025 08:44 AM    Alkaline Phosphatase 56 01/27/2025 08:44 AM    Total Protein 7.3 01/27/2025 08:44 AM    Albumin 4.1 01/27/2025 08:44 AM    Total Bilirubin 0.72 01/27/2025 08:44 AM    eGFR 93 01/27/2025 08:44 AM             Administrative Statements   I have spent a total time of 30 minutes in caring for this patient on the day of the visit/encounter including Impressions, Counseling / Coordination of care, Documenting in the medical record, Reviewing / ordering tests, medicine, procedures  , and Obtaining or reviewing history  .

## 2025-02-03 ENCOUNTER — OFFICE VISIT (OUTPATIENT)
Dept: HEMATOLOGY ONCOLOGY | Facility: CLINIC | Age: 50
End: 2025-02-03
Payer: MEDICARE

## 2025-02-03 VITALS
DIASTOLIC BLOOD PRESSURE: 80 MMHG | HEIGHT: 64 IN | BODY MASS INDEX: 43.62 KG/M2 | TEMPERATURE: 97.4 F | HEART RATE: 77 BPM | RESPIRATION RATE: 18 BRPM | OXYGEN SATURATION: 96 % | SYSTOLIC BLOOD PRESSURE: 114 MMHG | WEIGHT: 255.5 LBS

## 2025-02-03 DIAGNOSIS — D75.839 THROMBOCYTOSIS: Primary | ICD-10-CM

## 2025-02-03 DIAGNOSIS — D56.3 BETA THALASSEMIA MINOR: ICD-10-CM

## 2025-02-03 DIAGNOSIS — D72.829 LEUKOCYTOSIS, UNSPECIFIED TYPE: ICD-10-CM

## 2025-02-03 PROCEDURE — 99214 OFFICE O/P EST MOD 30 MIN: CPT | Performed by: INTERNAL MEDICINE

## 2025-02-03 RX ORDER — AZELASTINE HYDROCHLORIDE 137 UG/1
SPRAY, METERED NASAL
COMMUNITY
Start: 2025-01-22

## 2025-02-10 ENCOUNTER — TELEPHONE (OUTPATIENT)
Age: 50
End: 2025-02-10

## 2025-02-10 ENCOUNTER — NURSE TRIAGE (OUTPATIENT)
Age: 50
End: 2025-02-10

## 2025-02-10 DIAGNOSIS — E66.01 CLASS 3 SEVERE OBESITY DUE TO EXCESS CALORIES WITH SERIOUS COMORBIDITY AND BODY MASS INDEX (BMI) OF 45.0 TO 49.9 IN ADULT (HCC): ICD-10-CM

## 2025-02-10 DIAGNOSIS — E66.813 CLASS 3 SEVERE OBESITY DUE TO EXCESS CALORIES WITH SERIOUS COMORBIDITY AND BODY MASS INDEX (BMI) OF 45.0 TO 49.9 IN ADULT (HCC): Primary | ICD-10-CM

## 2025-02-10 DIAGNOSIS — E66.813 CLASS 3 SEVERE OBESITY DUE TO EXCESS CALORIES WITH SERIOUS COMORBIDITY AND BODY MASS INDEX (BMI) OF 45.0 TO 49.9 IN ADULT (HCC): ICD-10-CM

## 2025-02-10 DIAGNOSIS — E66.01 CLASS 3 SEVERE OBESITY DUE TO EXCESS CALORIES WITH SERIOUS COMORBIDITY AND BODY MASS INDEX (BMI) OF 45.0 TO 49.9 IN ADULT (HCC): Primary | ICD-10-CM

## 2025-02-10 RX ORDER — TIRZEPATIDE 7.5 MG/.5ML
7.5 INJECTION, SOLUTION SUBCUTANEOUS WEEKLY
Qty: 2 ML | Refills: 0 | Status: SHIPPED | OUTPATIENT
Start: 2025-02-10 | End: 2025-02-10 | Stop reason: SDUPTHER

## 2025-02-10 RX ORDER — TIRZEPATIDE 7.5 MG/.5ML
7.5 INJECTION, SOLUTION SUBCUTANEOUS WEEKLY
Qty: 2 ML | Refills: 0 | Status: SHIPPED | OUTPATIENT
Start: 2025-02-10 | End: 2025-02-10 | Stop reason: CLARIF

## 2025-02-10 RX ORDER — TIRZEPATIDE 7.5 MG/.5ML
7.5 INJECTION, SOLUTION SUBCUTANEOUS WEEKLY
Qty: 2 ML | Refills: 0 | Status: SHIPPED | OUTPATIENT
Start: 2025-02-10

## 2025-02-10 NOTE — TELEPHONE ENCOUNTER
Pt called again about this. She said the 7.5mg went to the wrong pharmacy. Call hub message sent to nurse to change to [facility].

## 2025-02-10 NOTE — TELEPHONE ENCOUNTER
Patient calling to f/u about   Zepbound 5 MG/0.5ML auto-injector. States she went to give herself her dose 2/10/25 and when she dispensed it there was a delayed response and the medication sprayed out. Because of that she is now a week short a dose. She did notify the  and made them aware of the faulty one. She is unsure how to proceed now. Does she take her last dose of 5 mg and move to 7.5 as prescribed or does she need to repeat 4 more whole doses of the 5 because of missing it. Patient reports no side effects and about a pound a week weight loss so feels she is ready to move to the 7.5 mg. Please reach out to patient with provider response.    RN did resend in prescription of Zepbound 7.5 mg/0.5 ml to the [facility] a while.

## 2025-02-10 NOTE — TELEPHONE ENCOUNTER
"Patient requesting prescription for   tirzepatide (Zepbound) 7.5 mg/0.5 mL auto-injector be resent to [facility] in [place]. RN addressed as per protocol.     Reason for Disposition  • Prescription not at pharmacy and was prescribed by PCP recently  (Exception: triager has access to EMR and prescription is recorded there. Go to Home Care and confirm for pharmacy.)    Answer Assessment - Initial Assessment Questions  1. NAME of MEDICINE: \"What medicine(s) are you calling about?\"  tirzepatide (Zepbound) 7.5 mg/0.5 mL auto-injector   2. QUESTION: \"What is your question?\" (e.g., double dose of medicine, side effect)      Prescription sent to wrong pharmacy-requesting it be resent to [facility]   3. PRESCRIBER: \"Who prescribed the medicine?\" Reason: if prescribed by specialist, call should be referred to that group.      Gretchen RUTHERFORD    Protocols used: Medication Question Call-Adult-[place]    "

## 2025-02-10 NOTE — TELEPHONE ENCOUNTER
Patient reports that she is taking the 5 mg of Zepbound with no side effects, but is not losing more than 1 lb a week (week 7 of Zepbound and only lost 7 lbs) even though she is continuing her exercise regiment of walking 2 miles a day and lifting weights. She is supposed to have an injection today. Last week she had a problem with the pen and it didn't disburse as expected and went all over the floor. (She noted that she called the .) This leaves her with only 3 pens for the month.    Her question is whether her dose should be increased to 7.5 mg since she is not seeing much weight loss. She experiences lowered appetite the first 2 days, but then goes back to normal levels. If Gretchen wants her to continue with 5 mg, she will need a new script including for the missed dose.    Please call her to advise. Thank you.

## 2025-02-13 ENCOUNTER — TELEPHONE (OUTPATIENT)
Age: 50
End: 2025-02-13

## 2025-02-13 NOTE — TELEPHONE ENCOUNTER
PA for zepbound 7.5mg SUBMITTED to [employer]    via    [x]CMM-KEY: [id number]  []Surescripts-Case ID #   []Availity-Auth ID # NDC #   []Faxed to plan   []Other website   []Phone call Case ID #     [x]PA sent as URGENT    All office notes, labs and other pertaining documents and studies sent. Clinical questions answered. Awaiting determination from insurance company.     Turnaround time for your insurance to make a decision on your Prior Authorization can take 7-21 business days.

## 2025-02-17 NOTE — TELEPHONE ENCOUNTER
PA for zepbound 7.5mg NOT REQUIRED     Reason (screenshot if applicable)          Patient advised by          [] MyChart Message  [] Phone call   []LMOM  []L/M to call office as no active Communication consent on file  []Unable to leave detailed message as VM not approved on Communication consent       Pharmacy advised by    []Fax  []Phone call   Patient picked up 2/10/25

## 2025-02-24 DIAGNOSIS — E66.01 CLASS 3 SEVERE OBESITY DUE TO EXCESS CALORIES WITH SERIOUS COMORBIDITY AND BODY MASS INDEX (BMI) OF 45.0 TO 49.9 IN ADULT (HCC): ICD-10-CM

## 2025-02-24 DIAGNOSIS — E66.813 CLASS 3 SEVERE OBESITY DUE TO EXCESS CALORIES WITH SERIOUS COMORBIDITY AND BODY MASS INDEX (BMI) OF 45.0 TO 49.9 IN ADULT (HCC): ICD-10-CM

## 2025-02-25 RX ORDER — TIRZEPATIDE 7.5 MG/.5ML
7.5 INJECTION, SOLUTION SUBCUTANEOUS WEEKLY
Qty: 2 ML | Refills: 0 | OUTPATIENT
Start: 2025-02-25

## 2025-03-05 DIAGNOSIS — E66.813 CLASS 3 SEVERE OBESITY DUE TO EXCESS CALORIES WITH SERIOUS COMORBIDITY AND BODY MASS INDEX (BMI) OF 45.0 TO 49.9 IN ADULT (HCC): ICD-10-CM

## 2025-03-05 DIAGNOSIS — E66.01 CLASS 3 SEVERE OBESITY DUE TO EXCESS CALORIES WITH SERIOUS COMORBIDITY AND BODY MASS INDEX (BMI) OF 45.0 TO 49.9 IN ADULT (HCC): ICD-10-CM

## 2025-03-05 RX ORDER — TIRZEPATIDE 7.5 MG/.5ML
7.5 INJECTION, SOLUTION SUBCUTANEOUS WEEKLY
Qty: 2 ML | Refills: 0 | Status: SHIPPED | OUTPATIENT
Start: 2025-03-05

## 2025-03-11 ENCOUNTER — OFFICE VISIT (OUTPATIENT)
Dept: DERMATOLOGY | Facility: CLINIC | Age: 50
End: 2025-03-11
Payer: MEDICARE

## 2025-03-11 DIAGNOSIS — L72.0 MILIUM: ICD-10-CM

## 2025-03-11 DIAGNOSIS — L64.9 ANDROGENETIC ALOPECIA: Primary | ICD-10-CM

## 2025-03-11 DIAGNOSIS — L21.9 SEBORRHEIC DERMATITIS: ICD-10-CM

## 2025-03-11 DIAGNOSIS — L85.3 XEROSIS OF SKIN: ICD-10-CM

## 2025-03-11 PROCEDURE — 99213 OFFICE O/P EST LOW 20 MIN: CPT | Performed by: DERMATOLOGY

## 2025-03-11 RX ORDER — KETOCONAZOLE 20 MG/G
CREAM TOPICAL DAILY
Qty: 60 G | Refills: 3 | Status: SHIPPED | OUTPATIENT
Start: 2025-03-11

## 2025-03-11 NOTE — PROGRESS NOTES
"[facility] Note     Patient Name: Elissa Leal  Encounter Date: 03/11/2025     Have you been cared for by a [facility] Dermatologist in the last 3 years and, if so, which description applies to you?    Yes.  I have been here within the last 3 years, and my medical history has NOT changed since that time.  I am FEMALE/of child-bearing potential.    REVIEW OF SYSTEMS:  Have you recently had or currently have any of the following? No changes in my recent health.   PAST MEDICAL HISTORY:  Have you personally ever had or currently have any of the following?  If \"YES,\" then please provide more detail. No changes in my medical history.   HISTORY OF IMMUNOSUPPRESSION: Do you have a history of any of the following:  Systemic Immunosuppression such as Diabetes, Biologic or Immunotherapy, Chemotherapy, Organ Transplantation, Bone Marrow Transplantation or Prednisone?  No     Answering \"YES\" requires the addition of the dotphrase \"IMMUNOSUPPRESSED\" as the first diagnosis of the patient's visit.   FAMILY HISTORY:  Any \"first degree relatives\" (parent, brother, sister, or child) with the following?    No changes in my family's known health.   PATIENT EXPERIENCE:    Do you want the Dermatologist to perform a COMPLETE skin exam today including a clinical examination under the \"bra and underwear\" areas?  NO  If necessary, do we have your permission to call and leave a detailed message on your Preferred Phone number that includes your specific medical information?  Yes      No Known Allergies   Current Outpatient Medications:     albuterol (PROVENTIL HFA,VENTOLIN HFA) 90 mcg/act inhaler, inhale 1 puff by mouth and INTO THE LUNGS every 4 hours if needed for wheezing, Disp: , Rfl:     Azelastine HCl 137 MCG/SPRAY SOLN, inhale 1 to 2 sprays into each nostril twice a day if needed, Disp: , Rfl:     clobetasol (TEMOVATE) 0.05 % external solution, Apply topically 2 (two) times a day To scalp, keep from dripping down " face or into eyes, Disp: 50 mL, Rfl: 6    dicyclomine (BENTYL) 10 mg capsule, Take 1 capsule (10 mg total) by mouth 3 (three) times a day before meals, Disp: 16 capsule, Rfl: 0    fluticasone-vilanterol (Breo Ellipta) 200-25 mcg/actuation inhaler, Inhale 1 puff daily Rinse mouth after use., Disp: 180 blister, Rfl: 0    ketoconazole (NIZORAL) 2 % cream, Apply topically twice a day when rash is active, apply topically twice a week when not active, Disp: 60 g, Rfl: 11    ketoconazole (NIZORAL) 2 % shampoo, Apply to damp facial skin and scalp once daily. Let sit for 5 minutes before rinsing out. Can use regular shampoo/face wash after using., Disp: 120 mL, Rfl: 6    levothyroxine 75 mcg tablet, take 1 tablet by mouth daily every morning, Disp: 90 tablet, Rfl: 1    metoclopramide (Reglan) 10 mg tablet, Take 1 tablet (10 mg total) by mouth 4 (four) times a day as needed (nausea), Disp: 40 tablet, Rfl: 1    metoprolol tartrate (LOPRESSOR) 50 mg tablet, Take 1 tablet (50 mg total) by mouth 2 (two) times a day, Disp: 180 tablet, Rfl: 1    tacrolimus (PROTOPIC) 0.1 % ointment, apply to inflamed face and scalp once daily until inflammation calms. Can repeat treatment., Disp: 100 g, Rfl: 3    tirzepatide (Zepbound) 7.5 mg/0.5 mL auto-injector, Inject 0.5 mL (7.5 mg total) under the skin once a week, Disp: 2 mL, Rfl: 0          Whom besides the patient is providing clinical information about today's encounter?   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 Patient is currently using rogaine over the counter and ketoconazole shampoo  with improvement.      Assessment and Plan:  Based on a thorough discussion of this condition and the management approach to it (including a comprehensive discussion of the known risks, side effects and potential benefits of treatment), the patient (family) agrees to implement the following specific plan:  Discussed treatment options, such as:  over the counter rogaine (minoxidil) 5% foam. Use one cap full a day on dry hair, part hair and apply directly to scalp. Do not do too close to bedtime. Need to do this daily. If you stop abruptly, you will lose hair. Can taper off if you don't like it.   Could do laser therapy like laser caps or keating (examples: capillus, laser hair max). Buy over the counter, online.  Cosmetic treatments like platelet rich plasma (PRP) do it monthly for 3 months. Dr. Bentley in our practice does this.  Hair transplant surgery   Hair fibers, put on topically so it looks more full (example topix)    Plan to continue rogaine over the counter and ketoconazole shampoo,         SEBORRHEIC DERMATITIS, possible of rosacea and perioral dermatitis     Physical Exam:  Anatomic Location Affected:  nose and cheeks  Morphological Description:  erythema with pink papule  Pertinent Positives:  Pertinent Negatives:    Additional History of Present Condition:  patient is possible using ketoconazole cream with improvement.     Assessment and Plan:  Based on a thorough discussion of this condition and the management approach to it (including a comprehensive discussion of the known risks, side effects and potential benefits of treatment), the patient (family) agrees to implement the following specific plan:  Continue ketoconazole cream as needed  Will consider protopic 0.1% ointment vs triple cream if ketoconazole does not work       Seborrheic Dermatitis   Seborrheic dermatitis is a common, chronic or relapsing form of eczema/dermatitis that mainly affects the sebaceous, gland-rich regions of the scalp, face,  "and trunk.  There are infantile and adult forms of seborrhoeic dermatitis. It is sometimes associated with psoriasis and, in that clinical scenario, may be referred to as \"sebo-psoriasis.\"  Seborrheic dermatitis is also known as \"seborrheic eczema.\"  Dandruff (also called \"pityriasis capitis\") is an uninflamed form of seborrhoeic dermatitis. Dandruff presents as bran-like scaly patches scattered within hair-bearing areas of the scalp.  In an infant, this condition may be referred to as \"cradle cap.\"  The cause of seborrheic dermatitis is not completely understood. It is associated with proliferation of various species of the skin commensal Malassezia, in its yeast (non-pathogenic) form. Its metabolites (such as the fatty acids oleic acid, malssezin, and indole-3-carbaldehyde) may cause an inflammatory reaction. Differences in skin barrier lipid content and function may account for individual presentations.    Infantile Seborrheic Dermatitis  Infantile seborrheic dermatitis affects babies under the age of 3 months and usually resolves by 6-12 months of age.  Infantile seborrheic dermatitis causes \"cradle cap\" (diffuse, greasy scaling on scalp). The rash may spread to affect armpit and groin folds (a type of \"napkin dermatitis\").  There may be associated salmon-pink colored patches that may flake or peel.  The rash in this case is usually not especially itchy, so the baby often appears undisturbed by the rash, even when more generalized.    Adult Seborrheic Dermatitis  Adult seborrheic dermatitis tends to begin in late adolescence; prevalence is greatest in young adults and in the elderly. It is more common in males than in females.    The following factors are sometimes associated with severe adult seborrheic dermatitis:  Oily skin  Familial tendency to seborrhoeic dermatitis or a family history of psoriasis  Immunosuppression: organ transplant recipient, human immunodeficiency virus (HIV) infection and patients with " lymphoma  Neurological and psychiatric diseases: Parkinson disease, tardive dyskinesia, depression, epilepsy, facial nerve palsy, spinal cord injury and congenital disorders such as Down syndrome  Treatment for psoriasis with psoralen and ultraviolet A (PUVA) therapy  Lack of sleep  Stressful events.    In adults, seborrheic dermatitis may typically affect the scalp, face (creases around the nose, behind ears, within eyebrows) and upper trunk. Typical clinical features include:  Winter flares, improving in summer following sun exposure  Minimal itch most of the time  Combination oily and dry mid-facial skin  Ill-defined localized scaly patches or diffuse scale in the scalp  Blepharitis; scaly red eyelid margins  Lloyd-pink, thin, scaly, and ill-defined plaques in skin folds on both sides of the face  Petal or ring-shaped flaky patches on hair-line and on anterior chest  Rash in armpits, under the breasts, in the groin folds and genital creases  Superficial folliculitis (inflamed hair follicles) on cheeks and upper trunk    Seborrheic dermatitis is diagnosed by its clinical appearance and behavior. Skin biopsy may be helpful but is rarely necessary to make this diagnosis.         MILIUM     Physical Exam:  Anatomic Location Affected:  left cheek   Morphological Description:  white papule   Pertinent Positives:  Pertinent Negatives:    Additional History of Present Condition:  present for some time and getting bigger     Assessment and Plan:  Based on a thorough discussion of this condition and the management approach to it (including a comprehensive discussion of the known risks, side effects and potential benefits of treatment), the patient (family) agrees to implement the following specific plan:  Reassured benign     Assessment and Plan  A milium is a small cyst containing keratin (the skin protein); they are usually multiple and are then known as milia. These harmless cysts present as tiny pearly-white bumps  just under the surface of the skin.  Milia are common in all ages and both sexes. They most often arise on the face and are particularly prominent on the eyelids and cheeks, but they may occur elsewhere.  There are various kinds of milia.   milia: Affect 40-50% of  babies, few to numerous lesions, often seen on the nose, but may also arise inside the mouth on the mucosa (Richard pearls) or palate (Spring nodules) or more widely on the scalp, face and upper trunk, Heal spontaneously within a few weeks of birth.  Primary milia in children and adults: found around eyelids, cheeks, forehead and genitalia, in young children, a row of milia may appear along the nasal crease, may clear in a few weeks or persist for months or longer.    Juvenile milia: associated with Rombo syndrome, basal cell naevus syndrome, Dumxq-Xamzo-Xlteebab syndrome, pachyonychia congenita, Hager syndrome and other genetic disorders, may be congenital (present at birth) or appear later in life.  Milia en plaque: multiple milia appear on within an inflamed plaque up to several centimeters in diameter, usually found on an eyelid, behind the ear, on a cheek or jaw.  3. Affect children and adults, especially middle-aged women.  4. Sometimes associated with another skin disease including pseudoxanthoma elasticum, discoid lupus erythematosus, lichen planus.  Multiple eruptive milia: crops of numerous milia appear over a few weeks to months, lesions may be asymptomatic or itchy, most often affect the face, upper arms and upper trunk.  Traumatic milia: occur at the site of injury as skin heals, arise from eccrine sweat ducts, examples include thermal burns, dermabrasion, blistering rashes such as bullous pemphigoid, often seen on the back of hands and fingers in porphyria cutanea tarda, a milia-like calcified nodule may develop after  heel stick blood test.   Milia associated with drugs: may rarely follow the use of topical  "medication, such as phenols, hydroquinone, 5-fluorouracil cream, and a corticosteroid.    Milia have a characteristic appearance. However, on occasion, a skin biopsy may be performed. This shows a small epidermoid cyst coming from a vellus hair follicle.  Milia should be distinguished from other types of cyst, comedones, xanthelasma and syringomas. Colloid milia are ricketts coloured bumps on cheeks and temples associated with excessive exposure to sunlight.  They should also be distinguished from milia-like cysts noted on dermoscopy in seborrhoeic keratoses, papillomatous moles and some basal cell carcinomas.  Milia do not need to be treated unless they are a cause for concern for the patient. They often clear up by themselves within a few months. Where possible, further trauma should be minimised to reduce the development of new lesions.  The lesion may be de-roofed using a sterile needle or blade and the contents squeezed or pricked out.  They may be destroyed using diathermy and curettage, or cryotherapy.  For widespread lesions, topical retinoids may be helpful.  Chemical peels, dermabrasion and laser ablation have been reported to be effective when used for very extensive milia.  Milia en plaque may improve with minocycline (a tetracycline antibiotic).         XEROSIS (\"DRY SKIN\")    Physical Exam:  Anatomic Location Affected:  bilateral elbows and left calf   Morphological Description:  xerosis   Pertinent Positives:  Pertinent Negatives:    Additional History of Present Condition:  present for some time     Assessment and Plan:  Based on a thorough discussion of this condition and the management approach to it (including a comprehensive discussion of the known risks, side effects and potential benefits of treatment), the patient (family) agrees to implement the following specific plan:  Can try UREA over the counter     Dry skin refers to skin that feels dry to touch. Dry skin has a dull surface with a rough, " scaly quality. The skin is less pliable and cracked. When dryness is severe, the skin may become inflamed and fissured.  Although any body site can be dry, dry skin tends to affect the shins more than any other site.    Dry skin is lacking moisture in the outer horny cell layer (stratum corneum) and this results in cracks in the skin surface.  Dry skin is also called xerosis, xeroderma or asteatosis (lack of fat).  It can affect males and females of all ages. There is some racial variability in water and lipid content of the skin.  Dry skin that starts in early childhood may be one of about 20 types of ichthyosis (fish-scale skin). There is often a family history of dry skin.   Dry skin is commonly seen in people with atopic dermatitis.  Nearly everyone > 60 years has dry skin.    Dry skin that begins later may be seen in people with certain diseases and conditions.  Postmenopausal women  Hypothyroidism  Chronic renal disease   Malnutrition and weight loss   Subclinical dermatitis   Treatment with certain drugs such as oral retinoids, diuretics and epidermal growth factor receptor inhibitors    People exposed to a dry environment may experience dry skin.  Low humidity: in desert climates or cool, windy conditions   Excessive air conditioning   Direct heat from a fire or fan heater   Excessive bathing   Contact with soap, detergents and solvents   Inappropriate topical agents such as alcohol   Frictional irritation from rough clothing or abrasives    Dry skin is due to abnormalities in the integrity of the barrier function of the stratum corneum, which is made up of corneocytes.  There is an overall reduction in the lipids in the stratum corneum.   Ratio of ceramides, cholesterol and free fatty acids may be normal or altered.   There may be a reduction in the proliferation of keratinocytes.   Keratinocyte subtypes change in dry skin with a decrease in keratins K1, K10 and increase in K5, K14.   Involucrin (a protein)  may be expressed early, increasing cell stiffness.   The result is retention of corneocytes and reduced water-holding capacity.    The inherited forms of ichthyosis are due to loss of function mutations in various genes (listed in parentheses below).  The clinical features of ichthyosis depend on the specific type of ichthyosis:  Ichthyosis vulgaris (FLG).   Recessive X-linked ichthyosis (STS)   Autosomal recessive congenital ichthyosis (ABCA12, TGM1, ALOXE3)   Keratinopathic ichthyoses (KRT1, KRT10, KRT2)    Acquired ichthyosis may be due to:  Metabolic factors: thyroid deficiency   Illness: lymphoma, internal malignancy, sarcoidosis, HIV infection   Drugs: nicotinic acid, kava, protein kinase inhibitors (eg EGFR inhibitors), hydroxyurea.    Complications of dry skin:  Dry areas of skin may become itchy, indicating a form of eczema/dermatitis has developed.  Atopic eczema -- especially in people with ichthyosis vulgaris   Eczema craquelé -- especially in elderly people. Also called asteatotic eczema   A dry form of nummular dermatitis/discoid eczema -- especially in people that wash their skin excessively.  When the dry skin of an elderly person is itchy without a visible rash, it is sometimes called winter itch, 7th age itch, senile pruritus or chronic pruritus of the elderly.    Other complications of dry skin may include:  Skin infection when bacteria or viruses penetrate a break in the skin surface   Overheating, especially in some forms of ichthyosis   Food allergy, eg, to peanuts, has been associated with filaggrin mutations   Contact allergy, eg, to nickel, has also been correlated with barrier function defects.    How is the type of dry skin diagnosed?  The type of dry skin is diagnosed by careful history and examination.    In children:  Family history   Age of onset   Appearance at birth, if known   Distribution of dry skin   Other features, eg eczema, abnormal nails, hair, dentition, sight,  hearing.    In adults:  Medical history   Medications and topical preparations   Bathing frequency and use of soap   Evaluation of environmental factors that may contribute to dry skin.    What is the treatment for dry skin?  The mainstay of treatment of dry skin and ichthyosis is moisturisers/emollients. They should be applied liberally and often enough to:  Reduce itch   Improve the barrier function   Prevent entry of irritants, bacteria   Reduce transepidermal water loss.    When considering which emollient is most suitable, consider:  Severity of the dryness   Tolerance   Personal preference   Cost and availability.    Emollients generally work best if applied to damp skin, if pH is below 7 (acidic), and if containing humectants such as urea or propylene glycol.  Additional treatments include:  Topical steroid if itchy or there is dermatitis -- choose an emollient base   Topical calcineurin inhibitors if topical steroids are unsuitable.    How can dry skin be prevented?  Eliminate aggravating factors:  Reduce the frequency of bathing.   A humidifier in winter and air conditioner in summer   Compare having a short shower with a prolonged soak in a bath.   Use lukewarm, not hot, water.   Replace standard soap with a substitute such as a synthetic detergent cleanser, water-miscible emollient, bath oil, anti-pruritic tar oil, colloidal oatmeal etc.   Apply an emollient liberally and often, particularly shortly after bathing, and when itchy. The drier the skin, the thicker this should be, especially on the hands.    What is the outlook for dry skin?  A tendency to dry skin may persist life-long, or it may improve once contributing factors are controlled.     Scribe Attestation      I,:  Daya Badillo am acting as a scribe while in the presence of the attending physician.:       I,:  Norma Mujica MD personally performed the services described in this documentation    as scribed in my presence.:

## 2025-03-18 ENCOUNTER — TELEPHONE (OUTPATIENT)
Age: 50
End: 2025-03-18

## 2025-03-18 DIAGNOSIS — R11.0 NAUSEA: Primary | ICD-10-CM

## 2025-03-18 RX ORDER — ONDANSETRON 4 MG/1
4 TABLET, FILM COATED ORAL EVERY 8 HOURS PRN
Qty: 20 TABLET | Refills: 0 | Status: SHIPPED | OUTPATIENT
Start: 2025-03-18

## 2025-03-18 NOTE — TELEPHONE ENCOUNTER
Pt called to ask Gretchen RUTHERFORD's advice.  Pt has been on Zepbound for a while with no nausea, but during the past 2 weeks she has been experiencing nausea.  She tried over-the-counter remedies like Tums, Mylanta, even Dramamine, but nothing helps to calm it.  She is wondering if there is something else she could try or if there is something that could be prescribed.  She uses [facility] in [place].  Please call her to advise.  Thank you!

## 2025-03-19 DIAGNOSIS — E66.813 CLASS 3 SEVERE OBESITY DUE TO EXCESS CALORIES WITH SERIOUS COMORBIDITY AND BODY MASS INDEX (BMI) OF 45.0 TO 49.9 IN ADULT (HCC): ICD-10-CM

## 2025-03-19 DIAGNOSIS — E66.01 CLASS 3 SEVERE OBESITY DUE TO EXCESS CALORIES WITH SERIOUS COMORBIDITY AND BODY MASS INDEX (BMI) OF 45.0 TO 49.9 IN ADULT (HCC): ICD-10-CM

## 2025-03-19 RX ORDER — TIRZEPATIDE 7.5 MG/.5ML
7.5 INJECTION, SOLUTION SUBCUTANEOUS WEEKLY
Qty: 2 ML | Refills: 0 | Status: SHIPPED | OUTPATIENT
Start: 2025-03-19

## 2025-03-26 DIAGNOSIS — I49.3 FREQUENT PVCS: ICD-10-CM

## 2025-03-26 DIAGNOSIS — R00.2 PALPITATIONS: ICD-10-CM

## 2025-03-26 NOTE — TELEPHONE ENCOUNTER
Reason for call:   [x] Refill   [] Prior Auth  [] Other:     Office:   [x] PCP/Provider - Gretchen Lake  [] Specialty/Provider -     Medication: Metoprolol Tartrate     Dose/Frequency: 50 mg BID    Quantity: 180    Pharmacy: [facility] [place] [facility]   Does the patient have enough for 3 days?   [x] Yes   [] No - Send as HP to POD    Mail Away Pharmacy   Does the patient have enough for 10 days?   [] Yes   [] No - Send as HP to POD

## 2025-03-27 RX ORDER — METOPROLOL TARTRATE 50 MG
50 TABLET ORAL 2 TIMES DAILY
Qty: 180 TABLET | Refills: 1 | Status: SHIPPED | OUTPATIENT
Start: 2025-03-27

## 2025-03-28 ENCOUNTER — TELEPHONE (OUTPATIENT)
Age: 50
End: 2025-03-28

## 2025-03-28 ENCOUNTER — OFFICE VISIT (OUTPATIENT)
Dept: PULMONOLOGY | Facility: CLINIC | Age: 50
End: 2025-03-28
Payer: MEDICARE

## 2025-03-28 VITALS
RESPIRATION RATE: 12 BRPM | OXYGEN SATURATION: 98 % | WEIGHT: 238 LBS | SYSTOLIC BLOOD PRESSURE: 110 MMHG | HEART RATE: 68 BPM | DIASTOLIC BLOOD PRESSURE: 78 MMHG | HEIGHT: 64 IN | BODY MASS INDEX: 40.63 KG/M2 | TEMPERATURE: 98.6 F

## 2025-03-28 DIAGNOSIS — J45.40 MODERATE PERSISTENT ASTHMA WITHOUT COMPLICATION: Primary | ICD-10-CM

## 2025-03-28 DIAGNOSIS — E66.813 CLASS 3 SEVERE OBESITY WITH SERIOUS COMORBIDITY AND BODY MASS INDEX (BMI) OF 40.0 TO 44.9 IN ADULT, UNSPECIFIED OBESITY TYPE (HCC): ICD-10-CM

## 2025-03-28 DIAGNOSIS — R60.0 BILATERAL LOWER EXTREMITY EDEMA: ICD-10-CM

## 2025-03-28 DIAGNOSIS — E66.01 CLASS 3 SEVERE OBESITY WITH SERIOUS COMORBIDITY AND BODY MASS INDEX (BMI) OF 40.0 TO 44.9 IN ADULT, UNSPECIFIED OBESITY TYPE (HCC): ICD-10-CM

## 2025-03-28 PROCEDURE — 99214 OFFICE O/P EST MOD 30 MIN: CPT | Performed by: INTERNAL MEDICINE

## 2025-03-28 RX ORDER — BUDESONIDE AND FORMOTEROL FUMARATE DIHYDRATE 160; 4.5 UG/1; UG/1
2 AEROSOL RESPIRATORY (INHALATION) 2 TIMES DAILY
Qty: 10.2 G | Refills: 11 | Status: SHIPPED | OUTPATIENT
Start: 2025-03-28 | End: 2026-03-28

## 2025-03-28 NOTE — PROGRESS NOTES
"Pulmonary Follow Up Note  Elissa Leal 50 y.o. female MRN: [id number]  3/28/2025      HPI:    Pt cont to have intermittent sx's such SOB. Some days better than others. No cough or sputum production.    Exercise Tolerance: Dependent on day.      Meds:  Breo 200 daily  Albuterol as needed, rarely    ROS:  Constitutional: - Fatigue, - chills, - fever, - weight change.   HEENT: - rhinorrhea, - sneezing, - sore throat.    Respiratory: - cough, + intermittent shortness of breath, - wheezing.    Cardiovascular: - chest pain,  -palpitations, - leg swelling.   Gastrointestinal: - abdominal pain, - constipation, - diarrhea, - nausea, - vomiting.   Endocrine: - cold intolerance, - heat intolerance.   Genitourinary: - dysuria.   Musculoskeletal: - arthralgias.   Skin:- rash, - wound.   Allergic/Immunologic: - allergies  Neurological: - dizziness, - numbness        Vitals: Blood pressure 110/78, pulse 68, temperature 98.6 °F (37 °C), temperature source Temporal, resp. rate 12, height 5' 4\" (1.626 m), weight 108 kg (238 lb), SpO2 98%, not currently breastfeeding., Body mass index is 40.85 kg/m².    Physical Exam:  GEN  NAD  HEENT  ncat, non icteric, MM moist  NECK  supple, no JVD, no LAD  CV  +s1s2, no mrg, RRR  PULM  CTA BL, no wrr  ABD  soft, nt, + obese  EXT + right greater than left but bilateral lower extremity pitting edema, no cyanosis, no clubbing  NEURO  Aox3, no focal weakness    Imaging and other studies:   I personally viewed and interpreted the following imaging studies:  No recent thoracic imaging available for interpretation    Pulmonary function testing:   PFT 1/23/2025 is relatively normal    Assessment:  Asthma, moderate persistent-without acute exacerbation  Morbid obesity  Right greater than left lower extremity edema    Plan:  Discontinue Breo 200.  Start Symbicort 160 twice daily.  Ordered for 1 year supply.  Management of tirzepatide for morbid obesity per primary care physician.  Check lower extremity " "duplex for right greater than left lower extremity edema and mild tenderness in the calf.  Patient will call pulmonary office with any worsening or development of new pulmonary symptoms prior to next visit.    Return visit in June/July 2025  On next visit we will evaluate symptoms, improvement in symptoms when switching from Breo to Symbicort    Note: Portions of the record may have been created with voice recognition software. Occasional wrong word or \"sound a like\" substitutions may have occurred due to the inherent limitations of voice recognition software. Read the chart carefully and recognize, using context, where substitutions have occurred.     Giovanni Landeros M.D.  [facility]  Answers submitted by the patient for this visit:  Pulmonology Questionnaire (Submitted on 3/27/2025)  Chief Complaint: Primary symptoms  Do you have shortness of breath?: Yes  Do you have wheezing?: Yes  Chronicity: chronic  When did you first notice your symptoms?: more than 1 year ago  How often do your symptoms occur?: every several days  Since you first noticed this problem, how has it changed?: waxing and waning  Have you had a change in appetite?: No  Do you have chest pain?: No  Do you have shortness of breath that occurs with effort or exertion?: Yes  Do you have ear congestion?: No  Do you have ear pain?: No  Do you have a fever?: No  Do you have headaches?: No  Do you have heartburn?: No  Do you have fatigue?: No  Do you have muscle pain?: No  Do you have nasal congestion?: No  Do you have shortness of breath when lying flat?: Yes  Do you have shortness of breath when you wake up?: No  Do you have post-nasal drip?: No  Do you have a runny nose?: No  Do you have sneezing?: No  Do you have a sore throat?: No  Do you have sweats?: No  Do you have trouble swallowing?: No  Have you experienced weight loss?: No  Which of the following makes your symptoms worse?: change in weather, climbing " stairs, exercise, exposure to smoke, strenuous activity, URI  Which of the following makes your symptoms better?: steroid inhaler

## 2025-03-28 NOTE — TELEPHONE ENCOUNTER
PA for SYMBICORT SUBMITTED to [employer]     via    [x]CMM-KEY: [id number]  []Surescripts-Case ID #   []Availity-Auth ID # NDC #   []Faxed to plan   []Other website   []Phone call Case ID #     []PA sent as URGENT    All office notes, labs and other pertaining documents and studies sent. Clinical questions answered. Awaiting determination from insurance company.     Turnaround time for your insurance to make a decision on your Prior Authorization can take 7-21 business days.

## 2025-03-28 NOTE — TELEPHONE ENCOUNTER
PA for SYMBICORT NOT REQUIRED     Reason (screenshot if applicable)          Patient advised by          [x] MyChart Message  [] Phone call   []LMOM  []L/M to call office as no active Communication consent on file  []Unable to leave detailed message as VM not approved on Communication consent       Pharmacy advised by    [x]Fax  []Phone call

## 2025-04-08 ENCOUNTER — TELEPHONE (OUTPATIENT)
Dept: FAMILY MEDICINE CLINIC | Facility: CLINIC | Age: 50
End: 2025-04-08

## 2025-04-08 DIAGNOSIS — E66.813 CLASS 3 SEVERE OBESITY DUE TO EXCESS CALORIES WITH SERIOUS COMORBIDITY AND BODY MASS INDEX (BMI) OF 45.0 TO 49.9 IN ADULT: Primary | ICD-10-CM

## 2025-04-08 RX ORDER — TIRZEPATIDE 10 MG/.5ML
10 INJECTION, SOLUTION SUBCUTANEOUS WEEKLY
Qty: 2 ML | Refills: 0 | Status: SHIPPED | OUTPATIENT
Start: 2025-04-08 | End: 2025-04-09 | Stop reason: SDUPTHER

## 2025-04-08 NOTE — TELEPHONE ENCOUNTER
Not a duplicate sent to wrong pharmacy    Reason for call:   [x] Refill   [] Prior Auth  [] Other:     Office:   [x] PCP/Provider -   [] Specialty/Provider -     Medication: (Zepbound) 10 mg/0.5 mL auto-injector    Dose/Frequency: Inject 0.5 mL (10 mg total) under the skin once a week,     Quantity: 2 mL    Pharmacy: [id number] [place], [place] -      Local Pharmacy   Does the patient have enough for 3 days?   [] Yes   [x] No - Send as HP to POD    Mail Away Pharmacy   Does the patient have enough for 10 days?   [] Yes   [] No - Send as HP to POD

## 2025-04-08 NOTE — TELEPHONE ENCOUNTER
Patient called the RX Refill Line. Message is being forwarded to the office.     Patient is requesting to increase her dosage of Zepbound .Patient states she has no bad side effects and is doing well. Patient stated she feels like she is at a stall right now not losing weight or gaining weight and would like to increase the strength.    Please contact patient at [phone]    Pharmacy:[id number] [facility]

## 2025-04-09 DIAGNOSIS — E66.813 CLASS 3 SEVERE OBESITY DUE TO EXCESS CALORIES WITH SERIOUS COMORBIDITY AND BODY MASS INDEX (BMI) OF 45.0 TO 49.9 IN ADULT: ICD-10-CM

## 2025-04-09 RX ORDER — TIRZEPATIDE 10 MG/.5ML
10 INJECTION, SOLUTION SUBCUTANEOUS WEEKLY
Qty: 2 ML | Refills: 0 | Status: SHIPPED | OUTPATIENT
Start: 2025-04-09

## 2025-04-09 NOTE — TELEPHONE ENCOUNTER
PA for ZEPBOUND 10MG SUBMITTED to [employer]    via    [x]CMM-KEY: [id number]  []Surescripts-Case ID #   []Availity-Auth ID # NDC #   []Faxed to plan   []Other website   []Phone call Case ID #     [x]PA sent as URGENT    All office notes, labs and other pertaining documents and studies sent. Clinical questions answered. Awaiting determination from insurance company.     Turnaround time for your insurance to make a decision on your Prior Authorization can take 7-21 business days.

## 2025-04-14 ENCOUNTER — HOSPITAL ENCOUNTER (OUTPATIENT)
Dept: NON INVASIVE DIAGNOSTICS | Facility: CLINIC | Age: 50
Discharge: HOME | End: 2025-04-14
Payer: MEDICARE

## 2025-04-14 DIAGNOSIS — R60.0 BILATERAL LOWER EXTREMITY EDEMA: ICD-10-CM

## 2025-04-14 PROCEDURE — 93970 EXTREMITY STUDY: CPT | Performed by: SURGERY

## 2025-04-14 PROCEDURE — 93970 EXTREMITY STUDY: CPT

## 2025-04-16 NOTE — TELEPHONE ENCOUNTER
Additional information faxed to [employer].  Office notes attached with questions answered on form.

## 2025-04-16 NOTE — TELEPHONE ENCOUNTER
Amaris from [facility] called to say they will need chart notes submitted including pt's diagnosis, weight and bmi and if already on the med: pre treatment weight and bmi, co morbid conditions and any improvement on the meds. Please, fax back to [phone].

## 2025-04-17 NOTE — TELEPHONE ENCOUNTER
PA for ZEPBOUND 10MG NOT REQUIRED     Reason (screenshot if applicable)          Patient advised by          [] MyChart Message  [] Phone call   []LMOM  []L/M to call office as no active Communication consent on file  []Unable to leave detailed message as VM not approved on Communication consent       Pharmacy advised by    []Fax  [x]Phone call  PATIENT PICKED UP

## 2025-04-23 DIAGNOSIS — E66.813 CLASS 3 SEVERE OBESITY DUE TO EXCESS CALORIES WITH SERIOUS COMORBIDITY AND BODY MASS INDEX (BMI) OF 45.0 TO 49.9 IN ADULT: ICD-10-CM

## 2025-04-23 RX ORDER — TIRZEPATIDE 10 MG/.5ML
10 INJECTION, SOLUTION SUBCUTANEOUS WEEKLY
Qty: 2 ML | Refills: 0 | Status: SHIPPED | OUTPATIENT
Start: 2025-04-23

## 2025-04-24 DIAGNOSIS — J45.40 MODERATE PERSISTENT ASTHMA WITHOUT COMPLICATION: ICD-10-CM

## 2025-04-24 RX ORDER — BUDESONIDE AND FORMOTEROL FUMARATE DIHYDRATE 160; 4.5 UG/1; UG/1
AEROSOL RESPIRATORY (INHALATION)
Qty: 34 G | Refills: 11 | Status: SHIPPED | OUTPATIENT
Start: 2025-04-24

## 2025-04-29 ENCOUNTER — OFFICE VISIT (OUTPATIENT)
Dept: CARDIOLOGY CLINIC | Facility: CLINIC | Age: 50
End: 2025-04-29
Payer: MEDICARE

## 2025-04-29 VITALS
BODY MASS INDEX: 39.78 KG/M2 | TEMPERATURE: 97.9 F | HEART RATE: 64 BPM | OXYGEN SATURATION: 98 % | WEIGHT: 233 LBS | HEIGHT: 64 IN | DIASTOLIC BLOOD PRESSURE: 70 MMHG | SYSTOLIC BLOOD PRESSURE: 118 MMHG

## 2025-04-29 DIAGNOSIS — I49.3 PVC (PREMATURE VENTRICULAR CONTRACTION): ICD-10-CM

## 2025-04-29 DIAGNOSIS — R00.2 PALPITATIONS: ICD-10-CM

## 2025-04-29 PROCEDURE — 93000 ELECTROCARDIOGRAM COMPLETE: CPT

## 2025-04-29 PROCEDURE — 99214 OFFICE O/P EST MOD 30 MIN: CPT

## 2025-04-29 NOTE — PROGRESS NOTES
"Elissa Leal  1975  [id number]  [facility] [place] [address] [facility] [address] [phone]    1. Palpitations  POCT ECG      2. PVC (premature ventricular contraction)  POCT ECG          Summary/Discussion:  Palpitations/hx of PVCs  - Holter (1/2021): Predominantly sinus rhythm, with an average heart rate of 67 beats per minute. Occasional premature ventricular contractions, constituting 1.7% of total beats. Some of the reported symptoms of \"chest pinch\" and \"fluttering\" during the monitoring period, correlated with single PVCs. Rare premature atrial contractions. No significant pauses or advanced degree heart block  - echo (1/2021): LVEF 60%, no WMA, no significant valvular disease   - symptoms well controlled on metoprolol tartrate 50 mg twice daily   prior discussion with her cardiologist about changing from tartrate to succinate for better 24 hour coverage  however, she was not interested in switching   - EKG today demonstrating sinus rhythm, low voltage QRS   - she is active at baseline and exercises regularly with evidence of intentional weight loss  - labs stable in 1/2025  - LDL 95-- controlled by diet alone   - blood pressure stable, 118/70      Interval History: Elissa Leal is a 50 y.o. year old female with history mentioned in problem list who presents to the office today for routine follow up.     Since her last office visit she has been overall feeling well from a cardiac standpoint. She denies any chest pain/pressure/discomfort. She denies lower extremity edema, orthopnea, and PND. She denies lightheadedness, dizziness, and syncope. Her palpitations have been well controlled on metoprolol. She does experience intermittent dyspnea on exertion which has been ongoing/unchanged since having COVID roughly 4 years ago. She has good functional capacity and exercises regularly with evidence of intentional weight loss.       No further cardiac testing " indicated at this time.     She will RTO in 1 year with Dr. Vela or sooner if necessary. She will call with any concerns.         Medical Problems       Problem List       Palpitations    Moderate asthma without complication    Anemia    Hypothyroidism    Microcytosis    Family history of thalassemia    Thrombocytosis    Leukocytosis    PVC (premature ventricular contraction)    Class 3 severe obesity with serious comorbidity and body mass index (BMI) of 45.0 to 49.9 in adult    Beta thalassemia minor        Past Medical History:   Diagnosis Date    Anemia 2021    Asthma Jume     Inhaler    Clotting disorder (HCC) 2021    On beta blocker    Eczema     GERD (gastroesophageal reflux disease) 2020    Certain foods cause a problem    Headache(784.0)     No known health problems     Obesity     Visual impairment 2020    New glasses for reading close up     Social History     Socioeconomic History    Marital status: Single     Spouse name: Not on file    Number of children: Not on file    Years of education: Not on file    Highest education level: Not on file   Occupational History    Not on file   Tobacco Use    Smoking status: Former     Current packs/day: 0.00     Average packs/day: 0.3 packs/day for 15.0 years (3.8 ttl pk-yrs)     Types: Cigarettes     Start date:      Quit date: 2018     Years since quittin.3     Passive exposure: Past    Smokeless tobacco: Never   Vaping Use    Vaping status: Never Used   Substance and Sexual Activity    Alcohol use: Not Currently    Drug use: Never    Sexual activity: Yes     Partners: Male     Birth control/protection: Condom Male, Rhythm, None   Other Topics Concern    Not on file   Social History Narrative    Not on file     Social Drivers of Health     Financial Resource Strain: Not on file   Food Insecurity: Not on file   Transportation Needs: Not on file   Physical Activity: Not on file   Stress: Not on file   Social Connections: Not  on file   Intimate Partner Violence: Not on file   Housing Stability: Not on file      Family History   Problem Relation Age of Onset    Thalassemia Mother     Cardiomyopathy Mother     Anxiety disorder Mother     Hypertension Mother     Autoimmune disease Mother     Hypertension Father     No Known Problems Maternal Grandmother     Heart attack Maternal Grandfather     No Known Problems Paternal Grandmother     No Known Problems Paternal Grandfather     Anxiety disorder Brother     No Known Problems Son     Lupus Family      Past Surgical History:   Procedure Laterality Date    NO PAST SURGERIES      SKIN BIOPSY         Current Outpatient Medications:     albuterol (PROVENTIL HFA,VENTOLIN HFA) 90 mcg/act inhaler, inhale 1 puff by mouth and INTO THE LUNGS every 4 hours if needed for wheezing, Disp: , Rfl:     clobetasol (TEMOVATE) 0.05 % external solution, Apply topically 2 (two) times a day To scalp, keep from dripping down face or into eyes, Disp: 50 mL, Rfl: 6    dicyclomine (BENTYL) 10 mg capsule, Take 1 capsule (10 mg total) by mouth 3 (three) times a day before meals, Disp: 16 capsule, Rfl: 0    ketoconazole (NIZORAL) 2 % cream, Apply topically twice a day when rash is active, apply topically twice a week when not active, Disp: 60 g, Rfl: 11    ketoconazole (NIZORAL) 2 % cream, Apply topically daily, Disp: 60 g, Rfl: 3    ketoconazole (NIZORAL) 2 % shampoo, Apply to damp facial skin and scalp once daily. Let sit for 5 minutes before rinsing out. Can use regular shampoo/face wash after using., Disp: 120 mL, Rfl: 6    levothyroxine 75 mcg tablet, take 1 tablet by mouth daily every morning, Disp: 90 tablet, Rfl: 1    metoclopramide (Reglan) 10 mg tablet, Take 1 tablet (10 mg total) by mouth 4 (four) times a day as needed (nausea), Disp: 40 tablet, Rfl: 1    metoprolol tartrate (LOPRESSOR) 50 mg tablet, Take 1 tablet (50 mg total) by mouth 2 (two) times a day, Disp: 180 tablet, Rfl: 1    ondansetron (ZOFRAN) 4 mg  "tablet, Take 1 tablet (4 mg total) by mouth every 8 (eight) hours as needed for nausea or vomiting, Disp: 20 tablet, Rfl: 0    Symbicort 160-4.5 MCG/ACT inhaler, inhale 2 puffs by mouth and INTO THE LUNGS twice a day Rinse mouth after use, Disp: 34 g, Rfl: 11    tacrolimus (PROTOPIC) 0.1 % ointment, apply to inflamed face and scalp once daily until inflammation calms. Can repeat treatment., Disp: 100 g, Rfl: 3    Zepbound 10 MG/0.5ML auto-injector, INJECT 10MG SUBCUTANEOUSLY (UNDER THE SKIN) ONCE WEEKLY, Disp: 2 mL, Rfl: 0    Azelastine HCl 137 MCG/SPRAY SOLN, inhale 1 to 2 sprays into each nostril twice a day if needed (Patient not taking: Reported on 3/28/2025), Disp: , Rfl:   No Known Allergies    Labs:     Chemistry        Component Value Date/Time    K 4.3 01/27/2025 0844     01/27/2025 0844    CO2 26 01/27/2025 0844    BUN 15 01/27/2025 0844    CREATININE 0.76 01/27/2025 0844        Component Value Date/Time    CALCIUM 9.8 01/27/2025 0844    ALKPHOS 56 01/27/2025 0844    AST 15 01/27/2025 0844    ALT 16 01/27/2025 0844            No results found for: \"CHOL\"  Lab Results   Component Value Date    HDL 48 (L) 05/28/2024    HDL 43 (L) 04/21/2023    HDL 44 (L) 01/26/2022     Lab Results   Component Value Date    LDLCALC 95 05/28/2024    LDLCALC 109 (H) 04/21/2023    LDLCALC 121 (H) 01/26/2022     Lab Results   Component Value Date    TRIG 79 05/28/2024    TRIG 114 04/21/2023    TRIG 140 01/26/2022     No results found for: \"CHOLHDL\"    Imaging:  VAS VENOUS DUPLEX - LOWER LIMB BILATERAL  Result Date: 4/14/2025  Narrative:  [facility] REPORT CLINICAL: Indications: Bilateral Localized edema [R60.0]. Patient presents with bilateral lower extremity edema. Operative History: Patient denies any cardiovascular surgeries Risk Factors The patient has no history of DVT.   CONCLUSION: Impression: RIGHT LOWER LIMB: No evidence of acute or chronic deep vein thrombosis. No evidence of superficial thrombophlebitis " "noted. Doppler evaluation shows a normal response to augmentation maneuvers.. Popliteal, posterior tibial and anterior tibial arterial Doppler waveforms are triphasic.  LEFT LOWER LIMB: No evidence of acute or chronic deep vein thrombosis. No evidence of superficial thrombophlebitis noted. Doppler evaluation shows a normal response to augmentation maneuvers. Popliteal, posterior tibial and anterior tibial arterial Doppler waveforms are triphasic.  SIGNATURE: Electronically Signed by: ESTRELLITA MOLINA DO on 2025-04-14 03:15:37 PM      ECG:  sinus rhythm, low voltage LVH    Review of Systems   Constitutional: Positive for weight loss.   Cardiovascular:  Positive for dyspnea on exertion and palpitations.   Respiratory:  Positive for shortness of breath.    All other systems reviewed and are negative.      Vitals:    04/29/25 0939   BP: 118/70   Pulse: 64   Temp: 97.9 °F (36.6 °C)   SpO2: 98%     Vitals:    04/29/25 0939   Weight: 106 kg (233 lb)     Height: 5' 4\" (162.6 cm)   Body mass index is 39.99 kg/m².    Physical Exam  Vitals and nursing note reviewed.   Constitutional:       General: She is not in acute distress.     Appearance: Normal appearance. She is not ill-appearing.   HENT:      Head: Normocephalic.      Nose: Nose normal.      Mouth/Throat:      Mouth: Mucous membranes are moist.      Pharynx: Oropharynx is clear.   Cardiovascular:      Rate and Rhythm: Normal rate and regular rhythm.      Heart sounds: Normal heart sounds. No murmur heard.  Pulmonary:      Effort: Pulmonary effort is normal.   Musculoskeletal:         General: Normal range of motion.      Cervical back: Normal range of motion.      Right lower leg: No edema.      Left lower leg: No edema.   Skin:     General: Skin is warm and dry.   Neurological:      Mental Status: She is alert and oriented to person, place, and time.   Psychiatric:         Mood and Affect: Mood normal.         Behavior: Behavior normal.        "

## 2025-05-08 ENCOUNTER — OFFICE VISIT (OUTPATIENT)
Dept: FAMILY MEDICINE CLINIC | Facility: CLINIC | Age: 50
End: 2025-05-08
Payer: MEDICARE

## 2025-05-08 VITALS
SYSTOLIC BLOOD PRESSURE: 110 MMHG | BODY MASS INDEX: 39.09 KG/M2 | HEIGHT: 64 IN | WEIGHT: 229 LBS | OXYGEN SATURATION: 98 % | HEART RATE: 82 BPM | TEMPERATURE: 97.8 F | DIASTOLIC BLOOD PRESSURE: 80 MMHG

## 2025-05-08 DIAGNOSIS — M54.9 PAIN IN RIGHT PARASPINAL REGION: ICD-10-CM

## 2025-05-08 DIAGNOSIS — I83.813 VARICOSE VEINS OF BOTH LOWER EXTREMITIES WITH PAIN: ICD-10-CM

## 2025-05-08 DIAGNOSIS — E66.813 CLASS 3 SEVERE OBESITY DUE TO EXCESS CALORIES WITH SERIOUS COMORBIDITY AND BODY MASS INDEX (BMI) OF 45.0 TO 49.9 IN ADULT: ICD-10-CM

## 2025-05-08 DIAGNOSIS — Z00.00 ANNUAL PHYSICAL EXAM: Primary | ICD-10-CM

## 2025-05-08 PROCEDURE — 99396 PREV VISIT EST AGE 40-64: CPT | Performed by: NURSE PRACTITIONER

## 2025-05-08 RX ORDER — METHYLPREDNISOLONE 4 MG/1
TABLET ORAL
Qty: 21 EACH | Refills: 0 | Status: SHIPPED | OUTPATIENT
Start: 2025-05-08

## 2025-05-08 RX ORDER — METHOCARBAMOL 500 MG/1
500 TABLET, FILM COATED ORAL 4 TIMES DAILY PRN
Qty: 40 TABLET | Refills: 0 | Status: SHIPPED | OUTPATIENT
Start: 2025-05-08

## 2025-05-08 NOTE — PROGRESS NOTES
Adult Annual Physical  Name: Elissa Leal      : 1975      MRN: [id number]  Encounter Provider: SANGEETA Chacon  Encounter Date: 2025   Encounter department: [facility]    :  Assessment & Plan  Annual physical exam    Orders:  •  Lipid panel; Future    Varicose veins of both lower extremities with pain    Orders:  •  Ambulatory Referral to Vascular Surgery; Future    Pain in right paraspinal region    Orders:  •  methylPREDNISolone 4 MG tablet therapy pack; Use as directed on package  •  methocarbamol (ROBAXIN) 500 mg tablet; Take 1 tablet (500 mg total) by mouth 4 (four) times a day as needed for muscle spasms    Class 3 severe obesity due to excess calories with serious comorbidity and body mass index (BMI) of 45.0 to 49.9 in adult  Prior Authorization Clinical Questions for Weight Management Pharmacotherapy    2. Does the patient have a diagnosis of obesity, confirmed by a BMI greater than or equal to 30 kg/m^2?: Yes  3. Does the patient have a BMI of greater than or equal to 27 kg/m^2 with at least one weight-related comorbidity/risk factor/complication (e.g. diabetes, dyslipidemia, coronary artery disease)?: Yes  4. Weight-related co-morbidities/risk factors: cardiovascular disease, metabolic dysfunction-associated steatotic liver disease (MASLD), asthma/reactive airway disease  5. WEGOVY CVA Indication: Does patient have established documented cardiovascular disease (history of a prior heart attack (myocardial infarction), stroke, or symptomatic peripheral arterial disease (PAD)?: N/A  6. ZEPBOUND YARITZA Indication: Does patient have documented YARITZA diagnosed via sleep study (insurance will require copy of sleep study results for approval)?: N/A  7. Has the patient been on a weight loss regimen of low-calorie diet, increased physical activity, and lifestyle modifications for a minimum of 6 months?: Yes  8. Has the patient completed a comprehensive weight loss program  (ie, Weight Watchers, Noom, Bariatrics, other jessica on phone)? If so, what?: No  9. Does the patient have a history of type 2 diabetes?: No  10. Has the member tried and failed other weight loss medication within the past 12 months?: Yes   -- Q10 Further explanation: Wegovy but had adverse effects so stopped in October 2024   11. Will the member use requested medication in combination with another GLP agonist or weight loss drug?: No  12. Is the medication a controlled substance?: No  For renewals: Has the patient had a positive outcome with current weight management medication (i.e., change in body weight of at least 4-5% after 12-16 weeks on maximally tolerated dose)?: Yes     Baseline weight (in pounds): 291 lbs  Current weight (in pounds): 229 lbs  Weight loss percentage: -21.31%                  Preventive Screenings:  - Diabetes Screening: screening up-to-date  - Cholesterol Screening: risks/benefits discussed and orders placed   - Hepatitis C screening: screening up-to-date   - HIV screening: patient declines   - Cervical cancer screening: risks/benefits discussed   - Breast cancer screening: screening up-to-date   - Colon cancer screening: screening up-to-date   - Lung cancer screening: screening not indicated     Immunizations:  - Immunizations due: Prevnar 20 and Tdap    Counseling/Anticipatory Guidance:  - Alcohol: discussed moderation in alcohol intake and recommendations for healthy alcohol use.   - Dental health: discussed importance of regular tooth brushing, flossing, and dental visits.   - Sexual health: discussed sexually transmitted diseases, partner selection, use of condoms, avoidance of unintended pregnancy, and contraceptive alternatives.   - Diet: discussed recommendations for a healthy/well-balanced diet.   - Exercise: the importance of regular exercise/physical activity was discussed. Recommend exercise 3-5 times per week for at least 30 minutes.   - Injury prevention: discussed safety/seat  belts, safety helmets, smoke detectors, carbon monoxide detectors, and smoking near bedding or upholstery.       Depression Screening and Follow-up Plan: Patient was screened for depression during today's encounter. They screened negative with a PHQ-2 score of 0.          History of Present Illness     Adult Annual Physical:  Patient presents for annual physical. Patient here for a comprehensive physical exam. The patient reports that has she has been losing weight her varicose veins are more prominent and more painful. She had a bilateral venous duplex performed on 04/14/2025. She has been walking and eating balanced diet with weight loss but currently reached a plateau. She was lifting flower pots recently and strained her lower back on the right side making sitting uncomfortable.         .     Diet and Physical Activity:  - Diet/Nutrition: well balanced diet, portion control, low calorie diet, low fat diet, low carb diet, consuming 3-5 servings of fruits/vegetables daily, adequate fiber intake and adequate whole grain intake.  - Exercise: walking, moderate cardiovascular exercise, strength training exercises, 5-7 times a week on average and 30-60 minutes on average.    Depression Screening:  - PHQ-2 Score: 0    General Health:  - Sleep: sleeps poorly, 4-6 hours of sleep on average and unrefreshing sleep.  - Hearing: normal hearing right ear and decreased hearing right ear. Fluid or wax in left ear  - Vision: most recent eye exam < 1 year ago and wears glasses.  - Dental: regular dental visits, brushes teeth twice daily and floss regularly.    /GYN Health:  - Follows with GYN: no.   - Last menstrual cycle: 4/22/2028.   - History of STDs: no    Advanced Care Planning:  - Has an advanced directive?: no    - Has a durable medical POA?: no    - ACP document given to patient?: yes      Review of Systems   Constitutional:  Negative for activity change, appetite change and unexpected weight change.   HENT:  Negative  for dental problem, ear pain, hearing loss, nosebleeds, sneezing, sore throat, tinnitus and trouble swallowing.    Eyes:  Negative for visual disturbance.   Respiratory:  Negative for cough, chest tightness, shortness of breath and wheezing.    Cardiovascular:  Negative for chest pain, palpitations and leg swelling.        Leg pains related to worsening varicose veins     Gastrointestinal:  Negative for abdominal distention, abdominal pain, constipation, diarrhea and nausea.   Endocrine: Negative for polydipsia and polyuria.   Genitourinary: Negative.    Musculoskeletal:  Positive for back pain. Negative for arthralgias, myalgias and neck pain.   Skin:  Negative for color change and rash.   Allergic/Immunologic: Negative for environmental allergies.   Neurological: Negative.  Negative for dizziness, weakness, light-headedness and headaches.   Hematological: Negative.    Psychiatric/Behavioral: Negative.  Negative for dysphoric mood and sleep disturbance. The patient is not nervous/anxious.      Current Outpatient Medications on File Prior to Visit   Medication Sig Dispense Refill   • albuterol (PROVENTIL HFA,VENTOLIN HFA) 90 mcg/act inhaler inhale 1 puff by mouth and INTO THE LUNGS every 4 hours if needed for wheezing     • clobetasol (TEMOVATE) 0.05 % external solution Apply topically 2 (two) times a day To scalp, keep from dripping down face or into eyes 50 mL 6   • dicyclomine (BENTYL) 10 mg capsule Take 1 capsule (10 mg total) by mouth 3 (three) times a day before meals 16 capsule 0   • ketoconazole (NIZORAL) 2 % cream Apply topically twice a day when rash is active, apply topically twice a week when not active 60 g 11   • ketoconazole (NIZORAL) 2 % shampoo Apply to damp facial skin and scalp once daily. Let sit for 5 minutes before rinsing out. Can use regular shampoo/face wash after using. 120 mL 6   • levothyroxine 75 mcg tablet take 1 tablet by mouth daily every morning 90 tablet 1   • metoclopramide  "(Reglan) 10 mg tablet Take 1 tablet (10 mg total) by mouth 4 (four) times a day as needed (nausea) 40 tablet 1   • metoprolol tartrate (LOPRESSOR) 50 mg tablet Take 1 tablet (50 mg total) by mouth 2 (two) times a day 180 tablet 1   • ondansetron (ZOFRAN) 4 mg tablet Take 1 tablet (4 mg total) by mouth every 8 (eight) hours as needed for nausea or vomiting 20 tablet 0   • Symbicort 160-4.5 MCG/ACT inhaler inhale 2 puffs by mouth and INTO THE LUNGS twice a day Rinse mouth after use 34 g 11   • tacrolimus (PROTOPIC) 0.1 % ointment apply to inflamed face and scalp once daily until inflammation calms. Can repeat treatment. 100 g 3   • Zepbound 10 MG/0.5ML auto-injector INJECT 10MG SUBCUTANEOUSLY (UNDER THE SKIN) ONCE WEEKLY 2 mL 0   • [DISCONTINUED] Azelastine HCl 137 MCG/SPRAY SOLN inhale 1 to 2 sprays into each nostril twice a day if needed (Patient not taking: Reported on 3/28/2025)     • [DISCONTINUED] ketoconazole (NIZORAL) 2 % cream Apply topically daily (Patient not taking: Reported on 5/8/2025) 60 g 3     No current facility-administered medications on file prior to visit.        Objective   /80 (BP Location: Right arm, Patient Position: Sitting, Cuff Size: Large)   Pulse 82   Temp 97.8 °F (36.6 °C) (Temporal)   Ht 5' 4\" (1.626 m)   Wt 104 kg (229 lb)   LMP 04/22/2025 (Exact Date)   SpO2 98%   BMI 39.31 kg/m² (Reviewed)    Physical Exam  Vitals reviewed.   Constitutional:       General: She is not in acute distress.     Appearance: Normal appearance. She is well-developed and well-groomed. She is not ill-appearing.   HENT:      Head: Normocephalic and atraumatic.      Right Ear: External ear normal.      Left Ear: External ear normal.      Nose: Nose normal.      Mouth/Throat:      Lips: Pink.      Mouth: Mucous membranes are moist.      Pharynx: Oropharynx is clear.   Eyes:      General: Lids are normal.      Extraocular Movements: Extraocular movements intact.      Conjunctiva/sclera: Conjunctivae " normal.      Pupils: Pupils are equal, round, and reactive to light.   Neck:      Thyroid: No thyromegaly or thyroid tenderness.      Trachea: Trachea and phonation normal.   Cardiovascular:      Rate and Rhythm: Normal rate and regular rhythm.      Pulses:           Radial pulses are 2+ on the right side and 2+ on the left side.        Dorsalis pedis pulses are 2+ on the right side and 2+ on the left side.        Posterior tibial pulses are 2+ on the right side and 2+ on the left side.      Heart sounds: Normal heart sounds. No murmur heard.  Pulmonary:      Effort: Pulmonary effort is normal.      Breath sounds: Normal breath sounds.   Abdominal:      General: Abdomen is flat. Bowel sounds are normal. There is no distension.      Palpations: Abdomen is soft.      Tenderness: There is no abdominal tenderness.   Musculoskeletal:      Cervical back: Neck supple.      Lumbar back: Spasms and tenderness present. Normal range of motion. Negative right straight leg raise test and negative left straight leg raise test.        Back:       Right lower leg: No edema.      Left lower leg: No edema.   Skin:     General: Skin is warm and dry.      Capillary Refill: Capillary refill takes less than 2 seconds.      Comments: Prominent varicose veins on bilateral lower extremities running from thigh to ankle.   Neurological:      General: No focal deficit present.      Mental Status: She is alert and oriented to person, place, and time.      Cranial Nerves: Cranial nerves 2-12 are intact.   Psychiatric:         Mood and Affect: Mood normal.         Behavior: Behavior normal. Behavior is cooperative.         Weight Management:   Patient started on Wegovy on 03/05/2024 at initial weight of 291 lbs.  Patient had adverse effects on Wegovy so stopped the medication for 2 months October - November 2024  Patient was started on Zepbound on 12/04/2024 at 251 lbs  Patient current weight 229 lbs

## 2025-05-08 NOTE — PATIENT INSTRUCTIONS
"Patient Education     Routine physical for adults   The Basics   Written by the doctors and editors at [facility]   What is a physical? -- A physical is a routine visit, or \"check-up,\" with your doctor. You might also hear it called a \"wellness visit\" or \"preventive visit.\"  During each visit, the doctor will:   Ask about your physical and mental health   Ask about your habits, behaviors, and lifestyle   Do an exam   Give you vaccines if needed   Talk to you about any medicines you take   Give advice about your health   Answer your questions  Getting regular check-ups is an important part of taking care of your health. It can help your doctor find and treat any problems you have. But it's also important for preventing health problems.  A routine physical is different from a \"sick visit.\" A sick visit is when you see a doctor because of a health concern or problem. Since physicals are scheduled ahead of time, you can think about what you want to ask the doctor.  How often should I get a physical? -- It depends on your age and health. In general, for people age 21 years and older:   If you are younger than 50 years, you might be able to get a physical every 3 years.   If you are 50 years or older, your doctor might recommend a physical every year.  If you have an ongoing health condition, like diabetes or high blood pressure, your doctor will probably want to see you more often.  What happens during a physical? -- In general, each visit will include:   Physical exam - The doctor or nurse will check your height, weight, heart rate, and blood pressure. They will also look at your eyes and ears. They will ask about how you are feeling and whether you have any symptoms that bother you.   Medicines - It's a good idea to bring a list of all the medicines you take to each doctor visit. Your doctor will talk to you about your medicines and answer any questions. Tell them if you are having any side effects that bother you. You " "should also tell them if you are having trouble paying for any of your medicines.   Habits and behaviors - This includes:   Your diet   Your exercise habits   Whether you smoke, drink alcohol, or use drugs   Whether you are sexually active   Whether you feel safe at home  Your doctor will talk to you about things you can do to improve your health and lower your risk of health problems. They will also offer help and support. For example, if you want to quit smoking, they can give you advice and might prescribe medicines. If you want to improve your diet or get more physical activity, they can help you with this, too.   Lab tests, if needed - The tests you get will depend on your age and situation. For example, your doctor might want to check your:   Cholesterol   Blood sugar   Iron level   Vaccines - The recommended vaccines will depend on your age, health, and what vaccines you already had. Vaccines are very important because they can prevent certain serious or deadly infections.   Discussion of screening - \"Screening\" means checking for diseases or other health problems before they cause symptoms. Your doctor can recommend screening based on your age, risk, and preferences. This might include tests to check for:   Cancer, such as breast, prostate, cervical, ovarian, colorectal, prostate, lung, or skin cancer   Sexually transmitted infections, such as chlamydia and gonorrhea   Mental health conditions like depression and anxiety  Your doctor will talk to you about the different types of screening tests. They can help you decide which screenings to have. They can also explain what the results might mean.   Answering questions - The physical is a good time to ask the doctor or nurse questions about your health. If needed, they can refer you to other doctors or specialists, too.  Adults older than 65 years often need other care, too. As you get older, your doctor will talk to you about:   How to prevent falling at " home   Hearing or vision tests   Memory testing   How to take your medicines safely   Making sure that you have the help and support you need at home  All topics are updated as new evidence becomes available and our peer review process is complete.  This topic retrieved from [employer] on: May 02, 2024.  Topic 728328 Version 1.0  Release: 32.4.3 - C32.122  © 2024 UpToDate, Inc. and/or its affiliates. All rights reserved.  Consumer Information Use and Disclaimer   Disclaimer: This generalized information is a limited summary of diagnosis, treatment, and/or medication information. It is not meant to be comprehensive and should be used as a tool to help the user understand and/or assess potential diagnostic and treatment options. It does NOT include all information about conditions, treatments, medications, side effects, or risks that may apply to a specific patient. It is not intended to be medical advice or a substitute for the medical advice, diagnosis, or treatment of a health care provider based on the health care provider's examination and assessment of a patient's specific and unique circumstances. Patients must speak with a health care provider for complete information about their health, medical questions, and treatment options, including any risks or benefits regarding use of medications. This information does not endorse any treatments or medications as safe, effective, or approved for treating a specific patient. UpToDate, Inc. and its affiliates disclaim any warranty or liability relating to this information or the use thereof.The use of this information is governed by the Terms of Use, available at https://www.woltersKOEZYuwer.com/en/know/clinical-effectiveness-terms. 2024© UpToDate, Inc. and its affiliates and/or licensors. All rights reserved.  Copyright   © 2024 UpToDate, Inc. and/or its affiliates. All rights reserved.

## 2025-05-20 DIAGNOSIS — E06.3 HYPOTHYROIDISM DUE TO HASHIMOTO'S THYROIDITIS: ICD-10-CM

## 2025-05-20 RX ORDER — LEVOTHYROXINE SODIUM 75 UG/1
75 TABLET ORAL EVERY MORNING
Qty: 90 TABLET | Refills: 1 | Status: SHIPPED | OUTPATIENT
Start: 2025-05-20

## 2025-06-02 DIAGNOSIS — E66.813 CLASS 3 SEVERE OBESITY DUE TO EXCESS CALORIES WITH SERIOUS COMORBIDITY AND BODY MASS INDEX (BMI) OF 45.0 TO 49.9 IN ADULT: ICD-10-CM

## 2025-06-04 RX ORDER — TIRZEPATIDE 10 MG/.5ML
10 INJECTION, SOLUTION SUBCUTANEOUS WEEKLY
Qty: 2 ML | Refills: 0 | Status: SHIPPED | OUTPATIENT
Start: 2025-06-04

## 2025-06-04 NOTE — TELEPHONE ENCOUNTER
- Patient is requesting to stay on the same dose    How are you tolerating the medication?   [] Nausea  [] Vomiting  [] Diarrhea  [x] Asymptomatic  [] Other:    Last visit weight: 229lbs    Current weight: 225lbs    Date of last injection: 06/02/25    How many injections do you have left: 1

## 2025-06-18 DIAGNOSIS — E66.813 CLASS 3 SEVERE OBESITY DUE TO EXCESS CALORIES WITH SERIOUS COMORBIDITY AND BODY MASS INDEX (BMI) OF 45.0 TO 49.9 IN ADULT: ICD-10-CM

## 2025-06-18 RX ORDER — TIRZEPATIDE 10 MG/.5ML
10 INJECTION, SOLUTION SUBCUTANEOUS WEEKLY
Qty: 2 ML | Refills: 0 | Status: SHIPPED | OUTPATIENT
Start: 2025-06-18

## 2025-06-30 ENCOUNTER — TELEPHONE (OUTPATIENT)
Dept: FAMILY MEDICINE CLINIC | Facility: CLINIC | Age: 50
End: 2025-06-30

## 2025-06-30 DIAGNOSIS — E66.813 CLASS 3 SEVERE OBESITY DUE TO EXCESS CALORIES WITH SERIOUS COMORBIDITY AND BODY MASS INDEX (BMI) OF 45.0 TO 49.9 IN ADULT: ICD-10-CM

## 2025-06-30 DIAGNOSIS — E66.813 CLASS 3 SEVERE OBESITY DUE TO EXCESS CALORIES WITH SERIOUS COMORBIDITY AND BODY MASS INDEX (BMI) OF 45.0 TO 49.9 IN ADULT: Primary | ICD-10-CM

## 2025-06-30 RX ORDER — TIRZEPATIDE 12.5 MG/.5ML
12.5 INJECTION, SOLUTION SUBCUTANEOUS WEEKLY
Qty: 2 ML | Refills: 0 | Status: CANCELLED | OUTPATIENT
Start: 2025-06-30

## 2025-06-30 RX ORDER — TIRZEPATIDE 12.5 MG/.5ML
12.5 INJECTION, SOLUTION SUBCUTANEOUS WEEKLY
Qty: 2 ML | Refills: 0 | Status: SHIPPED | OUTPATIENT
Start: 2025-06-30 | End: 2025-07-01 | Stop reason: SDUPTHER

## 2025-06-30 NOTE — TELEPHONE ENCOUNTER
Reason for call:   [x] Refill   [] Prior Auth  [x] Other: Not a duplicate sent to wrong pharmacy    Office:   [x] PCP/Provider - SANGEETA Chacon   [] Specialty/Provider -     Medication: (Zepbound) 12.5 mg/     Dose/Frequency: 0.5 ml once a week    Quantity: 2 ml    Pharmacy: [id number] [place], [place] - [address] [place] [address] Pharmacy   Does the patient have enough for 3 days?   [] Yes   [x] No - Send as HP to POD    Mail Away Pharmacy   Does the patient have enough for 10 days?   [] Yes   [] No - Send as HP to POD

## 2025-07-01 ENCOUNTER — TELEPHONE (OUTPATIENT)
Age: 50
End: 2025-07-01

## 2025-07-01 RX ORDER — TIRZEPATIDE 12.5 MG/.5ML
12.5 INJECTION, SOLUTION SUBCUTANEOUS WEEKLY
Qty: 2 ML | Refills: 0 | Status: SHIPPED | OUTPATIENT
Start: 2025-07-01

## 2025-07-01 NOTE — TELEPHONE ENCOUNTER
Patient called again. Script was sent to the wrong pharmacy. The second request was to correct the pharmacy. Advised patient I would resend the script to [facility].

## 2025-07-01 NOTE — TELEPHONE ENCOUNTER
[place] Zepbound 12.5 MG/0.5ML SUBMITTED    to [employer]     via    [x]CMM-KEY: [id number]  []Surescripts-Case ID #    []Availity-Auth ID #  NDC #    []Faxed to plan   []Other website    []Phone call Case ID #      []PA sent as URGENT    All office notes, labs and other pertaining documents and studies sent. Clinical questions answered. Awaiting determination from insurance company.     Turnaround time for your insurance to make a decision on your Prior Authorization can take 7-21 business days.

## 2025-07-02 ENCOUNTER — CONSULT (OUTPATIENT)
Dept: VASCULAR SURGERY | Facility: CLINIC | Age: 50
End: 2025-07-02
Attending: NURSE PRACTITIONER
Payer: MEDICARE

## 2025-07-02 VITALS
WEIGHT: 226 LBS | RESPIRATION RATE: 16 BRPM | HEART RATE: 88 BPM | BODY MASS INDEX: 38.58 KG/M2 | OXYGEN SATURATION: 97 % | SYSTOLIC BLOOD PRESSURE: 110 MMHG | HEIGHT: 64 IN | DIASTOLIC BLOOD PRESSURE: 72 MMHG

## 2025-07-02 DIAGNOSIS — I83.813 VARICOSE VEINS OF BOTH LOWER EXTREMITIES WITH PAIN: Primary | ICD-10-CM

## 2025-07-02 PROCEDURE — 99243 OFF/OP CNSLTJ NEW/EST LOW 30: CPT | Performed by: NURSE PRACTITIONER

## 2025-07-02 NOTE — TELEPHONE ENCOUNTER
[place] Zepbound 12.5 MG/0.5ML APPROVED     Date(s) approved 1/2/26    Case #     Patient advised by          []MyChart Message  [x]Phone call   []LMOM  []L/M to call office as no active Communication consent on file  []Unable to leave detailed message as VM not approved on Communication consent       Pharmacy advised by    [x]Fax  []Phone call  []Secure Chat    Specialty Pharmacy    []

## 2025-07-02 NOTE — ASSESSMENT & PLAN NOTE
50-year-old female with obesity, PVCs, beta thalassemia minor presents with complaints of symptomatic varicose veins R>L.    - Patient reports worsening varicose veins of the last 2 to 3 years.  Describes as aching and throbbing.  -She has had 70 pound intentional weight loss (Wegovy, Zepbound, diet and exercise).  Walks 2-2.5 miles daily  - Reports family history of venous insufficiency and varicose vein on maternal side.  -History of 1 pregnancy  -Reports use of compression on RLE which helps with symptoms.    Plan:  -Conservative medical management with daily use of medical grade compression stockings 20-30 mmHg.  On a.m., off in p.m.  Frequent ambulation   Leg elevation at rest   Moisturizer and diligent skin care to maintain skin integrity and prevent skin breakdown   - Bilateral lower extremity venous duplex with reflux measurement in 2 months  Return to office with vascular surgeon after imaging in 3 months to review make further recommendations if indicated.    Orders:    Ambulatory Referral to Vascular Surgery    Compression Stocking    VAS Lower extremity venous insufficiency duplex, bilateral w/ measurements; Future

## 2025-07-02 NOTE — PROGRESS NOTES
Name: Elissa Leal      : 1975      MRN: [id number]  Encounter Provider: SANGEETA Hill  Encounter Date: 2025   Encounter department: [facility] [place]  :  Assessment & Plan  Varicose veins of both lower extremities with pain  50-year-old female with obesity, PVCs, beta thalassemia minor presents with complaints of symptomatic varicose veins R>L.    - Patient reports worsening varicose veins of the last 2 to 3 years.  Describes as aching and throbbing.  -She has had 70 pound intentional weight loss (Wegovy, Zepbound, diet and exercise).  Walks 2-2.5 miles daily  - Reports family history of venous insufficiency and varicose vein on maternal side.  -History of 1 pregnancy  -Reports use of compression on RLE which helps with symptoms.    Plan:  -Conservative medical management with daily use of medical grade compression stockings 20-30 mmHg.  On a.m., off in p.m.  Frequent ambulation   Leg elevation at rest   Moisturizer and diligent skin care to maintain skin integrity and prevent skin breakdown   - Bilateral lower extremity venous duplex with reflux measurement in 2 months  Return to office with vascular surgeon after imaging in 3 months to review make further recommendations if indicated.    Orders:    Ambulatory Referral to Vascular Surgery    Compression Stocking    VAS Lower extremity venous insufficiency duplex, bilateral w/ measurements; Future        History of Present Illness   HPI  Elissa Leal is a 50 y.o. female who presents with complaints of symptomatic varicose veins right worse than left.  See above assessment and plan.  Discussed pathophysiology of venous insufficiency and varicose veins.  Patient has tried conservative management with some relief of symptoms however worsening over the last few years.  Patient does have 70 pound weight loss and is hopeful to lose another 20 pounds.    Discussed conservative management +/- venous duplex with reflux measurements for  evaluation and possible intervention.    History obtained from: patient    Review of Systems   Constitutional:  Positive for fatigue.   Cardiovascular:  Positive for leg swelling.   Musculoskeletal:  Negative for gait problem.   Skin:  Negative for color change and wound.   Neurological:  Negative for weakness.     Medical History Reviewed by provider this encounter:  Tobacco  Allergies  Meds  Problems  Med Hx  Surg Hx  Fam Hx     .  Past Medical History   Past Medical History[1]  Past Surgical History[2]  Family History[3]   reports that she quit smoking about 7 years ago. Her smoking use included cigarettes. She started smoking about 22 years ago. She has a 3.8 pack-year smoking history. She has been exposed to tobacco smoke. She has never used smokeless tobacco. She reports that she does not currently use alcohol. She reports that she does not use drugs.  Current Outpatient Medications   Medication Instructions    albuterol (PROVENTIL HFA,VENTOLIN HFA) 90 mcg/act inhaler     clobetasol (TEMOVATE) 0.05 % external solution Topical, 2 times daily, To scalp, keep from dripping down face or into eyes    dicyclomine (BENTYL) 10 mg, Oral, 3 times daily before meals    ketoconazole (NIZORAL) 2 % cream Apply topically twice a day when rash is active, apply topically twice a week when not active    ketoconazole (NIZORAL) 2 % shampoo Apply to damp facial skin and scalp once daily. Let sit for 5 minutes before rinsing out. Can use regular shampoo/face wash after using.    levothyroxine 75 mcg, Oral, Every morning, Take on an empty stomach    methocarbamol (ROBAXIN) 500 mg, Oral, 4 times daily PRN    methylPREDNISolone 4 MG tablet therapy pack Use as directed on package    metoclopramide (REGLAN) 10 mg, Oral, 4 times daily PRN    metoprolol tartrate (LOPRESSOR) 50 mg, Oral, 2 times daily    ondansetron (ZOFRAN) 4 mg, Oral, Every 8 hours PRN    Symbicort 160-4.5 MCG/ACT inhaler inhale 2 puffs by mouth and INTO THE LUNGS  "twice a day Rinse mouth after use    tacrolimus (PROTOPIC) 0.1 % ointment apply to inflamed face and scalp once daily until inflammation calms. Can repeat treatment.    Zepbound 12.5 mg, Subcutaneous, Weekly   Allergies[4]   Medications Ordered Prior to Encounter[5]   Social History[6]     Objective   /72 (BP Location: Left arm, Patient Position: Sitting, Cuff Size: Standard)   Pulse 88   Resp 16   Ht 5' 4\" (1.626 m)   Wt 103 kg (226 lb)   SpO2 97%   BMI 38.79 kg/m²      Physical Exam  Vitals reviewed.   Constitutional:       General: She is not in acute distress.     Appearance: She is obese.   HENT:      Head: Normocephalic and atraumatic.     Cardiovascular:      Rate and Rhythm: Normal rate.      Pulses:           Dorsalis pedis pulses are 2+ on the right side and 2+ on the left side.   Pulmonary:      Effort: Pulmonary effort is normal. No respiratory distress.     Musculoskeletal:         General: Normal range of motion.      Right lower leg: Edema present.      Left lower leg: No edema.     Skin:     General: Skin is warm and dry.      Capillary Refill: Capillary refill takes less than 2 seconds.      Comments: Arch, dilated truncal varicosities anterior thigh to right lateral knee and calf.     Neurological:      Mental Status: She is alert and oriented to person, place, and time.      Sensory: No sensory deficit.      Motor: No weakness.     Right lower extremity                  Administrative Statements   Discussed: Instructions for management, Patient and family education, Importance of tx compliance, Risk factor reductions, Impressions, Documenting in the medical record, Reviewing/placing orders in the medical record (including tests, medications, and/or procedures), and Obtaining or reviewing history  .       [1]   Past Medical History:  Diagnosis Date    Anemia 12/08/2021    Asthma Jume 2021    Inhaler    Clotting disorder (HCC) Feb. 2021    On beta blocker    Eczema     GERD " (gastroesophageal reflux disease) November 2020    Certain foods cause a problem    Headache(784.0)     No known health problems     Obesity     Visual impairment September 2020    New glasses for reading close up   [2]   Past Surgical History:  Procedure Laterality Date    NO PAST SURGERIES      SKIN BIOPSY     [3]   Family History  Problem Relation Name Age of Onset    Thalassemia Mother Devora     Cardiomyopathy Mother Devora     Anxiety disorder Mother Devora     Hypertension Mother Devora     Autoimmune disease Mother Devora     Hypertension Father Se     No Known Problems Maternal Grandmother      Heart attack Maternal Grandfather      No Known Problems Paternal Grandmother      No Known Problems Paternal Grandfather      Anxiety disorder Brother      No Known Problems Son      Lupus Family     [4] No Known Allergies  [5]   Current Outpatient Medications on File Prior to Visit   Medication Sig Dispense Refill    albuterol (PROVENTIL HFA,VENTOLIN HFA) 90 mcg/act inhaler       clobetasol (TEMOVATE) 0.05 % external solution Apply topically 2 (two) times a day To scalp, keep from dripping down face or into eyes 50 mL 6    dicyclomine (BENTYL) 10 mg capsule Take 1 capsule (10 mg total) by mouth 3 (three) times a day before meals 16 capsule 0    ketoconazole (NIZORAL) 2 % cream Apply topically twice a day when rash is active, apply topically twice a week when not active 60 g 11    ketoconazole (NIZORAL) 2 % shampoo Apply to damp facial skin and scalp once daily. Let sit for 5 minutes before rinsing out. Can use regular shampoo/face wash after using. 120 mL 6    levothyroxine 75 mcg tablet take 1 tablet by mouth every morning ON AN EMPTY STOMACH 90 tablet 1    methocarbamol (ROBAXIN) 500 mg tablet Take 1 tablet (500 mg total) by mouth 4 (four) times a day as needed for muscle spasms 40 tablet 0    methylPREDNISolone 4 MG tablet therapy pack Use as directed on package 21 each 0    metoclopramide  (Reglan) 10 mg tablet Take 1 tablet (10 mg total) by mouth 4 (four) times a day as needed (nausea) 40 tablet 1    metoprolol tartrate (LOPRESSOR) 50 mg tablet Take 1 tablet (50 mg total) by mouth 2 (two) times a day 180 tablet 1    ondansetron (ZOFRAN) 4 mg tablet Take 1 tablet (4 mg total) by mouth every 8 (eight) hours as needed for nausea or vomiting 20 tablet 0    Symbicort 160-4.5 MCG/ACT inhaler inhale 2 puffs by mouth and INTO THE LUNGS twice a day Rinse mouth after use 34 g 11    tirzepatide (Zepbound) 12.5 mg/0.5 mL auto-injector Inject 0.5 mL (12.5 mg total) under the skin once a week 2 mL 0    tacrolimus (PROTOPIC) 0.1 % ointment apply to inflamed face and scalp once daily until inflammation calms. Can repeat treatment. 100 g 3     No current facility-administered medications on file prior to visit.   [6]   Social History  Tobacco Use    Smoking status: Former     Current packs/day: 0.00     Average packs/day: 0.3 packs/day for 15.0 years (3.8 ttl pk-yrs)     Types: Cigarettes     Start date:      Quit date: 2018     Years since quittin.5     Passive exposure: Past    Smokeless tobacco: Never   Vaping Use    Vaping status: Never Used   Substance and Sexual Activity    Alcohol use: Not Currently    Drug use: Never    Sexual activity: Yes     Partners: Male     Birth control/protection: Condom Male, Rhythm, None

## 2025-07-02 NOTE — TELEPHONE ENCOUNTER
Patient contacted Rx refill line checking on the states of prior authorization for Zepbound 12.5 MG/0.5ML. Patient stated that she received message from [facility] pharmacy that prescription was denied through jessica. Patient is going to contact [facility] pharmacy and speak to staff in regard to Zepbound 12.5 mg/ 0.5 mL to confirm if approved or denied. Patient will update through [employer].    Note from payer: Sorry but [employer] cannot process this PA request electronically. Please refer to the original instructions from the PBM for information on how to process this prior authorization manually. - Prescriber details have been updated to match the prescriber directory.     Please contact patient at your earliest convenience once authorization has been resubmitted to pharmacy.     Patients last injection was on 6/30/25. Patient does have 1 injection left for next Monday 7/7/25.

## 2025-07-02 NOTE — PATIENT INSTRUCTIONS
Conservative medical management with daily use of medical grade compression stockings 20-30 mmHg.  On a.m., off in p.m.  Frequent ambulation   Leg elevation at rest   Moisturizer and diligent skin care to maintain skin integrity and prevent skin breakdown  Weight management, continued weight loss, great job so far!    Bilateral lower extremity venous duplex with reflux measurements in 2 months    Return to vascular surgeon after imaging to review and make further recommendations if indicated.

## 2025-07-08 DIAGNOSIS — R11.0 NAUSEA: ICD-10-CM

## 2025-07-08 RX ORDER — ONDANSETRON 4 MG/1
4 TABLET, FILM COATED ORAL EVERY 8 HOURS PRN
Qty: 90 TABLET | Refills: 0 | Status: SHIPPED | OUTPATIENT
Start: 2025-07-08 | End: 2025-10-06

## 2025-07-08 RX ORDER — ONDANSETRON 4 MG/1
4 TABLET, FILM COATED ORAL EVERY 8 HOURS PRN
Qty: 90 TABLET | Refills: 0 | Status: SHIPPED | OUTPATIENT
Start: 2025-07-08 | End: 2025-07-08 | Stop reason: SDUPTHER

## 2025-07-08 NOTE — TELEPHONE ENCOUNTER
Reason for call: Patient would like a 90 day supply. Patient stated that if medication can not be a 90 day supply please submit for a 30 day supply. Not a duplicate, pharmacy change.   [x] Refill   [] Prior Auth  [] Other:     Office: [facility]   [x] PCP/Provider - Gretchen Lake   [] Specialty/Provider -     Medication: ondansetron     Dose/Frequency: 4 mg/ 1 tab every 8 hours PRN     Quantity: 90 day supply     Pharmacy: [facility]   Does the patient have enough for 3 days?   [] Yes   [x] No - Send as HP to POD    Mail Away Pharmacy   Does the patient have enough for 10 days?   [] Yes   [] No - Send as HP to POD

## 2025-07-09 DIAGNOSIS — R11.0 NAUSEA: ICD-10-CM

## 2025-07-10 RX ORDER — METOCLOPRAMIDE 10 MG/1
10 TABLET ORAL 4 TIMES DAILY PRN
Qty: 40 TABLET | Refills: 0 | Status: SHIPPED | OUTPATIENT
Start: 2025-07-10

## 2025-07-16 DIAGNOSIS — E66.813 CLASS 3 SEVERE OBESITY DUE TO EXCESS CALORIES WITH SERIOUS COMORBIDITY AND BODY MASS INDEX (BMI) OF 45.0 TO 49.9 IN ADULT: ICD-10-CM

## 2025-07-16 RX ORDER — TIRZEPATIDE 12.5 MG/.5ML
INJECTION, SOLUTION SUBCUTANEOUS
Qty: 2 ML | Refills: 0 | Status: SHIPPED | OUTPATIENT
Start: 2025-07-16

## 2025-07-17 ENCOUNTER — OFFICE VISIT (OUTPATIENT)
Dept: PULMONOLOGY | Facility: CLINIC | Age: 50
End: 2025-07-17
Payer: MEDICARE

## 2025-07-17 VITALS
OXYGEN SATURATION: 97 % | SYSTOLIC BLOOD PRESSURE: 122 MMHG | BODY MASS INDEX: 38.45 KG/M2 | WEIGHT: 224 LBS | TEMPERATURE: 97.3 F | HEART RATE: 87 BPM | DIASTOLIC BLOOD PRESSURE: 80 MMHG

## 2025-07-17 DIAGNOSIS — J45.40 MODERATE PERSISTENT ASTHMA WITHOUT COMPLICATION: Primary | ICD-10-CM

## 2025-07-17 DIAGNOSIS — I83.813 VARICOSE VEINS OF BOTH LOWER EXTREMITIES WITH PAIN: ICD-10-CM

## 2025-07-17 DIAGNOSIS — F17.211 CIGARETTE NICOTINE DEPENDENCE IN REMISSION: ICD-10-CM

## 2025-07-17 DIAGNOSIS — E66.01 CLASS 2 SEVERE OBESITY DUE TO EXCESS CALORIES WITH SERIOUS COMORBIDITY AND BODY MASS INDEX (BMI) OF 38.0 TO 38.9 IN ADULT (HCC): ICD-10-CM

## 2025-07-17 DIAGNOSIS — E66.812 CLASS 2 SEVERE OBESITY DUE TO EXCESS CALORIES WITH SERIOUS COMORBIDITY AND BODY MASS INDEX (BMI) OF 38.0 TO 38.9 IN ADULT (HCC): ICD-10-CM

## 2025-07-17 PROBLEM — F17.213 CIGARETTE NICOTINE DEPENDENCE WITH WITHDRAWAL: Status: ACTIVE | Noted: 2025-07-17

## 2025-07-17 PROCEDURE — 99214 OFFICE O/P EST MOD 30 MIN: CPT | Performed by: NURSE PRACTITIONER

## 2025-07-17 RX ORDER — FLUTICASONE FUROATE AND VILANTEROL 200; 25 UG/1; UG/1
1 POWDER RESPIRATORY (INHALATION) DAILY
Qty: 180 BLISTER | Refills: 1 | Status: SHIPPED | OUTPATIENT
Start: 2025-07-17 | End: 2025-08-16

## 2025-07-17 NOTE — ASSESSMENT & PLAN NOTE
Discontinue Symbicort and return to Breo given better efficacy  Did not tolerate Symbicort  Continue albuterol as needed  She is aware of proper use and technique of her inhaled regimen  Orders:    fluticasone-vilanterol (Breo Ellipta) 200-25 mcg/actuation inhaler; Inhale 1 puff daily Rinse mouth after use.

## 2025-07-17 NOTE — PROGRESS NOTES
Follow-up  Visit - Pulmonary Medicine   Name: Elissa Leal      : 1975      MRN: [id number]  Encounter Provider: SANGEETA Carlos  Encounter Date: 2025   Encounter department: [facility]  :  Assessment & Plan  Moderate persistent asthma without complication  Discontinue Symbicort and return to Breo given better efficacy  Did not tolerate Symbicort  Continue albuterol as needed  She is aware of proper use and technique of her inhaled regimen  Orders:    fluticasone-vilanterol (Breo Ellipta) 200-25 mcg/actuation inhaler; Inhale 1 puff daily Rinse mouth after use.    Class 2 severe obesity due to excess calories with serious comorbidity and body mass index (BMI) of 38.0 to 38.9 in adult (HCC)  Continues to be active and is on Zepbound  Has lost 70 pounds  Continued lifestyle modifications and weight loss as able       Varicose veins of both lower extremities with pain  Following with vascular  Lower extremity duplex did not reveal VTE as ordered by Dr. Landeros       Cigarette nicotine dependence in remission  Quit in 2018 with less than 5-pack-year smoking history  Continued complete cessation         Return in about 6 months (around 2026), or if symptoms worsen or fail to improve.    All of Eli's questions were answered prior to leaving the office today. She will follow-up as listed above or sooner should the need arise, and is aware to call the office with any further questions or concerns.    History of Present Illness   Elissa Leal is a 50 y.o. female who presents for follow-up of asthma.  Eli saw Dr. Landeros about 4 months ago.  She was transitioned from Breo 200 to Symbicort.  She reports that since that time she feels the Symbicort does not work as well and she does not get the medication in as well as the Breo.  She would like to return to Breo because she had better efficacy.  She uses the albuterol fairly rarely unless weather exacerbates her symptoms.   She remains active and walks about 2-1/2 miles daily.  She has lost 70 pounds on a combination of Wegovy that was transitioned to Zepbound due to gastroparesis.  She is following with vascular for varicose veins.  Otherwise, no complaints.    Review of Systems   All other systems reviewed and are negative.  Aside from what is mentioned in the HPI, ROS is otherwise negative.     Medical History Reviewed by provider this encounter:  Tobacco  Allergies  Meds  Problems  Med Hx  Surg Hx  Fam Hx     .    Objective   Wt 102 kg (224 lb)   BMI 38.45 kg/m²     Physical Exam  Vitals reviewed.   Constitutional:       General: She is not in acute distress.     Appearance: She is well-developed. She is morbidly obese. She is not toxic-appearing or diaphoretic.   HENT:      Head: Normocephalic and atraumatic.     Eyes:      General: No scleral icterus.     Extraocular Movements: Extraocular movements intact.     Neck:      Trachea: No tracheal deviation.     Cardiovascular:      Rate and Rhythm: Normal rate and regular rhythm.      Heart sounds: S1 normal and S2 normal. No murmur heard.     No friction rub. No gallop.   Pulmonary:      Effort: Pulmonary effort is normal. No tachypnea, accessory muscle usage or respiratory distress.      Breath sounds: Normal breath sounds. No stridor. No decreased breath sounds, wheezing, rhonchi or rales.   Chest:      Chest wall: No tenderness.   Abdominal:      General: Bowel sounds are normal. There is no distension.      Palpations: Abdomen is soft.      Tenderness: There is no abdominal tenderness.     Musculoskeletal:      Cervical back: Neck supple.      Right lower leg: No edema.      Left lower leg: No edema.     Skin:     General: Skin is warm and dry.      Findings: No rash.     Neurological:      Mental Status: She is alert and oriented to person, place, and time.      GCS: GCS eye subscore is 4. GCS verbal subscore is 5. GCS motor subscore is 6.     Psychiatric:          Speech: Speech normal.         Behavior: Behavior normal. Behavior is cooperative.       Diagnostic Data:  Lower extremity venous duplex negative for VTE in April 2025      SANGEETA Carlos

## 2025-07-17 NOTE — LETTER
2025     SANGEETA Chacon  [address]  [address]  [place] [address]    Patient: Elissa Leal   YOB: 1975   Date of Visit: 2025       Dear SANGEETA White MD:    Thank you for referring Elissa Leal to me for evaluation. Below are my notes for this consultation.    If you have questions, please do not hesitate to call me. I look forward to following your patient along with you.         Sincerely,        SANGEETA Carlos        CC: MD Malia Gorman CRNP  2025 11:07 AM  Sign when Signing Visit  Follow-up  Visit - Pulmonary Medicine   Name: Elissa Leal      : 1975      MRN: [id number]  Encounter Provider: SANGEETA Carlos  Encounter Date: 2025   Encounter department: [facility]  :  Assessment & Plan  Moderate persistent asthma without complication  Discontinue Symbicort and return to Breo given better efficacy  Did not tolerate Symbicort  Continue albuterol as needed  She is aware of proper use and technique of her inhaled regimen  Orders:  •  fluticasone-vilanterol (Breo Ellipta) 200-25 mcg/actuation inhaler; Inhale 1 puff daily Rinse mouth after use.    Class 2 severe obesity due to excess calories with serious comorbidity and body mass index (BMI) of 38.0 to 38.9 in adult (HCC)  Continues to be active and is on Zepbound  Has lost 70 pounds  Continued lifestyle modifications and weight loss as able       Varicose veins of both lower extremities with pain  Following with vascular  Lower extremity duplex did not reveal VTE as ordered by Dr. Landeros       Cigarette nicotine dependence in remission  Quit in 2018 with less than 5-pack-year smoking history  Continued complete cessation         Return in about 6 months (around 2026), or if symptoms worsen or fail to improve.    All of Eli's questions were answered prior to leaving the office today. She will follow-up as  listed above or sooner should the need arise, and is aware to call the office with any further questions or concerns.    History of Present Illness  Elissa Leal is a 50 y.o. female who presents for follow-up of asthma.  Eli saw Dr. Landeros about 4 months ago.  She was transitioned from Breo 200 to Symbicort.  She reports that since that time she feels the Symbicort does not work as well and she does not get the medication in as well as the Breo.  She would like to return to Breo because she had better efficacy.  She uses the albuterol fairly rarely unless weather exacerbates her symptoms.  She remains active and walks about 2-1/2 miles daily.  She has lost 70 pounds on a combination of Wegovy that was transitioned to Zepbound due to gastroparesis.  She is following with vascular for varicose veins.  Otherwise, no complaints.    Review of Systems   All other systems reviewed and are negative.  Aside from what is mentioned in the HPI, ROS is otherwise negative.     Medical History Reviewed by provider this encounter:  Tobacco  Allergies  Meds  Problems  Med Hx  Surg Hx  Fam Hx     .    Objective  Wt 102 kg (224 lb)   BMI 38.45 kg/m²     Physical Exam  Vitals reviewed.   Constitutional:       General: She is not in acute distress.     Appearance: She is well-developed. She is morbidly obese. She is not toxic-appearing or diaphoretic.   HENT:      Head: Normocephalic and atraumatic.     Eyes:      General: No scleral icterus.     Extraocular Movements: Extraocular movements intact.     Neck:      Trachea: No tracheal deviation.     Cardiovascular:      Rate and Rhythm: Normal rate and regular rhythm.      Heart sounds: S1 normal and S2 normal. No murmur heard.     No friction rub. No gallop.   Pulmonary:      Effort: Pulmonary effort is normal. No tachypnea, accessory muscle usage or respiratory distress.      Breath sounds: Normal breath sounds. No stridor. No decreased breath sounds, wheezing, rhonchi or  rales.   Chest:      Chest wall: No tenderness.   Abdominal:      General: Bowel sounds are normal. There is no distension.      Palpations: Abdomen is soft.      Tenderness: There is no abdominal tenderness.     Musculoskeletal:      Cervical back: Neck supple.      Right lower leg: No edema.      Left lower leg: No edema.     Skin:     General: Skin is warm and dry.      Findings: No rash.     Neurological:      Mental Status: She is alert and oriented to person, place, and time.      GCS: GCS eye subscore is 4. GCS verbal subscore is 5. GCS motor subscore is 6.     Psychiatric:         Speech: Speech normal.         Behavior: Behavior normal. Behavior is cooperative.       Diagnostic Data:  Lower extremity venous duplex negative for VTE in April 2025      SANGEETA Carlos

## 2025-07-17 NOTE — ASSESSMENT & PLAN NOTE
Continues to be active and is on Zepbound  Has lost 70 pounds  Continued lifestyle modifications and weight loss as able

## 2025-07-17 NOTE — ASSESSMENT & PLAN NOTE
Following with vascular  Lower extremity duplex did not reveal VTE as ordered by Dr. Landeros

## 2025-07-18 ENCOUNTER — TELEPHONE (OUTPATIENT)
Age: 50
End: 2025-07-18

## 2025-07-18 NOTE — TELEPHONE ENCOUNTER
PA for BREO 200 SUBMITTED to [employer]     via    [x]CMM-KEY: [id number]      [x]PA sent as URGENT    All office notes, labs and other pertaining documents and studies sent. Clinical questions answered. Awaiting determination from insurance company.     Turnaround time for your insurance to make a decision on your Prior Authorization can take 7-21 business days.

## 2025-07-21 NOTE — TELEPHONE ENCOUNTER
Call from patient requesting update on her prior authorization. Please return her call. Thank you.

## 2025-07-22 NOTE — TELEPHONE ENCOUNTER
Patient called wanted update on prior authorization. I did inform patient can take anywhere from 7 to 21 days for response from insurance company. I suggested patient calling insurance to check on status

## 2025-07-24 DIAGNOSIS — R11.0 NAUSEA: ICD-10-CM

## 2025-07-24 NOTE — TELEPHONE ENCOUNTER
[employer] calling stating Prior auth needs to be completed in portal or it will time out, please complete. Any questions please call [phone].

## 2025-07-25 RX ORDER — ONDANSETRON 4 MG/1
TABLET, FILM COATED ORAL
Qty: 90 TABLET | Refills: 0 | Status: SHIPPED | OUTPATIENT
Start: 2025-07-25

## 2025-07-25 NOTE — TELEPHONE ENCOUNTER
PA for BREO  APPROVED     Date(s) approved 7/24/25-7/24/26    Case #    Patient advised by          [x]MyChart Message  []Phone call   [x]LMOM  []L/M to call office as no active Communication consent on file  []Unable to leave detailed message as VM not approved on Communication consent       Pharmacy advised by    [x]Fax  []Phone call  []Secure Chat    Specialty Pharmacy    []     Approval letter scanned into [employer] Yes      GENERAL: No fever or chills, EYES: no change in vision, HEENT: no trouble speaking, CARDIAC: no chest pain, palpitation PULMONARY: no cough or SOB, GI: no abdominal pain, no nausea, no vomiting, + diarrhea or constipation, : No changes in urination, SKIN: no rashes, NEURO: no headache,  MSK: No muscle pain ~Nic Ibarra MD (PGY 1)

## 2025-08-05 ENCOUNTER — APPOINTMENT (OUTPATIENT)
Dept: LAB | Facility: CLINIC | Age: 50
End: 2025-08-05
Payer: MEDICARE

## 2025-08-05 DIAGNOSIS — Z00.00 ANNUAL PHYSICAL EXAM: ICD-10-CM

## 2025-08-05 DIAGNOSIS — D72.829 LEUKOCYTOSIS, UNSPECIFIED TYPE: ICD-10-CM

## 2025-08-05 DIAGNOSIS — D75.839 THROMBOCYTOSIS: ICD-10-CM

## 2025-08-05 LAB
ALBUMIN SERPL BCG-MCNC: 4 G/DL (ref 3.5–5)
ALP SERPL-CCNC: 65 U/L (ref 34–104)
ALT SERPL W P-5'-P-CCNC: 26 U/L (ref 7–52)
ANION GAP SERPL CALCULATED.3IONS-SCNC: 7 MMOL/L (ref 4–13)
AST SERPL W P-5'-P-CCNC: 19 U/L (ref 13–39)
BASOPHILS # BLD AUTO: 0.1 THOUSANDS/ÂΜL (ref 0–0.1)
BASOPHILS NFR BLD AUTO: 1 % (ref 0–1)
BILIRUB SERPL-MCNC: 0.59 MG/DL (ref 0.2–1)
BUN SERPL-MCNC: 16 MG/DL (ref 5–25)
CALCIUM SERPL-MCNC: 9.8 MG/DL (ref 8.4–10.2)
CHLORIDE SERPL-SCNC: 104 MMOL/L (ref 96–108)
CHOLEST SERPL-MCNC: 165 MG/DL (ref ?–200)
CO2 SERPL-SCNC: 26 MMOL/L (ref 21–32)
CREAT SERPL-MCNC: 0.97 MG/DL (ref 0.6–1.3)
CRP SERPL QL: 14.5 MG/L
EOSINOPHIL # BLD AUTO: 0.26 THOUSAND/ÂΜL (ref 0–0.61)
EOSINOPHIL NFR BLD AUTO: 3 % (ref 0–6)
ERYTHROCYTE [DISTWIDTH] IN BLOOD BY AUTOMATED COUNT: 16.2 % (ref 11.6–15.1)
ERYTHROCYTE [SEDIMENTATION RATE] IN BLOOD: 18 MM/HOUR (ref 0–19)
GFR SERPL CREATININE-BSD FRML MDRD: 68 ML/MIN/1.73SQ M
GLUCOSE P FAST SERPL-MCNC: 91 MG/DL (ref 65–99)
HCT VFR BLD AUTO: 43.8 % (ref 34.8–46.1)
HDLC SERPL-MCNC: 53 MG/DL
HGB BLD-MCNC: 13.6 G/DL (ref 11.5–15.4)
IMM GRANULOCYTES # BLD AUTO: 0.02 THOUSAND/UL (ref 0–0.2)
IMM GRANULOCYTES NFR BLD AUTO: 0 % (ref 0–2)
LDH SERPL-CCNC: 152 U/L (ref 140–271)
LDLC SERPL CALC-MCNC: 95 MG/DL (ref 0–100)
LYMPHOCYTES # BLD AUTO: 3.69 THOUSANDS/ÂΜL (ref 0.6–4.47)
LYMPHOCYTES NFR BLD AUTO: 38 % (ref 14–44)
MCH RBC QN AUTO: 22 PG (ref 26.8–34.3)
MCHC RBC AUTO-ENTMCNC: 31.1 G/DL (ref 31.4–37.4)
MCV RBC AUTO: 71 FL (ref 82–98)
MONOCYTES # BLD AUTO: 0.59 THOUSAND/ÂΜL (ref 0.17–1.22)
MONOCYTES NFR BLD AUTO: 6 % (ref 4–12)
NEUTROPHILS # BLD AUTO: 5.07 THOUSANDS/ÂΜL (ref 1.85–7.62)
NEUTS SEG NFR BLD AUTO: 52 % (ref 43–75)
NONHDLC SERPL-MCNC: 112 MG/DL
NRBC BLD AUTO-RTO: 0 /100 WBCS
PLATELET # BLD AUTO: 398 THOUSANDS/UL (ref 149–390)
PMV BLD AUTO: 10.1 FL (ref 8.9–12.7)
POTASSIUM SERPL-SCNC: 4.5 MMOL/L (ref 3.5–5.3)
PROT SERPL-MCNC: 7.3 G/DL (ref 6.4–8.4)
RBC # BLD AUTO: 6.18 MILLION/UL (ref 3.81–5.12)
SODIUM SERPL-SCNC: 137 MMOL/L (ref 135–147)
TRIGL SERPL-MCNC: 83 MG/DL (ref ?–150)
WBC # BLD AUTO: 9.73 THOUSAND/UL (ref 4.31–10.16)

## 2025-08-05 PROCEDURE — 86140 C-REACTIVE PROTEIN: CPT

## 2025-08-05 PROCEDURE — 80061 LIPID PANEL: CPT

## 2025-08-05 PROCEDURE — 80053 COMPREHEN METABOLIC PANEL: CPT

## 2025-08-05 PROCEDURE — 85652 RBC SED RATE AUTOMATED: CPT

## 2025-08-05 PROCEDURE — 85025 COMPLETE CBC W/AUTO DIFF WBC: CPT

## 2025-08-05 PROCEDURE — 83615 LACTATE (LD) (LDH) ENZYME: CPT

## 2025-08-05 PROCEDURE — 36415 COLL VENOUS BLD VENIPUNCTURE: CPT

## 2025-08-11 ENCOUNTER — OFFICE VISIT (OUTPATIENT)
Dept: HEMATOLOGY ONCOLOGY | Facility: CLINIC | Age: 50
End: 2025-08-11
Payer: MEDICARE

## 2025-08-11 ENCOUNTER — TELEPHONE (OUTPATIENT)
Age: 50
End: 2025-08-11

## 2025-08-13 ENCOUNTER — APPOINTMENT (OUTPATIENT)
Dept: LAB | Facility: CLINIC | Age: 50
End: 2025-08-13
Payer: MEDICARE

## 2025-08-13 DIAGNOSIS — M79.10 MYALGIA: ICD-10-CM

## 2025-08-13 DIAGNOSIS — D56.3 BETA THALASSEMIA MINOR: ICD-10-CM

## 2025-08-13 DIAGNOSIS — D72.829 LEUKOCYTOSIS, UNSPECIFIED TYPE: ICD-10-CM

## 2025-08-13 DIAGNOSIS — D75.839 THROMBOCYTOSIS: ICD-10-CM

## 2025-08-13 LAB — B BURGDOR IGG+IGM SER QL IA: NEGATIVE

## 2025-08-13 PROCEDURE — 36415 COLL VENOUS BLD VENIPUNCTURE: CPT

## 2025-08-13 PROCEDURE — 86618 LYME DISEASE ANTIBODY: CPT

## 2025-08-18 ENCOUNTER — TELEPHONE (OUTPATIENT)
Age: 50
End: 2025-08-18

## 2025-08-18 DIAGNOSIS — Z12.31 ENCOUNTER FOR SCREENING MAMMOGRAM FOR BREAST CANCER: Primary | ICD-10-CM

## 2025-08-22 DIAGNOSIS — E66.813 CLASS 3 SEVERE OBESITY DUE TO EXCESS CALORIES WITH SERIOUS COMORBIDITY AND BODY MASS INDEX (BMI) OF 45.0 TO 49.9 IN ADULT: ICD-10-CM

## 2025-08-22 RX ORDER — TIRZEPATIDE 12.5 MG/.5ML
12.5 INJECTION, SOLUTION SUBCUTANEOUS WEEKLY
Qty: 2 ML | Refills: 0 | Status: SHIPPED | OUTPATIENT
Start: 2025-08-22